# Patient Record
Sex: FEMALE | Race: BLACK OR AFRICAN AMERICAN | Employment: UNEMPLOYED | URBAN - METROPOLITAN AREA
[De-identification: names, ages, dates, MRNs, and addresses within clinical notes are randomized per-mention and may not be internally consistent; named-entity substitution may affect disease eponyms.]

---

## 2018-11-15 ENCOUNTER — APPOINTMENT (OUTPATIENT)
Dept: GENERAL RADIOLOGY | Age: 48
End: 2018-11-15
Payer: COMMERCIAL

## 2018-11-15 ENCOUNTER — APPOINTMENT (OUTPATIENT)
Dept: CT IMAGING | Age: 48
End: 2018-11-15
Payer: COMMERCIAL

## 2018-11-15 ENCOUNTER — HOSPITAL ENCOUNTER (EMERGENCY)
Age: 48
Discharge: HOME | End: 2018-11-15
Payer: COMMERCIAL

## 2018-11-15 VITALS
HEART RATE: 98 BPM | SYSTOLIC BLOOD PRESSURE: 183 MMHG | RESPIRATION RATE: 16 BRPM | DIASTOLIC BLOOD PRESSURE: 108 MMHG | WEIGHT: 250 LBS | OXYGEN SATURATION: 98 % | BODY MASS INDEX: 42.91 KG/M2 | TEMPERATURE: 98.5 F
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
## 2018-11-15 DIAGNOSIS — S13.4XXA WHIPLASH INJURY TO NECK, INITIAL ENCOUNTER: ICD-10-CM

## 2018-11-15 DIAGNOSIS — M25.462 EFFUSION OF LEFT KNEE: ICD-10-CM

## 2018-11-15 PROCEDURE — 73562 X-RAY EXAM OF KNEE 3: CPT

## 2018-11-15 PROCEDURE — 99284 EMERGENCY DEPT VISIT MOD MDM: CPT

## 2018-11-15 PROCEDURE — 72125 CT NECK SPINE W/O DYE: CPT

## 2018-11-15 PROCEDURE — 6370000000 HC RX 637 (ALT 250 FOR IP): Performed by: NURSE PRACTITIONER

## 2018-11-15 PROCEDURE — 70450 CT HEAD/BRAIN W/O DYE: CPT

## 2018-11-15 RX ORDER — NAPROXEN 250 MG/1
500 TABLET ORAL ONCE
Status: COMPLETED | OUTPATIENT
Start: 2018-11-15 | End: 2018-11-15

## 2018-11-15 RX ORDER — NAPROXEN 500 MG/1
500 TABLET ORAL 2 TIMES DAILY WITH MEALS
Qty: 30 TABLET | Refills: 0 | Status: SHIPPED | OUTPATIENT
Start: 2018-11-15 | End: 2019-07-17

## 2018-11-15 RX ORDER — HYDROCODONE BITARTRATE AND ACETAMINOPHEN 5; 325 MG/1; MG/1
1 TABLET ORAL ONCE
Status: DISCONTINUED | OUTPATIENT
Start: 2018-11-15 | End: 2018-11-15

## 2018-11-15 RX ORDER — CYCLOBENZAPRINE HCL 10 MG
10 TABLET ORAL 3 TIMES DAILY PRN
Qty: 20 TABLET | Refills: 0 | Status: SHIPPED | OUTPATIENT
Start: 2018-11-15 | End: 2018-11-22

## 2018-11-15 RX ADMIN — NAPROXEN 500 MG: 250 TABLET ORAL at 17:09

## 2018-11-15 ASSESSMENT — ENCOUNTER SYMPTOMS
VOMITING: 0
ABDOMINAL PAIN: 0
DIARRHEA: 0
CONSTIPATION: 0
RHINORRHEA: 0
SORE THROAT: 0
SHORTNESS OF BREATH: 0
BLOOD IN STOOL: 0
NAUSEA: 0

## 2018-11-15 ASSESSMENT — PAIN SCALES - GENERAL: PAINLEVEL_OUTOF10: 8
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
## 2019-07-17 ENCOUNTER — OFFICE VISIT (OUTPATIENT)
Dept: PRIMARY CARE CLINIC | Age: 49
End: 2019-07-17
Payer: COMMERCIAL

## 2019-07-17 VITALS
SYSTOLIC BLOOD PRESSURE: 172 MMHG | BODY MASS INDEX: 43.19 KG/M2 | DIASTOLIC BLOOD PRESSURE: 115 MMHG | TEMPERATURE: 98.4 F | WEIGHT: 253 LBS | RESPIRATION RATE: 12 BRPM | HEIGHT: 64 IN | OXYGEN SATURATION: 96 % | HEART RATE: 80 BPM

## 2019-07-17 DIAGNOSIS — H60.312 ACUTE DIFFUSE OTITIS EXTERNA OF LEFT EAR: ICD-10-CM

## 2019-07-17 DIAGNOSIS — G89.29 CHRONIC MIDLINE LOW BACK PAIN WITH RIGHT-SIDED SCIATICA: ICD-10-CM

## 2019-07-17 DIAGNOSIS — Z78.9 NO HISTORY OF VACCINATION FOR MEASLES, MUMPS, RUBELLA (MMR): ICD-10-CM

## 2019-07-17 DIAGNOSIS — Z12.39 BREAST CANCER SCREENING: ICD-10-CM

## 2019-07-17 DIAGNOSIS — M54.41 CHRONIC MIDLINE LOW BACK PAIN WITH RIGHT-SIDED SCIATICA: ICD-10-CM

## 2019-07-17 DIAGNOSIS — F33.1 MODERATE EPISODE OF RECURRENT MAJOR DEPRESSIVE DISORDER (HCC): ICD-10-CM

## 2019-07-17 DIAGNOSIS — Z00.00 PREVENTATIVE HEALTH CARE: Primary | ICD-10-CM

## 2019-07-17 DIAGNOSIS — Z00.00 PREVENTATIVE HEALTH CARE: ICD-10-CM

## 2019-07-17 DIAGNOSIS — I10 ESSENTIAL HYPERTENSION: ICD-10-CM

## 2019-07-17 LAB
ALBUMIN SERPL-MCNC: 4.6 G/DL (ref 3.4–5)
ALP BLD-CCNC: 57 U/L (ref 40–129)
ALT SERPL-CCNC: 12 U/L (ref 10–40)
ANION GAP SERPL CALCULATED.3IONS-SCNC: 11 MMOL/L (ref 3–16)
AST SERPL-CCNC: 13 U/L (ref 15–37)
BASOPHILS ABSOLUTE: 0 K/UL (ref 0–0.2)
BASOPHILS RELATIVE PERCENT: 0.3 %
BILIRUB SERPL-MCNC: <0.2 MG/DL (ref 0–1)
BILIRUBIN DIRECT: <0.2 MG/DL (ref 0–0.3)
BILIRUBIN, INDIRECT: ABNORMAL MG/DL (ref 0–1)
BUN BLDV-MCNC: 12 MG/DL (ref 7–20)
CALCIUM SERPL-MCNC: 9.6 MG/DL (ref 8.3–10.6)
CHLORIDE BLD-SCNC: 102 MMOL/L (ref 99–110)
CHOLESTEROL, TOTAL: 166 MG/DL (ref 0–199)
CO2: 28 MMOL/L (ref 21–32)
CREAT SERPL-MCNC: 0.7 MG/DL (ref 0.6–1.1)
EOSINOPHILS ABSOLUTE: 0 K/UL (ref 0–0.6)
EOSINOPHILS RELATIVE PERCENT: 1.3 %
GFR AFRICAN AMERICAN: >60
GFR NON-AFRICAN AMERICAN: >60
GLUCOSE BLD-MCNC: 101 MG/DL (ref 70–99)
HCT VFR BLD CALC: 32.4 % (ref 36–48)
HDLC SERPL-MCNC: 54 MG/DL (ref 40–60)
HEMOGLOBIN: 10.4 G/DL (ref 12–16)
LDL CHOLESTEROL CALCULATED: 97 MG/DL
LYMPHOCYTES ABSOLUTE: 1.6 K/UL (ref 1–5.1)
LYMPHOCYTES RELATIVE PERCENT: 40.5 %
MCH RBC QN AUTO: 28.1 PG (ref 26–34)
MCHC RBC AUTO-ENTMCNC: 32.1 G/DL (ref 31–36)
MCV RBC AUTO: 87.5 FL (ref 80–100)
MONOCYTES ABSOLUTE: 0.2 K/UL (ref 0–1.3)
MONOCYTES RELATIVE PERCENT: 6.3 %
NEUTROPHILS ABSOLUTE: 2 K/UL (ref 1.7–7.7)
NEUTROPHILS RELATIVE PERCENT: 51.6 %
PDW BLD-RTO: 18.1 % (ref 12.4–15.4)
PHOSPHORUS: 3.7 MG/DL (ref 2.5–4.9)
PLATELET # BLD: 267 K/UL (ref 135–450)
PMV BLD AUTO: 7.6 FL (ref 5–10.5)
POTASSIUM SERPL-SCNC: 4.2 MMOL/L (ref 3.5–5.1)
RBC # BLD: 3.7 M/UL (ref 4–5.2)
RUBELLA ANTIBODY IGG: 16.9 IU/ML
SODIUM BLD-SCNC: 141 MMOL/L (ref 136–145)
TOTAL PROTEIN: 8.5 G/DL (ref 6.4–8.2)
TRIGL SERPL-MCNC: 74 MG/DL (ref 0–150)
VLDLC SERPL CALC-MCNC: 15 MG/DL
WBC # BLD: 3.9 K/UL (ref 4–11)

## 2019-07-17 PROCEDURE — 96160 PT-FOCUSED HLTH RISK ASSMT: CPT | Performed by: INTERNAL MEDICINE

## 2019-07-17 PROCEDURE — 99386 PREV VISIT NEW AGE 40-64: CPT | Performed by: INTERNAL MEDICINE

## 2019-07-17 RX ORDER — CYCLOBENZAPRINE HCL 5 MG
5 TABLET ORAL 2 TIMES DAILY PRN
Qty: 60 TABLET | Refills: 2 | Status: SHIPPED | OUTPATIENT
Start: 2019-07-17 | End: 2019-10-16 | Stop reason: SDUPTHER

## 2019-07-17 RX ORDER — AMLODIPINE BESYLATE 5 MG/1
5 TABLET ORAL DAILY
Qty: 30 TABLET | Refills: 5 | Status: SHIPPED | OUTPATIENT
Start: 2019-07-17 | End: 2020-02-16

## 2019-07-17 RX ORDER — NEOMYCIN SULFATE, POLYMYXIN B SULFATE AND HYDROCORTISONE 10; 3.5; 1 MG/ML; MG/ML; [USP'U]/ML
3 SUSPENSION/ DROPS AURICULAR (OTIC) 4 TIMES DAILY
Qty: 10 ML | Refills: 0 | Status: SHIPPED | OUTPATIENT
Start: 2019-07-17 | End: 2019-07-27

## 2019-07-17 RX ORDER — HYDROCHLOROTHIAZIDE 25 MG/1
25 TABLET ORAL EVERY MORNING
Qty: 30 TABLET | Refills: 5 | Status: SHIPPED | OUTPATIENT
Start: 2019-07-17 | End: 2020-02-16

## 2019-07-17 RX ORDER — GABAPENTIN 300 MG/1
300 CAPSULE ORAL 3 TIMES DAILY PRN
Qty: 60 CAPSULE | Refills: 5 | Status: SHIPPED | OUTPATIENT
Start: 2019-07-17 | End: 2020-02-16

## 2019-07-17 RX ORDER — SERTRALINE HYDROCHLORIDE 25 MG/1
25 TABLET, FILM COATED ORAL DAILY
Qty: 30 TABLET | Refills: 5 | Status: SHIPPED | OUTPATIENT
Start: 2019-07-17 | End: 2020-02-16

## 2019-07-17 SDOH — HEALTH STABILITY: MENTAL HEALTH: HOW OFTEN DO YOU HAVE A DRINK CONTAINING ALCOHOL?: MONTHLY OR LESS

## 2019-07-17 ASSESSMENT — PATIENT HEALTH QUESTIONNAIRE - PHQ9
6. FEELING BAD ABOUT YOURSELF - OR THAT YOU ARE A FAILURE OR HAVE LET YOURSELF OR YOUR FAMILY DOWN: 3
SUM OF ALL RESPONSES TO PHQ9 QUESTIONS 1 & 2: 6
7. TROUBLE CONCENTRATING ON THINGS, SUCH AS READING THE NEWSPAPER OR WATCHING TELEVISION: 3
2. FEELING DOWN, DEPRESSED OR HOPELESS: 3
8. MOVING OR SPEAKING SO SLOWLY THAT OTHER PEOPLE COULD HAVE NOTICED. OR THE OPPOSITE, BEING SO FIGETY OR RESTLESS THAT YOU HAVE BEEN MOVING AROUND A LOT MORE THAN USUAL: 0
3. TROUBLE FALLING OR STAYING ASLEEP: 2
1. LITTLE INTEREST OR PLEASURE IN DOING THINGS: 3
10. IF YOU CHECKED OFF ANY PROBLEMS, HOW DIFFICULT HAVE THESE PROBLEMS MADE IT FOR YOU TO DO YOUR WORK, TAKE CARE OF THINGS AT HOME, OR GET ALONG WITH OTHER PEOPLE: 2
SUM OF ALL RESPONSES TO PHQ QUESTIONS 1-9: 16
5. POOR APPETITE OR OVEREATING: 1
4. FEELING TIRED OR HAVING LITTLE ENERGY: 1
SUM OF ALL RESPONSES TO PHQ QUESTIONS 1-9: 16
9. THOUGHTS THAT YOU WOULD BE BETTER OFF DEAD, OR OF HURTING YOURSELF: 0

## 2019-07-17 ASSESSMENT — ENCOUNTER SYMPTOMS
SHORTNESS OF BREATH: 0
NAUSEA: 0
ABDOMINAL PAIN: 0
DIARRHEA: 0
COUGH: 0
CONSTIPATION: 0
VOMITING: 0
BACK PAIN: 1

## 2019-07-17 NOTE — PROGRESS NOTES
Hemoglobin A1C; Future  - Lipid Panel; Future  - HIV Screen; Future  - Hepatic Function Panel; Future  - Renal Function Panel; Future    2. Essential hypertension:  Goal BP less than 140/90. Pt's BP is very high today. Will start HCTZ and amlodipine for treatment   - hydrochlorothiazide (HYDRODIURIL) 25 MG tablet; Take 1 tablet by mouth every morning  Dispense: 30 tablet; Refill: 5  - amLODIPine (NORVASC) 5 MG tablet; Take 1 tablet by mouth daily  Dispense: 30 tablet; Refill: 5    3. Chronic midline low back pain with right-sided sciatica    - gabapentin (NEURONTIN) 300 MG capsule; Take 1 capsule by mouth 3 times daily as needed (pain) for up to 180 days. Intended supply: 30 days  Dispense: 60 capsule; Refill: 5  - cyclobenzaprine (FLEXERIL) 5 MG tablet; Take 1 tablet by mouth 2 times daily as needed for Muscle spasms  Dispense: 60 tablet; Refill: 2  - [facility]    4. Moderate episode of recurrent major depressive disorder (HCC)    - sertraline (ZOLOFT) 25 MG tablet; Take 1 tablet by mouth daily  Dispense: 30 tablet; Refill: 5  - External Referral To Psychology    5. Breast cancer screening    - [facility] DIGITAL SCREEN W OR WO CAD BILATERAL; Future    6. Acute diffuse otitis externa of left ear    - neomycin-polymyxin-hydrocortisone (CORTISPORIN) 3.5-27235-9 otic suspension; Place 3 drops into the right ear 4 times daily for 10 days  Dispense: 10 mL; Refill: 0    7. No history of vaccination for measles, mumps, rubella (MMR)    - RUBEOLA ANTIBODY IGG; Future  - MUMPS ANTIBODY, IGG; Future  - RUBELLA ANTIBODY, IGG; Future        Return in about 1 month (around 8/17/2019) for hypertension, depression .

## 2019-07-17 NOTE — LETTER
[facility]  [id number]  Phone: [phone]  Fax: [phone]    Jaz Asher MD        July 17, 2019     Patient: Alton Miller   YOB: 1970   Date of Visit: 7/17/2019       To Whom it May Concern:    Noa Trejo was seen in my clinic on 7/17/2019. She is currently waiting on blood work to demonstrate vaccination against measles, mumps, and rubella. Her paperwork will be completed as soon as the test results return. If you have any questions or concerns, please don't hesitate to call.     Sincerely,         Jaz Asher MD

## 2019-07-17 NOTE — PATIENT INSTRUCTIONS
your body. ? Sudden vision changes. ? Sudden trouble speaking. ? Sudden confusion or trouble understanding simple statements. ? Sudden problems with walking or balance. ? A sudden, severe headache that is different from past headaches.     · You have severe back or belly pain.    Do not wait until your blood pressure comes down on its own. Get help right away.   Call your doctor now or seek immediate care if:    · Your blood pressure is much higher than normal (such as 180/120 or higher), but you don't have symptoms.     · You think high blood pressure is causing symptoms, such as:  ? Severe headache.  ? Blurry vision.    Watch closely for changes in your health, and be sure to contact your doctor if:    · Your blood pressure measures higher than your doctor recommends at least 2 times. That means the top number is higher or the bottom number is higher, or both.     · You think you may be having side effects from your blood pressure medicine. Where can you learn more? Go to https://[employer]. org and sign in to your [employer] account. Enter I388 in the [employer] box to learn more about \"High Blood Pressure: Care Instructions. \"     If you do not have an account, please click on the \"Sign Up Now\" link. Current as of: July 22, 2018  Content Version: 12.0  © 7257-5047 Healthwise, Incorporated. Care instructions adapted under license by [facility] ([facility]). If you have questions about a medical condition or this instruction, always ask your healthcare professional. [place] [address] any warranty or liability for your use of this information. Patient Education        Low Sodium Diet (2,000 Milligram): Care Instructions  Your Care Instructions    Too much sodium causes your body to hold on to extra water. This can raise your blood pressure and force your heart and kidneys to work harder.  In very serious cases, this could cause you to be put in the hospital. It might even be life-threatening. By limiting sodium, you will feel better and lower your risk of serious problems. The most common source of sodium is salt. People get most of the salt in their diet from canned, prepared, and packaged foods. Fast food and restaurant meals also are very high in sodium. Your doctor will probably limit your sodium to less than 2,000 milligrams (mg) a day. This limit counts all the sodium in prepared and packaged foods and any salt you add to your food. Follow-up care is a key part of your treatment and safety. Be sure to make and go to all appointments, and call your doctor if you are having problems. It's also a good idea to know your test results and keep a list of the medicines you take. How can you care for yourself at home? Read food labels  · Read labels on cans and food packages. The labels tell you how much sodium is in each serving. Make sure that you look at the serving size. If you eat more than the serving size, you have eaten more sodium. · Food labels also tell you the Percent Daily Value for sodium. Choose products with low Percent Daily Values for sodium. · Be aware that sodium can come in forms other than salt, including monosodium glutamate (MSG), sodium citrate, and sodium bicarbonate (baking soda). MSG is often added to Asian food. When you eat out, you can sometimes ask for food without MSG or added salt. Buy low-sodium foods  · Buy foods that are labeled \"unsalted\" (no salt added), \"sodium-free\" (less than 5 mg of sodium per serving), or \"low-sodium\" (less than 140 mg of sodium per serving). Foods labeled \"reduced-sodium\" and \"light sodium\" may still have too much sodium. Be sure to read the label to see how much sodium you are getting. · Buy fresh vegetables, or frozen vegetables without added sauces. Buy low-sodium versions of canned vegetables, soups, and other canned goods. Prepare low-sodium meals  · Cut back on the amount of salt you use in cooking.  This Milligram): Care Instructions. \"     If you do not have an account, please click on the \"Sign Up Now\" link. Current as of: November 7, 2018  Content Version: 12.0  © 7521-9756 Healthwise, Incorporated. Care instructions adapted under license by [facility] ([facility]). If you have questions about a medical condition or this instruction, always ask your healthcare professional. [address] any warranty or liability for your use of this information.

## 2019-07-18 LAB
ESTIMATED AVERAGE GLUCOSE: 134.1 MG/DL
HBA1C MFR BLD: 6.3 %
HIV AG/AB: NORMAL
HIV ANTIGEN: NORMAL
HIV-1 ANTIBODY: NORMAL
HIV-2 AB: NORMAL

## 2019-07-19 LAB
MUV IGG SER QL: 8.2 AU/ML
RUBEOLA (MEASLES) AB IGG: >300 AU/ML

## 2019-07-22 DIAGNOSIS — R73.03 PREDIABETES: Primary | ICD-10-CM

## 2019-07-22 DIAGNOSIS — D50.9 IRON DEFICIENCY ANEMIA, UNSPECIFIED IRON DEFICIENCY ANEMIA TYPE: ICD-10-CM

## 2019-07-22 RX ORDER — LANOLIN ALCOHOL/MO/W.PET/CERES
325 CREAM (GRAM) TOPICAL
Qty: 30 TABLET | Refills: 5 | Status: SHIPPED | OUTPATIENT
Start: 2019-07-22 | End: 2020-09-15

## 2019-08-29 ENCOUNTER — TELEPHONE (OUTPATIENT)
Dept: PRIMARY CARE CLINIC | Age: 49
End: 2019-08-29

## 2019-08-29 NOTE — TELEPHONE ENCOUNTER
Tried to call and leave msg no voice mail set up. Patient needs MMR per nurse visit to be made so we can complete  Employee Medical Statement paper that's been scanned into chart.

## 2019-10-16 DIAGNOSIS — M54.41 CHRONIC MIDLINE LOW BACK PAIN WITH RIGHT-SIDED SCIATICA: ICD-10-CM

## 2019-10-16 DIAGNOSIS — G89.29 CHRONIC MIDLINE LOW BACK PAIN WITH RIGHT-SIDED SCIATICA: ICD-10-CM

## 2019-10-16 RX ORDER — CYCLOBENZAPRINE HCL 5 MG
TABLET ORAL
Qty: 60 TABLET | Refills: 0 | Status: SHIPPED | OUTPATIENT
Start: 2019-10-16 | End: 2019-11-15 | Stop reason: SDUPTHER

## 2019-11-15 DIAGNOSIS — M54.41 CHRONIC MIDLINE LOW BACK PAIN WITH RIGHT-SIDED SCIATICA: ICD-10-CM

## 2019-11-15 DIAGNOSIS — G89.29 CHRONIC MIDLINE LOW BACK PAIN WITH RIGHT-SIDED SCIATICA: ICD-10-CM

## 2019-11-18 RX ORDER — CYCLOBENZAPRINE HCL 5 MG
TABLET ORAL
Qty: 60 TABLET | Refills: 0 | Status: SHIPPED | OUTPATIENT
Start: 2019-11-18 | End: 2019-12-15 | Stop reason: SDUPTHER

## 2020-02-16 RX ORDER — GABAPENTIN 300 MG/1
CAPSULE ORAL
Qty: 60 CAPSULE | Refills: 5 | Status: SHIPPED | OUTPATIENT
Start: 2020-02-16 | End: 2020-09-15 | Stop reason: SDUPTHER

## 2020-02-16 RX ORDER — HYDROCHLOROTHIAZIDE 25 MG/1
25 TABLET ORAL EVERY MORNING
Qty: 30 TABLET | Refills: 5 | Status: SHIPPED | OUTPATIENT
Start: 2020-02-16 | End: 2020-09-15 | Stop reason: SDUPTHER

## 2020-02-16 RX ORDER — SERTRALINE HYDROCHLORIDE 25 MG/1
25 TABLET, FILM COATED ORAL DAILY
Qty: 30 TABLET | Refills: 5 | Status: SHIPPED | OUTPATIENT
Start: 2020-02-16 | End: 2020-09-15

## 2020-02-16 RX ORDER — AMLODIPINE BESYLATE 5 MG/1
5 TABLET ORAL DAILY
Qty: 30 TABLET | Refills: 5 | Status: SHIPPED | OUTPATIENT
Start: 2020-02-16 | End: 2020-09-15 | Stop reason: SDUPTHER

## 2020-09-14 PROBLEM — E66.01 MORBIDLY OBESE (HCC): Status: ACTIVE | Noted: 2020-09-14

## 2020-09-14 NOTE — PROGRESS NOTES
Date: 9/15/2020                                               Subjective/Objective:     Chief Complaint   Patient presents with   Lulú Dickerson New Doctor    Foot Pain     left gets stuck and she has manually move it       Former patient of Dr João Akers. Here to establish care with new provider. Last OV with Dr João Akers 7/17/2020. Works as  at [employer]. Hasn't taken any medications in over a week. Just forgets. Has been left foot cramping. In the morning, sometimes during the day. Started a few weeks ago. Is uncomfortable. Just doesn't seem to want to bend back into position (her toes). Did take her muscle relaxer today and isn't having the problem. - Hypertension:  Current treatment - HCTZ and norvasc. Home blood pressure monitoring: no. Check at the grocery store sometimes - denies highs. She is adherent to a low sodium diet. Patient denies chest pain, shortness of breath and headache. Antihypertensive medication side effects: no medication side effects noted. Use of agents associated with hypertension: none. Needs labs, will Rx one month of meds pending labs. Sodium (mmol/L)       Date                     Value                 07/17/2019               141              ---------- BUN (mg/dL)       Date                     Value                 07/17/2019               12               ---------- Glucose (mg/dL)       Date                     Value                 07/17/2019               101 (H)          ----------  Potassium (mmol/L)       Date                     Value                 07/17/2019               4.2              ---------- CREATININE (mg/dL)       Date                     Value                 07/17/2019               0.7              ----------     - Mood Disorder:  Patient presents for follow-up of depression and anxiety disorder. Current complaints include: tearfulness and excessive worry. She denies any other symptoms. Symptoms/signs of elvis: none. External stressors: housing issues. Previous treatment - Zoloft - stopped because 25 mg wasn't helping. Previously took 100 mg and felt well on this. Medication side effects: none. PHQ-9 in office today c/w moderate depression. Son is in skilled nursing - sentenced to 8 years - hasn't been able to visit due to COVID, is able to talk to him on the phone every day. Tearful in office when discussing this. - Prediabetes: A1C 6.3 (7/2019). Diet-drinks soda - has cut back - drinks regular, drinks juice occasionally. Diet is poor. Exercise-no regular. Denies polys. - Anemia: Isn't taking her Iron pills. Gets constipated when she takes them. Hasn't had occult stool testing to my knowledge. - Chronic low back pain: taking Gabapentin. Started many years ago, was playing with her children. Radiates down right leg. Gabapentin helps. Has not had injections. Has not seen specialist.  Has not done PT.     - Obesity: diet is poor, no regular exercise. Age/Gender Health Maintenance     Lipid - nl 7/2019  Colon Cancer Screening - needs. Denies FH  Never smoker      Tetanus - UTD, received 9/2017  Influenza Vaccine - needs - give in clinic today   Shingrix - needs   Needs MMR (not immune to mumps) - give dose 1 today      HIV Screening - non reactive 2019  Hep C Screening- needs, ordered     Breast Cancer Screening - last mammo 2016. Needs. Denies   Cervical Cancer Screening - s/p hysterectomy (unsure of cervix status).  Sees gyn at [address] [place] [address].                   Patient Active Problem List    Diagnosis Date Noted    Morbidly obese ([address] 09/14/2020    Prediabetes 07/22/2019    Iron deficiency anemia 07/22/2019    Essential hypertension 07/17/2019    Chronic midline low back pain with right-sided sciatica 07/17/2019    Moderate episode of recurrent major depressive disorder ([address] 07/17/2019    Acute diffuse otitis externa of left ear 07/17/2019    Other specified disorders of rotator cuff syndrome of shoulder and allied disorders 05/26/2009    Osteoarthritis of shoulder 03/17/2009    Obesity 06/16/2008       Past Medical History:   Diagnosis Date    Anxiety     Chronic back pain     Depression     Hypertension     Prediabetes        Past Surgical History:   Procedure Laterality Date    HYSTERECTOMY      SHOULDER SURGERY         Orders Only on 07/17/2019   Component Date Value Ref Range Status    Rubella Antibody IgG 07/17/2019 16.9  IU/mL Final    Comment: Default Normal Ranges    >=10 Presumed Immune  <10  Presumed Not immune    The following results were obtained with [employer] Rubella IgG  assay. Results from assays of other manufacturers cannot be used  interchangeably.  Mumps IgG 07/17/2019 8.2  AU/mL Final    Comment: INTERPRETIVE INFORMATION: Mumps Ab, IgG by EDUIN    8.9 AU/mL or less . ... Negative - No significant level of                           detectable IgG mumps virus antibody    9.0-10.9 AU/mL . ...... Equivocal - Repeat testing in 10-14                           days may be helpful    11.0 AU/mL or greater: Positive - IgG antibody to mumps                           virus detected, which may indicate                           a current or past exposure/                           immunization to mumps virus. The best evidence for current infection is a significant change on two  appropriately timed specimens, where both tests are done in the same  laboratory at the same time. Performed by [place] [address], [address] [facility] [phone]  www. Chepe Zepeda MD - Lab. Director      Rubeola (Measles) Ab IgG 07/17/2019 >300.0  AU/mL Final    Comment: INTERPRETIVE INFORMATION: Measles (Rubeola) Antibody, IgG    24.9 AU/mL or less. ....... Negative - No significant level                               of detectable measles (rubeola)                               IgG antibody. 25.0-29.9 AU/mL . .........  Equivocal - Repeat testing in 10-14 days may be helpful. 30.0 AU/mL or greater . ... Positive - IgG antibody to                               measles (rubeola) detected                               which may indicate a current                               or past exposure/immunization                               to measles (rubeola). The best evidence for current infection is a significant change on two  appropriately timed specimens, where both tests are done in the same  laboratory at the same time. Performed by [facility] [place]  [place] [address], [address] [facility] [phone]  www. Jevon Kam MD - Lab. Director      Sodium 07/17/2019 141  136 - 145 mmol/L Final    Potassium 07/17/2019 4.2  3.5 - 5.1 mmol/L Final    Chloride 07/17/2019 102  99 - 110 mmol/L Final    CO2 07/17/2019 28  21 - 32 mmol/L Final    Anion Gap 07/17/2019 11  3 - 16 Final    Glucose 07/17/2019 101* 70 - 99 mg/dL Final    BUN 07/17/2019 12  7 - 20 mg/dL Final    CREATININE 07/17/2019 0.7  0.6 - 1.1 mg/dL Final    GFR Non- 07/17/2019 >60  >60 Final    Comment: >60 mL/min/1.73m2 EGFR, calc. for ages 25 and older using the  MDRD formula (not corrected for weight), is valid for stable  renal function.  GFR  07/17/2019 >60  >60 Final    Comment: Chronic Kidney Disease: less than 60 ml/min/1.73 sq.m. Kidney Failure: less than 15 ml/min/1.73 sq.m. Results valid for patients 18 years and older.       Calcium 07/17/2019 9.6  8.3 - 10.6 mg/dL Final    Phosphorus 07/17/2019 3.7  2.5 - 4.9 mg/dL Final    Alb 07/17/2019 4.6  3.4 - 5.0 g/dL Final    Total Protein 07/17/2019 8.5* 6.4 - 8.2 g/dL Final    Alkaline Phosphatase 07/17/2019 57  40 - 129 U/L Final    ALT 07/17/2019 12  10 - 40 U/L Final    AST 07/17/2019 13* 15 - 37 U/L Final    Total Bilirubin 07/17/2019 <0.2  0.0 - 1.0 mg/dL Final    Bilirubin, Direct 07/17/2019 <0.2  0.0 - 0.3 mg/dL Final    Bilirubin, Indirect 07/17/2019 see below  0.0 - 1.0 mg/dL Final    Comment: Indirect Bilirubin cannot be calculated since Total Bilirubin  and/or Direct Bilirubin is below measurable range.       HIV Ag/Ab 07/17/2019 Non-Reactive  Non-reactive Final    HIV-1 Antibody 07/17/2019 Non-Reactive  Non-reactive Final    HIV ANTIGEN 07/17/2019 Non-Reactive  Non-reactive Final    HIV-2 Ab 07/17/2019 Non-Reactive  Non-reactive Final    Cholesterol, Total 07/17/2019 166  0 - 199 mg/dL Final    Triglycerides 07/17/2019 74  0 - 150 mg/dL Final    HDL 07/17/2019 54  40 - 60 mg/dL Final    LDL Calculated 07/17/2019 97  <100 mg/dL Final    VLDL Cholesterol Calculated 07/17/2019 15  Not Established mg/dL Final    Hemoglobin A1C 07/17/2019 6.3  See comment % Final    Comment: Comment:  Diagnosis of Diabetes: > or = 6.5%  Increased risk of diabetes (Prediabetes): 5.7-6.4%  Glycemic Control: Nonpregnant Adults: <7.0%                    Pregnant: <6.0%        eAG 07/17/2019 134.1  mg/dL Final    WBC 07/17/2019 3.9* 4.0 - 11.0 K/uL Final    RBC 07/17/2019 3.70* 4.00 - 5.20 M/uL Final    Hemoglobin 07/17/2019 10.4* 12.0 - 16.0 g/dL Final    Hematocrit 07/17/2019 32.4* 36.0 - 48.0 % Final    MCV 07/17/2019 87.5  80.0 - 100.0 fL Final    MCH 07/17/2019 28.1  26.0 - 34.0 pg Final    MCHC 07/17/2019 32.1  31.0 - 36.0 g/dL Final    RDW 07/17/2019 18.1* 12.4 - 15.4 % Final    Platelets 60/44/2466 267  135 - 450 K/uL Final    MPV 07/17/2019 7.6  5.0 - 10.5 fL Final    Neutrophils % 07/17/2019 51.6  % Final    Lymphocytes % 07/17/2019 40.5  % Final    Monocytes % 07/17/2019 6.3  % Final    Eosinophils % 07/17/2019 1.3  % Final    Basophils % 07/17/2019 0.3  % Final    Neutrophils Absolute 07/17/2019 2.0  1.7 - 7.7 K/uL Final    Lymphocytes Absolute 07/17/2019 1.6  1.0 - 5.1 K/uL Final    Monocytes Absolute 07/17/2019 0.2  0.0 - 1.3 K/uL Final    Eosinophils Absolute 07/17/2019 0.0  0.0 - 0.6 K/uL Final    Basophils Absolute 07/17/2019 0.0  0.0 - 0.2 K/uL Final       Family History   Problem Relation Age of Onset    Hypertension Mother     Diabetes Father     Hypertension Father     Cancer Father         leukemia    Heart Attack Father     Cancer Maternal Grandmother         liver       Current Outpatient Medications   Medication Sig Dispense Refill    zoster recombinant adjuvanted vaccine (SHINGRIX) 50 MCG/0.5ML SUSR injection Inject 0.5 mLs into the muscle See Admin Instructions 1 dose now and repeat in 2-6 months 0.5 mL 0    amLODIPine (NORVASC) 5 MG tablet Take 1 tablet by mouth daily 30 tablet 0    cyclobenzaprine (FLEXERIL) 5 MG tablet TAKE 1 TABLET BY MOUTH TWICE DAILY AS NEEDED FOR MUSCLE SPASMS 60 tablet 2    gabapentin (NEURONTIN) 300 MG capsule TAKE ONE CAPSULE BY MOUTH THREE TIMES DAILY AS NEEDED FOR PAIN 60 capsule 5    hydroCHLOROthiazide (HYDRODIURIL) 25 MG tablet Take 1 tablet by mouth every morning 30 tablet 0    sertraline (ZOLOFT) 50 MG tablet Take 0.5 tablets by mouth daily 30 tablet 5     No current facility-administered medications for this visit. No Known Allergies    Review of Systems   Constitutional: Negative for activity change, chills and fever. HENT: Negative for congestion and sore throat. Respiratory: Negative for cough and shortness of breath. Cardiovascular: Negative for chest pain and leg swelling. Gastrointestinal: Negative for abdominal distention, abdominal pain, constipation, diarrhea, nausea and vomiting. Endocrine: Negative for polydipsia, polyphagia and polyuria. Genitourinary: Negative for difficulty urinating. Musculoskeletal: Positive for back pain. Negative for arthralgias and myalgias. Chronic low back pain. Left foot cramping and getting stuck   Neurological: Negative for dizziness, weakness and headaches. Psychiatric/Behavioral: Negative for dysphoric mood and sleep disturbance. The patient is not nervous/anxious.     All other systems reviewed and are negative. Vitals:  /78   Pulse 77   Temp 97.9 °F (36.6 °C) (Temporal)   Ht 5' 4\" (1.626 m)   Wt 262 lb (118.8 kg)   BMI 44.97 kg/m²     Physical Exam  Vitals signs reviewed. Constitutional:       General: She is not in acute distress. Appearance: Normal appearance. She is obese. She is not ill-appearing. HENT:      Head: Normocephalic and atraumatic. Right Ear: Tympanic membrane, ear canal and external ear normal.      Left Ear: Tympanic membrane, ear canal and external ear normal.      Nose: Nose normal.      Mouth/Throat:      Mouth: Mucous membranes are moist.   Eyes:      Conjunctiva/sclera: Conjunctivae normal.      Pupils: Pupils are equal, round, and reactive to light. Neck:      Musculoskeletal: Normal range of motion and neck supple. Thyroid: No thyromegaly. Vascular: No carotid bruit. Cardiovascular:      Rate and Rhythm: Normal rate and regular rhythm. Pulses: Normal pulses. Heart sounds: Normal heart sounds. No murmur. Pulmonary:      Effort: Pulmonary effort is normal.      Breath sounds: Normal breath sounds. Abdominal:      General: Bowel sounds are normal. There is no distension. Palpations: Abdomen is soft. Tenderness: There is no abdominal tenderness. Musculoskeletal: Normal range of motion. General: No swelling or signs of injury. Right lower leg: No edema. Left lower leg: No edema. Lymphadenopathy:      Cervical: No cervical adenopathy. Skin:     General: Skin is warm and dry. Findings: No rash. Neurological:      General: No focal deficit present. Mental Status: She is alert and oriented to person, place, and time. Mental status is at baseline. Psychiatric:         Mood and Affect: Mood normal.         Behavior: Behavior normal.         Thought Content: Thought content normal.         Judgment: Judgment normal.         Assessment/Plan     1.  Moderate episode of recurrent major depressive disorder ([facility]): start zoloft at 1/2 tab (25 mg). She will schedule VV in a few weeks, with plans to increase dose if indicated. Previously felt well on 100 mg. Would also benefit from therapist.   - sertraline (ZOLOFT) 50 MG tablet; Take 0.5 tablets by mouth daily  Dispense: 30 tablet; Refill: 5    2. Anxiety:  - sertraline (ZOLOFT) 50 MG tablet; Take 0.5 tablets by mouth daily  Dispense: 30 tablet; Refill: 5    3. Morbidly obese ([facility]): counseled on diet, exercise. 4. Essential hypertension: continue meds, needs labs. Rx one month pending blood work. Advised to take medications regularly. - amLODIPine (NORVASC) 5 MG tablet; Take 1 tablet by mouth daily  Dispense: 30 tablet; Refill: 0  - hydroCHLOROthiazide (HYDRODIURIL) 25 MG tablet; Take 1 tablet by mouth every morning  Dispense: 30 tablet; Refill: 0  - RENAL FUNCTION PANEL; Future    5. Prediabetes: counseled on diet, exercise.   - HEMOGLOBIN A1C; Future    6. Iron deficiency anemia, unspecified iron deficiency anemia type: no longer taking PO iron.   - BLOOD OCCULT STOOL #1; Future  - CBC WITH AUTO DIFFERENTIAL; Future  - IRON AND TIBC; Future  - FERRITIN; Future    7. Cramping of feet: if doesn't resolve/worsens will schedule with podiatry.   - RENAL FUNCTION PANEL; Future  - Lauren Rahman, DPM, [facility], [facility]    8. Chronic midline low back pain with right-sided sciatica: would like to see specialist   - cyclobenzaprine (FLEXERIL) 5 MG tablet; TAKE 1 TABLET BY MOUTH TWICE DAILY AS NEEDED FOR MUSCLE SPASMS  Dispense: 60 tablet; Refill: 2  - gabapentin (NEURONTIN) 300 MG capsule; TAKE ONE CAPSULE BY MOUTH THREE TIMES DAILY AS NEEDED FOR PAIN  Dispense: 60 capsule; Refill: 5  - AFL - Don Madrigal MD, Spine Surgery DEPARTMENT OF [facility], [facility]    9. Need for shingles vaccine: go to pharmacy for vaccine  - zoster recombinant adjuvanted vaccine [facility]) 50 MCG/0.5ML SUSR injection;  Inject 0.5 mLs into the muscle See Admin Instructions 1 dose now and repeat in 2-6 months  Dispense: 0.5 mL; Refill: 0    10. Need for influenza vaccination  - INFLUENZA, QUADV, 3 YRS AND OLDER, IM PF, PREFILL SYR OR SDV, 0.5ML (AFLURIA QUADV, PF) - given in clinic today      11. Screening for malignant neoplasm of breast  - GABI DIGITAL SCREEN W OR WO CAD BILATERAL; Future    12. Need for MMR vaccine: not immune to mumps. Given in clinic today. - MMR vaccine subcutaneous    13. Healthcare maintenance  - RENAL FUNCTION PANEL; Future  - HEPATITIS C ANTIBODY; Future  - MICHELA Riojas MD, Gastroenterology, [facility]  - TSH with Reflex; Future      Orders Placed This Encounter   Procedures    GABI DIGITAL SCREEN W OR WO CAD BILATERAL     Standing Status:   Future     Standing Expiration Date:   11/14/2021     Order Specific Question:   Reason for exam:     Answer:   breast cancer screening    INFLUENZA, QUADV, 3 YRS AND OLDER, IM PF, PREFILL SYR OR SDV, 0.5ML (AFLURIA QUADV, PF)    MMR vaccine subcutaneous    RENAL FUNCTION PANEL     Standing Status:   Future     Standing Expiration Date:   9/14/2021    HEMOGLOBIN A1C     Standing Status:   Future     Standing Expiration Date:   9/14/2021    BLOOD OCCULT STOOL #1     Standing Status:   Future     Standing Expiration Date:   9/14/2021    CBC WITH AUTO DIFFERENTIAL     Standing Status:   Future     Standing Expiration Date:   9/14/2021    IRON AND TIBC     Standing Status:   Future     Standing Expiration Date:   9/14/2021     Order Specific Question:   Is Patient Fasting? Answer:   yes     Order Specific Question:   No of Hours?      Answer:   8    FERRITIN     Standing Status:   Future     Standing Expiration Date:   9/14/2021    HEPATITIS C ANTIBODY     Standing Status:   Future     Standing Expiration Date:   9/14/2021    TSH with Reflex     Standing Status:   Future     Standing Expiration Date:   9/15/2021    MICHELA Riojas MD, Gastroenterology, [facility]

## 2020-09-15 ENCOUNTER — OFFICE VISIT (OUTPATIENT)
Dept: PRIMARY CARE CLINIC | Age: 50
End: 2020-09-15
Payer: COMMERCIAL

## 2020-09-15 VITALS
WEIGHT: 262 LBS | DIASTOLIC BLOOD PRESSURE: 78 MMHG | HEIGHT: 64 IN | BODY MASS INDEX: 44.73 KG/M2 | HEART RATE: 77 BPM | SYSTOLIC BLOOD PRESSURE: 139 MMHG | TEMPERATURE: 97.9 F

## 2020-09-15 PROCEDURE — 99214 OFFICE O/P EST MOD 30 MIN: CPT | Performed by: NURSE PRACTITIONER

## 2020-09-15 PROCEDURE — 90707 MMR VACCINE SC: CPT | Performed by: NURSE PRACTITIONER

## 2020-09-15 PROCEDURE — G8431 POS CLIN DEPRES SCRN F/U DOC: HCPCS | Performed by: NURSE PRACTITIONER

## 2020-09-15 PROCEDURE — 90472 IMMUNIZATION ADMIN EACH ADD: CPT | Performed by: NURSE PRACTITIONER

## 2020-09-15 PROCEDURE — 90471 IMMUNIZATION ADMIN: CPT | Performed by: NURSE PRACTITIONER

## 2020-09-15 PROCEDURE — 90686 IIV4 VACC NO PRSV 0.5 ML IM: CPT | Performed by: NURSE PRACTITIONER

## 2020-09-15 RX ORDER — GABAPENTIN 300 MG/1
CAPSULE ORAL
Qty: 60 CAPSULE | Refills: 5 | Status: SHIPPED | OUTPATIENT
Start: 2020-09-15 | End: 2021-03-19 | Stop reason: SDUPTHER

## 2020-09-15 RX ORDER — ZOSTER VACCINE RECOMBINANT, ADJUVANTED 50 MCG/0.5
0.5 KIT INTRAMUSCULAR SEE ADMIN INSTRUCTIONS
Qty: 0.5 ML | Refills: 0 | Status: SHIPPED | OUTPATIENT
Start: 2020-09-15 | End: 2021-03-14

## 2020-09-15 RX ORDER — HYDROCHLOROTHIAZIDE 25 MG/1
25 TABLET ORAL EVERY MORNING
Qty: 30 TABLET | Refills: 0 | Status: SHIPPED | OUTPATIENT
Start: 2020-09-15 | End: 2021-03-19 | Stop reason: SDUPTHER

## 2020-09-15 RX ORDER — AMLODIPINE BESYLATE 5 MG/1
5 TABLET ORAL DAILY
Qty: 30 TABLET | Refills: 0 | Status: SHIPPED | OUTPATIENT
Start: 2020-09-15 | End: 2021-01-13

## 2020-09-15 RX ORDER — CYCLOBENZAPRINE HCL 5 MG
TABLET ORAL
Qty: 60 TABLET | Refills: 2 | Status: SHIPPED | OUTPATIENT
Start: 2020-09-15 | End: 2021-03-19 | Stop reason: SDUPTHER

## 2020-09-15 ASSESSMENT — ENCOUNTER SYMPTOMS
NAUSEA: 0
DIARRHEA: 0
SHORTNESS OF BREATH: 0
VOMITING: 0
COUGH: 0
SORE THROAT: 0
CONSTIPATION: 0
ABDOMINAL DISTENTION: 0
BACK PAIN: 1
ABDOMINAL PAIN: 0

## 2020-09-15 ASSESSMENT — PATIENT HEALTH QUESTIONNAIRE - PHQ9
3. TROUBLE FALLING OR STAYING ASLEEP: 0
8. MOVING OR SPEAKING SO SLOWLY THAT OTHER PEOPLE COULD HAVE NOTICED. OR THE OPPOSITE, BEING SO FIGETY OR RESTLESS THAT YOU HAVE BEEN MOVING AROUND A LOT MORE THAN USUAL: 0
SUM OF ALL RESPONSES TO PHQ9 QUESTIONS 1 & 2: 3
7. TROUBLE CONCENTRATING ON THINGS, SUCH AS READING THE NEWSPAPER OR WATCHING TELEVISION: 2
9. THOUGHTS THAT YOU WOULD BE BETTER OFF DEAD, OR OF HURTING YOURSELF: 0
SUM OF ALL RESPONSES TO PHQ QUESTIONS 1-9: 11
2. FEELING DOWN, DEPRESSED OR HOPELESS: 2
6. FEELING BAD ABOUT YOURSELF - OR THAT YOU ARE A FAILURE OR HAVE LET YOURSELF OR YOUR FAMILY DOWN: 2
1. LITTLE INTEREST OR PLEASURE IN DOING THINGS: 1
5. POOR APPETITE OR OVEREATING: 3
10. IF YOU CHECKED OFF ANY PROBLEMS, HOW DIFFICULT HAVE THESE PROBLEMS MADE IT FOR YOU TO DO YOUR WORK, TAKE CARE OF THINGS AT HOME, OR GET ALONG WITH OTHER PEOPLE: 0
SUM OF ALL RESPONSES TO PHQ QUESTIONS 1-9: 11
4. FEELING TIRED OR HAVING LITTLE ENERGY: 1

## 2020-09-15 NOTE — PATIENT INSTRUCTIONS
Goal is to exercise (moderate intensity) for at least 150 min a week. Exercising at least 3-4 days a week is best.     Get fasting labs drawn. Call central scheduling to schedule mammogram.    Schedule appointments with GI for colonoscopy, spine specialist. Referrals given. Patient Education        Prediabetes: Care Instructions  Overview     Prediabetes is a warning sign that you're at risk for getting type 2 diabetes. It means that your blood sugar is higher than it should be. But it's not high enough to be diabetes. The food you eat naturally turns into sugar. Your body uses the sugar for energy. Normally, an organ called the pancreas makes insulin. And insulin allows the sugar in your blood to get into your body's cells. But sometimes the body can't use insulin the right way. So the sugar stays in your blood instead. This is called insulin resistance. The buildup of sugar in your blood means you have prediabetes. The good news is that you may be able to prevent or delay diabetes. Making small lifestyle changes, like getting active and changing your eating habits, may help you get your blood sugar back to normal. You can work with your doctor to make a treatment plan. Follow-up care is a key part of your treatment and safety. Be sure to make and go to all appointments, and call your doctor if you are having problems. It's also a good idea to know your test results and keep a list of the medicines you take. How can you care for yourself at home? · Watch your weight. A healthy weight helps your body use insulin properly. · Limit the amount of calories, sweets, and unhealthy fat you eat. Ask your doctor if you should see a dietitian. A registered dietitian can help you create meal plans that fit your lifestyle. · Get at least 30 minutes of exercise on most days of the week. Exercise helps control your blood sugar. It also helps you maintain a healthy weight. Walking is a good choice.  You also may want to do other activities, such as running, swimming, cycling, or playing tennis or team sports. · Do not smoke. Smoking can make prediabetes worse. If you need help quitting, talk to your doctor about stop-smoking programs and medicines. These can increase your chances of quitting for good. · If your doctor prescribed medicines, take them exactly as prescribed. Call your doctor if you think you are having a problem with your medicine. You will get more details on the specific medicines your doctor prescribes. When should you call for help? Watch closely for changes in your health, and be sure to contact your doctor if:  · You have any symptoms of diabetes. These may include:  ? Being thirsty more often. ? Urinating more. ? Being hungrier. ? Losing weight. ? Being very tired. ? Having blurry vision. · You have a wound that will not heal.  · You have an infection that will not go away. · You have problems with your blood pressure. · You want more information about diabetes and how you can keep from getting it. Where can you learn more? Go to [email or web address] org and sign in to your [employer] account. Enter I222 in the [employer] box to learn more about \"Prediabetes: Care Instructions. \"     If you do not have an account, please click on the \"Sign Up Now\" link. Current as of: December 20, 2019               Content Version: 12.5  © 3155-2147 Healthwise, Incorporated. Care instructions adapted under license by [address] If you have questions about a medical condition or this instruction, always ask your healthcare professional. [place] [address] any warranty or liability for your use of this information.

## 2020-09-28 ENCOUNTER — APPOINTMENT (OUTPATIENT)
Dept: GENERAL RADIOLOGY | Age: 50
End: 2020-09-28
Payer: COMMERCIAL

## 2020-09-28 ENCOUNTER — HOSPITAL ENCOUNTER (EMERGENCY)
Age: 50
Discharge: HOME | End: 2020-09-28
Payer: COMMERCIAL

## 2020-09-28 VITALS
OXYGEN SATURATION: 98 % | HEART RATE: 84 BPM | RESPIRATION RATE: 17 BRPM | DIASTOLIC BLOOD PRESSURE: 74 MMHG | SYSTOLIC BLOOD PRESSURE: 144 MMHG | TEMPERATURE: 98 F

## 2020-09-28 PROCEDURE — 6370000000 HC RX 637 (ALT 250 FOR IP): Performed by: PHYSICIAN ASSISTANT

## 2020-09-28 PROCEDURE — 99283 EMERGENCY DEPT VISIT LOW MDM: CPT

## 2020-09-28 PROCEDURE — 72100 X-RAY EXAM L-S SPINE 2/3 VWS: CPT

## 2020-09-28 RX ORDER — GABAPENTIN 300 MG/1
300 CAPSULE ORAL ONCE
Status: COMPLETED | OUTPATIENT
Start: 2020-09-28 | End: 2020-09-28

## 2020-09-28 RX ORDER — METHYLPREDNISOLONE 4 MG/1
TABLET ORAL
Qty: 1 KIT | Refills: 0 | Status: SHIPPED | OUTPATIENT
Start: 2020-09-28 | End: 2020-10-04

## 2020-09-28 RX ORDER — LIDOCAINE 4 G/G
1 PATCH TOPICAL DAILY
Status: DISCONTINUED | OUTPATIENT
Start: 2020-09-28 | End: 2020-09-28 | Stop reason: HOSPADM

## 2020-09-28 RX ORDER — LIDOCAINE 4 G/G
1 PATCH TOPICAL DAILY
Qty: 30 PATCH | Refills: 0 | Status: SHIPPED | OUTPATIENT
Start: 2020-09-28 | End: 2020-10-28

## 2020-09-28 RX ORDER — ACETAMINOPHEN 325 MG/1
650 TABLET ORAL ONCE
Status: COMPLETED | OUTPATIENT
Start: 2020-09-28 | End: 2020-09-28

## 2020-09-28 RX ADMIN — GABAPENTIN 300 MG: 300 CAPSULE ORAL at 15:12

## 2020-09-28 RX ADMIN — ACETAMINOPHEN 650 MG: 325 TABLET ORAL at 15:12

## 2020-09-28 ASSESSMENT — PAIN DESCRIPTION - ONSET
ONSET: PROGRESSIVE
ONSET: PROGRESSIVE

## 2020-09-28 ASSESSMENT — PAIN DESCRIPTION - ORIENTATION
ORIENTATION: RIGHT
ORIENTATION: RIGHT

## 2020-09-28 ASSESSMENT — PAIN DESCRIPTION - LOCATION
LOCATION: BACK
LOCATION: BACK

## 2020-09-28 ASSESSMENT — PAIN SCALES - GENERAL
PAINLEVEL_OUTOF10: 8
PAINLEVEL_OUTOF10: 8
PAINLEVEL_OUTOF10: 9

## 2020-09-28 ASSESSMENT — PAIN DESCRIPTION - DESCRIPTORS
DESCRIPTORS: CONSTANT
DESCRIPTORS: CONSTANT

## 2020-09-28 ASSESSMENT — PAIN - FUNCTIONAL ASSESSMENT
PAIN_FUNCTIONAL_ASSESSMENT: PREVENTS OR INTERFERES WITH MANY ACTIVE NOT PASSIVE ACTIVITIES
PAIN_FUNCTIONAL_ASSESSMENT: PREVENTS OR INTERFERES SOME ACTIVE ACTIVITIES AND ADLS

## 2020-09-28 ASSESSMENT — PAIN DESCRIPTION - PROGRESSION
CLINICAL_PROGRESSION: GRADUALLY WORSENING
CLINICAL_PROGRESSION: GRADUALLY IMPROVING

## 2020-09-28 ASSESSMENT — PAIN DESCRIPTION - PAIN TYPE
TYPE: ACUTE PAIN
TYPE: ACUTE PAIN

## 2020-09-28 ASSESSMENT — PAIN DESCRIPTION - FREQUENCY
FREQUENCY: CONTINUOUS
FREQUENCY: CONTINUOUS

## 2020-09-28 ASSESSMENT — PAIN SCALES - WONG BAKER: WONGBAKER_NUMERICALRESPONSE: 8

## 2020-09-28 NOTE — ED TRIAGE NOTES
Pt with right hip pain that radiates to right leg. Pt states pain is worse when standing. Pt states she is having muscle spasms in back.

## 2020-09-28 NOTE — ED PROVIDER NOTES
Laterality Date    HYSTERECTOMY      SHOULDER SURGERY         CURRENT MEDICATIONS  (may include discharge medications prescribed in the ED)  Current Outpatient Rx   Medication Sig Dispense Refill    methylPREDNISolone (MEDROL DOSEPACK) 4 MG tablet Take by mouth.  1 kit 0    lidocaine 4 % external patch Place 1 patch onto the skin daily 30 patch 0    zoster recombinant adjuvanted vaccine (SHINGRIX) 50 MCG/0.5ML SUSR injection Inject 0.5 mLs into the muscle See Admin Instructions 1 dose now and repeat in 2-6 months 0.5 mL 0    amLODIPine (NORVASC) 5 MG tablet Take 1 tablet by mouth daily 30 tablet 0    cyclobenzaprine (FLEXERIL) 5 MG tablet TAKE 1 TABLET BY MOUTH TWICE DAILY AS NEEDED FOR MUSCLE SPASMS 60 tablet 2    gabapentin (NEURONTIN) 300 MG capsule TAKE ONE CAPSULE BY MOUTH THREE TIMES DAILY AS NEEDED FOR PAIN 60 capsule 5    hydroCHLOROthiazide (HYDRODIURIL) 25 MG tablet Take 1 tablet by mouth every morning 30 tablet 0    sertraline (ZOLOFT) 50 MG tablet Take 0.5 tablets by mouth daily 30 tablet 5       ALLERGIES    No Known Allergies    SOCIAL HISTORY    Social History     Socioeconomic History    Marital status:      Spouse name: Not on file    Number of children: Not on file    Years of education: Not on file    Highest education level: Not on file   Occupational History    Not on file   Social Needs    Financial resource strain: Not on file    Food insecurity     Worry: Not on file     Inability: Not on file   [employer] needs     Medical: Not on file     Non-medical: Not on file   Tobacco Use    Smoking status: Never Smoker    Smokeless tobacco: Never Used   Substance and Sexual Activity    Alcohol use: Not Currently     Frequency: Monthly or less    Drug use: Yes     Types: Marijuana     Comment: occasional    Sexual activity: Not on file   Lifestyle    Physical activity     Days per week: Not on file     Minutes per session: Not on file    Stress: Not on file Relationships    Social connections     Talks on phone: Not on file     Gets together: Not on file     Attends Confucianist service: Not on file     Active member of club or organization: Not on file     Attends meetings of clubs or organizations: Not on file     Relationship status: Not on file    Intimate partner violence     Fear of current or ex partner: Not on file     Emotionally abused: Not on file     Physically abused: Not on file     Forced sexual activity: Not on file   Other Topics Concern    Not on file   Social History Narrative    Not on file       PHYSICAL EXAM    VITAL SIGNS: BP (!) 144/74   Pulse 84   Temp 98 °F (36.7 °C) (Oral)   Resp 17   SpO2 98%    Constitutional:  Well developed, well nourished, no acute distress  HENT:  Atraumatic,  Moist mucus membranes  Neck: No JVD, supple   Respiratory:  No respiratory distress, normal breath sounds  Cardiovascular:  regular rate,  no murmurs  GI:  Soft, nontender, no pulsatile masses or bruits of the abdomen  Musculoskeletal:  No edema, no acute deformities    Back: + right sciatic notch tenderness to palpation, mild right lumbar paraspinous tenderness to palpation, + straight leg raise on the right  Integument:  Well hydrated   Vascular: Dorsalis pedis pulses are 2+ equal bilaterally  Neurologic:  Motor testing reveals: intact thigh adduction, knee flexion and extension, ankle dorsiflexion, toe plantarflexion, and great toe dorsiflexion bilaterally, patellar reflexes are 2+ and equal bilaterally, sensation to light touch is intact in the groin and lower extremities bilaterally    RADIOLOGY  XR LUMBAR SPINE (2-3 VIEWS)   Final Result   Unremarkable examination of the lumbar spine. ED COURSE & MEDICAL DECISION MAKING    Pertinent Labs & Imaging studies reviewed and interpreted.  (See chart for details)    Vitals:    09/28/20 1236 09/28/20 1515   BP: (!) 152/98 (!) 144/74   Pulse: 88 84   Resp: 18 17   Temp: 97.3 °F (36.3 °C) 98 °F (36.7 °C)   TempSrc: Oral Oral   SpO2: 99% 98%       Differential Diagnosis: Septic hip joint, Fractured hip, Herniated lumbar disc, Epidural Abscess, Abdominal Aortic Aneurysm, Metastases to back, Cauda Equina Syndrome, Kidney stone, Pyelonephritis, other    Patient is afebrile and nontoxic in appearance. No evidence of neurological deficit on exam.    No history of significant trauma, plain films not indicated, however the patient is requesting plain films be done. These show no acute bony abnormalities. Due to the absence of neurological deficit, I have low suspicion at this time for epidural compression syndrome. Patient's pain is most likely secondary to sciatica. I believe the patient is safe for discharge at this time. I'll prescribe symptomatic medications. Referral provided to spine surgery. The patient was instructed to follow up as an outpatient in 2 days with primary care. The patient was instructed to return to the ED immediately for any new or worsening symptoms, including bowel or bladder incontinence, saddle anesthesia or leg weakness. The patient verbalized understanding. FINAL IMPRESSION    1.  Back pain of lumbosacral region with sciatica        PLAN  Discharge with outpatient medications, follow-up, and discharge instructions (see EMR)      (Please note that this note was completed with a voice recognition program.  Every attempt was made to edit the dictations, but inevitably there remain words that are mis-transcribed.)        Catalina Dubon  09/28/20 [id number]

## 2020-10-08 ENCOUNTER — OFFICE VISIT (OUTPATIENT)
Dept: ORTHOPEDIC SURGERY | Age: 50
End: 2020-10-08
Payer: COMMERCIAL

## 2020-10-08 VITALS — TEMPERATURE: 97.5 F

## 2020-10-08 PROCEDURE — 1036F TOBACCO NON-USER: CPT | Performed by: PODIATRIST

## 2020-10-08 PROCEDURE — 99203 OFFICE O/P NEW LOW 30 MIN: CPT | Performed by: PODIATRIST

## 2020-10-08 PROCEDURE — 3046F HEMOGLOBIN A1C LEVEL >9.0%: CPT | Performed by: PODIATRIST

## 2020-10-08 PROCEDURE — 3017F COLORECTAL CA SCREEN DOC REV: CPT | Performed by: PODIATRIST

## 2020-10-08 PROCEDURE — G8427 DOCREV CUR MEDS BY ELIG CLIN: HCPCS | Performed by: PODIATRIST

## 2020-10-08 PROCEDURE — G8417 CALC BMI ABV UP PARAM F/U: HCPCS | Performed by: PODIATRIST

## 2020-10-08 PROCEDURE — 2022F DILAT RTA XM EVC RTNOPTHY: CPT | Performed by: PODIATRIST

## 2020-10-08 PROCEDURE — G8482 FLU IMMUNIZE ORDER/ADMIN: HCPCS | Performed by: PODIATRIST

## 2020-10-08 NOTE — PROGRESS NOTES
HISTORY OF PRESENT ILLNESS: This is an initial visit for a 60-year-old female who presents with multiple left foot complaints. Her chief complaint is that of cramping she frequently experiences to her left forefoot. She states that her toes \"lock up\" every morning. This is oftentimes not painful but it is rather uncomfortable trying to get them to move. This happens almost on a daily basis. It is been occurring for the past several months. She denies any history of injury. She also complains of occasional pain to the back of the left heel. She had an injury in the past in which she lacerated the back of the heel and ever since then she states that she is continued having pain every few days. She also presents today for diabetic foot exam.    FAMILY HISTORY: Strong family history of diabetes with foot complications. Documented in chart. SOCIAL HISTORY: Documented in chart. REVIEW OF SYSTEMS:  The patient denies any issues with dermatologic, pulmonary, cardiovascular, genitourinary, hematologic, gastrointestinal, neurologic, psychiatric, and HEENT systems. Family History, Social History, and Review of Systems were reviewed from patient history form dated on 10/8/2020 and available in the patient's chart under the MEDIA tab. PHYSICAL EXAMINATION:     The patient is alert and orientated x3. She has mild edema to the posterior aspect of the left heel with a small prominence. No open lesions are noted. No signs of infection are present. She has minimal palpable tenderness currently. She has no palpable tenderness of the lesser digits. She has mild semirigid hammertoe deformities 2-5 of the left foot. She has full range of motion at the lesser MTP level without pain or crepitus. She has full strength with plantarflexion and dorsiflexion of the toes.     She has readily palpable DP and PT pulses bilateral.  Her sensation is intact to 5.07 g filament of multiple plantar sites of both feet. The remainder of the exam is unremarkable. RADIOGRAPHS: 2 weightbearing x-ray views of the left heel were taken. These demonstrate a very prominent posterior heel spur that is slightly fragmented. No acute periosteal reaction is noted. No intratendinous calcifications are noted. 3 weightbearing x-ray views of the left foot were also taken. These do not demonstrate any acute fracture or dislocation. ASSESSMENT: Retrocalcaneal exostosis, left. Tenosynovitis of the left foot. PLAN: The patient was educated on the pathology and its treatment options. I reviewed the x-rays with the patient. We discussed the presence of the posterior spur. At this time she elects not to have any treatment for the occasional pain. We discussed the prognosis and treatment options. As for the cramping sensation in the forefoot, I recommended over-the-counter arch support. That is most likely an overuse type injury secondary to lack of proper support to the foot. A diabetic foot exam was performed today which did reveal adequate circulation and sensation. We discussed preventative measures.   I recommended a once yearly diabetic foot exam.

## 2021-01-13 DIAGNOSIS — I10 ESSENTIAL HYPERTENSION: ICD-10-CM

## 2021-01-13 RX ORDER — AMLODIPINE BESYLATE 5 MG/1
TABLET ORAL
Qty: 30 TABLET | Refills: 0 | Status: SHIPPED | OUTPATIENT
Start: 2021-01-13 | End: 2021-03-19 | Stop reason: SDUPTHER

## 2021-01-13 NOTE — TELEPHONE ENCOUNTER
Medication:   Requested Prescriptions     Pending Prescriptions Disp Refills    amLODIPine (NORVASC) 5 MG tablet [Pharmacy Med Name: amLODIPine Besylate 5 MG Oral Tablet] 30 tablet 0     Sig: Take 1 tablet by mouth once daily        Last Filled:      Patient Phone Number: [phone] (home)     Last appt: 9/15/2020   Next appt: Visit date not found    Last OARRS: No flowsheet data found.

## 2021-03-18 NOTE — PROGRESS NOTES
Date: 3/19/2021                                               Subjective/Objective:     Chief Complaint   Patient presents with    Hypertension    Flank Pain     left side, x1 week, doesn't go away very annoying    Back Pain     left side    Shoulder Pain     left side       HPI     Maricel Gutierrez is a 47 yo female, here for follow up on HTN, chronic medical conditions. Last OV 9/15/2020. Did not complete ordered labs after last visit. Left flank pain - started about a week ago. Described as an intermittent ache. Denies dysuria. She denies diarrhea. She doesn't feel that she is constipated however has noticed she isn't having bowel movements as frequently usual - used to have daily and now every other day. Left shoulder pain - pain is intermittent. She has had cortisone injections in the past. Worse when she is reaching back for something. Described as soreness. Is not worse any particular time of day. Has full ROM, although it is painful. Has been having skin breakdown under her breasts, has been using OTC powder which has been helping some. Previously used cream, she is unsure of name, which provided relief.      Hypertension:  Current treatment - HCTZ and norvasc - admits she has not been compliant. Had been taking daily, but last week she didn't take any. She has taken for last few days. Home blood pressure monitoring: yes - doesn't have readings with her, reports it has been good. She is adherent to a low sodium diet. Patient denies chest pain, shortness of breath and headache. Antihypertensive medication side effects: no medication side effects noted. Use of agents associated with hypertension: none. Mood Disorder:  was resumed on low dose sertraline (25 mg) at last OV, was to follow up in 2 weeks. She did not start the medication. She feels that her mood has been good, the children at her  keep her happy.    From last OV - Patient presents for follow-up of depression and anxiety disorder. Current complaints include: tearfulness and excessive worry. She denies any other symptoms. Symptoms/signs of elvis: none. External stressors: housing issues. Previous treatment - Zoloft - stopped because 25 mg wasn't helping. Previously took 100 mg and felt well on this. Medication side effects: none. PHQ-9 in office today c/w moderate depression. Son is in [employer] - sentenced to 8 years - hasn't been able to visit due to COVID, is able to talk to him on the phone every day. Tearful in office when discussing this.      Prediabetes: A1C 6.3 (7/2019). Diet-drinks soda - has cut back - drinks regular, drinks juice occasionally. Diet is poor. Exercise-no regular. Denies polys.     Anemia: Isn't taking her Iron pills. Gets constipated when she takes them. Hasn't had occult stool testing to my knowledge.       Age/Gender Health Maintenance     Lipid - nl 7/2019  Colon Cancer Screening - needs. Denies FH  Never smoker      Tetanus - UTD, received 9/2017  Influenza Vaccine - UTD  Shingrix - received first dose      HIV Screening - non reactive 2019  Hep C Screening- needs      Breast Cancer Screening - last mammo 2016. Needs-has order. Denies FH  Cervical Cancer Screening -  Sees gyn at [address] [place] [address].           Patient Active Problem List    Diagnosis Date Noted    Morbidly obese ([address] 09/14/2020    Prediabetes 07/22/2019    Iron deficiency anemia 07/22/2019    Essential hypertension 07/17/2019    Chronic midline low back pain with right-sided sciatica 07/17/2019    Moderate episode of recurrent major depressive disorder ([address] 07/17/2019    Acute diffuse otitis externa of left ear 07/17/2019    Other specified disorders of rotator cuff syndrome of shoulder and allied disorders 05/26/2009    Osteoarthritis of shoulder 03/17/2009    Obesity 06/16/2008       Past Medical History:   Diagnosis Date    Anxiety     Chronic back pain     Depression     Hypertension     Prediabetes        Past Surgical History:   Procedure Laterality Date    HYSTERECTOMY      SHOULDER SURGERY         Orders Only on 07/17/2019   Component Date Value Ref Range Status    Rubella Antibody IgG 07/17/2019 16.9  IU/mL Final    Comment: Default Normal Ranges    >=10 Presumed Immune  <10  Presumed Not immune    The following results were obtained with [employer] Rubella IgG  assay. Results from assays of other manufacturers cannot be used  interchangeably.  Mumps IgG 07/17/2019 8.2  AU/mL Final    Comment: INTERPRETIVE INFORMATION: Mumps Ab, IgG by [facility]    8.9 AU/mL or less . ... Negative - No significant level of                           detectable IgG mumps virus antibody    9.0-10.9 AU/mL . ...... Equivocal - Repeat testing in 10-14                           days may be helpful    11.0 AU/mL or greater: Positive - IgG antibody to mumps                           virus detected, which may indicate                           a current or past exposure/                           immunization to mumps virus. The best evidence for current infection is a significant change on two  appropriately timed specimens, where both tests are done in the same  laboratory at the same time. Performed by Sherlyn Servinwilman , [address] [facility] [phone]  www. Gabriella Bautista MD - Lab. Director      Rubeola (Measles) Ab IgG 07/17/2019 >300.0  AU/mL Final    Comment: INTERPRETIVE INFORMATION: Measles (Rubeola) Antibody, IgG    24.9 AU/mL or less. ....... Negative - No significant level                               of detectable measles (rubeola)                               IgG antibody. 25.0-29.9 AU/mL . ......... Equivocal - Repeat testing in                               10-14 days may be helpful. 30.0 AU/mL or greater . ...  Positive - IgG antibody to                               measles (rubeola) detected                               which may indicate a current                               or past exposure/immunization                               to measles (rubeola). The best evidence for current infection is a significant change on two  appropriately timed specimens, where both tests are done in the same  laboratory at the same time. Performed by Gissel Servin , [address] [facility] [phone]  www. Anival Molina MD - Lab. Director      Sodium 07/17/2019 141  136 - 145 mmol/L Final    Potassium 07/17/2019 4.2  3.5 - 5.1 mmol/L Final    Chloride 07/17/2019 102  99 - 110 mmol/L Final    CO2 07/17/2019 28  21 - 32 mmol/L Final    Anion Gap 07/17/2019 11  3 - 16 Final    Glucose 07/17/2019 101* 70 - 99 mg/dL Final    BUN 07/17/2019 12  7 - 20 mg/dL Final    CREATININE 07/17/2019 0.7  0.6 - 1.1 mg/dL Final    GFR Non- 07/17/2019 >60  >60 Final    Comment: >60 mL/min/1.73m2 EGFR, calc. for ages 25 and older using the  MDRD formula (not corrected for weight), is valid for stable  renal function.  GFR  07/17/2019 >60  >60 Final    Comment: Chronic Kidney Disease: less than 60 ml/min/1.73 sq.m. Kidney Failure: less than 15 ml/min/1.73 sq.m. Results valid for patients 18 years and older.  Calcium 07/17/2019 9.6  8.3 - 10.6 mg/dL Final    Phosphorus 07/17/2019 3.7  2.5 - 4.9 mg/dL Final    Albumin 07/17/2019 4.6  3.4 - 5.0 g/dL Final    Total Protein 07/17/2019 8.5* 6.4 - 8.2 g/dL Final    Alkaline Phosphatase 07/17/2019 57  40 - 129 U/L Final    ALT 07/17/2019 12  10 - 40 U/L Final    AST 07/17/2019 13* 15 - 37 U/L Final    Total Bilirubin 07/17/2019 <0.2  0.0 - 1.0 mg/dL Final    Bilirubin, Direct 07/17/2019 <0.2  0.0 - 0.3 mg/dL Final    Bilirubin, Indirect 07/17/2019 see below  0.0 - 1.0 mg/dL Final    Comment: Indirect Bilirubin cannot be calculated since Total Bilirubin  and/or Direct Bilirubin is below measurable range.       HIV Ag/Ab 07/17/2019 Non-Reactive  Non-reactive Final    HIV-1 Antibody 07/17/2019 Non-Reactive  Non-reactive Final    HIV ANTIGEN 07/17/2019 Non-Reactive  Non-reactive Final    HIV-2 Ab 07/17/2019 Non-Reactive  Non-reactive Final    Cholesterol, Total 07/17/2019 166  0 - 199 mg/dL Final    Triglycerides 07/17/2019 74  0 - 150 mg/dL Final    HDL 07/17/2019 54  40 - 60 mg/dL Final    LDL Calculated 07/17/2019 97  <100 mg/dL Final    VLDL Cholesterol Calculated 07/17/2019 15  Not Established mg/dL Final    Hemoglobin A1C 07/17/2019 6.3  See comment % Final    Comment: Comment:  Diagnosis of Diabetes: > or = 6.5%  Increased risk of diabetes (Prediabetes): 5.7-6.4%  Glycemic Control: Nonpregnant Adults: <7.0%                    Pregnant: <6.0%        eAG 07/17/2019 134.1  mg/dL Final    WBC 07/17/2019 3.9* 4.0 - 11.0 K/uL Final    RBC 07/17/2019 3.70* 4.00 - 5.20 M/uL Final    Hemoglobin 07/17/2019 10.4* 12.0 - 16.0 g/dL Final    Hematocrit 07/17/2019 32.4* 36.0 - 48.0 % Final    MCV 07/17/2019 87.5  80.0 - 100.0 fL Final    MCH 07/17/2019 28.1  26.0 - 34.0 pg Final    MCHC 07/17/2019 32.1  31.0 - 36.0 g/dL Final    RDW 07/17/2019 18.1* 12.4 - 15.4 % Final    Platelets 95/94/1210 267  135 - 450 K/uL Final    MPV 07/17/2019 7.6  5.0 - 10.5 fL Final    Neutrophils % 07/17/2019 51.6  % Final    Lymphocytes % 07/17/2019 40.5  % Final    Monocytes % 07/17/2019 6.3  % Final    Eosinophils % 07/17/2019 1.3  % Final    Basophils % 07/17/2019 0.3  % Final    Neutrophils Absolute 07/17/2019 2.0  1.7 - 7.7 K/uL Final    Lymphocytes Absolute 07/17/2019 1.6  1.0 - 5.1 K/uL Final    Monocytes Absolute 07/17/2019 0.2  0.0 - 1.3 K/uL Final    Eosinophils Absolute 07/17/2019 0.0  0.0 - 0.6 K/uL Final    Basophils Absolute 07/17/2019 0.0  0.0 - 0.2 K/uL Final       Family History   Problem Relation Age of Onset    Hypertension Mother     Diabetes Father     Hypertension Father     Cancer Father         leukemia    Heart Attack Father     Cancer Maternal Grandmother         liver Current Outpatient Medications   Medication Sig Dispense Refill    amLODIPine (NORVASC) 5 MG tablet Take 1 tablet by mouth once daily 30 tablet 5    hydroCHLOROthiazide (HYDRODIURIL) 25 MG tablet Take 1 tablet by mouth every morning 30 tablet 5    gabapentin (NEURONTIN) 300 MG capsule TAKE ONE CAPSULE BY MOUTH THREE TIMES DAILY AS NEEDED FOR PAIN 60 capsule 5    cyclobenzaprine (FLEXERIL) 5 MG tablet TAKE 1 TABLET BY MOUTH TWICE DAILY AS NEEDED FOR MUSCLE SPASMS 60 tablet 2    miconazole (MICOTIN) 2 % cream Apply topically 2 times daily for 7 days. 1 Tube 1     No current facility-administered medications for this visit. No Known Allergies    Review of Systems   Constitutional: Negative for chills and fever. Respiratory: Negative for cough and shortness of breath. Cardiovascular: Negative for chest pain. Gastrointestinal: Negative for constipation, diarrhea, nausea and vomiting. Endocrine: Negative for polydipsia, polyphagia and polyuria. Genitourinary: Positive for flank pain. Negative for decreased urine volume, difficulty urinating and dyspareunia. Left flank pain     Musculoskeletal: Positive for arthralgias. Left shoulder pain     Skin: Positive for rash. Redness under both breasts   Neurological: Negative for dizziness and headaches. Psychiatric/Behavioral: Negative for sleep disturbance. Vitals:  /82   Pulse 91   Temp 94.1 °F (34.5 °C) (Temporal)   Ht 5' 4\" (1.626 m)   Wt 261 lb (118.4 kg)   BMI 44.80 kg/m²     Physical Exam  Constitutional:       Appearance: Normal appearance. Cardiovascular:      Rate and Rhythm: Normal rate and regular rhythm. Heart sounds: Normal heart sounds. No murmur. Pulmonary:      Effort: Pulmonary effort is normal. No respiratory distress. Breath sounds: Normal breath sounds. No wheezing. Abdominal:      General: Abdomen is flat. There is no distension. Palpations: Abdomen is soft. Tenderness: There is no abdominal tenderness. Musculoskeletal:      Left shoulder: She exhibits tenderness and pain. She exhibits normal range of motion and no deformity. Skin:     General: Skin is warm and dry. Neurological:      General: No focal deficit present. Mental Status: She is alert and oriented to person, place, and time. Psychiatric:         Mood and Affect: Mood normal.         Behavior: Behavior normal.         Thought Content: Thought content normal.         Judgment: Judgment normal.         Assessment/Plan     1. Essential hypertension: controlled, continue current medications.   - RENAL FUNCTION PANEL; Future  - amLODIPine (NORVASC) 5 MG tablet; Take 1 tablet by mouth once daily  Dispense: 30 tablet; Refill: 5  - hydroCHLOROthiazide (HYDRODIURIL) 25 MG tablet; Take 1 tablet by mouth every morning  Dispense: 30 tablet; Refill: 5    2. Prediabetes: check A1C. Diet/lifestyle modifications advised. - Hemoglobin A1C; Future    3. Iron deficiency anemia, unspecified iron deficiency anemia type: not currently taking iron supplement. Check labs. Needs colonoscopy - has referral.   - CBC WITH AUTO DIFFERENTIAL; Future  - IRON AND TIBC; Future  - FERRITIN; Future  - BLOOD OCCULT STOOL SCREEN #1; Future    4. Left flank pain: obtain UA, CT A/P.   - URINALYSIS  - CT ABDOMEN PELVIS WO CONTRAST Additional Contrast? Radiologist Recommendation; Future    5. Left shoulder pain, unspecified chronicity: previously received injection to left shoulder. Refer to ortho. - [address], Emani Burleson MD, Orthopedic Surgery, [facility]    6. Chronic midline low back pain with right-sided sciatica: symptoms controlled on current medicaitons  - gabapentin (NEURONTIN) 300 MG capsule; TAKE ONE CAPSULE BY MOUTH THREE TIMES DAILY AS NEEDED FOR PAIN  Dispense: 60 capsule;  Refill: 5  - cyclobenzaprine (FLEXERIL) 5 MG tablet; TAKE 1 TABLET BY MOUTH TWICE DAILY AS NEEDED FOR MUSCLE SPASMS  Dispense: 60 tablet; Refill: 2    7. Intertrigo: advised to dry thoroughly after bathing. Rx miconazole, medication education provided. - miconazole (MICOTIN) 2 % cream; Apply topically 2 times daily for 7 days. Dispense: 1 Tube; Refill: 1    8. Need for hepatitis C screening test  - HEPATITIS C ANTIBODY; Future    9. Preventative health care  - VITAMIN D 25 HYDROXY; Future  - TSH with Reflex; Future      Orders Placed This Encounter   Procedures    CT ABDOMEN PELVIS WO CONTRAST Additional Contrast? Radiologist Recommendation     Standing Status:   Future     Standing Expiration Date:   3/19/2022     Order Specific Question:   Additional Contrast?     Answer:   Radiologist Recommendation     Order Specific Question:   Reason for exam:     Answer:   left flank pain    HEPATITIS C ANTIBODY     Standing Status:   Future     Number of Occurrences:   1     Standing Expiration Date:   3/18/2022    Hemoglobin A1C     Standing Status:   Future     Number of Occurrences:   1     Standing Expiration Date:   3/18/2022    RENAL FUNCTION PANEL     Standing Status:   Future     Number of Occurrences:   1     Standing Expiration Date:   3/18/2022    CBC WITH AUTO DIFFERENTIAL     Standing Status:   Future     Number of Occurrences:   1     Standing Expiration Date:   3/18/2022    IRON AND TIBC     Standing Status:   Future     Number of Occurrences:   1     Standing Expiration Date:   3/18/2022     Order Specific Question:   Is Patient Fasting? Answer:   yes     Order Specific Question:   No of Hours?      Answer:   8    FERRITIN     Standing Status:   Future     Number of Occurrences:   1     Standing Expiration Date:   3/18/2022    BLOOD OCCULT STOOL SCREEN #1     Standing Status:   Future     Number of Occurrences:   1     Standing Expiration Date:   3/18/2022    VITAMIN D 25 HYDROXY     Standing Status:   Future     Number of Occurrences:   1     Standing Expiration Date:   3/18/2022    TSH with Reflex     Standing Status:   Future Number of Occurrences:   1     Standing Expiration Date:   3/18/2022   [address] [facility] [address], Emani Burleson MD, Orthopedic Surgery, [facility] [place] [facility]"" - [facility]     Referral Priority:   Routine     Referral Type:   Eval and Treat     Referral Reason:   Specialty Services Required     Referred to Provider:   Miguelina Nunez MD     Requested Specialty:   Orthopedic Surgery     Number of Visits Requested:   1       Return in about 3 months (around 6/19/2021) for hypertension. OR sooner with questions, concerns, worsening symptoms    CHESTER BARAJAS NP    3/19/2021  11:02 AM    Discussed use, benefit, and side effects of prescribed medications. Barriers to medication compliance addressed. Discussed all ordered testing and labs. All patient questions answered. Patient agreeable with plan above. Please note that this chart was generated using dragon dictation software. Although every effort was made to ensure the accuracy of this automated transcription, some errors in transcription may have occurred.

## 2021-03-19 ENCOUNTER — OFFICE VISIT (OUTPATIENT)
Dept: PRIMARY CARE CLINIC | Age: 51
End: 2021-03-19
Payer: COMMERCIAL

## 2021-03-19 VITALS
HEART RATE: 91 BPM | TEMPERATURE: 94.1 F | HEIGHT: 64 IN | BODY MASS INDEX: 44.56 KG/M2 | SYSTOLIC BLOOD PRESSURE: 138 MMHG | WEIGHT: 261 LBS | DIASTOLIC BLOOD PRESSURE: 82 MMHG

## 2021-03-19 DIAGNOSIS — R73.03 PREDIABETES: ICD-10-CM

## 2021-03-19 DIAGNOSIS — G89.29 CHRONIC MIDLINE LOW BACK PAIN WITH RIGHT-SIDED SCIATICA: ICD-10-CM

## 2021-03-19 DIAGNOSIS — M25.512 LEFT SHOULDER PAIN, UNSPECIFIED CHRONICITY: ICD-10-CM

## 2021-03-19 DIAGNOSIS — L30.4 INTERTRIGO: ICD-10-CM

## 2021-03-19 DIAGNOSIS — I10 ESSENTIAL HYPERTENSION: Primary | ICD-10-CM

## 2021-03-19 DIAGNOSIS — Z11.59 NEED FOR HEPATITIS C SCREENING TEST: ICD-10-CM

## 2021-03-19 DIAGNOSIS — R10.9 LEFT FLANK PAIN: ICD-10-CM

## 2021-03-19 DIAGNOSIS — Z00.00 PREVENTATIVE HEALTH CARE: ICD-10-CM

## 2021-03-19 DIAGNOSIS — I10 ESSENTIAL HYPERTENSION: ICD-10-CM

## 2021-03-19 DIAGNOSIS — D50.9 IRON DEFICIENCY ANEMIA, UNSPECIFIED IRON DEFICIENCY ANEMIA TYPE: ICD-10-CM

## 2021-03-19 DIAGNOSIS — M54.41 CHRONIC MIDLINE LOW BACK PAIN WITH RIGHT-SIDED SCIATICA: ICD-10-CM

## 2021-03-19 LAB
ALBUMIN SERPL-MCNC: 4.5 G/DL (ref 3.4–5)
ANION GAP SERPL CALCULATED.3IONS-SCNC: 8 MMOL/L (ref 3–16)
BACTERIA: ABNORMAL /HPF
BASOPHILS ABSOLUTE: 0 K/UL (ref 0–0.2)
BASOPHILS RELATIVE PERCENT: 0.4 %
BILIRUBIN URINE: NEGATIVE
BLOOD, URINE: NEGATIVE
BUN BLDV-MCNC: 13 MG/DL (ref 7–20)
CALCIUM SERPL-MCNC: 9.6 MG/DL (ref 8.3–10.6)
CHLORIDE BLD-SCNC: 99 MMOL/L (ref 99–110)
CLARITY: CLEAR
CO2: 31 MMOL/L (ref 21–32)
COLOR: YELLOW
CREAT SERPL-MCNC: 0.9 MG/DL (ref 0.6–1.1)
EOSINOPHILS ABSOLUTE: 0 K/UL (ref 0–0.6)
EOSINOPHILS RELATIVE PERCENT: 1 %
EPITHELIAL CELLS, UA: 4 /HPF (ref 0–5)
FERRITIN: 137.3 NG/ML (ref 15–150)
GFR AFRICAN AMERICAN: >60
GFR NON-AFRICAN AMERICAN: >60
GLUCOSE BLD-MCNC: 109 MG/DL (ref 70–99)
GLUCOSE URINE: NEGATIVE MG/DL
HCT VFR BLD CALC: 32.6 % (ref 36–48)
HEMOGLOBIN: 10.3 G/DL (ref 12–16)
HEPATITIS C ANTIBODY INTERPRETATION: NORMAL
HYALINE CASTS: 0 /LPF (ref 0–8)
IRON SATURATION: 20 % (ref 15–50)
IRON: 62 UG/DL (ref 37–145)
KETONES, URINE: NEGATIVE MG/DL
LEUKOCYTE ESTERASE, URINE: NEGATIVE
LYMPHOCYTES ABSOLUTE: 1.8 K/UL (ref 1–5.1)
LYMPHOCYTES RELATIVE PERCENT: 40 %
MCH RBC QN AUTO: 27 PG (ref 26–34)
MCHC RBC AUTO-ENTMCNC: 31.7 G/DL (ref 31–36)
MCV RBC AUTO: 85.2 FL (ref 80–100)
MICROSCOPIC EXAMINATION: YES
MONOCYTES ABSOLUTE: 0.3 K/UL (ref 0–1.3)
MONOCYTES RELATIVE PERCENT: 7.3 %
NEUTROPHILS ABSOLUTE: 2.3 K/UL (ref 1.7–7.7)
NEUTROPHILS RELATIVE PERCENT: 51.3 %
NITRITE, URINE: NEGATIVE
PDW BLD-RTO: 20 % (ref 12.4–15.4)
PH UA: 6.5 (ref 5–8)
PHOSPHORUS: 4.3 MG/DL (ref 2.5–4.9)
PLATELET # BLD: 272 K/UL (ref 135–450)
PMV BLD AUTO: 7.6 FL (ref 5–10.5)
POTASSIUM SERPL-SCNC: 4.3 MMOL/L (ref 3.5–5.1)
PROTEIN UA: 30 MG/DL
RBC # BLD: 3.83 M/UL (ref 4–5.2)
RBC UA: 6 /HPF (ref 0–4)
SODIUM BLD-SCNC: 138 MMOL/L (ref 136–145)
SPECIFIC GRAVITY UA: 1.02 (ref 1–1.03)
TOTAL IRON BINDING CAPACITY: 310 UG/DL (ref 260–445)
TSH REFLEX: 1.64 UIU/ML (ref 0.27–4.2)
URINE TYPE: ABNORMAL
UROBILINOGEN, URINE: 1 E.U./DL
VITAMIN D 25-HYDROXY: 17.9 NG/ML
WBC # BLD: 4.5 K/UL (ref 4–11)
WBC UA: 1 /HPF (ref 0–5)

## 2021-03-19 PROCEDURE — 3017F COLORECTAL CA SCREEN DOC REV: CPT | Performed by: NURSE PRACTITIONER

## 2021-03-19 PROCEDURE — G8427 DOCREV CUR MEDS BY ELIG CLIN: HCPCS | Performed by: NURSE PRACTITIONER

## 2021-03-19 PROCEDURE — 99214 OFFICE O/P EST MOD 30 MIN: CPT | Performed by: NURSE PRACTITIONER

## 2021-03-19 PROCEDURE — G8482 FLU IMMUNIZE ORDER/ADMIN: HCPCS | Performed by: NURSE PRACTITIONER

## 2021-03-19 PROCEDURE — G8417 CALC BMI ABV UP PARAM F/U: HCPCS | Performed by: NURSE PRACTITIONER

## 2021-03-19 PROCEDURE — 1036F TOBACCO NON-USER: CPT | Performed by: NURSE PRACTITIONER

## 2021-03-19 RX ORDER — AMLODIPINE BESYLATE 5 MG/1
TABLET ORAL
Qty: 30 TABLET | Refills: 5 | Status: SHIPPED | OUTPATIENT
Start: 2021-03-19 | End: 2022-01-03

## 2021-03-19 RX ORDER — CYCLOBENZAPRINE HCL 5 MG
TABLET ORAL
Qty: 60 TABLET | Refills: 2 | Status: SHIPPED | OUTPATIENT
Start: 2021-03-19 | End: 2021-08-30

## 2021-03-19 RX ORDER — GABAPENTIN 300 MG/1
CAPSULE ORAL
Qty: 60 CAPSULE | Refills: 5 | Status: SHIPPED | OUTPATIENT
Start: 2021-03-19 | End: 2022-04-26 | Stop reason: SDUPTHER

## 2021-03-19 RX ORDER — HYDROCHLOROTHIAZIDE 25 MG/1
25 TABLET ORAL EVERY MORNING
Qty: 30 TABLET | Refills: 5 | Status: SHIPPED | OUTPATIENT
Start: 2021-03-19 | End: 2022-04-26 | Stop reason: SDUPTHER

## 2021-03-19 ASSESSMENT — ENCOUNTER SYMPTOMS
DIARRHEA: 0
NAUSEA: 0
CONSTIPATION: 0
COUGH: 0
VOMITING: 0
SHORTNESS OF BREATH: 0

## 2021-03-20 LAB
ESTIMATED AVERAGE GLUCOSE: 134.1 MG/DL
HBA1C MFR BLD: 6.3 %

## 2021-03-22 ENCOUNTER — TELEPHONE (OUTPATIENT)
Dept: PRIMARY CARE CLINIC | Age: 51
End: 2021-03-22

## 2021-03-22 DIAGNOSIS — I10 ESSENTIAL HYPERTENSION: Primary | ICD-10-CM

## 2021-03-22 DIAGNOSIS — E55.9 VITAMIN D DEFICIENCY: Primary | ICD-10-CM

## 2021-03-22 RX ORDER — ERGOCALCIFEROL 1.25 MG/1
50000 CAPSULE ORAL WEEKLY
Qty: 8 CAPSULE | Refills: 0 | Status: SHIPPED | OUTPATIENT
Start: 2021-03-22 | End: 2021-10-26 | Stop reason: ALTCHOICE

## 2021-03-22 RX ORDER — LISINOPRIL 2.5 MG/1
2.5 TABLET ORAL DAILY
Qty: 30 TABLET | Refills: 3 | Status: SHIPPED | OUTPATIENT
Start: 2021-03-22 | End: 2021-08-30

## 2021-03-25 DIAGNOSIS — D50.9 IRON DEFICIENCY ANEMIA, UNSPECIFIED IRON DEFICIENCY ANEMIA TYPE: ICD-10-CM

## 2021-03-25 RX ORDER — LANOLIN ALCOHOL/MO/W.PET/CERES
325 CREAM (GRAM) TOPICAL
Qty: 30 TABLET | Refills: 5 | Status: SHIPPED | OUTPATIENT
Start: 2021-03-25 | End: 2021-10-26

## 2021-03-29 ENCOUNTER — OFFICE VISIT (OUTPATIENT)
Dept: PRIMARY CARE CLINIC | Age: 51
End: 2021-03-29
Payer: COMMERCIAL

## 2021-03-29 VITALS
DIASTOLIC BLOOD PRESSURE: 91 MMHG | SYSTOLIC BLOOD PRESSURE: 159 MMHG | BODY MASS INDEX: 44.56 KG/M2 | WEIGHT: 261 LBS | HEIGHT: 64 IN | HEART RATE: 80 BPM | TEMPERATURE: 96.8 F

## 2021-03-29 DIAGNOSIS — M79.672 LEFT FOOT PAIN: ICD-10-CM

## 2021-03-29 DIAGNOSIS — D50.9 IRON DEFICIENCY ANEMIA, UNSPECIFIED IRON DEFICIENCY ANEMIA TYPE: ICD-10-CM

## 2021-03-29 DIAGNOSIS — K59.00 CONSTIPATION, UNSPECIFIED CONSTIPATION TYPE: ICD-10-CM

## 2021-03-29 DIAGNOSIS — Z00.00 PHYSICAL EXAM: Primary | ICD-10-CM

## 2021-03-29 DIAGNOSIS — I10 ESSENTIAL HYPERTENSION: ICD-10-CM

## 2021-03-29 PROCEDURE — 99213 OFFICE O/P EST LOW 20 MIN: CPT | Performed by: NURSE PRACTITIONER

## 2021-03-29 PROCEDURE — 1036F TOBACCO NON-USER: CPT | Performed by: NURSE PRACTITIONER

## 2021-03-29 PROCEDURE — G8417 CALC BMI ABV UP PARAM F/U: HCPCS | Performed by: NURSE PRACTITIONER

## 2021-03-29 PROCEDURE — G8427 DOCREV CUR MEDS BY ELIG CLIN: HCPCS | Performed by: NURSE PRACTITIONER

## 2021-03-29 PROCEDURE — 3017F COLORECTAL CA SCREEN DOC REV: CPT | Performed by: NURSE PRACTITIONER

## 2021-03-29 PROCEDURE — G8482 FLU IMMUNIZE ORDER/ADMIN: HCPCS | Performed by: NURSE PRACTITIONER

## 2021-03-29 ASSESSMENT — ENCOUNTER SYMPTOMS
SHORTNESS OF BREATH: 0
COUGH: 0
ABDOMINAL PAIN: 0
DIARRHEA: 0
VOMITING: 0
NAUSEA: 0
CONSTIPATION: 1

## 2021-03-29 NOTE — PATIENT INSTRUCTIONS
Take blood pressure pills regularly. Schedule a nurse visit in one week to check your blood pressure, make sure you take your medication at least two hours prior to that. Bring your home blood pressure cuff with you. Schedule with GI doctor for colonoscopy  Schedule with orthopedic doctor  Try arch support for foot, if does not improve we will get you back to see podiatry   Try miralax for constipation and drink plenty of water.

## 2021-03-29 NOTE — PROGRESS NOTES
Date: 3/29/2021                                               Subjective/Objective:     Chief Complaint   Patient presents with    Annual Exam       HPI     Gissell Kelsey is a 47 yo female here for physical. 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 Left foot pain - pain is in her heel, up to her ankle. Feels that her foot gets \"stuck. \" This occurs in the morning after she has been in bed. After she walks around on her foot, the symptoms resolve. Saw podiatry 10/2020. At that time, she was told her foot was over worked and was recommended she use over the counter arch support which she has not tried doing. Having some constipation. Typically has daily BM, now having 3-4 BMs weekly. Denies associated N/V. Denies associated abdominal pain. Does have some flank pain - has CT ordered for this.      Hypertension:  Current treatment - HCTZ and norvasc - admits she has not been compliant. She did take her blood pressure medications today, prior to this had not taken in 2 days. Home blood pressure monitoring: yes - does not have readings with her. Elizabeth Bowens is adherent to a low sodium diet. Patient denies chest pain, shortness of breath and headache.  Antihypertensive medication side effects: no medication side effects noted.  Use of agents associated with hypertension: none.      Prediabetes: A1C 6.3 (7/2019). Diet-drinks soda - has cut back - drinks regular, drinks juice occasionally. Diet is poor. Exercise-no regular. Denies polys.     Iron deficiency anemia: Isn't taking her Iron pills. Gets constipated when she takes them. Has not had colonoscopy - has GI referral - has not scheduled. Is asking about using Miralax to aide with constipation and iron pills.      Age/Gender Health Maintenance     Lipid - nl 7/2019  Colon Cancer Screening - needs.  Denies FH  Never smoker      Tetanus - UTD, received 9/2017  Influenza Vaccine - UTD  Shingrix - received first dose      HIV Screening - non reactive 2019  Hep C Screening- needs      Breast Cancer Screening - last mammo 2016. Needs-has order. Denies FH  Cervical Cancer Screening -  Sees gyn at [address] [place] [address]. Patient Active Problem List    Diagnosis Date Noted    Morbidly obese ([facility] [address] 09/14/2020    Prediabetes 07/22/2019    Iron deficiency anemia 07/22/2019    Essential hypertension 07/17/2019    Chronic midline low back pain with right-sided sciatica 07/17/2019    Moderate episode of recurrent major depressive disorder ([facility] [address] 07/17/2019    Acute diffuse otitis externa of left ear 07/17/2019    Other specified disorders of rotator cuff syndrome of shoulder and allied disorders 05/26/2009    Osteoarthritis of shoulder 03/17/2009    Obesity 06/16/2008       Past Medical History:   Diagnosis Date    Anxiety     Chronic back pain     Depression     Hypertension     Prediabetes        Past Surgical History:   Procedure Laterality Date    HYSTERECTOMY      SHOULDER SURGERY         Orders Only on 03/19/2021   Component Date Value Ref Range Status    Vit D, 25-Hydroxy 03/19/2021 17.9* >=30 ng/mL Final    Comment: <=20 ng/mL. ........... Ginger Rad Deficient  21-29 ng/mL. ......... Ginger Rad Insufficient  >=30 ng/mL. ........ Ginger Rad Sufficient      TSH 03/19/2021 1.64  0.27 - 4.20 uIU/mL Final    Sodium 03/19/2021 138  136 - 145 mmol/L Final    Potassium 03/19/2021 4.3  3.5 - 5.1 mmol/L Final    Chloride 03/19/2021 99  99 - 110 mmol/L Final    CO2 03/19/2021 31  21 - 32 mmol/L Final    Anion Gap 03/19/2021 8  3 - 16 Final    Glucose 03/19/2021 109* 70 - 99 mg/dL Final    BUN 03/19/2021 13  7 - 20 mg/dL Final    CREATININE 03/19/2021 0.9  0.6 - 1.1 mg/dL Final    GFR Non- 03/19/2021 >60  >60 Final    Comment: >60 mL/min/1.73m2 EGFR, calc. for ages 25 and older using the  MDRD formula (not corrected for weight), is valid for stable  renal function.       GFR  03/19/2021 >60  >60 Final    Comment: Chronic Kidney Disease: less than 60 ml/min/1.73 sq.m.          Kidney Failure: less than 15 ml/min/1.73 sq.m. Results valid for patients 18 years and older.       Calcium 03/19/2021 9.6  8.3 - 10.6 mg/dL Final    Phosphorus 03/19/2021 4.3  2.5 - 4.9 mg/dL Final    Albumin 03/19/2021 4.5  3.4 - 5.0 g/dL Final    Iron 03/19/2021 62  37 - 145 ug/dL Final    TIBC 03/19/2021 310  260 - 445 ug/dL Final    Iron Saturation 03/19/2021 20  15 - 50 % Final    Ferritin 03/19/2021 137.3  15.0 - 150.0 ng/mL Final    WBC 03/19/2021 4.5  4.0 - 11.0 K/uL Final    RBC 03/19/2021 3.83* 4.00 - 5.20 M/uL Final    Hemoglobin 03/19/2021 10.3* 12.0 - 16.0 g/dL Final    Hematocrit 03/19/2021 32.6* 36.0 - 48.0 % Final    MCV 03/19/2021 85.2  80.0 - 100.0 fL Final    MCH 03/19/2021 27.0  26.0 - 34.0 pg Final    MCHC 03/19/2021 31.7  31.0 - 36.0 g/dL Final    RDW 03/19/2021 20.0* 12.4 - 15.4 % Final    Platelets 93/30/3134 272  135 - 450 K/uL Final    MPV 03/19/2021 7.6  5.0 - 10.5 fL Final    Neutrophils % 03/19/2021 51.3  % Final    Lymphocytes % 03/19/2021 40.0  % Final    Monocytes % 03/19/2021 7.3  % Final    Eosinophils % 03/19/2021 1.0  % Final    Basophils % 03/19/2021 0.4  % Final    Neutrophils Absolute 03/19/2021 2.3  1.7 - 7.7 K/uL Final    Lymphocytes Absolute 03/19/2021 1.8  1.0 - 5.1 K/uL Final    Monocytes Absolute 03/19/2021 0.3  0.0 - 1.3 K/uL Final    Eosinophils Absolute 03/19/2021 0.0  0.0 - 0.6 K/uL Final    Basophils Absolute 03/19/2021 0.0  0.0 - 0.2 K/uL Final    Hep C Ab Interp 03/19/2021 Non-reactive  Non-reactive Final    Hemoglobin A1C 03/19/2021 6.3  See comment % Final    Comment: Comment:  Diagnosis of Diabetes: > or = 6.5%  Increased risk of diabetes (Prediabetes): 5.7-6.4%  Glycemic Control: Nonpregnant Adults: <7.0%                    Pregnant: <6.0%        eAG 03/19/2021 134.1  mg/dL Final       Family History   Problem Relation Age of Onset    Hypertension Mother     Diabetes Father     Hypertension Father     Cancer Father         leukemia    Heart Attack Father     Cancer Maternal Grandmother         liver       Current Outpatient Medications   Medication Sig Dispense Refill    ferrous sulfate (FE TABS 325) 325 (65 Fe) MG EC tablet Take 1 tablet by mouth daily (with breakfast) 30 tablet 5    vitamin D (ERGOCALCIFEROL) 1.25 MG (35621 UT) CAPS capsule Take 1 capsule by mouth once a week for 8 doses 8 capsule 0    lisinopril (PRINIVIL;ZESTRIL) 2.5 MG tablet Take 1 tablet by mouth daily 30 tablet 3    amLODIPine (NORVASC) 5 MG tablet Take 1 tablet by mouth once daily 30 tablet 5    hydroCHLOROthiazide (HYDRODIURIL) 25 MG tablet Take 1 tablet by mouth every morning 30 tablet 5    gabapentin (NEURONTIN) 300 MG capsule TAKE ONE CAPSULE BY MOUTH THREE TIMES DAILY AS NEEDED FOR PAIN 60 capsule 5    cyclobenzaprine (FLEXERIL) 5 MG tablet TAKE 1 TABLET BY MOUTH TWICE DAILY AS NEEDED FOR MUSCLE SPASMS 60 tablet 2    miconazole (MICOTIN) 2 % cream Apply topically 2 times daily for 7 days. 1 Tube 1     No current facility-administered medications for this visit. No Known Allergies    Review of Systems   Constitutional: Negative for chills and fever. Respiratory: Negative for cough and shortness of breath. Cardiovascular: Negative for chest pain. Gastrointestinal: Positive for constipation. Negative for abdominal pain, diarrhea, nausea and vomiting. Genitourinary: Positive for flank pain. Negative for difficulty urinating. Flank pain improving   Musculoskeletal: Positive for arthralgias. Left foot pain   Neurological: Negative for dizziness and headaches. Vitals:  BP (!) 159/91   Pulse 80   Temp 96.8 °F (36 °C) (Temporal)   Ht 5' 4\" (1.626 m)   Wt 261 lb (118.4 kg)   BMI 44.80 kg/m²     Physical Exam  Constitutional:       General: She is not in acute distress. Appearance: Normal appearance. She is obese. HENT:      Head: Normocephalic and atraumatic.       Right Ear: External ear normal.      Left Ear: External ear normal.   Cardiovascular:      Rate and Rhythm: Normal rate and regular rhythm. Pulses: Normal pulses. Heart sounds: Normal heart sounds. No murmur. Pulmonary:      Effort: Pulmonary effort is normal. No respiratory distress. Breath sounds: Normal breath sounds. Abdominal:      General: Bowel sounds are normal. There is no distension. Palpations: Abdomen is soft. Tenderness: There is no abdominal tenderness. Skin:     General: Skin is warm and dry. Neurological:      General: No focal deficit present. Mental Status: She is alert and oriented to person, place, and time. Psychiatric:         Mood and Affect: Mood normal.         Behavior: Behavior normal.         Thought Content: Thought content normal.         Judgment: Judgment normal.         Assessment/Plan     1. Physical exam: Medically cleared for employment. 2. Essential hypertension: Uncontrolled. For medication compliance. Medication compliance advised, she is to follow-up in 1 week for nurse visit. Advised to take her medications least 2 hours prior to that visit and bring her home blood pressure cuff with her. 3. Iron deficiency anemia, unspecified iron deficiency anemia type: Advised to schedule with GI for colonoscopy. Advised to resume taking her iron supplements. She may take MiraLAX to aid with the constipation, she will let me know if this does not help. 4. Left foot pain: Previously seen by podiatry. Reviewed with her the recommendations to wear an over-the-counter arch support. She will try this and let me know how she is doing. 5. Constipation, unspecified constipation type: She will start using MiraLAX, education provided. Advised to drink plenty of water. No orders of the defined types were placed in this encounter. Return in about 1 week (around 4/5/2021) for nurse visit for blood pressure check. . OR sooner with questions, concerns, worsening symptoms    CHESTER BARAJAS NP    3/29/2021  8:53 AM    Discussed use, benefit, and side effects of prescribed medications. Barriers to medication compliance addressed. Discussed all ordered testing and labs. All patient questions answered. Patient agreeable with plan above. Please note that this chart was generated using dragon dictation software. Although every effort was made to ensure the accuracy of this automated transcription, some errors in transcription may have occurred.

## 2021-04-05 ENCOUNTER — NURSE ONLY (OUTPATIENT)
Dept: PRIMARY CARE CLINIC | Age: 51
End: 2021-04-05

## 2021-04-05 VITALS — OXYGEN SATURATION: 98 % | HEART RATE: 96 BPM | SYSTOLIC BLOOD PRESSURE: 132 MMHG | DIASTOLIC BLOOD PRESSURE: 81 MMHG

## 2021-04-05 DIAGNOSIS — I10 HYPERTENSION, UNSPECIFIED TYPE: ICD-10-CM

## 2021-08-29 DIAGNOSIS — G89.29 CHRONIC MIDLINE LOW BACK PAIN WITH RIGHT-SIDED SCIATICA: ICD-10-CM

## 2021-08-29 DIAGNOSIS — I10 ESSENTIAL HYPERTENSION: ICD-10-CM

## 2021-08-29 DIAGNOSIS — M54.41 CHRONIC MIDLINE LOW BACK PAIN WITH RIGHT-SIDED SCIATICA: ICD-10-CM

## 2021-08-30 RX ORDER — CYCLOBENZAPRINE HCL 5 MG
TABLET ORAL
Qty: 60 TABLET | Refills: 0 | Status: SHIPPED | OUTPATIENT
Start: 2021-08-30 | End: 2021-10-26 | Stop reason: SDUPTHER

## 2021-08-30 RX ORDER — LISINOPRIL 2.5 MG/1
TABLET ORAL
Qty: 30 TABLET | Refills: 0 | Status: SHIPPED | OUTPATIENT
Start: 2021-08-30 | End: 2021-10-26

## 2021-08-30 NOTE — TELEPHONE ENCOUNTER
Medication:   Requested Prescriptions     Pending Prescriptions Disp Refills    cyclobenzaprine (FLEXERIL) 5 MG tablet [Pharmacy Med Name: Cyclobenzaprine HCl 5 MG Oral Tablet] 60 tablet 0     Sig: TAKE 1 TABLET BY MOUTH TWICE DAILY AS NEEDED FOR MUSCLE SPASM    lisinopril (PRINIVIL;ZESTRIL) 2.5 MG tablet [Pharmacy Med Name: Lisinopril 2.5 MG Oral Tablet] 30 tablet 0     Sig: Take 1 tablet by mouth once daily        Last Filled:      Patient Phone Number: [phone] (home)     Last appt: 3/29/2021   Next appt: Visit date not found    Last OARRS: No flowsheet data found.

## 2021-10-26 ENCOUNTER — OFFICE VISIT (OUTPATIENT)
Dept: PRIMARY CARE CLINIC | Age: 51
End: 2021-10-26
Payer: COMMERCIAL

## 2021-10-26 VITALS
BODY MASS INDEX: 44.22 KG/M2 | HEIGHT: 64 IN | DIASTOLIC BLOOD PRESSURE: 85 MMHG | WEIGHT: 259 LBS | HEART RATE: 83 BPM | TEMPERATURE: 97.2 F | SYSTOLIC BLOOD PRESSURE: 132 MMHG

## 2021-10-26 DIAGNOSIS — Z12.11 COLON CANCER SCREENING: ICD-10-CM

## 2021-10-26 DIAGNOSIS — Z00.00 PREVENTATIVE HEALTH CARE: ICD-10-CM

## 2021-10-26 DIAGNOSIS — I10 ESSENTIAL HYPERTENSION: Primary | ICD-10-CM

## 2021-10-26 DIAGNOSIS — R73.03 PREDIABETES: ICD-10-CM

## 2021-10-26 DIAGNOSIS — M54.41 CHRONIC MIDLINE LOW BACK PAIN WITH RIGHT-SIDED SCIATICA: ICD-10-CM

## 2021-10-26 DIAGNOSIS — E55.9 VITAMIN D DEFICIENCY: ICD-10-CM

## 2021-10-26 DIAGNOSIS — D50.9 IRON DEFICIENCY ANEMIA, UNSPECIFIED IRON DEFICIENCY ANEMIA TYPE: ICD-10-CM

## 2021-10-26 DIAGNOSIS — G89.29 CHRONIC MIDLINE LOW BACK PAIN WITH RIGHT-SIDED SCIATICA: ICD-10-CM

## 2021-10-26 PROCEDURE — G8427 DOCREV CUR MEDS BY ELIG CLIN: HCPCS | Performed by: NURSE PRACTITIONER

## 2021-10-26 PROCEDURE — G8484 FLU IMMUNIZE NO ADMIN: HCPCS | Performed by: NURSE PRACTITIONER

## 2021-10-26 PROCEDURE — G8417 CALC BMI ABV UP PARAM F/U: HCPCS | Performed by: NURSE PRACTITIONER

## 2021-10-26 PROCEDURE — 1036F TOBACCO NON-USER: CPT | Performed by: NURSE PRACTITIONER

## 2021-10-26 PROCEDURE — 99214 OFFICE O/P EST MOD 30 MIN: CPT | Performed by: NURSE PRACTITIONER

## 2021-10-26 PROCEDURE — 3017F COLORECTAL CA SCREEN DOC REV: CPT | Performed by: NURSE PRACTITIONER

## 2021-10-26 RX ORDER — CYCLOBENZAPRINE HCL 5 MG
TABLET ORAL
Qty: 60 TABLET | Refills: 0 | Status: SHIPPED | OUTPATIENT
Start: 2021-10-26 | End: 2022-01-03

## 2021-10-26 SDOH — ECONOMIC STABILITY: FOOD INSECURITY: WITHIN THE PAST 12 MONTHS, THE FOOD YOU BOUGHT JUST DIDN'T LAST AND YOU DIDN'T HAVE MONEY TO GET MORE.: NEVER TRUE

## 2021-10-26 SDOH — ECONOMIC STABILITY: FOOD INSECURITY: WITHIN THE PAST 12 MONTHS, YOU WORRIED THAT YOUR FOOD WOULD RUN OUT BEFORE YOU GOT MONEY TO BUY MORE.: NEVER TRUE

## 2021-10-26 ASSESSMENT — ENCOUNTER SYMPTOMS
NAUSEA: 0
BLOOD IN STOOL: 0
VOMITING: 0
SHORTNESS OF BREATH: 0
ABDOMINAL PAIN: 0
CONSTIPATION: 0
DIARRHEA: 0
COUGH: 0

## 2021-10-26 ASSESSMENT — SOCIAL DETERMINANTS OF HEALTH (SDOH): HOW HARD IS IT FOR YOU TO PAY FOR THE VERY BASICS LIKE FOOD, HOUSING, MEDICAL CARE, AND HEATING?: NOT HARD AT ALL

## 2021-10-26 NOTE — PROGRESS NOTES
Date: 10/26/2021                                               Subjective/Objective:     Chief Complaint   Patient presents with    Hypertension       HPI     Beth Blackmon is a 47 yo female, visit today for follow up on hypertension. Denies concerns. Hypertension:  Endorses compliance with amlodipine and hydrochlorothiazide. Home blood pressure monitoring: no. Has not taken medications yet today, normally takes later in the morning.  She is adherent to a low sodium diet. Patient denies chest pain, shortness of breath and headache.  Antihypertensive medication side effects: no medication side effects noted.  Use of agents associated with hypertension: none.      She carries a history of prediabetes, most recent A1C 6.3 (3/2021). Denies polys.     Iron deficiency anemia: Not compliant with PO iron due to constipation. Was referred to GI for colonoscopy-has not scheduled.     Age/Gender Health Maintenance     Colon Cancer Screening - needs  Never smoker      COVID vac - received dose 1     Breast Cancer Screening - last mammo 2016.  Needs-has order  Cervical Cancer Screening -  Sees gyn at [address] [place] [address]         Patient Active Problem List    Diagnosis Date Noted    Morbidly obese ([facility] [address] 09/14/2020    Prediabetes 07/22/2019    Iron deficiency anemia 07/22/2019    Essential hypertension 07/17/2019    Chronic midline low back pain with right-sided sciatica 07/17/2019    Moderate episode of recurrent major depressive disorder ([address] 07/17/2019    Acute diffuse otitis externa of left ear 07/17/2019    Other specified disorders of rotator cuff syndrome of shoulder and allied disorders 05/26/2009    Osteoarthritis of shoulder 03/17/2009    Obesity 06/16/2008       Past Medical History:   Diagnosis Date    Anxiety     Chronic back pain     Depression     Hypertension     Prediabetes        Past Surgical History:   Procedure Laterality Date    HYSTERECTOMY      SHOULDER SURGERY         Orders Only on 03/19/2021   Component Date Value Ref Range Status    Vit D, 25-Hydroxy 03/19/2021 17.9* >=30 ng/mL Final    Comment: <=20 ng/mL. ........... Posey Chime Deficient  21-29 ng/mL. ......... Posey Chime Insufficient  >=30 ng/mL. ........ Posey Chime Sufficient      TSH 03/19/2021 1.64  0.27 - 4.20 uIU/mL Final    Sodium 03/19/2021 138  136 - 145 mmol/L Final    Potassium 03/19/2021 4.3  3.5 - 5.1 mmol/L Final    Chloride 03/19/2021 99  99 - 110 mmol/L Final    CO2 03/19/2021 31  21 - 32 mmol/L Final    Anion Gap 03/19/2021 8  3 - 16 Final    Glucose 03/19/2021 109* 70 - 99 mg/dL Final    BUN 03/19/2021 13  7 - 20 mg/dL Final    CREATININE 03/19/2021 0.9  0.6 - 1.1 mg/dL Final    GFR Non- 03/19/2021 >60  >60 Final    Comment: >60 mL/min/1.73m2 EGFR, calc. for ages 25 and older using the  MDRD formula (not corrected for weight), is valid for stable  renal function.  GFR  03/19/2021 >60  >60 Final    Comment: Chronic Kidney Disease: less than 60 ml/min/1.73 sq.m. Kidney Failure: less than 15 ml/min/1.73 sq.m. Results valid for patients 18 years and older.       Calcium 03/19/2021 9.6  8.3 - 10.6 mg/dL Final    Phosphorus 03/19/2021 4.3  2.5 - 4.9 mg/dL Final    Albumin 03/19/2021 4.5  3.4 - 5.0 g/dL Final    Iron 03/19/2021 62  37 - 145 ug/dL Final    TIBC 03/19/2021 310  260 - 445 ug/dL Final    Iron Saturation 03/19/2021 20  15 - 50 % Final    Ferritin 03/19/2021 137.3  15.0 - 150.0 ng/mL Final    WBC 03/19/2021 4.5  4.0 - 11.0 K/uL Final    RBC 03/19/2021 3.83* 4.00 - 5.20 M/uL Final    Hemoglobin 03/19/2021 10.3* 12.0 - 16.0 g/dL Final    Hematocrit 03/19/2021 32.6* 36.0 - 48.0 % Final    MCV 03/19/2021 85.2  80.0 - 100.0 fL Final    MCH 03/19/2021 27.0  26.0 - 34.0 pg Final    MCHC 03/19/2021 31.7  31.0 - 36.0 g/dL Final    RDW 03/19/2021 20.0* 12.4 - 15.4 % Final    Platelets 97/92/0129 272  135 - 450 K/uL Final    MPV 03/19/2021 7.6  5.0 - 10.5 fL Final    Neutrophils % 03/19/2021 51.3  % Final    Lymphocytes % 03/19/2021 40.0  % Final    Monocytes % 03/19/2021 7.3  % Final    Eosinophils % 03/19/2021 1.0  % Final    Basophils % 03/19/2021 0.4  % Final    Neutrophils Absolute 03/19/2021 2.3  1.7 - 7.7 K/uL Final    Lymphocytes Absolute 03/19/2021 1.8  1.0 - 5.1 K/uL Final    Monocytes Absolute 03/19/2021 0.3  0.0 - 1.3 K/uL Final    Eosinophils Absolute 03/19/2021 0.0  0.0 - 0.6 K/uL Final    Basophils Absolute 03/19/2021 0.0  0.0 - 0.2 K/uL Final    Hep C Ab Interp 03/19/2021 Non-reactive  Non-reactive Final    Hemoglobin A1C 03/19/2021 6.3  See comment % Final    Comment: Comment:  Diagnosis of Diabetes: > or = 6.5%  Increased risk of diabetes (Prediabetes): 5.7-6.4%  Glycemic Control: Nonpregnant Adults: <7.0%                    Pregnant: <6.0%        eAG 03/19/2021 134.1  mg/dL Final       Family History   Problem Relation Age of Onset    Hypertension Mother     Diabetes Father     Hypertension Father     Cancer Father         leukemia    Heart Attack Father     Cancer Maternal Grandmother         liver       Current Outpatient Medications   Medication Sig Dispense Refill    cyclobenzaprine (FLEXERIL) 5 MG tablet TAKE 1 TABLET BY MOUTH TWICE DAILY AS NEEDED FOR MUSCLE SPASM 60 tablet 0    lisinopril (PRINIVIL;ZESTRIL) 2.5 MG tablet Take 1 tablet by mouth once daily 30 tablet 0    amLODIPine (NORVASC) 5 MG tablet Take 1 tablet by mouth once daily 30 tablet 5    hydroCHLOROthiazide (HYDRODIURIL) 25 MG tablet Take 1 tablet by mouth every morning 30 tablet 5    gabapentin (NEURONTIN) 300 MG capsule TAKE ONE CAPSULE BY MOUTH THREE TIMES DAILY AS NEEDED FOR PAIN 60 capsule 5     No current facility-administered medications for this visit. No Known Allergies    Review of Systems   Constitutional: Negative for chills and fever. HENT: Negative. Respiratory: Negative for cough and shortness of breath.     Cardiovascular: Negative for chest pain and leg swelling. Gastrointestinal: Negative for abdominal pain, blood in stool, constipation, diarrhea, nausea and vomiting. Endocrine: Negative for polydipsia, polyphagia and polyuria. Genitourinary: Negative. Skin: Negative. Neurological: Negative. Vitals:  /85   Pulse 83   Temp 97.2 °F (36.2 °C) (Temporal)   Ht 5' 4\" (1.626 m)   Wt 259 lb (117.5 kg)   BMI 44.46 kg/m²     Physical Exam  Vitals reviewed. Constitutional:       General: She is not in acute distress. Appearance: Normal appearance. She is obese. HENT:      Head: Normocephalic and atraumatic. Right Ear: Tympanic membrane, ear canal and external ear normal.      Left Ear: Tympanic membrane, ear canal and external ear normal.   Cardiovascular:      Rate and Rhythm: Normal rate and regular rhythm. Heart sounds: Normal heart sounds. No murmur heard. Pulmonary:      Effort: Pulmonary effort is normal. No respiratory distress. Breath sounds: Normal breath sounds. No wheezing. Abdominal:      General: Bowel sounds are normal. There is no distension. Palpations: Abdomen is soft. Tenderness: There is no abdominal tenderness. Lymphadenopathy:      Cervical: No cervical adenopathy. Skin:     General: Skin is warm and dry. Neurological:      General: No focal deficit present. Mental Status: She is alert and oriented to person, place, and time. Psychiatric:         Mood and Affect: Mood normal.         Behavior: Behavior normal.         Thought Content: Thought content normal.         Judgment: Judgment normal.         Assessment/Plan     1. Essential hypertension: controlled  - Continue current medications  - RENAL FUNCTION PANEL; Future    2. Prediabetes: diet/lifestyle managed  - Check A1C  - Hemoglobin A1C; Future    3.  Iron deficiency anemia, unspecified iron deficiency anemia type: non compliant with PO iron due to constipation  - Previously referred to GI for colonoscopy, did not arrange  - Refer to GI  - CBC; Future  - MICHELA Cerda MD, Gastroenterology, [facility]    4. Vitamin D deficiency  - VITAMIN D 25 HYDROXY; Future    5. Chronic midline low back pain with right-sided sciatica  - cyclobenzaprine (FLEXERIL) 5 MG tablet; TAKE 1 TABLET BY MOUTH TWICE DAILY AS NEEDED FOR MUSCLE SPASM  Dispense: 60 tablet; Refill: 0    6. Colon cancer screening  - MICHELA Cerda MD, Gastroenterology, [facility]    7. Preventative health care  - HEPATIC FUNCTION PANEL; Future      Orders Placed This Encounter   Procedures    Hemoglobin A1C     Standing Status:   Future     Standing Expiration Date:   10/26/2022    CBC     Standing Status:   Future     Standing Expiration Date:   10/26/2022    RENAL FUNCTION PANEL     Standing Status:   Future     Standing Expiration Date:   10/26/2022    HEPATIC FUNCTION PANEL     Standing Status:   Future     Standing Expiration Date:   10/26/2022    VITAMIN D 25 HYDROXY     Standing Status:   Future     Standing Expiration Date:   10/26/2022    MICHELA Cerda MD, Gastroenterology, [facility]     Referral Priority:   Routine     Referral Type:   Eval and Treat     Referral Reason:   Specialty Services Required     Referred to Provider:   Margaret Menendez MD     Requested Specialty:   Gastroenterology     Number of Visits Requested:   1       Return in about 6 months (around 4/26/2022) for hypertension. OR sooner with questions, concerns, worsening symptoms    SHY LOREDO, GORGE    10/26/2021  9:43 AM    Discussed use, benefit, and side effects of prescribed medications. Barriers to medication compliance addressed. Discussed all ordered testing and labs. All patient questions answered. Patient agreeable with plan above. Please note that this chart was generated using dragon dictation software.   Although every effort was made to ensure the accuracy of this automated transcription, some errors in transcription may have occurred.

## 2021-11-03 DIAGNOSIS — Z00.00 PREVENTATIVE HEALTH CARE: ICD-10-CM

## 2021-11-03 DIAGNOSIS — E55.9 VITAMIN D DEFICIENCY: ICD-10-CM

## 2021-11-03 DIAGNOSIS — I10 ESSENTIAL HYPERTENSION: ICD-10-CM

## 2021-11-03 DIAGNOSIS — R73.03 PREDIABETES: ICD-10-CM

## 2021-11-03 DIAGNOSIS — D50.9 IRON DEFICIENCY ANEMIA, UNSPECIFIED IRON DEFICIENCY ANEMIA TYPE: ICD-10-CM

## 2021-11-03 LAB
ALBUMIN SERPL-MCNC: 4.4 G/DL (ref 3.4–5)
ALP BLD-CCNC: 61 U/L (ref 40–129)
ALT SERPL-CCNC: 17 U/L (ref 10–40)
ANION GAP SERPL CALCULATED.3IONS-SCNC: 13 MMOL/L (ref 3–16)
AST SERPL-CCNC: 18 U/L (ref 15–37)
BILIRUB SERPL-MCNC: 0.3 MG/DL (ref 0–1)
BILIRUBIN DIRECT: <0.2 MG/DL (ref 0–0.3)
BILIRUBIN, INDIRECT: ABNORMAL MG/DL (ref 0–1)
BUN BLDV-MCNC: 13 MG/DL (ref 7–20)
CALCIUM SERPL-MCNC: 9.2 MG/DL (ref 8.3–10.6)
CHLORIDE BLD-SCNC: 97 MMOL/L (ref 99–110)
CO2: 25 MMOL/L (ref 21–32)
CREAT SERPL-MCNC: 1 MG/DL (ref 0.6–1.1)
GFR AFRICAN AMERICAN: >60
GFR NON-AFRICAN AMERICAN: 58
GLUCOSE BLD-MCNC: 100 MG/DL (ref 70–99)
HCT VFR BLD CALC: 29.3 % (ref 36–48)
HEMOGLOBIN: 9 G/DL (ref 12–16)
MCH RBC QN AUTO: 26.8 PG (ref 26–34)
MCHC RBC AUTO-ENTMCNC: 30.8 G/DL (ref 31–36)
MCV RBC AUTO: 87.1 FL (ref 80–100)
PDW BLD-RTO: 22.2 % (ref 12.4–15.4)
PHOSPHORUS: 3.3 MG/DL (ref 2.5–4.9)
PLATELET # BLD: 243 K/UL (ref 135–450)
PMV BLD AUTO: 7.8 FL (ref 5–10.5)
POTASSIUM SERPL-SCNC: 3.9 MMOL/L (ref 3.5–5.1)
RBC # BLD: 3.36 M/UL (ref 4–5.2)
SODIUM BLD-SCNC: 135 MMOL/L (ref 136–145)
TOTAL PROTEIN: 10 G/DL (ref 6.4–8.2)
VITAMIN D 25-HYDROXY: 19.2 NG/ML
WBC # BLD: 3.7 K/UL (ref 4–11)

## 2021-11-04 DIAGNOSIS — R77.9 ELEVATED BLOOD PROTEIN: Primary | ICD-10-CM

## 2021-11-04 DIAGNOSIS — E11.9 TYPE 2 DIABETES MELLITUS WITHOUT COMPLICATION, WITHOUT LONG-TERM CURRENT USE OF INSULIN (HCC): Primary | ICD-10-CM

## 2021-11-04 DIAGNOSIS — E55.9 VITAMIN D DEFICIENCY: ICD-10-CM

## 2021-11-04 DIAGNOSIS — D50.9 IRON DEFICIENCY ANEMIA, UNSPECIFIED IRON DEFICIENCY ANEMIA TYPE: ICD-10-CM

## 2021-11-04 LAB
ESTIMATED AVERAGE GLUCOSE: 139.9 MG/DL
HBA1C MFR BLD: 6.5 %

## 2021-11-04 RX ORDER — ERGOCALCIFEROL 1.25 MG/1
50000 CAPSULE ORAL WEEKLY
Qty: 12 CAPSULE | Refills: 0 | Status: SHIPPED | OUTPATIENT
Start: 2021-11-04 | End: 2022-04-26

## 2021-11-19 ENCOUNTER — HOSPITAL ENCOUNTER (OUTPATIENT)
Dept: CT IMAGING | Age: 51
Discharge: HOME | End: 2021-11-19
Attending: INTERNAL MEDICINE | Admitting: INTERNAL MEDICINE
Payer: COMMERCIAL

## 2021-11-19 VITALS
SYSTOLIC BLOOD PRESSURE: 134 MMHG | BODY MASS INDEX: 43.04 KG/M2 | OXYGEN SATURATION: 96 % | WEIGHT: 252.1 LBS | RESPIRATION RATE: 18 BRPM | HEART RATE: 87 BPM | HEIGHT: 64 IN | TEMPERATURE: 97 F | DIASTOLIC BLOOD PRESSURE: 77 MMHG

## 2021-11-19 DIAGNOSIS — D50.0 IRON DEFICIENCY ANEMIA SECONDARY TO BLOOD LOSS (CHRONIC): ICD-10-CM

## 2021-11-19 LAB
BASOPHILS ABSOLUTE: 0 K/UL (ref 0–0.2)
BASOPHILS RELATIVE PERCENT: 0.9 %
EOSINOPHILS ABSOLUTE: 0 K/UL (ref 0–0.6)
EOSINOPHILS RELATIVE PERCENT: 0.7 %
HCT VFR BLD CALC: 28.6 % (ref 36–48)
HCT VFR BLD CALC: 28.8 % (ref 36–48)
HEMOGLOBIN: 9 G/DL (ref 12–16)
IMMATURE RETIC FRACT: 0.49 (ref 0.21–0.37)
INR BLD: 1.13 (ref 0.88–1.12)
LYMPHOCYTES ABSOLUTE: 1.6 K/UL (ref 1–5.1)
LYMPHOCYTES RELATIVE PERCENT: 37.9 %
MCH RBC QN AUTO: 26.4 PG (ref 26–34)
MCHC RBC AUTO-ENTMCNC: 31.3 G/DL (ref 31–36)
MCV RBC AUTO: 84.4 FL (ref 80–100)
MONOCYTES ABSOLUTE: 0.3 K/UL (ref 0–1.3)
MONOCYTES RELATIVE PERCENT: 7.1 %
NEUTROPHILS ABSOLUTE: 2.3 K/UL (ref 1.7–7.7)
NEUTROPHILS RELATIVE PERCENT: 53.4 %
PDW BLD-RTO: 22 % (ref 12.4–15.4)
PLATELET # BLD: 250 K/UL (ref 135–450)
PMV BLD AUTO: 7.8 FL (ref 5–10.5)
PROTHROMBIN TIME: 12.8 SEC (ref 9.9–12.7)
RBC # BLD: 3.41 M/UL (ref 4–5.2)
RETICULOCYTE ABSOLUTE COUNT: 0.04 M/UL (ref 0.02–0.1)
RETICULOCYTE COUNT PCT: 1.3 % (ref 0.5–2.18)
WBC # BLD: 4.3 K/UL (ref 4–11)

## 2021-11-19 PROCEDURE — 7100000011 HC PHASE II RECOVERY - ADDTL 15 MIN

## 2021-11-19 PROCEDURE — 88185 FLOWCYTOMETRY/TC ADD-ON: CPT

## 2021-11-19 PROCEDURE — 88305 TISSUE EXAM BY PATHOLOGIST: CPT

## 2021-11-19 PROCEDURE — 85045 AUTOMATED RETICULOCYTE COUNT: CPT

## 2021-11-19 PROCEDURE — C1830 POWER BONE MARROW BX NEEDLE: HCPCS

## 2021-11-19 PROCEDURE — 77012 CT SCAN FOR NEEDLE BIOPSY: CPT

## 2021-11-19 PROCEDURE — 85610 PROTHROMBIN TIME: CPT

## 2021-11-19 PROCEDURE — 6360000002 HC RX W HCPCS: Performed by: RADIOLOGY

## 2021-11-19 PROCEDURE — 88311 DECALCIFY TISSUE: CPT

## 2021-11-19 PROCEDURE — 36415 COLL VENOUS BLD VENIPUNCTURE: CPT

## 2021-11-19 PROCEDURE — 88313 SPECIAL STAINS GROUP 2: CPT

## 2021-11-19 PROCEDURE — 88184 FLOWCYTOMETRY/ TC 1 MARKER: CPT

## 2021-11-19 PROCEDURE — 85025 COMPLETE CBC W/AUTO DIFF WBC: CPT

## 2021-11-19 PROCEDURE — 7100000010 HC PHASE II RECOVERY - FIRST 15 MIN

## 2021-11-19 RX ORDER — MIDAZOLAM HYDROCHLORIDE 1 MG/ML
INJECTION INTRAMUSCULAR; INTRAVENOUS DAILY PRN
Status: COMPLETED | OUTPATIENT
Start: 2021-11-19 | End: 2021-11-19

## 2021-11-19 RX ORDER — M-VIT,TX,IRON,MINS/CALC/FOLIC 27MG-0.4MG
1 TABLET ORAL DAILY
COMMUNITY
End: 2022-06-30 | Stop reason: HOSPADM

## 2021-11-19 RX ORDER — ONDANSETRON 2 MG/ML
4 INJECTION INTRAMUSCULAR; INTRAVENOUS EVERY 8 HOURS PRN
Status: DISCONTINUED | OUTPATIENT
Start: 2021-11-19 | End: 2021-11-20 | Stop reason: HOSPADM

## 2021-11-19 RX ORDER — OXYCODONE HYDROCHLORIDE AND ACETAMINOPHEN 5; 325 MG/1; MG/1
2 TABLET ORAL EVERY 4 HOURS PRN
Status: DISCONTINUED | OUTPATIENT
Start: 2021-11-19 | End: 2021-11-20 | Stop reason: HOSPADM

## 2021-11-19 RX ORDER — ACETAMINOPHEN 325 MG/1
650 TABLET ORAL EVERY 4 HOURS PRN
Status: DISCONTINUED | OUTPATIENT
Start: 2021-11-19 | End: 2021-11-20 | Stop reason: HOSPADM

## 2021-11-19 RX ORDER — FENTANYL CITRATE 50 UG/ML
INJECTION, SOLUTION INTRAMUSCULAR; INTRAVENOUS DAILY PRN
Status: COMPLETED | OUTPATIENT
Start: 2021-11-19 | End: 2021-11-19

## 2021-11-19 RX ORDER — OXYCODONE HYDROCHLORIDE AND ACETAMINOPHEN 5; 325 MG/1; MG/1
1 TABLET ORAL EVERY 4 HOURS PRN
Status: DISCONTINUED | OUTPATIENT
Start: 2021-11-19 | End: 2021-11-20 | Stop reason: HOSPADM

## 2021-11-19 RX ADMIN — FENTANYL CITRATE 50 MCG: 50 INJECTION INTRAMUSCULAR; INTRAVENOUS at 10:33

## 2021-11-19 RX ADMIN — MIDAZOLAM 1 MG: 1 INJECTION INTRAMUSCULAR; INTRAVENOUS at 10:26

## 2021-11-19 RX ADMIN — FENTANYL CITRATE 50 MCG: 50 INJECTION INTRAMUSCULAR; INTRAVENOUS at 10:26

## 2021-11-19 RX ADMIN — MIDAZOLAM 1 MG: 1 INJECTION INTRAMUSCULAR; INTRAVENOUS at 10:29

## 2021-11-19 ASSESSMENT — PAIN SCALES - GENERAL
PAINLEVEL_OUTOF10: 0

## 2021-11-19 ASSESSMENT — PAIN - FUNCTIONAL ASSESSMENT: PAIN_FUNCTIONAL_ASSESSMENT: 0-10

## 2021-11-19 NOTE — PROGRESS NOTES
Discharge instructions given to patient and daughter. Verbalize understanding. No complaints. Resting quietly.

## 2021-11-19 NOTE — PRE SEDATION
Sedation Pre-Procedure Note    Patient Name: Leila Crowder   YOB: 1970  Room/Bed: Room/bed info not found  Medical Record Number: [id number]  Date: 11/19/2021   Time: 10:12 AM       Indication:  Bone marrow bx    Consent: I have discussed with the patient and/or the patient representative the indication, alternatives, and the possible risks and/or complications of the planned procedure and the anesthesia methods. The patient and/or patient representative appear to understand and agree to proceed. Vital Signs:   Vitals:    11/19/21 0902   BP: (!) 147/86   Pulse: 90   Resp: 16   Temp: 97.2 °F (36.2 °C)   SpO2: 99%       Past Medical History:   has a past medical history of Anxiety, Chronic back pain, Depression, Hypertension, and Prediabetes. Past Surgical History:   has a past surgical history that includes Hysterectomy and shoulder surgery. Medications:   Scheduled Meds:   Continuous Infusions:   PRN Meds:   Home Meds:   Prior to Admission medications    Medication Sig Start Date End Date Taking? Authorizing Provider   Multiple Vitamins-Minerals (THERAPEUTIC MULTIVITAMIN-MINERALS) tablet Take 1 tablet by mouth daily   Yes Historical Provider, MD   metFORMIN (GLUCOPHAGE) 500 MG tablet Take 1 tablet by mouth daily (with breakfast) 11/4/21  Yes SHY Sarabia   cyclobenzaprine (FLEXERIL) 5 MG tablet TAKE 1 TABLET BY MOUTH TWICE DAILY AS NEEDED FOR MUSCLE SPASM 10/26/21  Yes SHY Sarabia   amLODIPine (NORVASC) 5 MG tablet Take 1 tablet by mouth once daily 3/19/21  Yes SHY Sarabia   hydroCHLOROthiazide (HYDRODIURIL) 25 MG tablet Take 1 tablet by mouth every morning 3/19/21  Yes SHY Arrington   gabapentin (NEURONTIN) 300 MG capsule TAKE ONE CAPSULE BY MOUTH THREE TIMES DAILY AS NEEDED FOR PAIN  Patient taking differently: Take 300 mg by mouth daily.  TAKE ONE CAPSULE BY MOUTH THREE TIMES DAILY AS NEEDED FOR PAIN 3/19/21 11/19/21 Yes Jarad Pritchard APRN   vitamin D (ERGOCALCIFEROL) 1.25 MG (63414 UT) CAPS capsule Take 1 capsule by mouth once a week for 12 doses 11/4/21 1/21/22  SHY Sarabia     Coumadin Use Last 7 Days:  no  Antiplatelet drug therapy use last 7 days: no  Other anticoagulant use last 7 days: no  Additional Medication Information:        Pre-Sedation Documentation and Exam:   I have reviewed the patient's history and review of systems.     Mallampati Airway Assessment:  normal neck range of motion    Prior History of Anesthesia Complications:   none    ASA Classification:  Class 2 - A normal healthy patient with mild systemic disease    Sedation/ Anesthesia Plan:   intravenous sedation    Medications Planned:   midazolam (Versed) intravenously and fentanyl intravenously    Patient is an appropriate candidate for plan of sedation: yes    Electronically signed by Morgan Mathis MD on 11/19/2021 at 10:12 AM

## 2021-11-29 LAB
Lab: NORMAL
REPORT: NORMAL
THIS TEST SENT TO: NORMAL

## 2021-12-07 ENCOUNTER — HOSPITAL ENCOUNTER (OUTPATIENT)
Dept: ONCOLOGY | Age: 51
Setting detail: INFUSION SERIES
Discharge: HOME | End: 2021-12-07
Payer: COMMERCIAL

## 2021-12-07 VITALS
OXYGEN SATURATION: 100 % | HEART RATE: 89 BPM | RESPIRATION RATE: 18 BRPM | SYSTOLIC BLOOD PRESSURE: 116 MMHG | DIASTOLIC BLOOD PRESSURE: 75 MMHG

## 2021-12-07 LAB
BASOPHILS ABSOLUTE: 0 K/UL (ref 0–0.2)
BASOPHILS RELATIVE PERCENT: 0.4 %
EOSINOPHILS ABSOLUTE: 0 K/UL (ref 0–0.6)
EOSINOPHILS RELATIVE PERCENT: 0.7 %
HCT VFR BLD CALC: 29.4 % (ref 36–48)
HEMOGLOBIN: 9.4 G/DL (ref 12–16)
LYMPHOCYTES ABSOLUTE: 2.1 K/UL (ref 1–5.1)
LYMPHOCYTES RELATIVE PERCENT: 45.6 %
MCH RBC QN AUTO: 27.2 PG (ref 26–34)
MCHC RBC AUTO-ENTMCNC: 31.8 G/DL (ref 31–36)
MCV RBC AUTO: 85.5 FL (ref 80–100)
MONOCYTES ABSOLUTE: 0.3 K/UL (ref 0–1.3)
MONOCYTES RELATIVE PERCENT: 6.4 %
NEUTROPHILS ABSOLUTE: 2.2 K/UL (ref 1.7–7.7)
NEUTROPHILS RELATIVE PERCENT: 46.9 %
PDW BLD-RTO: 22 % (ref 12.4–15.4)
PLATELET # BLD: 256 K/UL (ref 135–450)
PMV BLD AUTO: 7.7 FL (ref 5–10.5)
RBC # BLD: 3.44 M/UL (ref 4–5.2)
WBC # BLD: 4.7 K/UL (ref 4–11)

## 2021-12-07 PROCEDURE — 94761 N-INVAS EAR/PLS OXIMETRY MLT: CPT

## 2021-12-07 PROCEDURE — 88264 CHROMOSOME ANALYSIS 20-25: CPT

## 2021-12-07 PROCEDURE — 6360000002 HC RX W HCPCS: Performed by: NURSE PRACTITIONER

## 2021-12-07 PROCEDURE — 88271 CYTOGENETICS DNA PROBE: CPT

## 2021-12-07 PROCEDURE — 88275 CYTOGENETICS 100-300: CPT

## 2021-12-07 PROCEDURE — 88237 TISSUE CULTURE BONE MARROW: CPT

## 2021-12-07 PROCEDURE — 88185 FLOWCYTOMETRY/TC ADD-ON: CPT

## 2021-12-07 PROCEDURE — 38221 DX BONE MARROW BIOPSIES: CPT

## 2021-12-07 PROCEDURE — 88313 SPECIAL STAINS GROUP 2: CPT

## 2021-12-07 PROCEDURE — 88341 IMHCHEM/IMCYTCHM EA ADD ANTB: CPT

## 2021-12-07 PROCEDURE — 88342 IMHCHEM/IMCYTCHM 1ST ANTB: CPT

## 2021-12-07 PROCEDURE — 88184 FLOWCYTOMETRY/ TC 1 MARKER: CPT

## 2021-12-07 PROCEDURE — 99153 MOD SED SAME PHYS/QHP EA: CPT

## 2021-12-07 PROCEDURE — 96374 THER/PROPH/DIAG INJ IV PUSH: CPT

## 2021-12-07 PROCEDURE — 88311 DECALCIFY TISSUE: CPT

## 2021-12-07 PROCEDURE — 85025 COMPLETE CBC W/AUTO DIFF WBC: CPT

## 2021-12-07 PROCEDURE — 2700000000 HC OXYGEN THERAPY PER DAY

## 2021-12-07 PROCEDURE — 96375 TX/PRO/DX INJ NEW DRUG ADDON: CPT

## 2021-12-07 PROCEDURE — 88305 TISSUE EXAM BY PATHOLOGIST: CPT

## 2021-12-07 PROCEDURE — 99152 MOD SED SAME PHYS/QHP 5/>YRS: CPT

## 2021-12-07 RX ORDER — FENTANYL CITRATE 50 UG/ML
100 INJECTION, SOLUTION INTRAMUSCULAR; INTRAVENOUS
Status: COMPLETED | OUTPATIENT
Start: 2021-12-07 | End: 2021-12-07

## 2021-12-07 RX ORDER — FLUMAZENIL 0.1 MG/ML
0.5 INJECTION, SOLUTION INTRAVENOUS PRN
Status: DISCONTINUED | OUTPATIENT
Start: 2021-12-07 | End: 2021-12-08 | Stop reason: HOSPADM

## 2021-12-07 RX ORDER — NALOXONE HYDROCHLORIDE 0.4 MG/ML
0.4 INJECTION, SOLUTION INTRAMUSCULAR; INTRAVENOUS; SUBCUTANEOUS PRN
Status: DISCONTINUED | OUTPATIENT
Start: 2021-12-07 | End: 2021-12-08 | Stop reason: HOSPADM

## 2021-12-07 RX ORDER — MIDAZOLAM HYDROCHLORIDE 1 MG/ML
6 INJECTION INTRAMUSCULAR; INTRAVENOUS
Status: COMPLETED | OUTPATIENT
Start: 2021-12-07 | End: 2021-12-07

## 2021-12-07 RX ADMIN — FENTANYL CITRATE 75 MCG: 50 INJECTION INTRAMUSCULAR; INTRAVENOUS at 09:02

## 2021-12-07 RX ADMIN — MIDAZOLAM 4 MG: 1 INJECTION INTRAMUSCULAR; INTRAVENOUS at 09:01

## 2021-12-07 NOTE — PROCEDURES
Bone Marrow Core & Aspirate with Moderate Sedation    Diagnosis: NC/NC Anemia  Indication: Possible myeloma  Planned Sedation:  Fentanyl: 75 mcg  Versed: 4 mg  Ativan:  Pre-Procedure ASA Score: Class II - mild to moderate systemic disturbance, well-controlled  Airway Assessment Score: II (soft palate, uvula, fauces visible)    Consent:   Consent was obtained from the patient by:   1. Explaining the risks associated with biopsy [bleeding and bruising] and moderate sedation [respiratory depression and sedation], as well as the benefits [an understanding of the current diagnosis to make treatment decisions], and alternatives to biopsy with sedation. 2. The patient voiced an understanding of the risks/benefits and the answers to their questions, then proceeded to sign and date the consent form. Assessment:  Patient was assessed by myself prior to initiation of sedation and procedure and is deemed medically fit to undergo the procedure. Recent H&P, current medications, and allergies are on the chart and reviewed. Time out performed. Procedure:  Patient placed in the left lateral decubitus position. The area was prepped with chloraprep. Sterile drapes were applied. A puncture was made with the provided scalpel, then using a l needle a bone marrow aspirate was taken from the right posterior iliac crest.  Then using an 8 G 6\" needle a core biopsy was obtained. A sterile bandage was applied. The patient had no significant bleeding. The sample was sent for histology, flow cytometry and cytogenetics and FISH. Patient tolerated well, no complications. Patient may be discharged from outpt oncology following procedure once Shyanne Score is at least 8 or meets pre-procedure Shyanne Score. Nursing to notify physician if pt doesn't meet criteria for discharge.     Fede Sadler MD  [facility]  [phone]

## 2021-12-07 NOTE — PROGRESS NOTES
ETCO2  Monitoring done for conscious sedation. Patient is on 4 liters/min via nasal cannula for procedure.     Baseline information:  HR: 90 BP: 126/84  RR: 23 LOC: alert  ETCO2: 41 SpO2: 100    5 minutes after sedation administered:  HR: 85 BP: 142/99  RR: 23 LOC: lethargic  ETCO2: 41 SpO2: 99    10 min after sedation administered:  HR: 79 BP: 123/93  RR: 15 LOC: lethargic  ETCO2: 38 SpO2: 95    15 minutes after sedation administered:  HR: 79 BP: 125/100  RR: 18 LOC:lethargic  ETCO2: 42 SpO2: 98        Post-Procedure:  HR: 79 BP: 143/78  RR: 18 LOC: alert  ETCO2: 30 SpO2: 97  well    Patient/caregiver was educated on the proper method of use:  Yes      Level of patient/caregiver understanding able to:   [] Verbalize understanding   [] Demonstrate understanding       [] Teach back        [] Needs reinforcement       []  No available caregiver               []  Other:     Response to education:  Very Good

## 2022-03-18 ENCOUNTER — HOSPITAL ENCOUNTER (OUTPATIENT)
Dept: CT IMAGING | Age: 52
Discharge: HOME | End: 2022-03-18
Payer: COMMERCIAL

## 2022-03-18 VITALS
HEART RATE: 91 BPM | OXYGEN SATURATION: 97 % | DIASTOLIC BLOOD PRESSURE: 82 MMHG | RESPIRATION RATE: 16 BRPM | TEMPERATURE: 96.9 F | SYSTOLIC BLOOD PRESSURE: 130 MMHG

## 2022-03-18 DIAGNOSIS — C90.00 MULTIPLE MYELOMA, REMISSION STATUS UNSPECIFIED (HCC): ICD-10-CM

## 2022-03-18 LAB
APTT: 31.3 SEC (ref 26.2–38.6)
BASOPHILS ABSOLUTE: 0 K/UL (ref 0–0.2)
BASOPHILS RELATIVE PERCENT: 0.4 %
EOSINOPHILS ABSOLUTE: 0 K/UL (ref 0–0.6)
EOSINOPHILS RELATIVE PERCENT: 0.8 %
HCT VFR BLD CALC: 31.8 % (ref 36–48)
HEMOGLOBIN: 10 G/DL (ref 12–16)
INR BLD: 1.06 (ref 0.88–1.12)
LYMPHOCYTES ABSOLUTE: 0.5 K/UL (ref 1–5.1)
LYMPHOCYTES RELATIVE PERCENT: 8.7 %
MCH RBC QN AUTO: 27 PG (ref 26–34)
MCHC RBC AUTO-ENTMCNC: 31.6 G/DL (ref 31–36)
MCV RBC AUTO: 85.4 FL (ref 80–100)
MONOCYTES ABSOLUTE: 0.2 K/UL (ref 0–1.3)
MONOCYTES RELATIVE PERCENT: 3.2 %
NEUTROPHILS ABSOLUTE: 4.7 K/UL (ref 1.7–7.7)
NEUTROPHILS RELATIVE PERCENT: 86.9 %
PDW BLD-RTO: 19.8 % (ref 12.4–15.4)
PLATELET # BLD: 267 K/UL (ref 135–450)
PMV BLD AUTO: 8.4 FL (ref 5–10.5)
PROTHROMBIN TIME: 12 SEC (ref 9.9–12.7)
RBC # BLD: 3.73 M/UL (ref 4–5.2)
WBC # BLD: 5.4 K/UL (ref 4–11)

## 2022-03-18 PROCEDURE — 7100000011 HC PHASE II RECOVERY - ADDTL 15 MIN

## 2022-03-18 PROCEDURE — 85730 THROMBOPLASTIN TIME PARTIAL: CPT

## 2022-03-18 PROCEDURE — 2500000003 HC RX 250 WO HCPCS: Performed by: RADIOLOGY

## 2022-03-18 PROCEDURE — 7100000010 HC PHASE II RECOVERY - FIRST 15 MIN

## 2022-03-18 PROCEDURE — 88365 INSITU HYBRIDIZATION (FISH): CPT

## 2022-03-18 PROCEDURE — 77012 CT SCAN FOR NEEDLE BIOPSY: CPT

## 2022-03-18 PROCEDURE — 6360000002 HC RX W HCPCS: Performed by: RADIOLOGY

## 2022-03-18 PROCEDURE — 85610 PROTHROMBIN TIME: CPT

## 2022-03-18 PROCEDURE — 88342 IMHCHEM/IMCYTCHM 1ST ANTB: CPT

## 2022-03-18 PROCEDURE — 88305 TISSUE EXAM BY PATHOLOGIST: CPT

## 2022-03-18 PROCEDURE — 2709999900 CT BIOPSY BONE MARROW

## 2022-03-18 PROCEDURE — 36415 COLL VENOUS BLD VENIPUNCTURE: CPT

## 2022-03-18 PROCEDURE — 88311 DECALCIFY TISSUE: CPT

## 2022-03-18 PROCEDURE — 88341 IMHCHEM/IMCYTCHM EA ADD ANTB: CPT

## 2022-03-18 PROCEDURE — 85025 COMPLETE CBC W/AUTO DIFF WBC: CPT

## 2022-03-18 PROCEDURE — 88313 SPECIAL STAINS GROUP 2: CPT

## 2022-03-18 RX ORDER — FENTANYL CITRATE 50 UG/ML
50 INJECTION, SOLUTION INTRAMUSCULAR; INTRAVENOUS ONCE
Status: DISCONTINUED | OUTPATIENT
Start: 2022-03-18 | End: 2022-03-19 | Stop reason: HOSPADM

## 2022-03-18 RX ORDER — MIDAZOLAM HYDROCHLORIDE 1 MG/ML
1 INJECTION INTRAMUSCULAR; INTRAVENOUS ONCE
Status: DISCONTINUED | OUTPATIENT
Start: 2022-03-18 | End: 2022-03-19 | Stop reason: HOSPADM

## 2022-03-18 RX ORDER — LIDOCAINE HYDROCHLORIDE 10 MG/ML
15 INJECTION, SOLUTION INFILTRATION; PERINEURAL ONCE
Status: COMPLETED | OUTPATIENT
Start: 2022-03-18 | End: 2022-03-18

## 2022-03-18 RX ORDER — BUPIVACAINE HYDROCHLORIDE 5 MG/ML
15 INJECTION, SOLUTION EPIDURAL; INTRACAUDAL ONCE
Status: COMPLETED | OUTPATIENT
Start: 2022-03-18 | End: 2022-03-18

## 2022-03-18 RX ORDER — MIDAZOLAM HYDROCHLORIDE 1 MG/ML
1 INJECTION INTRAMUSCULAR; INTRAVENOUS ONCE
Status: COMPLETED | OUTPATIENT
Start: 2022-03-18 | End: 2022-03-18

## 2022-03-18 RX ORDER — FENTANYL CITRATE 50 UG/ML
50 INJECTION, SOLUTION INTRAMUSCULAR; INTRAVENOUS ONCE
Status: COMPLETED | OUTPATIENT
Start: 2022-03-18 | End: 2022-03-18

## 2022-03-18 RX ORDER — HEPARIN SODIUM (PORCINE) LOCK FLUSH IV SOLN 100 UNIT/ML 100 UNIT/ML
500 SOLUTION INTRAVENOUS ONCE
Status: COMPLETED | OUTPATIENT
Start: 2022-03-18 | End: 2022-03-18

## 2022-03-18 RX ADMIN — LIDOCAINE HYDROCHLORIDE 15 ML: 10 INJECTION, SOLUTION INFILTRATION; PERINEURAL at 11:34

## 2022-03-18 RX ADMIN — FENTANYL CITRATE 50 MCG: 50 INJECTION, SOLUTION INTRAMUSCULAR; INTRAVENOUS at 11:27

## 2022-03-18 RX ADMIN — Medication 500 UNITS: at 11:35

## 2022-03-18 RX ADMIN — MIDAZOLAM HYDROCHLORIDE 1 MG: 2 INJECTION, SOLUTION INTRAMUSCULAR; INTRAVENOUS at 11:27

## 2022-03-18 RX ADMIN — BUPIVACAINE HYDROCHLORIDE 75 MG: 5 INJECTION, SOLUTION EPIDURAL; INTRACAUDAL; PERINEURAL at 11:34

## 2022-03-18 ASSESSMENT — PAIN SCALES - GENERAL
PAINLEVEL_OUTOF10: 0
PAINLEVEL_OUTOF10: 0

## 2022-03-18 NOTE — PROGRESS NOTES
Pt arrived in SDS  Alert and vs stable  Hep lock dcd, fluids and food taken well  drsg on biopsy site, lower R iliac crest clean dry and intact  Pain scale -0  Verbal and written discharge instructions given to pt and S.O. Pt remains stable , dcd per wheelchair to car with family member present  Ambulatory Surgery/Procedure Discharge Note    Vitals:    03/18/22 1210   BP:    Pulse: 91   Resp: 16   Temp:    SpO2: 97%       No intake/output data recorded. Restroom use offered before discharge. Yes    Pain assessment:  level of pain (1-10, 10 severe),   Pain Level: 0        Patient discharged to home/self care.  Patient discharged via wheel chair by transporter to waiting family/S.O.       3/18/2022 12:15 PM

## 2022-03-18 NOTE — H&P
IR  H & P      Patient:  Neno Mills   :   1970      Relevant patient history reviewed and discussed. CC: Multiple myeloma in need of bone marrow biopsy. The procedure including risks and benefits was discussed at length with the patient (or designated family member) and all questions were answered. Informed consent to proceed with the procedure was given. Heart : regular rate and rhythm  Lungs : clear, breathing easily  Airway Assessment: Mallampati 4  Condition : stable    Shyanne Scale: Activity:  2 - Able to move 4 extremities voluntarily on command  Respiration:  2 - Able to breathe deeply and cough freely  Circulation:  2 - BP+/- 20mmHg of normal  Consciousness:  2 - Fully awake  Oxygen Saturation (color):  2 - Able to maintain oxygen saturation >92% on room air      [facility] nurses notes reviewed and agreed. Medications reviewed. Current Outpatient Medications on File Prior to Encounter   Medication Sig Dispense Refill    cyclobenzaprine (FLEXERIL) 5 MG tablet TAKE 1 TABLET BY MOUTH TWICE DAILY AS NEEDED FOR MUSCLE SPASM 60 tablet 2    amLODIPine (NORVASC) 5 MG tablet Take 1 tablet by mouth once daily 30 tablet 2    Multiple Vitamins-Minerals (THERAPEUTIC MULTIVITAMIN-MINERALS) tablet Take 1 tablet by mouth daily      vitamin D (ERGOCALCIFEROL) 1.25 MG (82166 UT) CAPS capsule Take 1 capsule by mouth once a week for 12 doses 12 capsule 0    metFORMIN (GLUCOPHAGE) 500 MG tablet Take 1 tablet by mouth daily (with breakfast) 90 tablet 1    hydroCHLOROthiazide (HYDRODIURIL) 25 MG tablet Take 1 tablet by mouth every morning 30 tablet 5    gabapentin (NEURONTIN) 300 MG capsule TAKE ONE CAPSULE BY MOUTH THREE TIMES DAILY AS NEEDED FOR PAIN (Patient taking differently: Take 300 mg by mouth daily. TAKE ONE CAPSULE BY MOUTH THREE TIMES DAILY AS NEEDED FOR PAIN) 60 capsule 5     No current facility-administered medications on file prior to encounter.      Allergies: No Known Allergies  Sedation : Moderate sedation planned  ASA 1 - Normal health patient

## 2022-03-18 NOTE — SEDATION DOCUMENTATION
IMAGING SERVICES NURSING PROGRESS NOTE    Procedure:  BMBX  March 18, 2022  Ángel Saldivar      Allergies:  No Known Allergies    Vitals:    03/18/22 1112   BP: (!) 147/87   Pulse: 91   Resp: 18   Temp: 98.2 °F (36.8 °C)   SpO2: 96%       Recent lab work reviewed with MD: yes    Procedure explained to patient by MD: yes   Informed consent obtained:yes  Family with patient:yes    Mental Status:  Normal  Readiness to learn:  Yes  Barriers to learning: No    Pain Assessment Pre-Procedure:  Pain Present:  no  Pain Score:  0  Pain Quality/Description:  na    Time out Procedure Verification with:  [x] RN  [x] Physician  [x] Patient  [x] Other: CT Technologist  Procedure site marked, if applicable:  Yes    Note: Patient arrived A & O x 4, denies pain, breathing easily on room air, Spoke to Dr. Sharon Singletary prior to procedure. Procedural sedation:  Fentanyl:  100mcg  Versed:    2mg  Post Procedureal Note:  Patient tolerated procedure well. Breathing easily on room air. Report given to Srinivas Levine RN. Patient transported in stable conditon to room 4.     Pain Assessment Post-Procedure:  Pain Present:  no  Pain Score:  0  Pain Quality/Description:  na    Plan of Care Goals:  Safety measures met:  Yes  Patient understands explanation of procedure:  Yes    Time in:  1127  Time out:  Jerman Mckeon RN.  R.N. 3/18/2022

## 2022-03-18 NOTE — BRIEF OP NOTE
Brief Postoperative Note    Suzanna Brantley  YOB: 1970  [id number]    Pre-operative Diagnosis: Multiple myeloma in need of bone marrow biopsy.     Post-operative Diagnosis: Same    Procedure: CT guided bone marrow biopsy    Anesthesia: moderate    Surgeons/Assistants: Ishmael Hall    Estimated Blood Loss: Minimal    Complications: none    Specimens: were obtained      Ishmael Hall MD MD  3/18/2022

## 2022-03-28 PROBLEM — D50.0 ANEMIA DUE TO CHRONIC BLOOD LOSS: Status: ACTIVE | Noted: 2022-03-28

## 2022-03-28 PROBLEM — K90.9 INTESTINAL MALABSORPTION: Status: ACTIVE | Noted: 2022-03-28

## 2022-03-28 PROBLEM — C90.00 MULTIPLE MYELOMA (HCC): Status: ACTIVE | Noted: 2022-03-28

## 2022-04-05 DIAGNOSIS — R06.02 SHORTNESS OF BREATH: ICD-10-CM

## 2022-04-05 DIAGNOSIS — C90.01 MULTIPLE MYELOMA IN REMISSION (HCC): Primary | ICD-10-CM

## 2022-04-07 LAB
Lab: NORMAL
REPORT: NORMAL
THIS TEST SENT TO: NORMAL

## 2022-04-11 LAB
Lab: NORMAL
REPORT: NORMAL
THIS TEST SENT TO: NORMAL

## 2022-04-16 DIAGNOSIS — G89.29 CHRONIC MIDLINE LOW BACK PAIN WITH RIGHT-SIDED SCIATICA: ICD-10-CM

## 2022-04-16 DIAGNOSIS — M54.41 CHRONIC MIDLINE LOW BACK PAIN WITH RIGHT-SIDED SCIATICA: ICD-10-CM

## 2022-04-16 DIAGNOSIS — I10 ESSENTIAL HYPERTENSION: ICD-10-CM

## 2022-04-18 RX ORDER — CYCLOBENZAPRINE HCL 5 MG
TABLET ORAL
Qty: 60 TABLET | Refills: 0 | OUTPATIENT
Start: 2022-04-18

## 2022-04-18 RX ORDER — AMLODIPINE BESYLATE 5 MG/1
TABLET ORAL
Qty: 30 TABLET | Refills: 0 | OUTPATIENT
Start: 2022-04-18

## 2022-04-20 ENCOUNTER — HOSPITAL ENCOUNTER (OUTPATIENT)
Dept: NON INVASIVE DIAGNOSTICS | Age: 52
Discharge: HOME | End: 2022-04-20
Payer: COMMERCIAL

## 2022-04-20 ENCOUNTER — HOSPITAL ENCOUNTER (OUTPATIENT)
Dept: PULMONOLOGY | Age: 52
Discharge: HOME | End: 2022-04-20
Payer: COMMERCIAL

## 2022-04-20 DIAGNOSIS — C90.01 MULTIPLE MYELOMA IN REMISSION (HCC): ICD-10-CM

## 2022-04-20 DIAGNOSIS — R06.02 SHORTNESS OF BREATH: ICD-10-CM

## 2022-04-20 LAB
LV EF: 70 %
LVEF MODALITY: NORMAL

## 2022-04-20 PROCEDURE — 94010 BREATHING CAPACITY TEST: CPT

## 2022-04-20 PROCEDURE — 94729 DIFFUSING CAPACITY: CPT

## 2022-04-20 PROCEDURE — 94664 DEMO&/EVAL PT USE INHALER: CPT

## 2022-04-20 PROCEDURE — 6360000004 HC RX CONTRAST MEDICATION: Performed by: INTERNAL MEDICINE

## 2022-04-20 PROCEDURE — 94726 PLETHYSMOGRAPHY LUNG VOLUMES: CPT

## 2022-04-20 PROCEDURE — 94761 N-INVAS EAR/PLS OXIMETRY MLT: CPT

## 2022-04-20 PROCEDURE — C8929 TTE W OR WO FOL WCON,DOPPLER: HCPCS

## 2022-04-20 RX ADMIN — PERFLUTREN 1.65 MG: 6.52 INJECTION, SUSPENSION INTRAVENOUS at 15:13

## 2022-04-22 NOTE — PROCEDURES
[facility]               [address]                               PULMONARY FUNCTION    PATIENT NAME: Cynthia Fitzpatrick                  :        1970  MED REC NO:   [id number]                          ROOM:  ACCOUNT NO:   [de-identified]                           ADMIT DATE: 2022  PROVIDER:     Michael Malave MD    DATE OF PROCEDURE:  2022    FINDINGS:  Spirometry for this patient shows an FEV1 of 1.85 liters,  which is 81% of predicted and a forced vital capacity of 2.24, which is  78% of predicted giving a ratio of 83%. Lung volume shows an increase  total lung capacity at 138% of predicted and residual volume of 249% of  predicted. Diffusion capacity was well within the normal range. CONCLUSION:  Forced vital capacity was low at a ratio normal and her  total lung capacity was greater than expected. The elevated total lung  capacity and residual volume would suggest hyperinflation and air  trapping. A bronchodilator challenge would be very useful interpreting  this study, but I would read this as a mild obstructive defect versus  normal study with elevated total lung capacity and residual volume of  questionable clinical significance.   Diffusion is normal.        Jessica Mora MD    D: 2022 9:59:30       T: 2022 10:48:13     [email or web address]  Job#: [id number]     Doc#: [id number]    CC:

## 2022-04-23 NOTE — PROGRESS NOTES
Kimber Stark (:  1970) is a 46 y.o. female,New patient, here for evaluation of the following chief complaint(s):  Established New Doctor, Medication Check, Foot Pain (right heel pain had a cut where the door cut her heel last summer. still having pain after healing ), and Numbness (tingling in right arm )         ASSESSMENT/PLAN:  1. Obesity, diabetes, and hypertension syndrome ([facility] weight increasing, BP elevated but has not taken medication today, a1c 6.4%  -     POCT glycosylated hemoglobin (Hb A1C)6.4%  -  Continue   lisinopril (PRINIVIL;ZESTRIL) 2.5 MG tablet; Take 1 tablet by mouth daily, Disp-90 tablet, R-1Normal  - Continue    metFORMIN (GLUCOPHAGE) 500 MG tablet; Take 1 tablet by mouth daily (with breakfast), Disp-90 tablet, R-1Normal  -     amLODIPine (NORVASC) 5 MG tablet; Take 1 tablet daily, Disp-90 tablet, R-1Normal  - Continue    hydroCHLOROthiazide (HYDRODIURIL) 25 MG tablet; Take 1 tablet by mouth every morning, Disp-90 tablet, R-1Normal  3. Encounter for screening mammogram for malignant neoplasm of breast  -     GABI DIGITAL SCREEN W OR WO CAD BILATERAL; Future  4. Screening for thyroid disorder  -     TSH with Reflex; Future  -     T4, Free; Future  5. Screening for lipid disorders  -     Lipid Panel; Future  6. Vitamin D deficiency  -     Vitamin D 25 Hydroxy; Future  7. Chronic midline low back pain with right-sided sciatica, no significant degenerative changes noted 2020 in x-ray  -   continue  gabapentin (NEURONTIN) 300 MG capsule; TAKE ONE CAPSULE BY MOUTH THREE TIMES DAILY AS NEEDED FOR PAIN, Disp-90 capsule, R-5Normal  - Continue    cyclobenzaprine (FLEXERIL) 5 MG tablet; Take 1 tablet BID as needed, Disp-60 tablet, R-2Normal  8. Moderate episode of recurrent major depressive disorder ([facility]- PHQ-9; denies SI/HI  -no medications at this time  -consider Psychiatry or Psychology referral if symptoms worsen  9. Multiple myeloma in remission ([facility]  -followed by Hem/Onc  10. Type 2 diabetes mellitus with hyperlipidemia ([facility]- a1c 6.4%  -Continue Metformin  -Decrease sugary foods, drinks, starches  -Exercise regulary    Return in about 3 months (around 7/26/2022) for HTN, DIABETES. Ms. Ananth Sorensen is establishing care, with a history of multiple myeloma diagnosed in December. States she is in remission, completed treatment but has an appointment for future treatments. Dental extractions on Saturday. Subjective   SUBJECTIVE/OBJECTIVE:  Hypertension  This is a chronic problem. The current episode started more than 1 year ago. The problem is uncontrolled. Pertinent negatives include no chest pain, headaches, palpitations or shortness of breath. Risk factors for coronary artery disease include dyslipidemia, family history, obesity and diabetes mellitus. Past treatments include calcium channel blockers, diuretics and lifestyle changes. Compliance problems include diet and exercise. Hyperlipidemia  This is a chronic problem. Exacerbating diseases include diabetes and obesity. Factors aggravating her hyperlipidemia include thiazides. Pertinent negatives include no chest pain, myalgias or shortness of breath. Current antihyperlipidemic treatment includes diet change and exercise. Compliance problems include adherence to diet and adherence to exercise. Risk factors for coronary artery disease include dyslipidemia, hypertension and obesity. Diabetes  She presents for her follow-up diabetic visit. She has type 2 diabetes mellitus. Her disease course has been stable (A1C 6.5%--> 6.4%). Pertinent negatives for hypoglycemia include no dizziness, headaches or nervousness/anxiousness. Pertinent negatives for diabetes include no chest pain and no polyuria. There are no hypoglycemic complications. Symptoms are stable. There are no diabetic complications. Risk factors for coronary artery disease include dyslipidemia, diabetes mellitus, obesity and hypertension.  Current diabetic treatment includes oral agent (monotherapy). Multiple Myeloma   Diagnosed in December, started on chemotherapy, completed in March. Had teeth pulled, colonoscopy on Thursday and EGD. Has appointment next week for next steps in treatment. Had 3 teeth pulled on upper left on Saturday, has some pain, slight facial swelling    Review of Systems   Constitutional: Negative for activity change and fever. HENT: Positive for dental problem (dental extractions). Negative for congestion. Eyes: Negative for visual disturbance. Respiratory: Negative for chest tightness and shortness of breath. Cardiovascular: Negative for chest pain, palpitations and leg swelling. Hypertension, hyperlipdemia   Gastrointestinal: Negative for abdominal pain, constipation and diarrhea. Endocrine: Negative for polyuria. Diabetes   Genitourinary: Negative for dysuria. Hysterectomy   Musculoskeletal: Negative for arthralgias and myalgias. Skin: Negative for rash. Neurological: Negative for dizziness, light-headedness and headaches. Hematological:        Multiple Myeloma   Psychiatric/Behavioral: Negative for agitation, decreased concentration and sleep disturbance. The patient is not nervous/anxious. Depression   family history includes Cancer in her father and maternal grandmother; Diabetes in her father; Heart Attack in her father; Hypertension in her father and mother. Objective     height is 5' 4\" (1.626 m) and weight is 262 lb (118.8 kg). Her temperature is 97.1 °F (36.2 °C). Her blood pressure is 145/86 (abnormal) and her pulse is 104. Her oxygen saturation is 97%. BP Readings from Last 3 Encounters:   04/26/22 (!) 145/86   03/18/22 130/82   12/07/21 116/75     Wt Readings from Last 3 Encounters:   04/26/22 262 lb (118.8 kg)   11/19/21 252 lb 1.5 oz (114.4 kg)   10/26/21 259 lb (117.5 kg)     Physical Exam  Vitals reviewed. Constitutional:       Appearance: She is well-developed.    HENT: Head: Normocephalic. Right Ear: Hearing and external ear normal.      Left Ear: Hearing and external ear normal.      Nose: Nose normal.   Eyes:      General: Lids are normal.   Cardiovascular:      Rate and Rhythm: Normal rate and regular rhythm. Heart sounds: Normal heart sounds, S1 normal and S2 normal.   Pulmonary:      Effort: Pulmonary effort is normal.      Breath sounds: Normal breath sounds. Musculoskeletal:         General: Normal range of motion. Skin:     General: Skin is warm and dry. Findings: No rash. Neurological:      Mental Status: She is alert and oriented to person, place, and time. GCS: GCS eye subscore is 4. GCS verbal subscore is 5. GCS motor subscore is 6. Psychiatric:         Speech: Speech normal.         Behavior: Behavior normal. Behavior is cooperative. On this date 4/26/2022 I have spent 25 minutes reviewing previous notes, test results and face to face with the patient discussing the diagnosis and importance of compliance with the treatment plan as well as documenting on the day of the visit. An electronic signature was used to authenticate this note.     --SHY Joe - CNP

## 2022-04-26 ENCOUNTER — OFFICE VISIT (OUTPATIENT)
Dept: PRIMARY CARE CLINIC | Age: 52
End: 2022-04-26
Payer: COMMERCIAL

## 2022-04-26 VITALS
BODY MASS INDEX: 44.73 KG/M2 | TEMPERATURE: 97.1 F | WEIGHT: 262 LBS | HEIGHT: 64 IN | DIASTOLIC BLOOD PRESSURE: 86 MMHG | SYSTOLIC BLOOD PRESSURE: 145 MMHG | OXYGEN SATURATION: 97 % | HEART RATE: 104 BPM

## 2022-04-26 DIAGNOSIS — G89.29 CHRONIC MIDLINE LOW BACK PAIN WITH RIGHT-SIDED SCIATICA: ICD-10-CM

## 2022-04-26 DIAGNOSIS — E55.9 VITAMIN D DEFICIENCY: ICD-10-CM

## 2022-04-26 DIAGNOSIS — Z13.220 SCREENING FOR LIPID DISORDERS: ICD-10-CM

## 2022-04-26 DIAGNOSIS — Z13.29 SCREENING FOR THYROID DISORDER: ICD-10-CM

## 2022-04-26 DIAGNOSIS — M54.41 CHRONIC MIDLINE LOW BACK PAIN WITH RIGHT-SIDED SCIATICA: ICD-10-CM

## 2022-04-26 DIAGNOSIS — I15.2 OBESITY, DIABETES, AND HYPERTENSION SYNDROME (HCC): Primary | ICD-10-CM

## 2022-04-26 DIAGNOSIS — F33.1 MODERATE EPISODE OF RECURRENT MAJOR DEPRESSIVE DISORDER (HCC): ICD-10-CM

## 2022-04-26 DIAGNOSIS — C90.01 MULTIPLE MYELOMA IN REMISSION (HCC): ICD-10-CM

## 2022-04-26 DIAGNOSIS — E66.9 OBESITY, DIABETES, AND HYPERTENSION SYNDROME (HCC): Primary | ICD-10-CM

## 2022-04-26 DIAGNOSIS — E78.5 TYPE 2 DIABETES MELLITUS WITH HYPERLIPIDEMIA (HCC): ICD-10-CM

## 2022-04-26 DIAGNOSIS — E11.69 OBESITY, DIABETES, AND HYPERTENSION SYNDROME (HCC): Primary | ICD-10-CM

## 2022-04-26 DIAGNOSIS — E11.69 TYPE 2 DIABETES MELLITUS WITH HYPERLIPIDEMIA (HCC): ICD-10-CM

## 2022-04-26 DIAGNOSIS — E11.59 OBESITY, DIABETES, AND HYPERTENSION SYNDROME (HCC): Primary | ICD-10-CM

## 2022-04-26 DIAGNOSIS — Z12.31 ENCOUNTER FOR SCREENING MAMMOGRAM FOR MALIGNANT NEOPLASM OF BREAST: ICD-10-CM

## 2022-04-26 PROBLEM — R73.03 PREDIABETES: Status: RESOLVED | Noted: 2019-07-22 | Resolved: 2022-04-26

## 2022-04-26 LAB
CHOLESTEROL, TOTAL: 208 MG/DL (ref 0–199)
HBA1C MFR BLD: 6.4 %
HDLC SERPL-MCNC: 56 MG/DL (ref 40–60)
LDL CHOLESTEROL CALCULATED: 133 MG/DL
T4 FREE: 1.1 NG/DL (ref 0.9–1.8)
TRIGL SERPL-MCNC: 94 MG/DL (ref 0–150)
TSH REFLEX: 0.47 UIU/ML (ref 0.27–4.2)
VITAMIN D 25-HYDROXY: 27.6 NG/ML
VLDLC SERPL CALC-MCNC: 19 MG/DL

## 2022-04-26 PROCEDURE — 3044F HG A1C LEVEL LT 7.0%: CPT | Performed by: NURSE PRACTITIONER

## 2022-04-26 PROCEDURE — G8417 CALC BMI ABV UP PARAM F/U: HCPCS | Performed by: NURSE PRACTITIONER

## 2022-04-26 PROCEDURE — 2022F DILAT RTA XM EVC RTNOPTHY: CPT | Performed by: NURSE PRACTITIONER

## 2022-04-26 PROCEDURE — 3017F COLORECTAL CA SCREEN DOC REV: CPT | Performed by: NURSE PRACTITIONER

## 2022-04-26 PROCEDURE — 1036F TOBACCO NON-USER: CPT | Performed by: NURSE PRACTITIONER

## 2022-04-26 PROCEDURE — 99214 OFFICE O/P EST MOD 30 MIN: CPT | Performed by: NURSE PRACTITIONER

## 2022-04-26 PROCEDURE — G8427 DOCREV CUR MEDS BY ELIG CLIN: HCPCS | Performed by: NURSE PRACTITIONER

## 2022-04-26 PROCEDURE — 83036 HEMOGLOBIN GLYCOSYLATED A1C: CPT | Performed by: NURSE PRACTITIONER

## 2022-04-26 RX ORDER — VALACYCLOVIR HYDROCHLORIDE 500 MG/1
500 TABLET, FILM COATED ORAL DAILY
COMMUNITY
End: 2022-06-30 | Stop reason: SDUPTHER

## 2022-04-26 RX ORDER — LISINOPRIL 2.5 MG/1
2.5 TABLET ORAL DAILY
COMMUNITY
End: 2022-04-26 | Stop reason: SDUPTHER

## 2022-04-26 RX ORDER — LISINOPRIL 2.5 MG/1
2.5 TABLET ORAL DAILY
Qty: 90 TABLET | Refills: 1 | Status: SHIPPED | OUTPATIENT
Start: 2022-04-26

## 2022-04-26 RX ORDER — GABAPENTIN 300 MG/1
CAPSULE ORAL
Qty: 90 CAPSULE | Refills: 5 | Status: SHIPPED | OUTPATIENT
Start: 2022-04-26 | End: 2022-06-30 | Stop reason: SDUPTHER

## 2022-04-26 RX ORDER — HYDROCHLOROTHIAZIDE 25 MG/1
25 TABLET ORAL EVERY MORNING
Qty: 90 TABLET | Refills: 1 | Status: SHIPPED | OUTPATIENT
Start: 2022-04-26

## 2022-04-26 RX ORDER — CYCLOBENZAPRINE HCL 5 MG
TABLET ORAL
Qty: 60 TABLET | Refills: 2 | Status: SHIPPED | OUTPATIENT
Start: 2022-04-26 | End: 2022-10-14

## 2022-04-26 RX ORDER — AMLODIPINE BESYLATE 5 MG/1
TABLET ORAL
Qty: 90 TABLET | Refills: 1 | Status: SHIPPED | OUTPATIENT
Start: 2022-04-26

## 2022-04-26 ASSESSMENT — ENCOUNTER SYMPTOMS
DIARRHEA: 0
ABDOMINAL PAIN: 0
CHEST TIGHTNESS: 0
CONSTIPATION: 0
SHORTNESS OF BREATH: 0

## 2022-04-26 ASSESSMENT — PATIENT HEALTH QUESTIONNAIRE - PHQ9
3. TROUBLE FALLING OR STAYING ASLEEP: 1
SUM OF ALL RESPONSES TO PHQ QUESTIONS 1-9: 5
8. MOVING OR SPEAKING SO SLOWLY THAT OTHER PEOPLE COULD HAVE NOTICED. OR THE OPPOSITE, BEING SO FIGETY OR RESTLESS THAT YOU HAVE BEEN MOVING AROUND A LOT MORE THAN USUAL: 0
9. THOUGHTS THAT YOU WOULD BE BETTER OFF DEAD, OR OF HURTING YOURSELF: 0
SUM OF ALL RESPONSES TO PHQ QUESTIONS 1-9: 5
10. IF YOU CHECKED OFF ANY PROBLEMS, HOW DIFFICULT HAVE THESE PROBLEMS MADE IT FOR YOU TO DO YOUR WORK, TAKE CARE OF THINGS AT HOME, OR GET ALONG WITH OTHER PEOPLE: 0
SUM OF ALL RESPONSES TO PHQ QUESTIONS 1-9: 5
4. FEELING TIRED OR HAVING LITTLE ENERGY: 1
6. FEELING BAD ABOUT YOURSELF - OR THAT YOU ARE A FAILURE OR HAVE LET YOURSELF OR YOUR FAMILY DOWN: 0
1. LITTLE INTEREST OR PLEASURE IN DOING THINGS: 1
7. TROUBLE CONCENTRATING ON THINGS, SUCH AS READING THE NEWSPAPER OR WATCHING TELEVISION: 0
2. FEELING DOWN, DEPRESSED OR HOPELESS: 1
SUM OF ALL RESPONSES TO PHQ9 QUESTIONS 1 & 2: 2
5. POOR APPETITE OR OVEREATING: 1
SUM OF ALL RESPONSES TO PHQ QUESTIONS 1-9: 5

## 2022-04-27 LAB
ESTIMATED AVERAGE GLUCOSE: 131.2 MG/DL
HBA1C MFR BLD: 6.2 %

## 2022-05-16 ENCOUNTER — HOSPITAL ENCOUNTER (OUTPATIENT)
Dept: ONCOLOGY | Age: 52
Setting detail: INFUSION SERIES
Discharge: HOME | End: 2022-05-16
Payer: COMMERCIAL

## 2022-05-16 VITALS
RESPIRATION RATE: 18 BRPM | OXYGEN SATURATION: 96 % | HEIGHT: 66 IN | TEMPERATURE: 97.8 F | HEART RATE: 99 BPM | WEIGHT: 260.58 LBS | DIASTOLIC BLOOD PRESSURE: 102 MMHG | BODY MASS INDEX: 41.88 KG/M2 | SYSTOLIC BLOOD PRESSURE: 151 MMHG

## 2022-05-16 LAB
A/G RATIO: 1 (ref 1.1–2.2)
ALBUMIN SERPL-MCNC: 4.4 G/DL (ref 3.4–5)
ALP BLD-CCNC: 61 U/L (ref 40–129)
ALT SERPL-CCNC: 23 U/L (ref 10–40)
ANION GAP SERPL CALCULATED.3IONS-SCNC: 9 MMOL/L (ref 3–16)
APTT: 35.1 SEC (ref 26.2–38.6)
AST SERPL-CCNC: 17 U/L (ref 15–37)
BASOPHILS ABSOLUTE: 0 K/UL (ref 0–0.2)
BASOPHILS RELATIVE PERCENT: 0.8 %
BILIRUB SERPL-MCNC: <0.2 MG/DL (ref 0–1)
BILIRUBIN DIRECT: <0.2 MG/DL (ref 0–0.3)
BILIRUBIN, INDIRECT: NORMAL MG/DL (ref 0–1)
BUN BLDV-MCNC: 9 MG/DL (ref 7–20)
CALCIUM SERPL-MCNC: 9.8 MG/DL (ref 8.3–10.6)
CHLORIDE BLD-SCNC: 97 MMOL/L (ref 99–110)
CO2: 31 MMOL/L (ref 21–32)
CREAT SERPL-MCNC: 0.7 MG/DL (ref 0.6–1.1)
EOSINOPHILS ABSOLUTE: 0 K/UL (ref 0–0.6)
EOSINOPHILS RELATIVE PERCENT: 1.1 %
GFR AFRICAN AMERICAN: >60
GFR NON-AFRICAN AMERICAN: >60
GLUCOSE BLD-MCNC: 120 MG/DL (ref 70–99)
GONADOTROPIN, CHORIONIC (HCG) QUANT: <5 MIU/ML
HAV IGM SER IA-ACNC: NORMAL
HBV SURFACE AB TITR SER: <3.5 MIU/ML
HCT VFR BLD CALC: 38.2 % (ref 36–48)
HEMOGLOBIN: 12.2 G/DL (ref 12–16)
INR BLD: 1.04 (ref 0.88–1.12)
LACTATE DEHYDROGENASE: 182 U/L (ref 100–190)
LYMPHOCYTES ABSOLUTE: 1.4 K/UL (ref 1–5.1)
LYMPHOCYTES RELATIVE PERCENT: 32.9 %
MCH RBC QN AUTO: 29 PG (ref 26–34)
MCHC RBC AUTO-ENTMCNC: 32 G/DL (ref 31–36)
MCV RBC AUTO: 90.6 FL (ref 80–100)
MONOCYTES ABSOLUTE: 0.3 K/UL (ref 0–1.3)
MONOCYTES RELATIVE PERCENT: 6.5 %
NEUTROPHILS ABSOLUTE: 2.5 K/UL (ref 1.7–7.7)
NEUTROPHILS RELATIVE PERCENT: 58.7 %
PDW BLD-RTO: 17.7 % (ref 12.4–15.4)
PHOSPHORUS: 3.1 MG/DL (ref 2.5–4.9)
PLATELET # BLD: 213 K/UL (ref 135–450)
PMV BLD AUTO: 7.7 FL (ref 5–10.5)
POTASSIUM SERPL-SCNC: 3.6 MMOL/L (ref 3.5–5.1)
PROTHROMBIN TIME: 11.8 SEC (ref 9.9–12.7)
RBC # BLD: 4.21 M/UL (ref 4–5.2)
SODIUM BLD-SCNC: 137 MMOL/L (ref 136–145)
TOTAL PROTEIN: 8.8 G/DL (ref 6.4–8.2)
URIC ACID, SERUM: 6.5 MG/DL (ref 2.6–6)
WBC # BLD: 4.3 K/UL (ref 4–11)

## 2022-05-16 PROCEDURE — 85025 COMPLETE CBC W/AUTO DIFF WBC: CPT

## 2022-05-16 PROCEDURE — 84100 ASSAY OF PHOSPHORUS: CPT

## 2022-05-16 PROCEDURE — 86787 VARICELLA-ZOSTER ANTIBODY: CPT

## 2022-05-16 PROCEDURE — 86707 HEPATITIS BE ANTIBODY: CPT

## 2022-05-16 PROCEDURE — 80053 COMPREHEN METABOLIC PANEL: CPT

## 2022-05-16 PROCEDURE — 86706 HEP B SURFACE ANTIBODY: CPT

## 2022-05-16 PROCEDURE — 86709 HEPATITIS A IGM ANTIBODY: CPT

## 2022-05-16 PROCEDURE — 84550 ASSAY OF BLOOD/URIC ACID: CPT

## 2022-05-16 PROCEDURE — 85610 PROTHROMBIN TIME: CPT

## 2022-05-16 PROCEDURE — 86694 HERPES SIMPLEX NES ANTBDY: CPT

## 2022-05-16 PROCEDURE — 82248 BILIRUBIN DIRECT: CPT

## 2022-05-16 PROCEDURE — 84702 CHORIONIC GONADOTROPIN TEST: CPT

## 2022-05-16 PROCEDURE — 85660 RBC SICKLE CELL TEST: CPT

## 2022-05-16 PROCEDURE — G0480 DRUG TEST DEF 1-7 CLASSES: HCPCS

## 2022-05-16 PROCEDURE — 85730 THROMBOPLASTIN TIME PARTIAL: CPT

## 2022-05-16 PROCEDURE — 80307 DRUG TEST PRSMV CHEM ANLYZR: CPT

## 2022-05-16 PROCEDURE — 86645 CMV ANTIBODY IGM: CPT

## 2022-05-16 PROCEDURE — 83615 LACTATE (LD) (LDH) ENZYME: CPT

## 2022-05-16 NOTE — PROGRESS NOTES
Pre-Autologous Stem Cell Transplant Psychological Evaluation- 5/16/2022    PSYCHOSOCIAL ASSESSMENT/RECOMMENDATIONS    Based on this evaluation, the recommendation is to consider as a candidate if identified risk factors can be resolved. Spencer Sainz was informed of the following risk factors that have been identified and recommendations that are being made to the treatment team:      1) Patient's caregiver reported having no knowledge regarding transplant. Patient and caregiver reported that caregiver was not present during pre-transplant education. However, documentation indicates that caregiver was present, but minimally engaged. Recommend additional education of caregiver and then reassessment by psychologist.    2) Ms. Kami Ying smokes marijuana nightly to assist with sleep. Recommended eliminating smoking marijuana. 3) Some concerns exist regarding Ms. Kayleen Butt adherence to treatment recommendations. Chart review reveals issues with remembering to take medications. Recommend utilizing a pill box, alarms, and caregiver to remember medications. Additional suggestions:  1) Complete advanced directive     Identified caregivers: Mag Mac (partner), Ethan Wolfe (daughter), Liana Simms (mother), Jairo Tate (daughter)   Protective factors: multiple people living in the home who can assist   DSM-5 Diagnoses: Hx of documented recurrent major depressive disorder although patient currently denies  ____________________________________________________________________________________________    Spencer Sainz is a 59-year-old [facility] American female with multiple myeloma referred for a psychological evaluation in preparation for autologous stem cell transplant. She presented with her identified primary caregiver, Mag Abbasitings (her partner). PAST MEDICAL HISTORY  Specner Sainz reports that she was diagnosed with multiple myeloma in 12/2021.  She explains her experience with treatment thus far to be \"it's been okay and chemo has not been bad\". Past medical history is significant for obesity, major depressive disorder recurrent, ADHD (childhood), hypertension, and vitamin D deficiency. KNOWLEDGE RELATED TO PROCEDURE    Diagnosis: Adequate, was provided additional education and expressed understanding  Procedure: Good  Risks and Benefits: Good  Outcome: Adequate, was provided additional education and expressed understanding  Lifestyle changes:    Hand-washing and hygiene:Excellent   People and Places: Excellent   Bleeding precautions: Good   Pets: Excellent   Sun exposure: Poor, was provided additional education and expressed understanding   Driving restrictions: Excellent   Staying within 45 minutes of the hospital: Poor, was provided additional education and expressed understanding   Physical activity: Poor, was provided additional education and expressed understanding   Sexual activity: Poor, was provided additional education and expressed understanding   Tobacco use: Excellent   Alcohol use: Excellent   Illicit drug use (including marijuana): Excellent   Food safety: Poor, was provided additional education and expressed understanding   Medication management: Excellent   Frequency of appointments: Excellent   When to call BMT doctor: Good   Going to the [facility]. in case of emergency: Excellent   Possibility for admission and re-admission to the hospital: Excellent    ADHERENCE     No-showed appointments: Denies; Chart review indicates inconsistent follow-up with PCPs in the past. Patient reports that she would not follow-up regularly in the past before she had chronic health conditions. She reports following up since being diagnosed with high blood pressure. Patient reports not liking past physicians, but eventually finding a PCP that she trusts. Reports forgetting medications: PCP note from 9/2020 indicates \"Hasn't taken medications in over a week. Just forgets\";  Patient reports currently forgetting medications once a week. Using pill box: Yes; Use of pill box was recommended: Yes  Is willing to allow caregiver to manage medications immediately following procedure: Yes  Currently requires assistance with taking medications: Denies  Endorses history of going against the medical advise of a provider: Denies  History of issues with adherence to medical recommendations for comorbidities: Denies  Motivation for procedure: 9/10    PSYCHOSOCIAL HISTORY    Marital Status:  for over 10 years, but in a relationship for 20 years   If , describes marriage as: current relationship is \"I wouldn't trade him for the world\"  Race/Ethnicity: \"Black\"  Lives: [place], [place] (approximately 15 minutes from [facility] [employer] with partner, mother, daughter [de-identified]35)  Reports stable housing: Yes  Reliable transportation: Yes  Children: 4 (ages 21-31)  Employment: not currently, applied for disability last week  Financial concerns related to this procedure: Denies  Childhood history: She was raised by mother and father in [place], [place] and has 3 half siblings. Sunny Hal describes her childhood as \"wonderful\"  Educational history: Denies difficulties in school (e.g., learning disability, accommodations). Highest level of education completed is bachelor's degree in business. Mu-ism/spiritual beliefs: Chelsea Pinzon and denies Hinduism beliefs that would affect her medical care   service: Denies  Legal history (e.g. DUI/DWI, prison/FCI, arrests, probation/parole, bankruptcy): Denies  Current stressors: son being in [employer] for the past 4 years (talks on the phone daily)  Greatest stressor in life thus far: son being in [employer]   Coping mechanisms: accepting what she can't control, crafts  Physical activity: walks daily for 6,000 steps per day  Specific dietary needs: Denies;  Denies being a picky eater  Supplements or over-the-counter drugs: multivitamin     SUPPORT SYSTEM    Identified primary caregiver: Margret Alvarez  Relationship to patient: Partner  Age: 54  [facility] Yes outside of work  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  Adequate knowledge regarding procedure: No  Diagnosis: Poor, was provided additional education and expressed understanding  Procedure: Poor, attempted to provide additional education, but did not appear to retain the information  Risks and Benefits: Poor, attempted to provide additional education, but did not appear to retain the information  Outcome: Poor, attempted to provide additional education, but did not appear to retain the information  Lifestyle changes: Caregiver would not attempt  Willingness to be a caregiver: Yes  Significant mental health conditions that would affect care giving: Denies  Significant substance abuse that would affect care giving: Denies  Significant medical conditions that would affect care giving: Denies    CAREGIVER: RAPID ESTIMATE OF ADULT LITERACY IN MEDICINE  REALM-R: 3/8; REALM-SF Score: 4/7  (0= <3rd grade reading level; 1-3= 4-6th grade reading level; 4-6= 7-8th grade reading level; >7= >9th grade reading level)    CAREGIVER: GIOVANY COGNITIVE ASSESSMENT (Version 1): 26/30 (Within Normal Limits)  Difficulty noted in the area(s) of: attention  Normal scores are ?26     Additional support people:   1) Santino Mosquera   Relationship to patient: Daughter  Age: 35  Lives: with patient  Employment: works third shift doing customer service  COVID vaccine: No   2) Diamante Lambert   Relationship to patient: mother  Age: 68  [facility]: [facility] with patient  Employment: Denies  COVID vaccine: Yes   3) Razia Jiménez (daughter)    Has completed an advanced directive: No    PSYCHIATRIC HISTORY    Current mental health treatment: Denies  Past mental health treatment: sertraline (25mg) but states that this did not help  Family psychiatric history: Denies    Depression: States that she had depression about her son when he went to snf; States that these feelings would occur for 30 minutes at a time; symptoms consisted of anhedonia/diminished interest in activities and depressed mood; Denies history of major depressive episodes  Suicidal ideation: Denies  Homicidal ideation: Denies  Past suicide attempts: Denies  Anxiety (i.e. Generalized Anxiety Disorder, health or treatment related anxiety, panic attacks, obsessions, or compulsions): Denies  Medical Anxiety (e.g. needles, pills, MRI, hospitals): Does not like taking pills due to childhood experience, but does it anyway  Eating disordered behavior (I.e. binging, purging, other compensatory behaviors, concern about body image): Denies  Rosaura: Denies   Psychosis: Denies  Post-traumatic stress disorder: Denies  Developmental or learning disability: Denies  Sleep: 5 hours per night; Denies difficulties with sleep; takes naps during the day    THE DEPRESSION, ANXIETY, STRESS SCALE- 21 ITEMS (JORGE-21) : Typical cutoff scores:  Depression = 2 (Normal) (0-9=Normal, 10-13= Mild, 14-20= Moderate, 21-27= Severe, 28+= Extremely Severe)  Anxiety = 5 (Normal) (0-9=Normal, 10-13= Mild, 14-20= Moderate, 21-27= Severe, 28+= Extremely Severe)  Stress= 2 (Normal) (0-14=Normal, 15-18= Mild, 19-25= Moderate, 26-33= Severe, 34+= Extremely Severe)    When compared to a sample of the general population she scored in the following percentiles:  Depression= 41.5 (Normal range)  Anxiety= 85.5 (Mild range)  Stress= 25.8 (Normal range)    BRIEF COPING ORIENTATION TO PROBLEMS EXPERIENCED INVENTORY (BRIEF-COPE):    Results indicate primarily an Approach coping style. This coping style is characterized by the subscales of active coping, positive reframing, planning, acceptance, seeking emotional support, and seeking informational support.  Approach Coping is associated with more helpful responses to adversity, including adaptive practical adjustment, better physical health outcomes and more stable emotional responding. Approach Coping:   Percentile= 99.6 (Raw score= 37)  Avoidant Coping   Percentile= 2.6 (Raw score=20)    WORLD HEALTH ORGANIZATION QUALITY OF LIFE ASSESSMENT (WHOQOL-BREF):     Scores indicate her quality of life in the following areas in percentiles:  Physical Health: 66.1 percentile (Transformed score= 81)  Psychological Health: 95.3 percentile (Transformed score= 94)  Social Relationships: 69.9 percentile (Transformed score= 81)  Environment: 67.8 percentile (Transformed score= 81)    SUBSTANCE USE HISTORY    Tobacco: Denies  Alcohol: Reports drinking less than monthly. Denies history of problematic alcohol use. Illicit drugs: Marijuana nightly, smokes marijuana; denies other illicit drug use     Sunny Hong does not foresee difficulties from abstaining from these substances immediately following her procedure. NEUROCOGNITIVE FUNCTIONING    Sunny Hong denies a history of closed head injury, seizures, or stroke. PATIENT: RAPID ESTIMATE OF ADULT LITERACY IN MEDICINE  REALM-R: 2/8; REALM-SF Score: 4/7  (0= <3rd grade reading level; 1-3= 4-6th grade reading level; 4-6= 7-8th grade reading level; >7= >9th grade reading level)    PATIENT GIOVANY COGNITIVE ASSESSMENT (Version 1): 28/30 (Within Normal Limits)  Difficulty noted in the area(s) of: None  Normal scores are ?26     BEHAVIORAL OBSERVATIONS    She arrived on time for her scheduled appointment. She presented with her partner as her caregiver and was dressed appropriately. During the evaluation, she presented with normal speech and logical/linear thought processes. Patient reported frustration regarding anticipated length of the evaluation, stating that this was a long amount of time. She was cooperative and friendly. Her mood was \"good\" and affect was euthymic.  Patient's primary caregiver presented as reserved and made minimal transplantation. Appropriateness of Physical Living Space and Environment: 0) Excellent: Patient has excellent, long-term, permanent and adequate housing. Social Support System Total Score: 6    Psychological Stability and Psychopathology   Presence of Psychopathology: 2) Mild Psychopathology - Present or History of mild psychopathology (e.g., Adjustment disorder). Usually a self-limited problem without significant negative impact on level of functioning. No hospitalization needed. No History of SI/SA. Assessment of Depression: 0) No Clinical Depression  Assessment of Anxiety: 0) No Clinical Anxiety  History of Organic Psychopathology or Neurocognitive Impairment: 0) None: No history of disease or treatment induced psychiatric problem. Assessment of Current Cognitive Functionin) Cognitive Functioning Within Normal Limits; or MoCA / MMSE . 26. Influence of Personality Traits vs. Disorder: 0) None: No history of significant personality disorder or psychopathology/traits. Effect of Truthfulness vs. Deceptive Behavior in Presentation: 4) Patient has not been fully forthcoming with negative information, but provides it on confrontation. Overall Risk for Psychopathology: 2) Mild: History of poor coping with current or previous medical challenges or psychosocial stressors. Only minimal, if any, psychiatric complications in response to illness, medical treatment or psychosocial stressors. Psychological Stability and Psychopathology Total Score: 8    Lifestyle and Effect of Substance Use  Alcohol Use/Abuse/Dependence: 2) ALCOHOL USE - NO ABUSE: History of minimal alcohol use which has caused no social or medical problems (i.e., no abuse). If requested by the team the patient promptly discontinued all alcohol use. Alcohol Risk for Recidivism: 1) Low Risk: (AUDIT 1 - 7). Substance Use/Abuse/Dependence: 2) History of minimal substance abuse (illicit or prescribed substances).  Quit use as soon as patient learned of disease or when first told by MD.  Substance Use Risk for Recidivism: 1) Low Risk: (DAST 1 - 2). Nicotine Use/Abuse/Dependence: 0) None: Never used tobacco in any form. No history of Nicotine Use/Abuse. Lifestyle and Effect of Substance Use Total Score: 6    -------------------------------------------------------------------------------------    Shellia Sandifer, Psy.D., Clinical Psychologist      Yamil Lozano was seen for a pre-BMT/CAR-T psychological evaluation. There were no urgent psychological concerns (e.g. suicidal or homicidal ideation). Full report to follow. Prior to the evaluation, patient and/or support system was informed of the purposes of the evaluation, which include identification of any psychosocial barriers to successful BMT/CAR-T. Educated patient regarding psychologist's role in making treatment recommendations to patient's treatment team regarding patient's candidacy for BMT/CAR-T from a psychological standpoint. Discussed limits of confidentiality (e.g., documenting in patient's medical record, coordinating with team, abuse of vulnerable person, court subpoena, and/or being a risk to self or others). Also explained to patient that provider sees patient's while hospitalized in the [place] [address]. Patient expressed understanding and was agreeable to complete the evaluation. All measures completed today were sent to medical records to be uploaded into patient's chart.

## 2022-05-16 NOTE — LETTER
[facility]  [address] [facility] [address]  Phone: [phone]    Jody Jones PSYD        May 16, 2022    [address] [facility] [address]      Dear Claudia Carrizales:    Based on today's psychological evaluation, the following recommendations need to be addressed prior to your procedure:     Will talk to transplant coordinator regarding additional education of caregiver and scheduling another appointment for caregiver and psychologist.  [address] [facility] [address] Immediately eliminate smoking of marijuana   Set alarms for remembering medications and have caregiver remind you        If you have any questions or concerns, please don't hesitate to call.     Sincerely,        Jody Jones PSYD

## 2022-05-17 LAB
CYTOMEGALOVIRUS IGM ANTIBODY: <8 AU/ML
HEPATITIS BE ANTIBODY: NEGATIVE

## 2022-05-18 LAB
AMPHETAMINES SCREEN BLOOD: NEGATIVE NG/ML
BARBITURATES SCREEN BLOOD: NEGATIVE NG/ML
BENZODIAZEPINES SCREEN BLOOD: NEGATIVE NG/ML
BUPRENORPHINE: NEGATIVE NG/ML
CANNABINOID SCREEN BLOOD: POSITIVE NG/ML
COCAINE SCREEN BLOOD: NEGATIVE NG/ML
HERPES TYPE 1/2 IGM COMBINED: 0.32 IV
HERPES TYPE I/II IGG COMBINED: 7.9 IV
Lab: NORMAL
METHADONE SCREEN BLOOD: NEGATIVE NG/ML
METHAMPHETAMINES SERUM/ PLASMA: NEGATIVE NG/ML
OPIATES SCREEN BLOOD: NEGATIVE NG/ML
OXYCODONE: NEGATIVE NG/ML
PHENCYCLIDINE SCREEN BLOOD: NEGATIVE NG/ML
VARICELLA ZOSTER AB IGM: 0 ISR
VZV IGG SER QL IA: 197.6 IV

## 2022-05-19 LAB — MISCELLANEOUS LAB TEST ORDER: NORMAL

## 2022-05-20 LAB
3-OH-COTININE URINE: 54 NG/ML
ANABASINE URINE: <5 NG/ML
COTININE, URINE: 23 NG/ML
NICOTINE URINE: <15 NG/ML

## 2022-05-23 LAB — CANNABINOID GC/MS: 131 NG/ML

## 2022-06-07 ENCOUNTER — CASE MANAGEMENT (OUTPATIENT)
Dept: ONCOLOGY | Age: 52
End: 2022-06-07

## 2022-06-07 NOTE — PROGRESS NOTES
Met with pt & significant other Virgie Means. She was seen at [facility] for pre auto SCT candidacy visit. Her pre-transplant psycho-social assessment identified potential problems with caregiver situation. Ms. Stacey Johnson would like to do outpatient transplant. Today I reviewed with Gómez Ulloa the expectations of caregiver during the transplant process. Emphasized that he must accompany Ms. Stacey Johnson on her daily visits; re- educated him on pt's risk for infection due to compromised immune system - topics included strict handwashing at home, restrictions enforced for [place] - no large gatherings, avoid grocery, shopping malls, [employer] functions. Mr. Lurdes Roa was informed that he is to contact [facility] should pt develop fever of 100.4 or greater, N/V that doesn't resolve with prescription nausea med, diarrhea. Pt states that in addition to Mr. Lrudes Roa being with her, her adult daughter - Susan Cox - lives with them & can assist as caregiver. Pt's mother also lives with them and can be present to help with meal prep, grocery shopping & household chores. At end of this meeting Mr. Lurdes Roa acknowledged of caregiver responsibilities & states he can follow thru with these. Ms Stacey Johnson & Mr. Lurdes Roa were told this is an acceptable plan. Will review with team at next protocol meeting.

## 2022-06-09 ENCOUNTER — TELEPHONE (OUTPATIENT)
Dept: ONCOLOGY | Age: 52
End: 2022-06-09

## 2022-06-09 DIAGNOSIS — Z52.011 AUTOLOGOUS DONOR OF STEM CELLS: ICD-10-CM

## 2022-06-09 RX ORDER — LOPERAMIDE HYDROCHLORIDE 2 MG/1
2 CAPSULE ORAL PRN
Status: CANCELLED | OUTPATIENT
Start: 2022-06-21

## 2022-06-09 RX ORDER — PROCHLORPERAZINE MALEATE 10 MG
10 TABLET ORAL EVERY 6 HOURS PRN
Status: CANCELLED | OUTPATIENT
Start: 2022-06-21

## 2022-06-09 NOTE — TELEPHONE ENCOUNTER
Behavioral Health    Called patient to schedule follow-up caregiver evaluation for autologous stem cell transplant. Patient did not have a voicemail set up. Will try again later.

## 2022-06-10 ENCOUNTER — HOSPITAL ENCOUNTER (OUTPATIENT)
Dept: CT IMAGING | Age: 52
Discharge: HOME | End: 2022-06-10
Payer: COMMERCIAL

## 2022-06-10 VITALS
TEMPERATURE: 96.7 F | DIASTOLIC BLOOD PRESSURE: 94 MMHG | RESPIRATION RATE: 20 BRPM | SYSTOLIC BLOOD PRESSURE: 154 MMHG | OXYGEN SATURATION: 96 % | HEART RATE: 81 BPM

## 2022-06-10 DIAGNOSIS — C90.00 MULTIPLE MYELOMA, REMISSION STATUS UNSPECIFIED (HCC): ICD-10-CM

## 2022-06-10 LAB
APTT: 25.1 SEC (ref 23–34.3)
BASOPHILS ABSOLUTE: 0 K/UL (ref 0–0.2)
BASOPHILS RELATIVE PERCENT: 1 %
EOSINOPHILS ABSOLUTE: 0.1 K/UL (ref 0–0.6)
EOSINOPHILS RELATIVE PERCENT: 1.7 %
HCT VFR BLD CALC: 37.6 % (ref 36–48)
HEMOGLOBIN: 12.3 G/DL (ref 12–16)
INR BLD: 1.07 (ref 0.87–1.14)
LYMPHOCYTES ABSOLUTE: 1.2 K/UL (ref 1–5.1)
LYMPHOCYTES RELATIVE PERCENT: 27.8 %
MCH RBC QN AUTO: 29.8 PG (ref 26–34)
MCHC RBC AUTO-ENTMCNC: 32.6 G/DL (ref 31–36)
MCV RBC AUTO: 91.3 FL (ref 80–100)
MONOCYTES ABSOLUTE: 0.4 K/UL (ref 0–1.3)
MONOCYTES RELATIVE PERCENT: 8.5 %
NEUTROPHILS ABSOLUTE: 2.7 K/UL (ref 1.7–7.7)
NEUTROPHILS RELATIVE PERCENT: 61 %
PDW BLD-RTO: 17.5 % (ref 12.4–15.4)
PLATELET # BLD: 220 K/UL (ref 135–450)
PMV BLD AUTO: 7.5 FL (ref 5–10.5)
PROTHROMBIN TIME: 13.8 SEC (ref 11.7–14.5)
RBC # BLD: 4.12 M/UL (ref 4–5.2)
WBC # BLD: 4.5 K/UL (ref 4–11)

## 2022-06-10 PROCEDURE — 7100000011 HC PHASE II RECOVERY - ADDTL 15 MIN

## 2022-06-10 PROCEDURE — 85730 THROMBOPLASTIN TIME PARTIAL: CPT

## 2022-06-10 PROCEDURE — 99152 MOD SED SAME PHYS/QHP 5/>YRS: CPT

## 2022-06-10 PROCEDURE — 88311 DECALCIFY TISSUE: CPT

## 2022-06-10 PROCEDURE — 88305 TISSUE EXAM BY PATHOLOGIST: CPT

## 2022-06-10 PROCEDURE — 85025 COMPLETE CBC W/AUTO DIFF WBC: CPT

## 2022-06-10 PROCEDURE — 77012 CT SCAN FOR NEEDLE BIOPSY: CPT

## 2022-06-10 PROCEDURE — 36415 COLL VENOUS BLD VENIPUNCTURE: CPT

## 2022-06-10 PROCEDURE — 88313 SPECIAL STAINS GROUP 2: CPT

## 2022-06-10 PROCEDURE — 2500000003 HC RX 250 WO HCPCS: Performed by: RADIOLOGY

## 2022-06-10 PROCEDURE — 7100000010 HC PHASE II RECOVERY - FIRST 15 MIN

## 2022-06-10 PROCEDURE — 85610 PROTHROMBIN TIME: CPT

## 2022-06-10 PROCEDURE — 6360000002 HC RX W HCPCS: Performed by: RADIOLOGY

## 2022-06-10 RX ORDER — MIDAZOLAM HYDROCHLORIDE 1 MG/ML
1 INJECTION INTRAMUSCULAR; INTRAVENOUS ONCE
Status: COMPLETED | OUTPATIENT
Start: 2022-06-10 | End: 2022-06-10

## 2022-06-10 RX ORDER — LIDOCAINE HYDROCHLORIDE 20 MG/ML
15 INJECTION, SOLUTION INFILTRATION; PERINEURAL ONCE
Status: COMPLETED | OUTPATIENT
Start: 2022-06-10 | End: 2022-06-10

## 2022-06-10 RX ORDER — FENTANYL CITRATE 50 UG/ML
50 INJECTION, SOLUTION INTRAMUSCULAR; INTRAVENOUS ONCE
Status: COMPLETED | OUTPATIENT
Start: 2022-06-10 | End: 2022-06-10

## 2022-06-10 RX ORDER — BUPIVACAINE HYDROCHLORIDE 5 MG/ML
30 INJECTION, SOLUTION EPIDURAL; INTRACAUDAL ONCE
Status: COMPLETED | OUTPATIENT
Start: 2022-06-10 | End: 2022-06-10

## 2022-06-10 RX ORDER — HEPARIN SODIUM (PORCINE) LOCK FLUSH IV SOLN 100 UNIT/ML 100 UNIT/ML
500 SOLUTION INTRAVENOUS ONCE
Status: COMPLETED | OUTPATIENT
Start: 2022-06-10 | End: 2022-06-10

## 2022-06-10 RX ADMIN — BUPIVACAINE HYDROCHLORIDE 150 MG: 5 INJECTION, SOLUTION EPIDURAL; INTRACAUDAL; PERINEURAL at 11:46

## 2022-06-10 RX ADMIN — MIDAZOLAM HYDROCHLORIDE 1 MG: 2 INJECTION, SOLUTION INTRAMUSCULAR; INTRAVENOUS at 11:51

## 2022-06-10 RX ADMIN — FENTANYL CITRATE 50 MCG: 50 INJECTION, SOLUTION INTRAMUSCULAR; INTRAVENOUS at 11:51

## 2022-06-10 RX ADMIN — Medication 500 UNITS: at 11:55

## 2022-06-10 RX ADMIN — MIDAZOLAM HYDROCHLORIDE 1 MG: 2 INJECTION, SOLUTION INTRAMUSCULAR; INTRAVENOUS at 11:43

## 2022-06-10 RX ADMIN — LIDOCAINE HYDROCHLORIDE 15 ML: 20 INJECTION, SOLUTION INFILTRATION; PERINEURAL at 11:45

## 2022-06-10 RX ADMIN — FENTANYL CITRATE 50 MCG: 50 INJECTION, SOLUTION INTRAMUSCULAR; INTRAVENOUS at 11:42

## 2022-06-10 ASSESSMENT — PAIN - FUNCTIONAL ASSESSMENT: PAIN_FUNCTIONAL_ASSESSMENT: NONE - DENIES PAIN

## 2022-06-10 NOTE — BRIEF OP NOTE
Brief Postoperative Note      Patient: Albina Diaz  YOB: 1970  MRN: [id number]    Date of Procedure: 6/10/2022    Pre-Op Diagnosis: multiple myeloma    Post-Op Diagnosis: Same       CT guided bone marrow biopsy    Dr. Ashleigh Bentley    Assistant:  * No surgical staff found *    Anesthesia: Intravenous sedation, local    Estimated Blood Loss (mL): Minimal    Complications: None    Specimens:   Aspirate x 2, core x 1     Implants:  * No implants in log *      Drains: * No LDAs found *    Electronically signed by Sharmaine To MD on 6/10/2022 at 12:11 PM

## 2022-06-10 NOTE — PROGRESS NOTES
1342   Ambulatory Surgery/Procedure Discharge Note    Vitals:    06/10/22 1300   BP: (!) 154/94   Pulse: 81   Resp: 20   Temp:    SpO2: 96%       No intake/output data recorded. Restroom use offered before discharge. Yes    Pain assessment:  present - adequately treated           Patient discharged to home/self care.  Patient discharged via wheel chair by transporter to waiting family/S.O.       6/10/2022 2:15 PM

## 2022-06-10 NOTE — PROGRESS NOTES
Discharged to home ambulatory accompanied by RN. Daughter with patient. Denies pain. Reviewed instructions, including incision care, when to call MD, resuming home medications, follow up. Verbalized understanding of all.

## 2022-06-10 NOTE — SEDATION DOCUMENTATION
IMAGING SERVICES NURSING PROGRESS NOTE    Procedure:  BMBX  Sayda 10, 2022  Vaughn Schaefer      Allergies:  No Known Allergies    Vitals:    06/10/22 1143   BP: 138/81   Pulse: 83   Resp: 18   Temp:    SpO2: 92%       Recent lab work reviewed with MD: yes    Procedure explained to patient by MD: yes   Informed consent obtained:yes  Family with patient:yes    Mental Status:  Normal  Readiness to learn:  Yes  Barriers to learning: No    Pain Assessment Pre-Procedure:  Pain Present:  no  Pain Score:  0  Pain Quality/Description:  na    Time out Procedure Verification with:  [x] RN  [x] Physician  [x] Patient  [x] Other: CT Technologist  Procedure site marked, if applicable:  Yes    Note: Patient arrived A & O x 4, denies pain, breathing easily on room air, Spoke to Dr. Sonia Cordero prior to procedure. Procedural sedation:  Fentanyl:  100mcg  Versed:    2mg  Post Procedureal Note:  Patient tolerated procedure well. Breathing easily on room air. Report given to [facility] RN. Patient transported in stable conditon to room 18.     Pain Assessment Post-Procedure:  Pain Present:  no  Pain Score:  0  Pain Quality/Description:  na    Plan of Care Goals:  Safety measures met:  Yes  Patient understands explanation of procedure:  Yes    Time in:  1140  Time out:  1103 Josie Craig RN.  R.N. 6/10/2022

## 2022-06-10 NOTE — PRE SEDATION
Sedation Pre-Procedure Note    Patient Name: Sunny Hong   YOB: 1970  Room/Bed: Room/bed info not found  Medical Record Number: [id number]  Date: 6/10/2022   Time: 12:10 PM       Indication:  Multiple myeloma    Consent: I have discussed with the patient and/or the patient representative the indication, alternatives, and the possible risks and/or complications of the planned procedure and the anesthesia methods. The patient and/or patient representative appear to understand and agree to proceed. Vital Signs:   Vitals:    06/10/22 1158   BP: 129/78   Pulse: 78   Resp: 18   Temp:    SpO2: 91%       Past Medical History:   has a past medical history of Anxiety, Chronic back pain, Depression, Hypertension, Multiple myeloma not having achieved remission ([facility] [address] Prediabetes, Type 2 diabetes mellitus with hyperglycemia, without long-term current use of insulin ([facility] [address] and Type 2 diabetes mellitus with hyperlipidemia ([facility] [address] Past Surgical History:   has a past surgical history that includes Hysterectomy; shoulder surgery; CT BIOPSY BONE MARROW (11/19/2021); and CT BIOPSY BONE MARROW (3/18/2022). Medications:   Scheduled Meds:   Continuous Infusions:   PRN Meds:   Home Meds:   Prior to Admission medications    Medication Sig Start Date End Date Taking?  Authorizing Provider   valACYclovir (VALTREX) 500 MG tablet Take 500 mg by mouth daily    Historical Provider, MD   Oyster Shell 500 MG TABS Take 500 mg by mouth daily    Historical Provider, MD   lisinopril (PRINIVIL;ZESTRIL) 2.5 MG tablet Take 1 tablet by mouth daily 4/26/22   SHY Polk CNP   gabapentin (NEURONTIN) 300 MG capsule TAKE ONE CAPSULE BY MOUTH THREE TIMES DAILY AS NEEDED FOR PAIN 4/26/22 12/27/22  SHY Polk CNP   metFORMIN (GLUCOPHAGE) 500 MG tablet Take 1 tablet by mouth daily (with breakfast) 4/26/22   SHY Polk CNP   amLODIPine (NORVASC) 5 MG tablet Take 1 tablet daily 4/26/22   Pennie Buerger SHY Weiner CNP   cyclobenzaprine (FLEXERIL) 5 MG tablet Take 1 tablet BID as needed 4/26/22   SHY Gonzalez CNP   hydroCHLOROthiazide (HYDRODIURIL) 25 MG tablet Take 1 tablet by mouth every morning 4/26/22   SHY Gonzalez CNP   Multiple Vitamins-Minerals (THERAPEUTIC MULTIVITAMIN-MINERALS) tablet Take 1 tablet by mouth daily    Historical Provider, MD     Coumadin Use Last 7 Days:  no  Antiplatelet drug therapy use last 7 days: no  Other anticoagulant use last 7 days: no  Additional Medication Information:  See nsg notes      Pre-Sedation Documentation and Exam:   I have reviewed the patient's history and review of systems.     Mallampati Airway Assessment:  Mallampati Class III - (soft palate & base of uvula are visible)    Prior History of Anesthesia Complications:   none    ASA Classification:  Class 2 - A normal healthy patient with mild systemic disease    Sedation/ Anesthesia Plan:   intravenous sedation    Medications Planned:   midazolam (Versed) intravenously and fentanyl intravenously    Patient is an appropriate candidate for plan of sedation: yes    Electronically signed by Polo Richardson MD on 6/10/2022 at 12:10 PM

## 2022-06-13 ENCOUNTER — HOSPITAL ENCOUNTER (OUTPATIENT)
Age: 52
Setting detail: INFUSION SERIES
Discharge: HOME | End: 2022-06-13

## 2022-06-14 ENCOUNTER — CLINICAL DOCUMENTATION (OUTPATIENT)
Dept: ONCOLOGY | Age: 52
End: 2022-06-14

## 2022-06-14 NOTE — PROGRESS NOTES
Behavioral health note: Caregiver did not show for today's scheduled appointment.  Notified oncologist

## 2022-06-15 ENCOUNTER — TELEPHONE (OUTPATIENT)
Dept: ONCOLOGY | Age: 52
End: 2022-06-15

## 2022-06-15 NOTE — TELEPHONE ENCOUNTER
Behavioral health follow-up     Called patient due to caregiver missing appointment yesterday for required caregiver evaluation prior to autologous stem cell transplant. Patient reports that he likely forgot. She is going to have him call psychologist to reschedule.

## 2022-06-16 ENCOUNTER — TELEPHONE (OUTPATIENT)
Dept: ONCOLOGY | Age: 52
End: 2022-06-16

## 2022-06-16 ENCOUNTER — TELEPHONE (OUTPATIENT)
Dept: SURGERY | Age: 52
End: 2022-06-16

## 2022-06-16 NOTE — TELEPHONE ENCOUNTER
Patient called expressing frustration regarding requirement of caregiver evaluation prior to autologous stem cell transplant. She states that she feels that this is unnecessary. However, she requested that this appointment be scheduled for 6/21/2022 at 9:30am to meet this requirement. Patient requested transplant handbook to have her caregiver review in advance. She was agreeable to handbook being emailed to her. Appointment scheduled.

## 2022-06-17 ENCOUNTER — ANESTHESIA EVENT (OUTPATIENT)
Dept: OPERATING ROOM | Age: 52
End: 2022-06-17
Payer: COMMERCIAL

## 2022-06-17 NOTE — PROGRESS NOTES
[facility] PRE-SURGICAL TESTING INSTRUCTIONS                                  PRIOR TO PROCEDURE DATE:        1. PLEASE FOLLOW ANY  GUIDELINES/ INSTRUCTIONS PRIOR TO YOUR PROCEDURE AS ADVISED BY YOUR SURGEON. 2. Arrange for someone to drive you home and be with you for the first 24 hours after discharge for your safety after your procedure for which you received sedation. Ensure it is someone we can share information with regarding your discharge. 3. You must contact your surgeon for instructions IF:   You are taking any blood thinners, aspirin, anti-inflammatory or vitamin E.   There is a change in your physical condition such as a cold, fever, rash, cuts, sores or any other infection, especially near your surgical site. 4. Do not drink alcohol the day before or day of your procedure. 5. A Pre-op History and Physical for surgery MUST be completed by your Physician or Urgent Care within 30 days of your procedure date. Please bring a copy with you on the day of your procedure and along with any other testing performed. THE DAY OF YOUR PROCEDURE:  1. Follow instructions for ARRIVAL TIME as DIRECTED BY YOUR SURGEON. 2. Enter the MAIN entrance from [address] and follow the signs to the free [place] or [employer] parking (offered free of charge 6am-5pm). 3. Enter the Main Entrance of the hospital (do not enter from the lower level of the parking garage). Upon entrance, check in with the  at the main desk on your left. If no one is available at the desk, proceed into the [facility] Waiting Room and go through the door directly into the [facility]. There is a Check-in desk ACROSS from Room 5 (marked with a sign hanging from the ceiling). The phone number for the surgery center is [phone]. 4. Please call [phone] option #2 option #2 if you have not been preregistered yet.   On the day of your procedure bring your insurance card and photo ID. You will be registered at your bedside once brought back to your room. 5. DO NOT EAT ANYTHING eight hours prior to your arrival for surgery. May have 8 ounces of water 4 hours prior to your arrival for surgery. NOTE: ALL Gastric, Bariatric and Bowel surgery patients MUST follow their surgeon's instructions. 6. MEDICATIONS    Take the following medications with a SMALL sip of water: AMLODIPINE   Bariatric patient's call surgeon if on diabetic medications as some need to be stopped 1 week preop   Use your usual dose of inhalers the morning of surgery. BRING your rescue inhaler with you to hospital.    Anesthesia does NOT want you to take insulin the morning of surgery. They will control your blood sugar while you are at the hospital. Please contact your ordering physician for instructions regarding your insulin the night before your procedure. If you have an insulin pump, please keep it set on basal rate. 7. Do not swallow water when brushing teeth. No gum, candy, mints or ice chips. Refrain from smoking or at least decrease the amount. 8. Dress in loose, comfortable clothing appropriate for redressing after your procedure. Do not wear jewelry (including body piercings), make-up (especially NO eye make-up), fingernail polish (NO toenail polish if foot/leg surgery), lotion, powders or metal hairclips. 9. Dentures, glasses, or contacts will need to be removed before your procedure. Bring cases for your glasses, contacts, dentures, or hearing aids to protect them while you are in surgery. 10. If you use a CPAP, please bring it with you on the day of your procedure. 11. We recommend that valuable personal  belongings such as cash, cell phones, e-tablets or jewelry, be left at home during your stay. The hospital will not be responsible for valuables that are not secured in the hospital safe.  However, if your insurance requires a co-pay, you may want to bring a method of payment, i.e. Check or credit card, if you wish to pay your co-pay the day of surgery. 12. If you are to stay overnight, you may bring a bag with personal items. Please have any large items you may need brought in by your family after your arrival to your hospital room. 15. If you have a Living Will or Durable Power of , please bring a copy on the day of your procedure. 15. With your permission, one family member may accompany you while you are being prepared for surgery. Once you are ready, additional family members may join you. HOW WE KEEP YOU SAFE and WORK TO PREVENT SURGICAL SITE INFECTIONS:  1. Health care workers should always check your ID bracelet to verify your name and birth date. You will be asked many times to state your name, date of birth, and allergies. 2. Health care workers should always clean their hands with soap or alcohol gel before providing care to you. It is okay to ask anyone if they cleaned their hands before they touch you. 3. You will be actively involved in verifying the type of procedure you are having and ensuring the correct surgical site. This will be confirmed multiple times prior to your procedure. Do NOT fco your surgery site UNLESS instructed to by your surgeon. 4. Do not shave or wax for 72 hours prior to procedure near your operative site. Shaving with a razor can irritate your skin and make it easier to develop an infection. On the day of your procedure, any hair that needs to be removed near the surgical site will be clipped by a healthcare worker using a special clippers designed to avoid skin irritation. 5. When you are in the operating room, your surgical site will be cleansed with a special soap, and in most cases, you will be given an antibiotic before the surgery begins. What to expect AFTER YOUR PROCEDURE:  1. Immediately following your procedure, your will be taken to the PACU for the first phase of your recovery.   Your nurse will help you recover from any potential side effects of anesthesia, such as extreme drowsiness, changes in your vital signs or breathing patterns. Nausea, headache, muscle aches, or sore throat may also occur after anesthesia. Your nurse will help you manage these potential side effects. 2. For comfort and safety, arrange to have someone at home with you for the first 24 hours after discharge. 3. You and your family will be given written instructions about your diet, activity, dressing care, medications, and return visits. 4. Once at home, should issues with nausea, pain, or bleeding occur, or should you notice any signs of infection, you should call your surgeon. 5. Always clean your hands before and after caring for your wound. Do not let your family touch your surgery site without cleaning their hands. 6. Narcotic pain medications can cause significant constipation. You may want to add a stool softener to your postoperative medication schedule or speak to your surgeon on how best to manage this SIDE EFFECT. SPECIAL INSTRUCTIONS     Thank you for allowing us to care for you. We strive to exceed your expectations in the delivery of care and service provided to you and your family. If you need to contact the [place] [address] staff for any reason, please call us at [phone]    Instructions reviewed with patient during preadmission testing phone interview.   Sanjana Keller RN.6/17/2022 .7:47 AM      ADDITIONAL EDUCATIONAL INFORMATION REVIEWED PER PHONE WITH YOU AND/OR YOUR FAMILY:  No Hibiclens® Bathing Instructions   Yes Antibacterial Soap

## 2022-06-17 NOTE — PROGRESS NOTES
Place patient label inside box (if no patient label, complete below)  Name:  :  MR#:                 Rosalva Cordoba / PROCEDURE  1. I (we), Nidia Rowell  (Patient Name) authorize DR. Ingrid Perales  (Provider / Paxton Beth) and/or such assistants as may be selected by him/her, to perform the following operation/procedure(s): INSERT TRIFUSION CATHETER       Note: If unable to obtain consent prior to an emergent procedure, document the emergent reason in the medical record. This procedure has been explained to my (our) satisfaction and included in the explanation was:  A) The intended benefit, nature, and extent of the procedure to be performed;  B) The significant risks involved and the probability of success;  C) Alternative procedures and methods of treatment;  D) The dangers and probable consequences of such alternatives (including no procedure or treatment); E) The expected consequences of the procedure on my future health;  F) Whether other qualified individuals would be performing important surgical tasks and/or whether  would be present to advise or support the procedure. I (we) understand that there are other risks of infection and other serious complications in the pre-operative/procedural and postoperative/procedural stages of my (our) care. I (we) have asked all of the questions which I (we) thought were important in deciding whether or not to undergo treatment or diagnosis. These questions have been answered to my (our) satisfaction. I (we) understand that no assurance can be given that the procedure will be a success, and no guarantee or warranty of success has been given to me (us). 2. It has been explained to me (us) that during the course of the operation/procedure, unforeseen conditions may be revealed that necessitate extension of the original procedure(s) or different procedure(s) than those set forth in Paragraph 1.  I (we) authorize and request that the above-named physician, his/her assistants or his/her designees, perform procedures as necessary and desirable if deemed to be in my (our) best interest.     Revised 8/2/2021                                                                          Page 1 of 2       3. I acknowledge that health care personnel may be observing this procedure for the purpose of medical education or other specified purposes as may be necessary as requested and/or approved by my (our) physician. 4. I (we) consent to the disposal by the hospital Pathologist of the removed tissue, parts or organs in accordance with hospital policy. 5. I do ____ do not ____ consent to the use of a local infiltration pain blocking agent that will be used by my provider/surgical provider to help alleviate pain during my procedure. 6. I do ____ do not ____ consent to an emergent blood transfusion in the case of a life-threatening situation that requires blood components to be administered. This consent is valid for 24 hours from the beginning of the procedure. 7. This patient does ____ or does not ____ currently have a DNR status/order. If DNR order is in place, obtain Addendum to the Surgical Consent for ALL Patients with a DNR Order to address joseph-operative status for limited intervention or DNR suspension.      8. I have read and fully understand the above Consent for Operation/Procedure and that all blanks were completed before I signed the consent.   _____________________________       _____________________      ____/____am/pm  Signature of Patient or legal representative      Printed Name / Relationship            Date / Time   ____________________________       _____________________      ____/____am/pm  Witness to Signature                                    Printed Name                    Date / Time     If patient is unable to sign or is a minor, complete the following)  Patient is a minor, ____ years of age, or unable to sign because:   ______________________________________________________________________________________________    Masterell Halim If a phone consent is obtained, consent will be documented by using two health care professionals, each affirming that the consenting party has no questions and gives consent for the procedure discussed with the physician/provider.   _____________________          ____________________       _____/_____am/pm   2nd witness to phone consent        Printed name           Date / Time    Informed Consent:  I have provided the explanation described above in section 1 to the patient and/or legal representative.  I have provided the patient and/or legal representative with an opportunity to ask any questions about the proposed operation/procedure.   ___________________________          ____________________         ____/____am/pm  Provider / Proceduralist                            Printed name            Date / Time  Revised 8/2/2021                                                                      Page 2 of 2

## 2022-06-20 ENCOUNTER — APPOINTMENT (OUTPATIENT)
Dept: GENERAL RADIOLOGY | Age: 52
End: 2022-06-20
Attending: SURGERY
Payer: COMMERCIAL

## 2022-06-20 ENCOUNTER — HOSPITAL ENCOUNTER (OUTPATIENT)
Age: 52
Setting detail: OUTPATIENT SURGERY
Discharge: HOME | End: 2022-06-20
Attending: SURGERY | Admitting: SURGERY
Payer: COMMERCIAL

## 2022-06-20 ENCOUNTER — ANESTHESIA (OUTPATIENT)
Dept: OPERATING ROOM | Age: 52
End: 2022-06-20
Payer: COMMERCIAL

## 2022-06-20 VITALS
SYSTOLIC BLOOD PRESSURE: 156 MMHG | HEART RATE: 102 BPM | WEIGHT: 262.2 LBS | OXYGEN SATURATION: 100 % | TEMPERATURE: 97.1 F | BODY MASS INDEX: 43.68 KG/M2 | HEIGHT: 65 IN | DIASTOLIC BLOOD PRESSURE: 93 MMHG | RESPIRATION RATE: 14 BRPM

## 2022-06-20 LAB
ANISOCYTOSIS: ABNORMAL
BANDED NEUTROPHILS RELATIVE PERCENT: 7 % (ref 0–7)
BASOPHILS ABSOLUTE: 0 K/UL (ref 0–0.2)
BASOPHILS RELATIVE PERCENT: 0 %
DOHLE BODIES: PRESENT
EOSINOPHILS ABSOLUTE: 0.5 K/UL (ref 0–0.6)
EOSINOPHILS RELATIVE PERCENT: 2 %
GLUCOSE BLD-MCNC: 136 MG/DL (ref 70–99)
GLUCOSE BLD-MCNC: 148 MG/DL (ref 70–99)
HCT VFR BLD CALC: 37.4 % (ref 36–48)
HEMOGLOBIN: 11.7 G/DL (ref 12–16)
LYMPHOCYTES ABSOLUTE: 3 K/UL (ref 1–5.1)
LYMPHOCYTES RELATIVE PERCENT: 13 %
MCH RBC QN AUTO: 29.7 PG (ref 26–34)
MCHC RBC AUTO-ENTMCNC: 31.2 G/DL (ref 31–36)
MCV RBC AUTO: 95.2 FL (ref 80–100)
MONOCYTES ABSOLUTE: 1.1 K/UL (ref 0–1.3)
MONOCYTES RELATIVE PERCENT: 5 %
NEUTROPHILS ABSOLUTE: 18.3 K/UL (ref 1.7–7.7)
NEUTROPHILS RELATIVE PERCENT: 73 %
PDW BLD-RTO: 16.8 % (ref 12.4–15.4)
PERFORMED ON: ABNORMAL
PERFORMED ON: ABNORMAL
PLATELET # BLD: 226 K/UL (ref 135–450)
PLATELET SLIDE REVIEW: ADEQUATE
PMV BLD AUTO: 7.9 FL (ref 5–10.5)
RBC # BLD: 3.93 M/UL (ref 4–5.2)
SLIDE REVIEW: ABNORMAL
TOXIC GRANULATION: PRESENT
WBC # BLD: 22.9 K/UL (ref 4–11)

## 2022-06-20 PROCEDURE — 2709999900 HC NON-CHARGEABLE SUPPLY: Performed by: SURGERY

## 2022-06-20 PROCEDURE — 77001 FLUOROGUIDE FOR VEIN DEVICE: CPT

## 2022-06-20 PROCEDURE — 86367 STEM CELLS TOTAL COUNT: CPT

## 2022-06-20 PROCEDURE — 2580000003 HC RX 258: Performed by: SURGERY

## 2022-06-20 PROCEDURE — C1769 GUIDE WIRE: HCPCS | Performed by: SURGERY

## 2022-06-20 PROCEDURE — 71045 X-RAY EXAM CHEST 1 VIEW: CPT

## 2022-06-20 PROCEDURE — 36558 INSERT TUNNELED CV CATH: CPT | Performed by: SURGERY

## 2022-06-20 PROCEDURE — 2500000003 HC RX 250 WO HCPCS: Performed by: NURSE ANESTHETIST, CERTIFIED REGISTERED

## 2022-06-20 PROCEDURE — 7100000001 HC PACU RECOVERY - ADDTL 15 MIN: Performed by: SURGERY

## 2022-06-20 PROCEDURE — 3600000012 HC SURGERY LEVEL 2 ADDTL 15MIN: Performed by: SURGERY

## 2022-06-20 PROCEDURE — 6360000002 HC RX W HCPCS: Performed by: SURGERY

## 2022-06-20 PROCEDURE — 7100000000 HC PACU RECOVERY - FIRST 15 MIN: Performed by: SURGERY

## 2022-06-20 PROCEDURE — 3600000002 HC SURGERY LEVEL 2 BASE: Performed by: SURGERY

## 2022-06-20 PROCEDURE — 2580000003 HC RX 258: Performed by: NURSE ANESTHETIST, CERTIFIED REGISTERED

## 2022-06-20 PROCEDURE — 7100000010 HC PHASE II RECOVERY - FIRST 15 MIN: Performed by: SURGERY

## 2022-06-20 PROCEDURE — C1751 CATH, INF, PER/CENT/MIDLINE: HCPCS | Performed by: SURGERY

## 2022-06-20 PROCEDURE — 6360000002 HC RX W HCPCS: Performed by: NURSE ANESTHETIST, CERTIFIED REGISTERED

## 2022-06-20 PROCEDURE — 3700000000 HC ANESTHESIA ATTENDED CARE: Performed by: SURGERY

## 2022-06-20 PROCEDURE — A4217 STERILE WATER/SALINE, 500 ML: HCPCS | Performed by: SURGERY

## 2022-06-20 PROCEDURE — 3700000001 HC ADD 15 MINUTES (ANESTHESIA): Performed by: SURGERY

## 2022-06-20 PROCEDURE — 85025 COMPLETE CBC W/AUTO DIFF WBC: CPT

## 2022-06-20 PROCEDURE — 77001 FLUOROGUIDE FOR VEIN DEVICE: CPT | Performed by: SURGERY

## 2022-06-20 PROCEDURE — 2580000003 HC RX 258: Performed by: ANESTHESIOLOGY

## 2022-06-20 RX ORDER — LABETALOL HYDROCHLORIDE 5 MG/ML
10 INJECTION, SOLUTION INTRAVENOUS
Status: DISCONTINUED | OUTPATIENT
Start: 2022-06-20 | End: 2022-06-20 | Stop reason: HOSPADM

## 2022-06-20 RX ORDER — DEXAMETHASONE SODIUM PHOSPHATE 4 MG/ML
INJECTION, SOLUTION INTRA-ARTICULAR; INTRALESIONAL; INTRAMUSCULAR; INTRAVENOUS; SOFT TISSUE PRN
Status: DISCONTINUED | OUTPATIENT
Start: 2022-06-20 | End: 2022-06-20 | Stop reason: SDUPTHER

## 2022-06-20 RX ORDER — SODIUM CHLORIDE 0.9 % (FLUSH) 0.9 %
5-40 SYRINGE (ML) INJECTION EVERY 12 HOURS SCHEDULED
Status: DISCONTINUED | OUTPATIENT
Start: 2022-06-20 | End: 2022-06-20 | Stop reason: HOSPADM

## 2022-06-20 RX ORDER — ONDANSETRON 2 MG/ML
INJECTION INTRAMUSCULAR; INTRAVENOUS PRN
Status: DISCONTINUED | OUTPATIENT
Start: 2022-06-20 | End: 2022-06-20 | Stop reason: SDUPTHER

## 2022-06-20 RX ORDER — MAGNESIUM HYDROXIDE 1200 MG/15ML
LIQUID ORAL CONTINUOUS PRN
Status: DISCONTINUED | OUTPATIENT
Start: 2022-06-20 | End: 2022-06-20 | Stop reason: HOSPADM

## 2022-06-20 RX ORDER — SODIUM CHLORIDE 0.9 % (FLUSH) 0.9 %
5-40 SYRINGE (ML) INJECTION PRN
Status: DISCONTINUED | OUTPATIENT
Start: 2022-06-20 | End: 2022-06-20 | Stop reason: HOSPADM

## 2022-06-20 RX ORDER — OXYCODONE HYDROCHLORIDE 5 MG/1
5 TABLET ORAL PRN
Status: DISCONTINUED | OUTPATIENT
Start: 2022-06-20 | End: 2022-06-20 | Stop reason: HOSPADM

## 2022-06-20 RX ORDER — PROPOFOL 10 MG/ML
INJECTION, EMULSION INTRAVENOUS PRN
Status: DISCONTINUED | OUTPATIENT
Start: 2022-06-20 | End: 2022-06-20 | Stop reason: SDUPTHER

## 2022-06-20 RX ORDER — IPRATROPIUM BROMIDE AND ALBUTEROL SULFATE 2.5; .5 MG/3ML; MG/3ML
1 SOLUTION RESPIRATORY (INHALATION)
Status: DISCONTINUED | OUTPATIENT
Start: 2022-06-20 | End: 2022-06-20 | Stop reason: HOSPADM

## 2022-06-20 RX ORDER — HEPARIN SODIUM (PORCINE) LOCK FLUSH IV SOLN 100 UNIT/ML 100 UNIT/ML
SOLUTION INTRAVENOUS PRN
Status: DISCONTINUED | OUTPATIENT
Start: 2022-06-20 | End: 2022-06-20 | Stop reason: HOSPADM

## 2022-06-20 RX ORDER — SODIUM CHLORIDE, SODIUM LACTATE, POTASSIUM CHLORIDE, CALCIUM CHLORIDE 600; 310; 30; 20 MG/100ML; MG/100ML; MG/100ML; MG/100ML
INJECTION, SOLUTION INTRAVENOUS CONTINUOUS
Status: DISCONTINUED | OUTPATIENT
Start: 2022-06-20 | End: 2022-06-20 | Stop reason: HOSPADM

## 2022-06-20 RX ORDER — FENTANYL CITRATE 50 UG/ML
50 INJECTION, SOLUTION INTRAMUSCULAR; INTRAVENOUS EVERY 5 MIN PRN
Status: DISCONTINUED | OUTPATIENT
Start: 2022-06-20 | End: 2022-06-20 | Stop reason: HOSPADM

## 2022-06-20 RX ORDER — ONDANSETRON 2 MG/ML
4 INJECTION INTRAMUSCULAR; INTRAVENOUS
Status: DISCONTINUED | OUTPATIENT
Start: 2022-06-20 | End: 2022-06-20 | Stop reason: HOSPADM

## 2022-06-20 RX ORDER — METOCLOPRAMIDE HYDROCHLORIDE 5 MG/ML
10 INJECTION INTRAMUSCULAR; INTRAVENOUS
Status: DISCONTINUED | OUTPATIENT
Start: 2022-06-20 | End: 2022-06-20 | Stop reason: HOSPADM

## 2022-06-20 RX ORDER — LORAZEPAM 2 MG/ML
0.5 INJECTION INTRAMUSCULAR
Status: DISCONTINUED | OUTPATIENT
Start: 2022-06-20 | End: 2022-06-20 | Stop reason: HOSPADM

## 2022-06-20 RX ORDER — KETAMINE HCL IN NACL, ISO-OSM 20 MG/2 ML
SYRINGE (ML) INJECTION PRN
Status: DISCONTINUED | OUTPATIENT
Start: 2022-06-20 | End: 2022-06-20 | Stop reason: SDUPTHER

## 2022-06-20 RX ORDER — SODIUM CHLORIDE, SODIUM LACTATE, POTASSIUM CHLORIDE, CALCIUM CHLORIDE 600; 310; 30; 20 MG/100ML; MG/100ML; MG/100ML; MG/100ML
INJECTION, SOLUTION INTRAVENOUS CONTINUOUS PRN
Status: DISCONTINUED | OUTPATIENT
Start: 2022-06-20 | End: 2022-06-20 | Stop reason: SDUPTHER

## 2022-06-20 RX ORDER — SODIUM CHLORIDE 9 MG/ML
INJECTION, SOLUTION INTRAVENOUS PRN
Status: DISCONTINUED | OUTPATIENT
Start: 2022-06-20 | End: 2022-06-20 | Stop reason: HOSPADM

## 2022-06-20 RX ORDER — MIDAZOLAM HYDROCHLORIDE 1 MG/ML
INJECTION INTRAMUSCULAR; INTRAVENOUS PRN
Status: DISCONTINUED | OUTPATIENT
Start: 2022-06-20 | End: 2022-06-20 | Stop reason: SDUPTHER

## 2022-06-20 RX ORDER — SODIUM CHLORIDE 9 MG/ML
25 INJECTION, SOLUTION INTRAVENOUS PRN
Status: DISCONTINUED | OUTPATIENT
Start: 2022-06-20 | End: 2022-06-20 | Stop reason: HOSPADM

## 2022-06-20 RX ORDER — GLYCOPYRROLATE 1 MG/5 ML
SYRINGE (ML) INTRAVENOUS PRN
Status: DISCONTINUED | OUTPATIENT
Start: 2022-06-20 | End: 2022-06-20 | Stop reason: SDUPTHER

## 2022-06-20 RX ORDER — OXYCODONE HYDROCHLORIDE 5 MG/1
10 TABLET ORAL PRN
Status: DISCONTINUED | OUTPATIENT
Start: 2022-06-20 | End: 2022-06-20 | Stop reason: HOSPADM

## 2022-06-20 RX ADMIN — PROPOFOL 50 MG: 10 INJECTION, EMULSION INTRAVENOUS at 09:51

## 2022-06-20 RX ADMIN — PROPOFOL 20 MG: 10 INJECTION, EMULSION INTRAVENOUS at 10:06

## 2022-06-20 RX ADMIN — PROPOFOL 20 MG: 10 INJECTION, EMULSION INTRAVENOUS at 09:56

## 2022-06-20 RX ADMIN — PROPOFOL 50 MG: 10 INJECTION, EMULSION INTRAVENOUS at 09:45

## 2022-06-20 RX ADMIN — PROPOFOL 30 MG: 10 INJECTION, EMULSION INTRAVENOUS at 09:37

## 2022-06-20 RX ADMIN — DEXAMETHASONE SODIUM PHOSPHATE 4 MG: 4 INJECTION, SOLUTION INTRAMUSCULAR; INTRAVENOUS at 10:02

## 2022-06-20 RX ADMIN — PROPOFOL 20 MG: 10 INJECTION, EMULSION INTRAVENOUS at 09:54

## 2022-06-20 RX ADMIN — MIDAZOLAM HYDROCHLORIDE 2 MG: 2 INJECTION, SOLUTION INTRAMUSCULAR; INTRAVENOUS at 09:24

## 2022-06-20 RX ADMIN — ONDANSETRON 4 MG: 2 INJECTION INTRAMUSCULAR; INTRAVENOUS at 10:02

## 2022-06-20 RX ADMIN — Medication 0.2 MG: at 09:28

## 2022-06-20 RX ADMIN — Medication 10 MG: at 09:36

## 2022-06-20 RX ADMIN — SODIUM CHLORIDE, POTASSIUM CHLORIDE, SODIUM LACTATE AND CALCIUM CHLORIDE: 600; 310; 30; 20 INJECTION, SOLUTION INTRAVENOUS at 09:07

## 2022-06-20 RX ADMIN — CEFAZOLIN 2000 MG: 2 INJECTION, POWDER, FOR SOLUTION INTRAMUSCULAR; INTRAVENOUS at 09:30

## 2022-06-20 RX ADMIN — PROPOFOL 30 MG: 10 INJECTION, EMULSION INTRAVENOUS at 09:33

## 2022-06-20 RX ADMIN — SODIUM CHLORIDE, SODIUM LACTATE, POTASSIUM CHLORIDE, AND CALCIUM CHLORIDE: .6; .31; .03; .02 INJECTION, SOLUTION INTRAVENOUS at 08:42

## 2022-06-20 RX ADMIN — PROPOFOL 40 MG: 10 INJECTION, EMULSION INTRAVENOUS at 09:42

## 2022-06-20 RX ADMIN — PROPOFOL 20 MG: 10 INJECTION, EMULSION INTRAVENOUS at 09:58

## 2022-06-20 RX ADMIN — PROPOFOL 50 MG: 10 INJECTION, EMULSION INTRAVENOUS at 09:48

## 2022-06-20 RX ADMIN — PROPOFOL 20 MG: 10 INJECTION, EMULSION INTRAVENOUS at 10:02

## 2022-06-20 RX ADMIN — Medication 10 MG: at 09:44

## 2022-06-20 ASSESSMENT — PAIN SCALES - GENERAL: PAINLEVEL_OUTOF10: 0

## 2022-06-20 ASSESSMENT — LIFESTYLE VARIABLES: SMOKING_STATUS: 0

## 2022-06-20 NOTE — PROGRESS NOTES
Patient admitted to PACU # 06 from OR at 1020 post [address]   per Dr. Mantilla Rater. Attached to PACU monitoring system and report received from anesthesia provider. Patient was reported to be hemodynamically stable during procedure. Patient drowsy on admission and denied pain. Pt on simple mask at 6 L NC. R trifusion catheter in place and drsg is c/d/i with tegaderm. Pt ST on monitor. . Will continue to monitor.

## 2022-06-20 NOTE — BRIEF OP NOTE
Brief Postoperative Note      Patient: Adrian Peñao  YOB: 1970  MRN: [id number]    Date of Procedure: 6/20/2022    Pre-Op Diagnosis: Multiple myeloma, remission status unspecified ([facility] [C90.00]    Post-Op Diagnosis: Same       Procedure(s):  INSERT TRIFUSION CATHETER    Surgeon(s):  Nia Peterson MD    Assistant:  Resident: Brian Lechuga MD    Anesthesia: Monitor Anesthesia Care    Estimated Blood Loss (mL): Minimal    Complications: None    Specimens:   * No specimens in log *    Implants:  * No implants in log *      Drains: * No LDAs found *    Findings: L subclavian Trifusion catheter placement with fluoroscopic guidance    Electronically signed by Brian Lechuga MD on 6/20/2022 at 10:12 AM

## 2022-06-20 NOTE — PROGRESS NOTES
Pt to [facility] for insert trifusion catheter. Pt alert; oriented X 4; speech clear; breathing easily on RA; walks with steady gait without Jalen Jhonathan RN from oncology came to bedside and brought tubes of blood to be drawn with placement of IV. Pt reports being a very difficult stick, and warm blankets were placed to both arms. #20 IV placed in right hand by Bennett Butler RN after 3 sticks, and all ordered blood drawn and sent to lab and additional tubes handed to Andrade Hartley. Daughter of pt has her purse and other valuables. Pt in surgery now. Ancef 2 g IVPB went to OR with pt.

## 2022-06-20 NOTE — FLOWSHEET NOTE
Dr. Regina Billy made aware of BP and that pt is upset and states will take BP medication at home. Per Dr. Regina Billy ok to DC.

## 2022-06-20 NOTE — OP NOTE
OPERATIVE NOTE     Patient name: Vaughn Schaefer  : 1970  MRN: [id number]     Pre-operative Diagnosis: Multiple myeloma     Post-operative Diagnosis: same     Procedure: Left subclavian triple lumen Trifusion catheter (23 cm) insertion with fluoroscopic guidance     Surgeon: Lu Richard MD    Assistant: Elver Mckeon, PGY-3 resident    Anesthesia: MAC and Local     Estimated Blood Loss: minimal     Complications: None    Indication for procedure: Patient required Trifusion catheter placement as recommended by patient's oncologist.    Risks, benefits, and alternatives discussed with patient and any available family members in the preoperative area. Risks including but not limited to: bleeding, infection, hematoma, malposition, need for re-operation or removal, and pneumothorax. All questions answered and patient consented to proceed. Procedure Details: The patient was brought to the operating room and placed in the supine position with arms padded and tucked at sides. A towel roll was placed between the scapulae. Upper chest hair was trimmed with electronic mary as needed. The patient's bilateral upper chest and neck was then prepped and draped in sterile fashion using chlorhexidine solution. A time-out was performed confirming the patient's identity and operative site. Antibiotics were confirmed to be infusing. SCDs were on and functioning. Sedation was started by anesthesia. Patient was placed in Trendelenburg position. The left infraclavicular fossa was anesthetized with local anesthetic. The left subclavian vein was then entered on the 2nd attempt with return of venous blood. Guidewire was then advanced through the access needle and positioned in the superior vena cava. This was confirmed using fluoroscopy. Using fluoroscopy, the catheter was measured and an appropriate exit site was estimated and chosen.  Following this, the skin exit site was anesthetized and incised inferior and lateral to the access site. Skin was also incised adjacent to wire site in order to accommodate the wire, catheter, and dilator sheath. The Trifusion triple lumen catheter was then tunneled subcutaneously to the entrance site of the guidewire with the subcutaneous cuff positioned at the skin exit site. Smaller dilator advanced over wire without resistance under fluoroscopic guidance and then removed over the wire. The larger dilator sheath was placed over the guidewire and guided into the superior vena cava under fluoroscopic guidance without resistance. The inner dilator and wire were subsequently removed and the opening of the sheath was occluded with a finger. The catheter was then threaded through the sheath into the superior vena cava, then the sheath was split and discarded. Final positioning of the catheter course was confirmed and documented using fluoroscopy. The catheter was then aspirated and flushed through all 3 lumens with heparinized saline. Good return of venous blood was noted, and flushed easily. Wounds were inspected. Good hemostasis was noted. Following this, the access site wound was then closed using 4-0 monocryl and skin glue. The catheter was secured to the skin using 3-0 prolene sutures x 2. Sterile dressing including BioPatch and tegaderm was applied. All counts were correct at the end of the procedure. The patient tolerated the procedure well and was taken to the recovery room in stable condition. Portable chest radiograph ordered for PACU.

## 2022-06-20 NOTE — ANESTHESIA POSTPROCEDURE EVALUATION
Department of Anesthesiology  Postprocedure Note    Patient: Cassi Irby  MRN: [id number]  YOB: 1970  Date of evaluation: 6/20/2022  Time:  11:19 AM     Procedure Summary     Date: 06/20/22 Room / Location: [address] [place] [address]    Anesthesia Start: 1976 Anesthesia Stop: 1022    Procedure: INSERT TRIFUSION CATHETER (N/A Chest) Diagnosis:       Multiple myeloma, remission status unspecified ([address]      (Multiple myeloma, remission status unspecified ([address] [C90.00])    Surgeons: Odalis Eaton MD Responsible Provider: Latisha Tucker MD    Anesthesia Type: MAC ASA Status: 3          Anesthesia Type: No value filed. Shyanne Phase I: Shyanne Score: 10    Shyanne Phase II:      Last vitals: Reviewed and per EMR flowsheets.        Anesthesia Post Evaluation    Patient location during evaluation: PACU  Level of consciousness: awake and alert  Airway patency: patent  Nausea & Vomiting: no vomiting  Complications: no  Cardiovascular status: blood pressure returned to baseline  Respiratory status: acceptable  Hydration status: euvolemic  Multimodal analgesia pain management approach

## 2022-06-20 NOTE — H&P
Adrian Hernandez    [id number]    [facility] Same Day Surgery Update H & P  Department of General Surgery   Surgical Service   Pre-operative History and Physical  Last H & P within the last 30 days. DIAGNOSIS:   Multiple myeloma, remission status unspecified ([facility] [C90.00]    Procedure(s):  INSERT TRIFUSION CATHETER    History obtained from: Patient interview and EHR      HISTORY OF PRESENT ILLNESS:   Patient is a morbidly obese (Body mass index is 43.97 kg/m².), 46 y.o. female with multiple myeloma presents today for trifusion catheter placement in anticipation of planned treatment. Illness Screening: Patient denies fever, chills, worsening cough, or close contact with sick individuals. Past Medical History:        Diagnosis Date    Anxiety     Chronic back pain     Depression     Hypertension     Multiple myeloma not having achieved remission ([address]     Prediabetes     Type 2 diabetes mellitus with hyperglycemia, without long-term current use of insulin ([facility])     Type 2 diabetes mellitus with hyperlipidemia ([facility] 4/26/2022     Past Surgical History:        Procedure Laterality Date    CT BONE MARROW BIOPSY  11/19/2021    CT BONE MARROW BIOPSY 11/19/2021 [facility] CT    CT BONE MARROW BIOPSY  3/18/2022    CT BONE MARROW [facility]OPSY [facility]    CT BONE MARROW BIOPSY  6/10/2022    CT BONE MARROW BIOPSY 6/10/2022 [facility] CT SCAN    HYSTERECTOMY (CERVIX STATUS UNKNOWN)      SHOULDER SURGERY         Medications Prior to Admission:      Prior to Admission medications    Medication Sig Start Date End Date Taking?  Authorizing Provider   valACYclovir (VALTREX) 500 MG tablet Take 500 mg by mouth daily    Historical Provider, MD   Oyster Shell 500 MG TABS Take 500 mg by mouth daily    Historical Provider, MD   lisinopril (PRINIVIL;ZESTRIL) 2.5 MG tablet Take 1 tablet by mouth daily 4/26/22   SHY Damian - CNP   gabapentin (NEURONTIN) 300 MG capsule TAKE ONE CAPSULE BY MOUTH THREE TIMES DAILY AS NEEDED FOR PAIN 4/26/22 12/27/22  SHY Singh CNP   metFORMIN (GLUCOPHAGE) 500 MG tablet Take 1 tablet by mouth daily (with breakfast) 4/26/22   SHY Singh CNP   amLODIPine (NORVASC) 5 MG tablet Take 1 tablet daily 4/26/22   SHY Singh CNP   cyclobenzaprine (FLEXERIL) 5 MG tablet Take 1 tablet BID as needed 4/26/22   SHY Singh CNP   hydroCHLOROthiazide (HYDRODIURIL) 25 MG tablet Take 1 tablet by mouth every morning 4/26/22   SHY Singh CNP   Multiple Vitamins-Minerals (THERAPEUTIC MULTIVITAMIN-MINERALS) tablet Take 1 tablet by mouth daily    Historical Provider, MD         Allergies:  Patient has no known allergies. PHYSICAL EXAM:      BP (!) 150/104   Pulse 98   Temp 97.2 °F (36.2 °C) (Temporal)   Resp 18   Ht 5' 4.75\" (1.645 m)   Wt 262 lb 3.2 oz (118.9 kg)   SpO2 96%   BMI 43.97 kg/m²      Airway:  Airway patent with no audible stridor    Heart:  Regular rate and rhythm, No murmur noted    Lungs:  No increased work of breathing, good air exchange, clear to auscultation bilaterally, no crackles or wheezing    Abdomen:  Soft, non-distended, non-tender, no rebound tenderness or guarding, and no masses palpated    ASSESSMENT AND PLAN     Patient is a 46 y.o. female with above specified procedure planned. 1.  The patients history and physical was obtained and signed off by the pre-admission testing department. Patient seen and focused exam done today- no new changes since last physical exam on 6/7/22    2. Access to ancillary services are available per request of the provider.     SHY Baez CNP     6/20/2022

## 2022-06-20 NOTE — ANESTHESIA PRE PROCEDURE
Department of Anesthesiology  Preprocedure Note       Name:  Evangelina Live   Age:  46 y.o.  :  1970                                          MRN:  [id number]         Date:  2022      Surgeon: Russ Martines):  Teresa Aguirre MD    Procedure: Procedure(s):  INSERT TRIFUSION CATHETER    Medications prior to admission:   Prior to Admission medications    Medication Sig Start Date End Date Taking?  Authorizing Provider   valACYclovir (VALTREX) 500 MG tablet Take 500 mg by mouth daily    Historical Provider, MD   Oyster Shell 500 MG TABS Take 500 mg by mouth daily    Historical Provider, MD   lisinopril (PRINIVIL;ZESTRIL) 2.5 MG tablet Take 1 tablet by mouth daily 22   SHY Kruse CNP   gabapentin (NEURONTIN) 300 MG capsule TAKE ONE CAPSULE BY MOUTH THREE TIMES DAILY AS NEEDED FOR PAIN 22  SHY Kruse CNP   metFORMIN (GLUCOPHAGE) 500 MG tablet Take 1 tablet by mouth daily (with breakfast) 22   SHY Kruse CNP   amLODIPine (NORVASC) 5 MG tablet Take 1 tablet daily 22   SHY Kruse CNP   cyclobenzaprine (FLEXERIL) 5 MG tablet Take 1 tablet BID as needed 22   SHY Kruse CNP   hydroCHLOROthiazide (HYDRODIURIL) 25 MG tablet Take 1 tablet by mouth every morning 22   SHY Kruse CNP   Multiple Vitamins-Minerals (THERAPEUTIC MULTIVITAMIN-MINERALS) tablet Take 1 tablet by mouth daily    Historical Provider, MD       Current medications:    Current Facility-Administered Medications   Medication Dose Route Frequency Provider Last Rate Last Admin    ceFAZolin (ANCEF) 2,000 mg in sodium chloride 0.9 % 50 mL IVPB (mini-bag)  2,000 mg IntraVENous Once Teresa Aguirre MD        lactated ringers infusion   IntraVENous Continuous Rose Suarez DO        sodium chloride flush 0.9 % injection 5-40 mL  5-40 mL IntraVENous 2 times per day Rose Suarez DO        sodium chloride flush 0.9 % injection Answered      Vital Signs (Current):   Vitals:    06/17/22 0739 06/20/22 0820   BP:  (!) 150/104   Pulse:  98   Resp:  18   Temp:  97.2 °F (36.2 °C)   TempSrc:  Temporal   SpO2:  96%   Weight: 260 lb (117.9 kg) 262 lb 3.2 oz (118.9 kg)   Height: 5' 5.75\" (1.67 m) 5' 4.75\" (1.645 m)                                              BP Readings from Last 3 Encounters:   06/20/22 (!) 150/104   06/10/22 (!) 154/94   05/16/22 (!) 151/102       NPO Status:                                                                                 BMI:   Wt Readings from Last 3 Encounters:   06/20/22 262 lb 3.2 oz (118.9 kg)   05/16/22 260 lb 9.3 oz (118.2 kg)   04/26/22 262 lb (118.8 kg)     Body mass index is 43.97 kg/m². CBC:   Lab Results   Component Value Date    WBC 4.5 06/10/2022    RBC 4.12 06/10/2022    HGB 12.3 06/10/2022    HCT 37.6 06/10/2022    MCV 91.3 06/10/2022    RDW 17.5 06/10/2022     06/10/2022       CMP:   Lab Results   Component Value Date     05/16/2022    K 3.6 05/16/2022    CL 97 05/16/2022    CO2 31 05/16/2022    BUN 9 05/16/2022    CREATININE 0.7 05/16/2022    GFRAA >60 05/16/2022    AGRATIO 1.0 05/16/2022    LABGLOM >60 05/16/2022    GLUCOSE 120 05/16/2022    PROT 8.8 05/16/2022    CALCIUM 9.8 05/16/2022    BILITOT <0.2 05/16/2022    ALKPHOS 61 05/16/2022    AST 17 05/16/2022    ALT 23 05/16/2022       POC Tests: No results for input(s): POCGLU, POCNA, POCK, POCCL, POCBUN, POCHEMO, POCHCT in the last 72 hours.     Coags:   Lab Results   Component Value Date    PROTIME 13.8 06/10/2022    INR 1.07 06/10/2022    APTT 25.1 06/10/2022       HCG (If Applicable): No results found for: PREGTESTUR, PREGSERUM, HCG, HCGQUANT     ABGs: No results found for: PHART, PO2ART, QGY4JIR, YDA4AQH, BEART, J4VYBVBE     Type & Screen (If Applicable):  No results found for: LABABO, LABRH    Drug/Infectious Status (If Applicable):  No results found for: HIV, HEPCAB    COVID-19 Screening (If Applicable): No results found for: COVID19        Anesthesia Evaluation    Airway: Mallampati: III  TM distance: >3 FB   Neck ROM: full  Mouth opening: > = 3 FB   Dental: normal exam         Pulmonary: breath sounds clear to auscultation      (-) COPD, asthma, sleep apnea and not a current smoker                           Cardiovascular:    (+) hypertension:,     (-) past MI, CAD, CABG/stent, dysrhythmias,  angina,  CHF, orthopnea and PND      Rhythm: regular  Rate: normal           Beta Blocker:  Not on Beta Blocker         Neuro/Psych:   (+) neuromuscular disease (sciatica with chronic pain):, depression/anxiety    (-) TIA and CVA           GI/Hepatic/Renal:   (+) morbid obesity     (-) GERD, PUD and hepatitis       Endo/Other:    (+) DiabetesType II DM, , blood dyscrasia (Fe def anemia): anemia, arthritis: OA., malignancy/cancer (multiple myeloma). (-) hypothyroidism, hyperthyroidism               Abdominal:             Vascular:     - DVT and PE. Other Findings:           Anesthesia Plan      MAC     ASA 3     (Pt counseled on the Risks of conversion to GA and or GETA in the event of medical necessity or emergency, Pt verbalizes understanding and agrees to continue)  Induction: intravenous. MIPS: Prophylactic antiemetics administered. Anesthetic plan and risks discussed with patient. Plan discussed with CRNA.                     Thierno Granados MD   6/20/2022

## 2022-06-21 ENCOUNTER — CLINICAL DOCUMENTATION (OUTPATIENT)
Dept: ONCOLOGY | Age: 52
End: 2022-06-21

## 2022-06-21 ENCOUNTER — HOSPITAL ENCOUNTER (OUTPATIENT)
Dept: ONCOLOGY | Age: 52
Setting detail: INFUSION SERIES
Discharge: HOME | End: 2022-06-21
Payer: COMMERCIAL

## 2022-06-21 VITALS
RESPIRATION RATE: 20 BRPM | OXYGEN SATURATION: 97 % | DIASTOLIC BLOOD PRESSURE: 88 MMHG | HEART RATE: 115 BPM | TEMPERATURE: 98.6 F | SYSTOLIC BLOOD PRESSURE: 150 MMHG

## 2022-06-21 DIAGNOSIS — Z52.011 AUTOLOGOUS DONOR OF STEM CELLS: Primary | ICD-10-CM

## 2022-06-21 DIAGNOSIS — C90.00 MULTIPLE MYELOMA, REMISSION STATUS UNSPECIFIED (HCC): ICD-10-CM

## 2022-06-21 LAB
A/G RATIO: 1.4 (ref 1.1–2.2)
ALBUMIN SERPL-MCNC: 4.5 G/DL (ref 3.4–5)
ALP BLD-CCNC: 199 U/L (ref 40–129)
ALT SERPL-CCNC: 21 U/L (ref 10–40)
ANION GAP SERPL CALCULATED.3IONS-SCNC: 11 MMOL/L (ref 3–16)
ANISOCYTOSIS: ABNORMAL
ANISOCYTOSIS: ABNORMAL
AST SERPL-CCNC: 17 U/L (ref 15–37)
BANDED NEUTROPHILS RELATIVE PERCENT: 16 % (ref 0–7)
BASOPHILS ABSOLUTE: 0 K/UL (ref 0–0.2)
BASOPHILS ABSOLUTE: 0.2 K/UL (ref 0–0.2)
BASOPHILS RELATIVE PERCENT: 0 %
BASOPHILS RELATIVE PERCENT: 1 %
BILIRUB SERPL-MCNC: <0.2 MG/DL (ref 0–1)
BLASTS RELATIVE PERCENT: 1 %
BUN BLDV-MCNC: 12 MG/DL (ref 7–20)
CALCIUM SERPL-MCNC: 9.5 MG/DL (ref 8.3–10.6)
CHLORIDE BLD-SCNC: 100 MMOL/L (ref 99–110)
CO2: 29 MMOL/L (ref 21–32)
CREAT SERPL-MCNC: 0.8 MG/DL (ref 0.6–1.1)
DOHLE BODIES: PRESENT
EOSINOPHILS ABSOLUTE: 0 K/UL (ref 0–0.6)
EOSINOPHILS ABSOLUTE: 0.2 K/UL (ref 0–0.6)
EOSINOPHILS RELATIVE PERCENT: 0 %
EOSINOPHILS RELATIVE PERCENT: 1 %
GFR AFRICAN AMERICAN: >60
GFR NON-AFRICAN AMERICAN: >60
GLUCOSE BLD-MCNC: 207 MG/DL (ref 70–99)
HCT VFR BLD CALC: 32.8 % (ref 36–48)
HCT VFR BLD CALC: 33.9 % (ref 36–48)
HEMATOLOGY PATH CONSULT: NO
HEMOGLOBIN: 10.7 G/DL (ref 12–16)
HEMOGLOBIN: 10.9 G/DL (ref 12–16)
HYPOCHROMIA: ABNORMAL
LYMPHOCYTES ABSOLUTE: 1.4 K/UL (ref 1–5.1)
LYMPHOCYTES ABSOLUTE: 1.9 K/UL (ref 1–5.1)
LYMPHOCYTES RELATIVE PERCENT: 6 %
LYMPHOCYTES RELATIVE PERCENT: 7 %
MAGNESIUM: 1.6 MG/DL (ref 1.8–2.4)
MCH RBC QN AUTO: 29.7 PG (ref 26–34)
MCH RBC QN AUTO: 29.9 PG (ref 26–34)
MCHC RBC AUTO-ENTMCNC: 32 G/DL (ref 31–36)
MCHC RBC AUTO-ENTMCNC: 32.6 G/DL (ref 31–36)
MCV RBC AUTO: 91.2 FL (ref 80–100)
MCV RBC AUTO: 93.3 FL (ref 80–100)
METAMYELOCYTES RELATIVE PERCENT: 1 %
METAMYELOCYTES RELATIVE PERCENT: 1 %
MONOCYTES ABSOLUTE: 0 K/UL (ref 0–1.3)
MONOCYTES ABSOLUTE: 1.6 K/UL (ref 0–1.3)
MONOCYTES RELATIVE PERCENT: 0 %
MONOCYTES RELATIVE PERCENT: 6 %
MYELOCYTE PERCENT: 1 %
NEUTROPHILS ABSOLUTE: 20.5 K/UL (ref 1.7–7.7)
NEUTROPHILS ABSOLUTE: 23.8 K/UL (ref 1.7–7.7)
NEUTROPHILS RELATIVE PERCENT: 74 %
NEUTROPHILS RELATIVE PERCENT: 85 %
NUCLEATED RED BLOOD CELLS: 1 /100 WBC
PDW BLD-RTO: 16.8 % (ref 12.4–15.4)
PDW BLD-RTO: 16.9 % (ref 12.4–15.4)
PLATELET # BLD: 145 K/UL (ref 135–450)
PLATELET # BLD: 222 K/UL (ref 135–450)
PMV BLD AUTO: 7.6 FL (ref 5–10.5)
PMV BLD AUTO: 7.7 FL (ref 5–10.5)
POTASSIUM SERPL-SCNC: 3.3 MMOL/L (ref 3.5–5.1)
RBC # BLD: 3.6 M/UL (ref 4–5.2)
RBC # BLD: 3.64 M/UL (ref 4–5.2)
SODIUM BLD-SCNC: 140 MMOL/L (ref 136–145)
TOTAL PROTEIN: 7.8 G/DL (ref 6.4–8.2)
TOXIC GRANULATION: PRESENT
WBC # BLD: 22.5 K/UL (ref 4–11)
WBC # BLD: 27.4 K/UL (ref 4–11)

## 2022-06-21 PROCEDURE — 96366 THER/PROPH/DIAG IV INF ADDON: CPT

## 2022-06-21 PROCEDURE — 6370000000 HC RX 637 (ALT 250 FOR IP): Performed by: INTERNAL MEDICINE

## 2022-06-21 PROCEDURE — 36592 COLLECT BLOOD FROM PICC: CPT

## 2022-06-21 PROCEDURE — 6360000002 HC RX W HCPCS

## 2022-06-21 PROCEDURE — 83735 ASSAY OF MAGNESIUM: CPT

## 2022-06-21 PROCEDURE — 96372 THER/PROPH/DIAG INJ SC/IM: CPT

## 2022-06-21 PROCEDURE — 38206 HARVEST AUTO STEM CELLS: CPT

## 2022-06-21 PROCEDURE — 85025 COMPLETE CBC W/AUTO DIFF WBC: CPT

## 2022-06-21 PROCEDURE — 6360000002 HC RX W HCPCS: Performed by: STUDENT IN AN ORGANIZED HEALTH CARE EDUCATION/TRAINING PROGRAM

## 2022-06-21 PROCEDURE — 38207 CRYOPRESERVE STEM CELLS: CPT

## 2022-06-21 PROCEDURE — 6360000002 HC RX W HCPCS: Performed by: INTERNAL MEDICINE

## 2022-06-21 PROCEDURE — 80053 COMPREHEN METABOLIC PANEL: CPT

## 2022-06-21 PROCEDURE — 96365 THER/PROPH/DIAG IV INF INIT: CPT

## 2022-06-21 RX ORDER — PROCHLORPERAZINE MALEATE 10 MG
10 TABLET ORAL EVERY 6 HOURS PRN
Status: CANCELLED | OUTPATIENT
Start: 2022-06-22

## 2022-06-21 RX ORDER — LOPERAMIDE HYDROCHLORIDE 2 MG/1
2 CAPSULE ORAL PRN
Status: CANCELLED | OUTPATIENT
Start: 2022-06-22

## 2022-06-21 RX ORDER — ONDANSETRON HYDROCHLORIDE 8 MG/1
24 TABLET, FILM COATED ORAL ONCE
Status: CANCELLED | OUTPATIENT
Start: 2022-06-30

## 2022-06-21 RX ORDER — HEPARIN SODIUM (PORCINE) LOCK FLUSH IV SOLN 100 UNIT/ML 100 UNIT/ML
500 SOLUTION INTRAVENOUS PRN
Status: DISCONTINUED | OUTPATIENT
Start: 2022-06-21 | End: 2022-06-22 | Stop reason: HOSPADM

## 2022-06-21 RX ORDER — LOPERAMIDE HYDROCHLORIDE 2 MG/1
2 CAPSULE ORAL PRN
Status: DISCONTINUED | OUTPATIENT
Start: 2022-06-21 | End: 2022-06-22 | Stop reason: HOSPADM

## 2022-06-21 RX ORDER — POTASSIUM CHLORIDE 20 MEQ/1
40 TABLET, EXTENDED RELEASE ORAL ONCE
Status: COMPLETED | OUTPATIENT
Start: 2022-06-21 | End: 2022-06-21

## 2022-06-21 RX ORDER — PROCHLORPERAZINE MALEATE 10 MG
10 TABLET ORAL EVERY 6 HOURS PRN
Status: DISCONTINUED | OUTPATIENT
Start: 2022-06-21 | End: 2022-06-22 | Stop reason: HOSPADM

## 2022-06-21 RX ORDER — 0.9 % SODIUM CHLORIDE 0.9 %
1000 INTRAVENOUS SOLUTION INTRAVENOUS ONCE
Status: CANCELLED | OUTPATIENT
Start: 2022-06-30

## 2022-06-21 RX ORDER — DEXAMETHASONE 4 MG/1
12 TABLET ORAL ONCE
Status: CANCELLED | OUTPATIENT
Start: 2022-06-30

## 2022-06-21 RX ADMIN — SODIUM CHLORIDE, PRESERVATIVE FREE 1500 UNITS: 5 INJECTION INTRAVENOUS at 14:00

## 2022-06-21 RX ADMIN — FILGRASTIM-AAFI 1200 MCG: 300 INJECTION, SOLUTION SUBCUTANEOUS at 07:57

## 2022-06-21 RX ADMIN — POTASSIUM CHLORIDE 40 MEQ: 20 TABLET, EXTENDED RELEASE ORAL at 10:33

## 2022-06-21 RX ADMIN — CALCIUM GLUCONATE 4000 MG: 20 INJECTION, SOLUTION INTRAVENOUS at 08:51

## 2022-06-21 NOTE — PROGRESS NOTES
PATIENT:  Riana Langston    :  [birth year]    DIAGNOSIS:  Mult Myeloma    PROCEDURE: Autologous stem cell collection in anticipation of autologous stem cell transplant. SUBJECTIVE:  Feels well, did not receive Mozobil as her pre collection CD34 count was 40  . REVIEW OF SYSTEMS:  The remainder of her 10-point is negative. PHYSICAL EXAMINATION:   GENERAL APPEARANCE: He is alert and oriented in no distress. VITAL SIGNS: Within normal limits. HEENT: Pupils are equal and reactive. RESPIRATORY: Clear to auscultation bilaterally. CARDIOVASCULAR: Regular rate and rhythm. No rubs or murmurs. ABDOMEN: Nontender. EXTREMITIES: No edema. SKIN: No rash or dermatitis. LABORATORIES: Reviewed. DESCRIPTION OF PROCEDURE: Patient underwent a 25 liter collection at a flow rate of 100 mL per minute. The procedure was tolerated well. ASSESSMENT AND PLAN: Day #  1 of stem cell collection. Goal is 5.0 CD-34 cells/kg. Collection will be done today and possibly tomorrow depending on CD-34 count from today.    =====================    Enrique Bañuelos.  Mecca Kang, [address] [place] [address]

## 2022-06-21 NOTE — PROGRESS NOTES
Pt arrived to [facility] today for scheduled stem cell pheresis procedure. Apheresis, line care, education, & lab draws all completed by Scott HILL, Helen . Review Scott documentation for details.      Kayleen Romero RN

## 2022-06-21 NOTE — PROGRESS NOTES
Pre-Autologous Stem Cell Transplant Psychological Evaluation Update- 6/21/2022    PSYCHOSOCIAL ASSESSMENT/RECOMMENDATIONS    Based on this evaluation, the recommendation is to continue with monitoring of identified risk factors. Helene Roper was informed of the following risk factors that have been identified and recommendations that are being made to the treatment team:      1) Patient's caregiver concerns have been addressed. She is now using her mother and daughter as her caregivers rather than her partner. Her mother and daughter appear to be adequate caregivers. 2) Patient smokes marijuana nightly, but was educated on the importance of abstinence from marijuana post-transplant. 3) Some concerns regarding patient's adherence to treatment recommendations due to history of issues remembering to take medications or difficulties contacting patient via telephone. Obtained contact information for all caregivers to assist with collaboration of care. Also, patient has not voluntarily disclosed some negative information during evaluation. Recommend continuing to develop relationship with patient to assist with transparency.      · Identified caregivers: Nasra Baumann (partner), Katelynmegan Viktor (daughter), Boni Dakins (mother), Herrera Nephew (daughter)  · Protective factors: multiple people living in the home who can assist  · DSM-5 Diagnoses: Hx of documented recurrent major depressive disorder although patient currently denies  ____________________________________________________________________________________________    SUPPORT SYSTEM    Identified primary caregiver: Boni Dakins  Relationship to patient: Mother  Age: 68  Lives: [place] to stay with patient: Yes with assistance from daughter  Employment: Denied   Adequate knowledge regarding procedure: Yes  Diagnosis: Good  Procedure: Adequate, was provided additional education and expressed understanding  Risks and Benefits: Excellent  Outcome: Excellent  Lifestyle changes:     Hand-washing and hygiene:Excellent    People and Places: Excellent    Bleeding precautions: Excellent    Pets: Excellent    Sun exposure: Excellent    Driving restrictions: Adequate, was provided additional education and expressed understanding    Staying within 45 minutes of the hospital: Excellent    Physical activity: Excellent    Tobacco use: Excellent    Alcohol use: Excellent    Illicit drug use (including marijuana): Excellent    Food safety: Excellent    Medication management: Excellent    Frequency of appointments: Excellent    When to call BMT doctor: Excellent    Going to the [facility] in case of emergency: Excellent    Possibility for admission and re-admission to the hospital: Excellent  Willingness to be a caregiver: Yes  Significant mental health conditions that would affect care giving: Denies  Significant substance abuse that would affect care giving: Denies  Significant medical conditions that would affect care giving: Denies  Smoke tobacco in the home: Denies  Known cognitive impairment: Denies  COVID vaccine: Yes    CAREGIVER: RAPID ESTIMATE OF ADULT LITERACY IN MEDICINE  REALM-R: 7/8; REALM-SF Score: N/A  (0= <3rd grade reading level; 1-3= 4-6th grade reading level; 4-6= 7-8th grade reading level; >7= >9th grade reading level)    CAREGIVER: GIOVANY COGNITIVE ASSESSMENT (Version 1): 26/30 (Within Normal Limits)  Difficulty noted in the area(s) of: delayed recall  Normal scores are ?26     Additional support people:              1) Santino Mosquera              Relationship to patient: Daughter  Age: 35  Lives: with patient  Employment: works third shift doing customer service  COVID vaccine: No              2) Richard Rachel Pallas              Relationship to patient: partner  Age: 54  Lives: [address] with patient  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  COVID vaccine:  Yes              3) Razia Jiménez (daughter)    STANDARD ASSESSMENT FOR INTEGRATING DATA  BALJIT INTEGRATED PSYCHOSOCIAL ASSESSMENT FOR TRANSPLANT (SIPAT)= 23 ((21-39) Minimally Acceptable Candidate: Consider continuing if identified risk factors can be satisfactorily addressed in advance)   SIPAT Score Interpretation: 0-6 Excellent Candidate, 7-20 Good Candidate, 21-39 Minimally Acceptance Candidate, 40-69 Poor Candidate, >70 High Risk Candidate  The total score was obtained through summing patient's score on the following categories:    Patient's Readiness Level  Knowledge and Understanding of Medical Illness Process: 1) Good Understanding: Patient & support system are mostly aware of the cause(s) of the illness process and contribution to current health status. Knowledge and Understanding of the Process of Transplantation: 1) Good Understanding: Patient & support have studied & understood provided literature. Willingness/Desire for Treatment: 1) Good: Patient expresses interest and is actively involved in his/her care  Treatment Compliance/Adherence: 2) Good: Patient is mostly compliant; requires redirection or reeducation; but no significant negative outcomes are documented. Lifestyle Factors: 0) Able to modify & sustain needed changes- self initiated. Patient's Readiness Level Total Score: 5    Social Support System  Availability of Social Support System: 0) Excellent: Multiple family, significant others &/or friends have been identified and ARE actively engaged as part of the support system. Excellent back-up system in place. Functionality of Social Support System: 2) Good: A limited support system has already committed to and has had effective engagement in the patient's care. Or, they are not involved yet, but appear ready to help. Appropriateness of Physical Living Space and Environment: 0) Excellent: Patient has excellent, long-term, permanent and adequate housing.    Social Support System Total Score: 2    Psychological Stability and Psychopathology   Presence of Psychopathology: 2) Mild Psychopathology - Present or History of mild psychopathology (e.g., Adjustment disorder). Usually a self-limited problem without significant negative impact on level of functioning. No hospitalization needed. No History of SI/SA. Assessment of Depression: 0) No Clinical Depression  Assessment of Anxiety: 0) No Clinical Anxiety  History of Organic Psychopathology or Neurocognitive Impairment: 0) None: No history of disease or treatment induced psychiatric problem. Assessment of Current Cognitive Functionin) Cognitive Functioning Within Normal Limits; or MoCA / MMSE . 26. Influence of Personality Traits vs. Disorder: 0) None: No history of significant personality disorder or psychopathology/traits. Effect of Truthfulness vs. Deceptive Behavior in Presentation: 4) Patient has not been fully forthcoming with negative information, but provides it on confrontation. Overall Risk for Psychopathology: 2) Mild: History of poor coping with current or previous medical challenges or psychosocial stressors. Only minimal, if any, psychiatric complications in response to illness, medical treatment or psychosocial stressors. Psychological Stability and Psychopathology Total Score: 8    Lifestyle and Effect of Substance Use  Alcohol Use/Abuse/Dependence: 2) ALCOHOL USE - NO ABUSE: History of minimal alcohol use which has caused no social or medical problems (i.e., no abuse). If requested by the team the patient promptly discontinued all alcohol use. Alcohol Risk for Recidivism: 1) Low Risk: (AUDIT 1 - 7). Substance Use/Abuse/Dependence: 2) History of minimal substance abuse (illicit or prescribed substances). Quit use as soon as patient learned of disease or when first told by MD.  Substance Use Risk for Recidivism: 2) Moderate Risk: (DAST 3 - 5). Nicotine Use/Abuse/Dependence: 0) None: Never used tobacco in any form.  No history of Nicotine Use/Abuse. Lifestyle and Effect of Substance Use Total Score: 7    -------------------------------------------------------------------------------------    Cas Copeland Psy.D., Clinical Psychologist      Blane Wylie was seen for a pre-BMT/CAR-T psychological evaluation. There were no urgent psychological concerns (e.g. suicidal or homicidal ideation). Full report to follow. Prior to the evaluation, patient and/or support system was informed of the purposes of the evaluation, which include identification of any psychosocial barriers to successful BMT/CAR-T. Educated patient regarding psychologist's role in making treatment recommendations to patient's treatment team regarding patient's candidacy for BMT/CAR-T from a psychological standpoint. Discussed limits of confidentiality (e.g., documenting in patient's medical record, coordinating with team, abuse of vulnerable person, court subpoena, and/or being a risk to self or others). Also explained to patient that provider sees patient's while hospitalized in the [place] [address]. Patient expressed understanding and was agreeable to complete the evaluation. All measures completed today were sent to medical records to be uploaded into patient's chart.

## 2022-06-21 NOTE — CARE COORDINATION
Met with patient and caregivers ( daughter and mother)  today while patient was collecting cells for a future transplant. I provided information regarding inpatient stay including reviewing safety policies that are followed on the  Unit. Reviewed how to prepare for an inpatient stay. I also reviewed when caregivers are needed for inpatient stay which is upon discharge. I also provided information to caregivers regarding their role with an outpatient infusion. This information was provided in writing as well as verbally discussed. Will follow up with patient when she comes back for transplant.

## 2022-06-24 RX ORDER — PETROLATUM, MENTHOL, UNSPECIFIED FORM, CAMPHOR (SYNTHETIC), AND PHENOL 59.14; 1; 1; .6 G/100G; G/100G; G/100G; G/100G
PASTE TOPICAL PRN
Status: CANCELLED | OUTPATIENT
Start: 2022-06-30

## 2022-06-24 RX ORDER — PROCHLORPERAZINE MALEATE 10 MG
10 TABLET ORAL EVERY 6 HOURS PRN
Status: CANCELLED | OUTPATIENT
Start: 2022-06-30

## 2022-06-24 RX ORDER — POTASSIUM CHLORIDE 20 MEQ/1
40 TABLET, EXTENDED RELEASE ORAL PRN
Status: CANCELLED | OUTPATIENT
Start: 2022-06-30

## 2022-06-24 RX ORDER — OXYCODONE HYDROCHLORIDE 5 MG/1
5 TABLET ORAL EVERY 4 HOURS PRN
Status: CANCELLED | OUTPATIENT
Start: 2022-06-30

## 2022-06-24 RX ORDER — PROCHLORPERAZINE EDISYLATE 5 MG/ML
10 INJECTION INTRAMUSCULAR; INTRAVENOUS EVERY 6 HOURS PRN
Status: CANCELLED | OUTPATIENT
Start: 2022-06-30

## 2022-06-24 RX ORDER — MAGNESIUM SULFATE IN WATER 40 MG/ML
4000 INJECTION, SOLUTION INTRAVENOUS PRN
Status: CANCELLED | OUTPATIENT
Start: 2022-06-30

## 2022-06-24 RX ORDER — HEPARIN SODIUM (PORCINE) LOCK FLUSH IV SOLN 100 UNIT/ML 100 UNIT/ML
500 SOLUTION INTRAVENOUS PRN
Status: CANCELLED | OUTPATIENT
Start: 2022-06-30

## 2022-06-24 RX ORDER — SODIUM CHLORIDE 9 MG/ML
INJECTION, SOLUTION INTRAVENOUS CONTINUOUS PRN
Status: CANCELLED | OUTPATIENT
Start: 2022-06-30

## 2022-06-24 RX ORDER — ONDANSETRON 2 MG/ML
8 INJECTION INTRAMUSCULAR; INTRAVENOUS EVERY 8 HOURS PRN
Status: CANCELLED | OUTPATIENT
Start: 2022-06-30

## 2022-06-24 RX ORDER — OXYCODONE HYDROCHLORIDE 5 MG/1
10 TABLET ORAL EVERY 4 HOURS PRN
Status: CANCELLED | OUTPATIENT
Start: 2022-06-30

## 2022-06-24 RX ORDER — ONDANSETRON 4 MG/1
8 TABLET, FILM COATED ORAL EVERY 8 HOURS PRN
Status: CANCELLED | OUTPATIENT
Start: 2022-06-30

## 2022-06-24 RX ORDER — 0.9 % SODIUM CHLORIDE 0.9 %
1000 INTRAVENOUS SOLUTION INTRAVENOUS ONCE
Status: CANCELLED | OUTPATIENT
Start: 2022-06-30 | End: 2022-06-30

## 2022-06-24 RX ORDER — POTASSIUM CHLORIDE 29.8 MG/ML
80 INJECTION INTRAVENOUS PRN
Status: CANCELLED | OUTPATIENT
Start: 2022-06-30

## 2022-06-30 ENCOUNTER — HOSPITAL ENCOUNTER (OUTPATIENT)
Dept: ONCOLOGY | Age: 52
Setting detail: INFUSION SERIES
Discharge: HOME | End: 2022-06-30
Payer: COMMERCIAL

## 2022-06-30 VITALS
HEART RATE: 87 BPM | OXYGEN SATURATION: 94 % | SYSTOLIC BLOOD PRESSURE: 120 MMHG | DIASTOLIC BLOOD PRESSURE: 64 MMHG | TEMPERATURE: 98.1 F | HEIGHT: 65 IN | RESPIRATION RATE: 18 BRPM | BODY MASS INDEX: 43.68 KG/M2 | WEIGHT: 262.2 LBS

## 2022-06-30 DIAGNOSIS — Z52.011 AUTOLOGOUS DONOR OF STEM CELLS: ICD-10-CM

## 2022-06-30 DIAGNOSIS — C90.00 MULTIPLE MYELOMA, REMISSION STATUS UNSPECIFIED (HCC): ICD-10-CM

## 2022-06-30 DIAGNOSIS — M54.41 CHRONIC MIDLINE LOW BACK PAIN WITH RIGHT-SIDED SCIATICA: ICD-10-CM

## 2022-06-30 DIAGNOSIS — C90.00 MULTIPLE MYELOMA NOT HAVING ACHIEVED REMISSION (HCC): Primary | ICD-10-CM

## 2022-06-30 DIAGNOSIS — G89.29 CHRONIC MIDLINE LOW BACK PAIN WITH RIGHT-SIDED SCIATICA: ICD-10-CM

## 2022-06-30 LAB
ABO/RH: NORMAL
ALBUMIN SERPL-MCNC: 4.4 G/DL (ref 3.4–5)
ALP BLD-CCNC: 81 U/L (ref 40–129)
ALT SERPL-CCNC: 34 U/L (ref 10–40)
ANION GAP SERPL CALCULATED.3IONS-SCNC: 15 MMOL/L (ref 3–16)
ANTIBODY SCREEN: NORMAL
AST SERPL-CCNC: 21 U/L (ref 15–37)
BASOPHILS ABSOLUTE: 0 K/UL (ref 0–0.2)
BASOPHILS RELATIVE PERCENT: 0.6 %
BILIRUB SERPL-MCNC: <0.2 MG/DL (ref 0–1)
BILIRUBIN DIRECT: <0.2 MG/DL (ref 0–0.3)
BILIRUBIN, INDIRECT: NORMAL MG/DL (ref 0–1)
BUN BLDV-MCNC: 15 MG/DL (ref 7–20)
CALCIUM SERPL-MCNC: 9.1 MG/DL (ref 8.3–10.6)
CHLORIDE BLD-SCNC: 101 MMOL/L (ref 99–110)
CO2: 25 MMOL/L (ref 21–32)
CREAT SERPL-MCNC: 0.7 MG/DL (ref 0.6–1.1)
EOSINOPHILS ABSOLUTE: 0.1 K/UL (ref 0–0.6)
EOSINOPHILS RELATIVE PERCENT: 1.3 %
GFR AFRICAN AMERICAN: >60
GFR NON-AFRICAN AMERICAN: >60
GLUCOSE BLD-MCNC: 222 MG/DL (ref 70–99)
HCT VFR BLD CALC: 32.4 % (ref 36–48)
HEMOGLOBIN: 10.6 G/DL (ref 12–16)
INR BLD: 1.04 (ref 0.87–1.14)
LYMPHOCYTES ABSOLUTE: 1.3 K/UL (ref 1–5.1)
LYMPHOCYTES RELATIVE PERCENT: 28.9 %
MAGNESIUM: 1.7 MG/DL (ref 1.8–2.4)
MCH RBC QN AUTO: 30.4 PG (ref 26–34)
MCHC RBC AUTO-ENTMCNC: 32.9 G/DL (ref 31–36)
MCV RBC AUTO: 92.5 FL (ref 80–100)
MONOCYTES ABSOLUTE: 0.3 K/UL (ref 0–1.3)
MONOCYTES RELATIVE PERCENT: 7 %
NEUTROPHILS ABSOLUTE: 2.8 K/UL (ref 1.7–7.7)
NEUTROPHILS RELATIVE PERCENT: 62.2 %
PDW BLD-RTO: 15.6 % (ref 12.4–15.4)
PHOSPHORUS: 2.8 MG/DL (ref 2.5–4.9)
PLATELET # BLD: 132 K/UL (ref 135–450)
PMV BLD AUTO: 8.1 FL (ref 5–10.5)
POTASSIUM SERPL-SCNC: 3.4 MMOL/L (ref 3.5–5.1)
PROTHROMBIN TIME: 13.6 SEC (ref 11.7–14.5)
RBC # BLD: 3.5 M/UL (ref 4–5.2)
SODIUM BLD-SCNC: 141 MMOL/L (ref 136–145)
TOTAL PROTEIN: 7.4 G/DL (ref 6.4–8.2)
URIC ACID, SERUM: 7.2 MG/DL (ref 2.6–6)
WBC # BLD: 4.5 K/UL (ref 4–11)

## 2022-06-30 PROCEDURE — 96365 THER/PROPH/DIAG IV INF INIT: CPT

## 2022-06-30 PROCEDURE — 80048 BASIC METABOLIC PNL TOTAL CA: CPT

## 2022-06-30 PROCEDURE — 6360000002 HC RX W HCPCS: Performed by: NURSE PRACTITIONER

## 2022-06-30 PROCEDURE — 83735 ASSAY OF MAGNESIUM: CPT

## 2022-06-30 PROCEDURE — 2580000003 HC RX 258: Performed by: STUDENT IN AN ORGANIZED HEALTH CARE EDUCATION/TRAINING PROGRAM

## 2022-06-30 PROCEDURE — 86850 RBC ANTIBODY SCREEN: CPT

## 2022-06-30 PROCEDURE — 84550 ASSAY OF BLOOD/URIC ACID: CPT

## 2022-06-30 PROCEDURE — 6370000000 HC RX 637 (ALT 250 FOR IP): Performed by: NURSE PRACTITIONER

## 2022-06-30 PROCEDURE — 36592 COLLECT BLOOD FROM PICC: CPT

## 2022-06-30 PROCEDURE — 84100 ASSAY OF PHOSPHORUS: CPT

## 2022-06-30 PROCEDURE — 86900 BLOOD TYPING SEROLOGIC ABO: CPT

## 2022-06-30 PROCEDURE — 6360000002 HC RX W HCPCS: Performed by: STUDENT IN AN ORGANIZED HEALTH CARE EDUCATION/TRAINING PROGRAM

## 2022-06-30 PROCEDURE — 85025 COMPLETE CBC W/AUTO DIFF WBC: CPT

## 2022-06-30 PROCEDURE — 86901 BLOOD TYPING SEROLOGIC RH(D): CPT

## 2022-06-30 PROCEDURE — 96413 CHEMO IV INFUSION 1 HR: CPT

## 2022-06-30 PROCEDURE — 85610 PROTHROMBIN TIME: CPT

## 2022-06-30 PROCEDURE — 96360 HYDRATION IV INFUSION INIT: CPT

## 2022-06-30 PROCEDURE — 96361 HYDRATE IV INFUSION ADD-ON: CPT

## 2022-06-30 PROCEDURE — 80076 HEPATIC FUNCTION PANEL: CPT

## 2022-06-30 PROCEDURE — 6370000000 HC RX 637 (ALT 250 FOR IP): Performed by: STUDENT IN AN ORGANIZED HEALTH CARE EDUCATION/TRAINING PROGRAM

## 2022-06-30 RX ORDER — GABAPENTIN 300 MG/1
CAPSULE ORAL
Qty: 90 CAPSULE | Refills: 5 | Status: ON HOLD | OUTPATIENT
Start: 2022-06-30 | End: 2022-07-13 | Stop reason: HOSPADM

## 2022-06-30 RX ORDER — ONDANSETRON 4 MG/1
24 TABLET, FILM COATED ORAL ONCE
Status: COMPLETED | OUTPATIENT
Start: 2022-06-30 | End: 2022-06-30

## 2022-06-30 RX ORDER — DEXAMETHASONE 4 MG/1
12 TABLET ORAL ONCE
Status: COMPLETED | OUTPATIENT
Start: 2022-06-30 | End: 2022-06-30

## 2022-06-30 RX ORDER — 0.9 % SODIUM CHLORIDE 0.9 %
1000 INTRAVENOUS SOLUTION INTRAVENOUS ONCE
Status: CANCELLED | OUTPATIENT
Start: 2022-07-01

## 2022-06-30 RX ORDER — 0.9 % SODIUM CHLORIDE 0.9 %
1000 INTRAVENOUS SOLUTION INTRAVENOUS ONCE
Status: CANCELLED | OUTPATIENT
Start: 2022-06-30

## 2022-06-30 RX ORDER — 0.9 % SODIUM CHLORIDE 0.9 %
1000 INTRAVENOUS SOLUTION INTRAVENOUS ONCE
Status: COMPLETED | OUTPATIENT
Start: 2022-06-30 | End: 2022-06-30

## 2022-06-30 RX ORDER — LEVOFLOXACIN 500 MG/1
500 TABLET, FILM COATED ORAL DAILY
Qty: 30 TABLET | Refills: 0 | Status: SHIPPED | OUTPATIENT
Start: 2022-06-30 | End: 2022-07-06 | Stop reason: SDUPTHER

## 2022-06-30 RX ORDER — VALACYCLOVIR HYDROCHLORIDE 500 MG/1
500 TABLET, FILM COATED ORAL 2 TIMES DAILY
Qty: 60 TABLET | Refills: 2 | Status: SHIPPED | OUTPATIENT
Start: 2022-06-30

## 2022-06-30 RX ORDER — ONDANSETRON 2 MG/ML
8 INJECTION INTRAMUSCULAR; INTRAVENOUS EVERY 8 HOURS PRN
Status: CANCELLED | OUTPATIENT
Start: 2022-07-01

## 2022-06-30 RX ORDER — POTASSIUM CHLORIDE 20 MEQ/1
40 TABLET, EXTENDED RELEASE ORAL PRN
Status: DISCONTINUED | OUTPATIENT
Start: 2022-06-30 | End: 2022-07-01 | Stop reason: HOSPADM

## 2022-06-30 RX ORDER — OXYCODONE HYDROCHLORIDE 5 MG/1
5 TABLET ORAL EVERY 4 HOURS PRN
Status: CANCELLED | OUTPATIENT
Start: 2022-07-01

## 2022-06-30 RX ORDER — DEXAMETHASONE 4 MG/1
12 TABLET ORAL ONCE
Status: CANCELLED | OUTPATIENT
Start: 2022-06-30

## 2022-06-30 RX ORDER — PROCHLORPERAZINE EDISYLATE 5 MG/ML
10 INJECTION INTRAMUSCULAR; INTRAVENOUS EVERY 6 HOURS PRN
Status: CANCELLED | OUTPATIENT
Start: 2022-07-01

## 2022-06-30 RX ORDER — PROCHLORPERAZINE MALEATE 10 MG
10 TABLET ORAL EVERY 6 HOURS PRN
Status: CANCELLED | OUTPATIENT
Start: 2022-07-01

## 2022-06-30 RX ORDER — OXYCODONE HYDROCHLORIDE 5 MG/1
10 TABLET ORAL EVERY 4 HOURS PRN
Status: CANCELLED | OUTPATIENT
Start: 2022-07-01

## 2022-06-30 RX ORDER — DIPHENHYDRAMINE HCL 25 MG
25 TABLET ORAL ONCE
Status: CANCELLED | OUTPATIENT
Start: 2022-07-01

## 2022-06-30 RX ORDER — POTASSIUM CHLORIDE 29.8 MG/ML
80 INJECTION INTRAVENOUS PRN
Status: CANCELLED | OUTPATIENT
Start: 2022-07-01

## 2022-06-30 RX ORDER — MAGNESIUM SULFATE IN WATER 40 MG/ML
4000 INJECTION, SOLUTION INTRAVENOUS PRN
Status: CANCELLED | OUTPATIENT
Start: 2022-07-01

## 2022-06-30 RX ORDER — POTASSIUM CHLORIDE 20 MEQ/1
40 TABLET, EXTENDED RELEASE ORAL PRN
Status: CANCELLED | OUTPATIENT
Start: 2022-07-01

## 2022-06-30 RX ORDER — PETROLATUM, MENTHOL, UNSPECIFIED FORM, CAMPHOR (SYNTHETIC), AND PHENOL 59.14; 1; 1; .6 G/100G; G/100G; G/100G; G/100G
PASTE TOPICAL PRN
Status: CANCELLED | OUTPATIENT
Start: 2022-07-01

## 2022-06-30 RX ORDER — SODIUM CHLORIDE 9 MG/ML
INJECTION, SOLUTION INTRAVENOUS CONTINUOUS PRN
Status: CANCELLED | OUTPATIENT
Start: 2022-06-30

## 2022-06-30 RX ORDER — PROCHLORPERAZINE MALEATE 10 MG
10 TABLET ORAL EVERY 6 HOURS PRN
Qty: 120 TABLET | Refills: 3 | Status: SHIPPED | OUTPATIENT
Start: 2022-06-30

## 2022-06-30 RX ORDER — DIPHENHYDRAMINE HYDROCHLORIDE 50 MG/ML
25 INJECTION INTRAMUSCULAR; INTRAVENOUS PRN
Status: CANCELLED | OUTPATIENT
Start: 2022-06-30 | End: 2022-07-01

## 2022-06-30 RX ORDER — ONDANSETRON 4 MG/1
8 TABLET, FILM COATED ORAL EVERY 8 HOURS PRN
Status: CANCELLED | OUTPATIENT
Start: 2022-07-01

## 2022-06-30 RX ORDER — POTASSIUM CHLORIDE 20 MEQ/1
20 TABLET, EXTENDED RELEASE ORAL DAILY
Qty: 30 TABLET | Refills: 3 | Status: SHIPPED | OUTPATIENT
Start: 2022-06-30

## 2022-06-30 RX ORDER — HEPARIN SODIUM (PORCINE) LOCK FLUSH IV SOLN 100 UNIT/ML 100 UNIT/ML
500 SOLUTION INTRAVENOUS PRN
Status: CANCELLED | OUTPATIENT
Start: 2022-07-01

## 2022-06-30 RX ORDER — MORPHINE SULFATE 2 MG/ML
2 INJECTION, SOLUTION INTRAMUSCULAR; INTRAVENOUS PRN
Status: CANCELLED | OUTPATIENT
Start: 2022-06-30 | End: 2022-07-01

## 2022-06-30 RX ORDER — ONDANSETRON 8 MG/1
8 TABLET, ORALLY DISINTEGRATING ORAL EVERY 8 HOURS PRN
Qty: 30 TABLET | Refills: 2 | Status: ON HOLD | OUTPATIENT
Start: 2022-06-30 | End: 2022-07-13

## 2022-06-30 RX ORDER — ONDANSETRON HYDROCHLORIDE 8 MG/1
24 TABLET, FILM COATED ORAL ONCE
Status: CANCELLED | OUTPATIENT
Start: 2022-06-30

## 2022-06-30 RX ORDER — HEPARIN SODIUM (PORCINE) LOCK FLUSH IV SOLN 100 UNIT/ML 100 UNIT/ML
500 SOLUTION INTRAVENOUS PRN
Status: DISCONTINUED | OUTPATIENT
Start: 2022-06-30 | End: 2022-07-01 | Stop reason: HOSPADM

## 2022-06-30 RX ORDER — ACETAMINOPHEN 325 MG/1
650 TABLET ORAL ONCE
Status: CANCELLED | OUTPATIENT
Start: 2022-07-01

## 2022-06-30 RX ORDER — 0.9 % SODIUM CHLORIDE 0.9 %
1000 INTRAVENOUS SOLUTION INTRAVENOUS ONCE
Status: CANCELLED | OUTPATIENT
Start: 2022-07-01 | End: 2022-07-01

## 2022-06-30 RX ORDER — SODIUM CHLORIDE 9 MG/ML
INJECTION, SOLUTION INTRAVENOUS CONTINUOUS PRN
Status: CANCELLED | OUTPATIENT
Start: 2022-07-01

## 2022-06-30 RX ORDER — FLUCONAZOLE 200 MG/1
400 TABLET ORAL DAILY
Qty: 60 TABLET | Refills: 3 | Status: ON HOLD | OUTPATIENT
Start: 2022-07-01 | End: 2022-07-13 | Stop reason: HOSPADM

## 2022-06-30 RX ADMIN — FOSAPREPITANT 150 MG: 150 INJECTION, POWDER, LYOPHILIZED, FOR SOLUTION INTRAVENOUS at 10:28

## 2022-06-30 RX ADMIN — SODIUM CHLORIDE 1000 ML: 9 INJECTION, SOLUTION INTRAVENOUS at 09:05

## 2022-06-30 RX ADMIN — DEXAMETHASONE 12 MG: 4 TABLET ORAL at 10:29

## 2022-06-30 RX ADMIN — MELPHALAN HYDROCHLORIDE 400 MG: KIT INTRAVENOUS at 11:25

## 2022-06-30 RX ADMIN — ONDANSETRON HYDROCHLORIDE 24 MG: 4 TABLET, FILM COATED ORAL at 10:28

## 2022-06-30 RX ADMIN — POTASSIUM CHLORIDE 40 MEQ: 20 TABLET, EXTENDED RELEASE ORAL at 11:45

## 2022-06-30 RX ADMIN — SODIUM CHLORIDE, PRESERVATIVE FREE 500 UNITS: 5 INJECTION INTRAVENOUS at 14:12

## 2022-06-30 RX ADMIN — SODIUM CHLORIDE 1000 ML: 9 INJECTION, SOLUTION INTRAVENOUS at 11:48

## 2022-06-30 NOTE — H&P
[address] | VIP History and Physical        Attending Physician: No att. providers found    Primary Care: Thony Harrington, APRN - CNP       Referring MD: Abran Cuba, [address]  [facility],  [address]    Name: Hu Zhao :  1970  MRN:  [id number]    Admission: 22    Date: 2022    Reason for Admission: High Dose Melphalan preparative regimen followed by Autologous Stem Cell Transplant for IgG Kappa Multiple Myeloma    History of Present Illness: Chiara Bustillo is a 46year old female with hx of HTN & anemia who was diagnosed with multiple myeloma and was referred to Dr. Rene Mccarthy for evaluation of autologous transplantation. Chiara Bustillo was referred to Dr Gianni Landry for anemia in . She has history of iron def anemia and underwent hysterectomy when she was 28. Her iron studies were unremarkable this time. Myeloma labs were ordered. Her kappa light chain ratio was 132. SPEP suggested M spike of 3.7  g/dL of IgG kappa. She underwent a bone marrow biopsy which showed 20% plasma cells. She has t(14:20), gain of 1q21, monosomy 13. She had a PET scan (22) which showed diffuse marrow signaling, no lytic lesions. She had increased uptake in esophageous and EGD has been recommended. She has never had a colonoscopy or EGD. She was started on RVD on 22 and has currently completed 5 cycles (C5D1: 22). Her   repeat bone marrow bx (6/10/22) was negative for plasma cells, consistent with partial response. She then started Granix 22 and collected stem cells on 22. She is now being seen to start high dose Melphalan (200mg/m^2) x 1 dose in OP Infusion. This will be followed by an Autologous Stem Cell Transplant of 3.66 x 10^6 [id number]/kg PBSCs in 210 mL on 22.  She feels well today and denies fever, chills, sweats, change in appetite, visual changes, headache, sinus congestion, sore throat, mouth pain, cough, increased shortness of breath, chest pain, abdominal pain, nausea, vomiting, diarrhea, constipation, dysuria, hematuria, lower extremity pain or swelling. She has no complaints today. Pre Transplant Workup:              Pre Transplant labs:   5/16/2022 11:46   CMV IgM <8.0   VARICELLA ZOSTER AB IGM 0.00   VARICELLA ZOSTER AB .6   Herpes Type 1/2 IgM Combined 0.32        5/16/2022 11:46   Herpes Type 1/2 IgM Combined 0.32   VARICELLA ZOSTER AB .6   Hep A IgM Non-reactive   Hep B E Ab Negative   Hep B S Ab <3.50       Past Surgical History:   Procedure Laterality Date    CATHETER INSERTION N/A 6/20/2022    INSERT TRIFUSION CATHETER performed by Brenda Carreno MD at [facility]  11/19/2021    CT BONE MARROW BIOPSY 11/19/2021 [facility] CT    CT BONE MARROW BIOPSY  3/18/2022    CT BONE MARROW BIOPSY 3/18/2022 [facility] CT SCAN    CT BONE MARROW BIOPSY  6/10/2022    CT BONE MARROW BIOPSY 6/10/2022 [facility] CT SCAN    DENTAL SURGERY      HYSTERECTOMY (CERVIX STATUS UNKNOWN)      SHOULDER SURGERY         Past Medical History:   Diagnosis Date    Anxiety     Chronic back pain     Depression     Hypertension     Multiple myeloma not having achieved remission ([facility]     Prediabetes     Type 2 diabetes mellitus with hyperglycemia, without long-term current use of insulin ([facility]     Type 2 diabetes mellitus with hyperlipidemia ([facility] 4/26/2022       Prior to Admission medications    Medication Sig Start Date End Date Taking? Authorizing Provider   gabapentin (NEURONTIN) 300 MG capsule TAKE ONE CAPSULE BY MOUTH DAILY AS NEEDED FOR PAIN 6/30/22 3/2/23 Yes Ravinder Salamanca APRN - NP   valACYclovir (VALTREX) 500 MG tablet Take 1 tablet by mouth 2 times daily 6/30/22  Yes Ravinder Salamanca APRN - NP   fluconazole (DIFLUCAN) 200 MG tablet Take 2 tablets by mouth daily 7/1/22 7/31/22 Yes Ravinder Salamanca APRN - NP   levoFLOXacin (LEVAQUIN) 500 MG tablet Take 1 tablet by mouth daily Do not take until instructed to by Provider.  6/30/22 7/30/22 Yes SHY Bearden NP   prochlorperazine (COMPAZINE) 10 MG tablet Take 1 tablet by mouth every 6 hours as needed (nausea / vomiting) 6/30/22  Yes SHY Bearden NP   ondansetron (ZOFRAN ODT) 8 MG TBDP disintegrating tablet Place 1 tablet under the tongue every 8 hours as needed for Nausea or Vomiting 6/30/22 7/10/22 Yes SHY Bearden NP   potassium chloride (KLOR-CON M) 20 MEQ extended release tablet Take 1 tablet by mouth daily 6/30/22  Yes SHY Bearden NP   lisinopril (PRINIVIL;ZESTRIL) 2.5 MG tablet Take 1 tablet by mouth daily 4/26/22   SHY Saez CNP   metFORMIN (GLUCOPHAGE) 500 MG tablet Take 1 tablet by mouth daily (with breakfast) 4/26/22   SHY Saez CNP   amLODIPine (NORVASC) 5 MG tablet Take 1 tablet daily 4/26/22   SHY Saez CNP   cyclobenzaprine (FLEXERIL) 5 MG tablet Take 1 tablet BID as needed 4/26/22   SHY Saez CNP   hydroCHLOROthiazide (HYDRODIURIL) 25 MG tablet Take 1 tablet by mouth every morning 4/26/22   SHY Saez CNP       No Known Allergies    Family History   Problem Relation Age of Onset    Hypertension Mother     Diabetes Father     Hypertension Father     Cancer Father         leukemia    Heart Attack Father     Cancer Maternal Grandmother         liver        Social History     Socioeconomic History    Marital status:      Spouse name: Not on file    Number of children: 2    Years of education: Not on file    Highest education level: Not on file   Occupational History    Occupation: day care worker   Tobacco Use    Smoking status: Never Smoker    Smokeless tobacco: Never Used   Vaping Use    Vaping Use: Never used   Substance and Sexual Activity    Alcohol use: Not Currently    Drug use: Yes     Types: Marijuana Lesia Lux)     Comment: medical marijuana - smokes 2 per day    Sexual activity: Not on file   Other Topics Concern    Not on file Social History Narrative    Lives with daughter, mother, and significant other     Social Determinants of Health     Financial Resource Strain: Low Risk     Difficulty of Paying Living Expenses: Not hard at all   Food Insecurity: No Food Insecurity    Worried About [address] in the Last Year: Never true    [address] in the Last Year: Never true   Transportation Needs:     Lack of Transportation (Medical): Not on file    Lack of Transportation (Non-Medical):  Not on file   Physical Activity:     Days of Exercise per Week: Not on file    Minutes of Exercise per Session: Not on file   Stress:     Feeling of Stress : Not on file   Social Connections:     Frequency of Communication with Friends and Family: Not on file    Frequency of Social Gatherings with Friends and Family: Not on file    Attends Gnosticism Services: Not on file    Active Member of Clubs or Organizations: Not on file    Attends Club or Organization Meetings: Not on file    Marital Status: Not on file   Intimate Partner Violence:     Fear of Current or Ex-Partner: Not on file    Emotionally Abused: Not on file    Physically Abused: Not on file    Sexually Abused: Not on file   Housing Stability:     Unable to Pay for Housing in the Last Year: Not on file    Number of [place] in the Last Year: Not on file    Unstable Housing in the Last Year: Not on file        ROS:  As noted above, otherwise remainder of 10-point ROS negative      Physical Exam:     Vital Signs:  /64   Pulse 87   Temp 98.1 °F (36.7 °C) (Axillary)   Resp 18   Ht 5' 4.75\" (1.645 m)   Wt 262 lb 3.2 oz (118.9 kg)   SpO2 94%   BMI 43.97 kg/m²     Weight:    Wt Readings from Last 3 Encounters:   06/30/22 262 lb 3.2 oz (118.9 kg)   06/20/22 262 lb 3.2 oz (118.9 kg)   05/16/22 260 lb 9.3 oz (118.2 kg)       KPS: 80% Normal activity with effort; some signs or symptoms of disease    ECOG PS:  (1) Restricted in physically strenuous activity, ambulatory and able to do work of light nature    General: Awake, alert and oriented. HEENT: Normocephalic, atraumatic, poor dentition, many teeth requiring extraction  NECK: supple without palpable adenopathy  BACK: Straight negative CVAT  SKIN: warm dry and intact without lesions rashes or masses  CHEST: CTA bilaterally without use of accessory muscles  CV: Normal S1 S2, RRR, no MRG  ABD: NT ND normoactive BS, no palpable masses or hepatosplenomegaly  EXTREMITIES: without edema, denies calf tenderness  NEURO: CN II - XII grossly intact  CATHETER: Left Subclavian Trifusion (GS: Sofie, 6/20/22)     Laboratory Data:  CBC:   Recent Labs     06/30/22  0928   WBC 4.5   HGB 10.6*   HCT 32.4*   MCV 92.5   *     BMP/Mag:  Recent Labs     06/30/22 0919      K 3.4*      CO2 25   PHOS 2.8   BUN 15   CREATININE 0.7   MG 1.70*     LIVP:   Recent Labs     06/30/22 0919   AST 21   ALT 34   BILIDIR <0.2   BILITOT <0.2   ALKPHOS 81     Coags:   Recent Labs     06/30/22  0919   PROTIME 13.6   INR 1.04     Uric Acid   Recent Labs     06/30/22  0919   LABURIC 7.2*       PROBLEM LIST:            1. Multiple myeloma   2. Dental disorder  3. HTN  4. Intestinal malabsorption (disorder)  5.  Iron deficiency anemia due to chronic blood loss        TREATMENT:            1. RVD x 5 cycles:  - C1D1: 12/2021  - C5D1: 5/16/22    ASSESSMENT AND PLAN:            1. 1) Multiple myeloma, IgG Kappa, ISS 3: partial response   - t(14;20), gain of 1q21, monosomy 13.    - At diagnoses - M spike is 3.7, FLC ratio  132  - BMBx (12/7/21): 20% plasma cells & PET scan (1/12/22) showed diffuse bone marrow activity, no lytic lesions  - Started RVD on 12/27/21 and tolerated well   - M spike down to 0.5 and FLC ratio decreased to 15  - BMBx (3/18/22) after cycle 4: 40% cellularity and few percentage of clonal plasma cells   - Her transplant work up was delayed due to needing dental extraction and EGD   - EGD unremarkable and findings on PET scan are likely related to esophagitis   - Light chain trending up, will plan for 1-2 cycles of RVD prior to transplant   - TTE, PFTS, CXR normal  - Finished cycle 5 of RVD   - 6/10/22 Repeat bone marrow bx negative for plasma cells: Consistent with partial response     Dr. Yair Diehl has discussed the risks associated with transplant which include the risk of infection, bleeding and inability to obtain a long term remission. He understands these risks and is willing to proceed. The consent is signed and on the chart. Day - 1    2. ID:  Afebrile, no evidence of infection.    - Begin Valtrex 6/30/22  - Start Diflucan prophylaxis on 7/1/22  - Start Levaquin when [address] < 1.5    3. Heme: Hx of Anemia: resolved related to myeloma, chemotherapy & iron deficiency has resolved  - Received IV iron (3/21/22 & 3/28/22)  - EGD/colonoscopy (4/28/22): mild esophagitis and normal colonoscopy  - Transfuse for Hgb < 7 and Platelets < 94F  - No transfusion today    4. Metabolic: HypoK+, Hyperglycemic, renal fxn stable. - Start IVF hydration: will receive 2 L NS today and tomorrow with ASCT, then 1 L daily in OP Infusion  - Replace potassium and magnesium per policy. 5. Nutrition:  Appetite and oral intake is good. - Start low microbial diet   - Follow closely with dietary    6. Poor dentition  - completed partial extractions, others pending.    - Disposition: Will return to OP Infusion tomorrow for ASCT then daily for toxicity assessment, labs and MD visit. The patient was seen and examined by Dr. Yair Diehl. This admission history and physical has been discussed and agreed upon by Dr. Yair Diehl.     SHY Gibbons - GORGE

## 2022-06-30 NOTE — PROGRESS NOTES
Original chemotherapy orders reviewed and acknowledged. Appropriateness of chemotherapy treatment regimen Melphalan for diagnosis of Multiple Myeloma was verified. Patient educated on chemotherapy regimen. Acknowledgement of informed consent for chemotherapy obtained. Pt height, weight, and BSA verified. Appropriate dosing calculations of chemotherapy based on height, weight, and BSA verified. Administration: Chemotherapy drug Melphalan independently verified with Birgit Hardy RN prior to administration. Original order, appropriateness of regimen, drug supplied, height, weight, BSA, dose calculations, expiration dates/times, drug appearance, and two patient identifiers were verified by both RNs. Drug checked for vesicant/irritant status and for risk of hypersensitivity. Most recent laboratory values and allergies, were reviewed. Appropriate IV access available: Positive, brisk blood return via CVC was confirmed prior to administration. Chest x-ray for correct line placement reviewed  Renard Birmingham RN and Birgit Hardy RN verified correct rate of chemotherapy and maintenance IV fluids. Patient was educated on chemotherapy regimen prior to administration including indication for treatment related to disease & side effects of chemotherapy drug. Patient verbalizes understanding of all instructions. Completion of Chemotherapy: Monitoring during infusion done per policy, see Flowsheets. Blood return verified before, during, and after infusion per policy; no signs of extravasation. Pt tolerated chemotherapy well and without incident. Chemotherapy infusion end time on the [facility]     Renard Birmingham RN

## 2022-07-01 ENCOUNTER — HOSPITAL ENCOUNTER (OUTPATIENT)
Dept: ONCOLOGY | Age: 52
Setting detail: INFUSION SERIES
Discharge: HOME | End: 2022-07-01
Payer: COMMERCIAL

## 2022-07-01 VITALS
SYSTOLIC BLOOD PRESSURE: 137 MMHG | RESPIRATION RATE: 22 BRPM | BODY MASS INDEX: 42.91 KG/M2 | WEIGHT: 266.98 LBS | DIASTOLIC BLOOD PRESSURE: 72 MMHG | HEIGHT: 66 IN | OXYGEN SATURATION: 100 % | TEMPERATURE: 97.7 F | HEART RATE: 108 BPM

## 2022-07-01 DIAGNOSIS — Z52.011 AUTOLOGOUS DONOR OF STEM CELLS: Primary | ICD-10-CM

## 2022-07-01 DIAGNOSIS — C90.00 MULTIPLE MYELOMA, REMISSION STATUS UNSPECIFIED (HCC): ICD-10-CM

## 2022-07-01 LAB
ALBUMIN SERPL-MCNC: 4.3 G/DL (ref 3.4–5)
ALP BLD-CCNC: 74 U/L (ref 40–129)
ALT SERPL-CCNC: 30 U/L (ref 10–40)
ANION GAP SERPL CALCULATED.3IONS-SCNC: 8 MMOL/L (ref 3–16)
AST SERPL-CCNC: 17 U/L (ref 15–37)
BASOPHILS ABSOLUTE: 0 K/UL (ref 0–0.2)
BASOPHILS RELATIVE PERCENT: 0.4 %
BILIRUB SERPL-MCNC: 0.3 MG/DL (ref 0–1)
BILIRUBIN DIRECT: <0.2 MG/DL (ref 0–0.3)
BILIRUBIN, INDIRECT: NORMAL MG/DL (ref 0–1)
BUN BLDV-MCNC: 14 MG/DL (ref 7–20)
CALCIUM SERPL-MCNC: 8.9 MG/DL (ref 8.3–10.6)
CHLORIDE BLD-SCNC: 103 MMOL/L (ref 99–110)
CO2: 26 MMOL/L (ref 21–32)
CREAT SERPL-MCNC: 0.8 MG/DL (ref 0.6–1.1)
EOSINOPHILS ABSOLUTE: 0 K/UL (ref 0–0.6)
EOSINOPHILS RELATIVE PERCENT: 0 %
GFR AFRICAN AMERICAN: >60
GFR NON-AFRICAN AMERICAN: >60
GLUCOSE BLD-MCNC: 217 MG/DL (ref 70–99)
HCT VFR BLD CALC: 30.1 % (ref 36–48)
HEMOGLOBIN: 10 G/DL (ref 12–16)
LACTATE DEHYDROGENASE: 246 U/L (ref 100–190)
LYMPHOCYTES ABSOLUTE: 0.4 K/UL (ref 1–5.1)
LYMPHOCYTES RELATIVE PERCENT: 5.4 %
MAGNESIUM: 1.9 MG/DL (ref 1.8–2.4)
MCH RBC QN AUTO: 30.6 PG (ref 26–34)
MCHC RBC AUTO-ENTMCNC: 33.3 G/DL (ref 31–36)
MCV RBC AUTO: 91.9 FL (ref 80–100)
MONOCYTES ABSOLUTE: 0.3 K/UL (ref 0–1.3)
MONOCYTES RELATIVE PERCENT: 3.5 %
NEUTROPHILS ABSOLUTE: 7.3 K/UL (ref 1.7–7.7)
NEUTROPHILS RELATIVE PERCENT: 90.7 %
PDW BLD-RTO: 16.2 % (ref 12.4–15.4)
PHOSPHORUS: 2.1 MG/DL (ref 2.5–4.9)
PLATELET # BLD: 183 K/UL (ref 135–450)
PMV BLD AUTO: 8.1 FL (ref 5–10.5)
POTASSIUM SERPL-SCNC: 4.1 MMOL/L (ref 3.5–5.1)
RBC # BLD: 3.27 M/UL (ref 4–5.2)
SODIUM BLD-SCNC: 137 MMOL/L (ref 136–145)
TOTAL PROTEIN: 7.5 G/DL (ref 6.4–8.2)
URIC ACID, SERUM: 6.3 MG/DL (ref 2.6–6)
WBC # BLD: 8.1 K/UL (ref 4–11)

## 2022-07-01 PROCEDURE — 96360 HYDRATION IV INFUSION INIT: CPT

## 2022-07-01 PROCEDURE — 80048 BASIC METABOLIC PNL TOTAL CA: CPT

## 2022-07-01 PROCEDURE — 84100 ASSAY OF PHOSPHORUS: CPT

## 2022-07-01 PROCEDURE — 38241 TRANSPLT AUTOL HCT/DONOR: CPT

## 2022-07-01 PROCEDURE — 38208 THAW PRESERVED STEM CELLS: CPT

## 2022-07-01 PROCEDURE — 80076 HEPATIC FUNCTION PANEL: CPT

## 2022-07-01 PROCEDURE — 96374 THER/PROPH/DIAG INJ IV PUSH: CPT

## 2022-07-01 PROCEDURE — 36592 COLLECT BLOOD FROM PICC: CPT

## 2022-07-01 PROCEDURE — 83615 LACTATE (LD) (LDH) ENZYME: CPT

## 2022-07-01 PROCEDURE — 6370000000 HC RX 637 (ALT 250 FOR IP): Performed by: NURSE PRACTITIONER

## 2022-07-01 PROCEDURE — 85025 COMPLETE CBC W/AUTO DIFF WBC: CPT

## 2022-07-01 PROCEDURE — 2580000003 HC RX 258: Performed by: NURSE PRACTITIONER

## 2022-07-01 PROCEDURE — 6360000002 HC RX W HCPCS: Performed by: NURSE PRACTITIONER

## 2022-07-01 PROCEDURE — 96361 HYDRATE IV INFUSION ADD-ON: CPT

## 2022-07-01 PROCEDURE — 83735 ASSAY OF MAGNESIUM: CPT

## 2022-07-01 PROCEDURE — 84550 ASSAY OF BLOOD/URIC ACID: CPT

## 2022-07-01 RX ORDER — DIMETHICONE, CAMPHOR (SYNTHETIC), MENTHOL, AND PHENOL 1.1; .5; .625; .5 G/100G; G/100G; G/100G; G/100G
OINTMENT TOPICAL PRN
Status: DISCONTINUED | OUTPATIENT
Start: 2022-07-01 | End: 2022-07-02 | Stop reason: HOSPADM

## 2022-07-01 RX ORDER — MAGNESIUM SULFATE IN WATER 40 MG/ML
4000 INJECTION, SOLUTION INTRAVENOUS PRN
Status: CANCELLED | OUTPATIENT
Start: 2022-07-02

## 2022-07-01 RX ORDER — PROCHLORPERAZINE EDISYLATE 5 MG/ML
10 INJECTION INTRAMUSCULAR; INTRAVENOUS EVERY 6 HOURS PRN
Status: CANCELLED | OUTPATIENT
Start: 2022-07-02

## 2022-07-01 RX ORDER — ONDANSETRON 4 MG/1
8 TABLET, FILM COATED ORAL EVERY 8 HOURS PRN
Status: DISCONTINUED | OUTPATIENT
Start: 2022-07-01 | End: 2022-07-02 | Stop reason: HOSPADM

## 2022-07-01 RX ORDER — SODIUM CHLORIDE 9 MG/ML
INJECTION, SOLUTION INTRAVENOUS CONTINUOUS PRN
Status: DISCONTINUED | OUTPATIENT
Start: 2022-07-01 | End: 2022-07-02 | Stop reason: HOSPADM

## 2022-07-01 RX ORDER — DIPHENHYDRAMINE HYDROCHLORIDE 50 MG/ML
25 INJECTION INTRAMUSCULAR; INTRAVENOUS PRN
Status: DISCONTINUED | OUTPATIENT
Start: 2022-07-01 | End: 2022-07-02 | Stop reason: HOSPADM

## 2022-07-01 RX ORDER — POTASSIUM CHLORIDE 20 MEQ/1
40 TABLET, EXTENDED RELEASE ORAL PRN
Status: DISCONTINUED | OUTPATIENT
Start: 2022-07-01 | End: 2022-07-02 | Stop reason: HOSPADM

## 2022-07-01 RX ORDER — PROCHLORPERAZINE MALEATE 10 MG
10 TABLET ORAL EVERY 6 HOURS PRN
Status: DISCONTINUED | OUTPATIENT
Start: 2022-07-01 | End: 2022-07-02 | Stop reason: HOSPADM

## 2022-07-01 RX ORDER — PETROLATUM, MENTHOL, UNSPECIFIED FORM, CAMPHOR (SYNTHETIC), AND PHENOL 59.14; 1; 1; .6 G/100G; G/100G; G/100G; G/100G
PASTE TOPICAL PRN
Status: CANCELLED | OUTPATIENT
Start: 2022-07-02

## 2022-07-01 RX ORDER — POTASSIUM CHLORIDE 29.8 MG/ML
80 INJECTION INTRAVENOUS PRN
Status: CANCELLED | OUTPATIENT
Start: 2022-07-02

## 2022-07-01 RX ORDER — PROCHLORPERAZINE MALEATE 10 MG
10 TABLET ORAL EVERY 6 HOURS PRN
Status: CANCELLED | OUTPATIENT
Start: 2022-07-02

## 2022-07-01 RX ORDER — DIPHENHYDRAMINE HCL 25 MG
25 TABLET ORAL ONCE
Status: COMPLETED | OUTPATIENT
Start: 2022-07-01 | End: 2022-07-01

## 2022-07-01 RX ORDER — SODIUM CHLORIDE 9 MG/ML
INJECTION, SOLUTION INTRAVENOUS CONTINUOUS PRN
Status: CANCELLED | OUTPATIENT
Start: 2022-07-02

## 2022-07-01 RX ORDER — 0.9 % SODIUM CHLORIDE 0.9 %
1000 INTRAVENOUS SOLUTION INTRAVENOUS ONCE
Status: CANCELLED | OUTPATIENT
Start: 2022-07-02 | End: 2022-07-02

## 2022-07-01 RX ORDER — OXYCODONE HYDROCHLORIDE 5 MG/1
10 TABLET ORAL EVERY 4 HOURS PRN
Status: DISCONTINUED | OUTPATIENT
Start: 2022-07-01 | End: 2022-07-02 | Stop reason: HOSPADM

## 2022-07-01 RX ORDER — 0.9 % SODIUM CHLORIDE 0.9 %
1000 INTRAVENOUS SOLUTION INTRAVENOUS ONCE
Status: COMPLETED | OUTPATIENT
Start: 2022-07-01 | End: 2022-07-01

## 2022-07-01 RX ORDER — OXYCODONE HYDROCHLORIDE 5 MG/1
5 TABLET ORAL EVERY 4 HOURS PRN
Status: DISCONTINUED | OUTPATIENT
Start: 2022-07-01 | End: 2022-07-02 | Stop reason: HOSPADM

## 2022-07-01 RX ORDER — MORPHINE SULFATE 2 MG/ML
2 INJECTION, SOLUTION INTRAMUSCULAR; INTRAVENOUS PRN
Status: DISCONTINUED | OUTPATIENT
Start: 2022-07-01 | End: 2022-07-02 | Stop reason: HOSPADM

## 2022-07-01 RX ORDER — ONDANSETRON 2 MG/ML
8 INJECTION INTRAMUSCULAR; INTRAVENOUS EVERY 8 HOURS PRN
Status: DISCONTINUED | OUTPATIENT
Start: 2022-07-01 | End: 2022-07-02 | Stop reason: HOSPADM

## 2022-07-01 RX ORDER — HEPARIN SODIUM (PORCINE) LOCK FLUSH IV SOLN 100 UNIT/ML 100 UNIT/ML
500 SOLUTION INTRAVENOUS PRN
Status: CANCELLED | OUTPATIENT
Start: 2022-07-02

## 2022-07-01 RX ORDER — POTASSIUM CHLORIDE 29.8 MG/ML
20 INJECTION INTRAVENOUS PRN
Status: DISCONTINUED | OUTPATIENT
Start: 2022-07-01 | End: 2022-07-02 | Stop reason: HOSPADM

## 2022-07-01 RX ORDER — ONDANSETRON 2 MG/ML
8 INJECTION INTRAMUSCULAR; INTRAVENOUS EVERY 8 HOURS PRN
Status: CANCELLED | OUTPATIENT
Start: 2022-07-02

## 2022-07-01 RX ORDER — ACETAMINOPHEN 325 MG/1
650 TABLET ORAL ONCE
Status: COMPLETED | OUTPATIENT
Start: 2022-07-01 | End: 2022-07-01

## 2022-07-01 RX ORDER — PROCHLORPERAZINE EDISYLATE 5 MG/ML
10 INJECTION INTRAMUSCULAR; INTRAVENOUS EVERY 6 HOURS PRN
Status: DISCONTINUED | OUTPATIENT
Start: 2022-07-01 | End: 2022-07-02 | Stop reason: HOSPADM

## 2022-07-01 RX ORDER — POTASSIUM CHLORIDE 20 MEQ/1
40 TABLET, EXTENDED RELEASE ORAL PRN
Status: CANCELLED | OUTPATIENT
Start: 2022-07-02

## 2022-07-01 RX ORDER — OXYCODONE HYDROCHLORIDE 5 MG/1
5 TABLET ORAL EVERY 4 HOURS PRN
Status: CANCELLED | OUTPATIENT
Start: 2022-07-02

## 2022-07-01 RX ORDER — ONDANSETRON 4 MG/1
8 TABLET, FILM COATED ORAL EVERY 8 HOURS PRN
Status: CANCELLED | OUTPATIENT
Start: 2022-07-02

## 2022-07-01 RX ORDER — OXYCODONE HYDROCHLORIDE 5 MG/1
10 TABLET ORAL EVERY 4 HOURS PRN
Status: CANCELLED | OUTPATIENT
Start: 2022-07-02

## 2022-07-01 RX ORDER — MAGNESIUM SULFATE IN WATER 40 MG/ML
4000 INJECTION, SOLUTION INTRAVENOUS PRN
Status: DISCONTINUED | OUTPATIENT
Start: 2022-07-01 | End: 2022-07-02 | Stop reason: HOSPADM

## 2022-07-01 RX ORDER — HEPARIN SODIUM (PORCINE) LOCK FLUSH IV SOLN 100 UNIT/ML 100 UNIT/ML
500 SOLUTION INTRAVENOUS PRN
Status: DISCONTINUED | OUTPATIENT
Start: 2022-07-01 | End: 2022-07-02 | Stop reason: HOSPADM

## 2022-07-01 RX ADMIN — SODIUM CHLORIDE 1000 ML: 9 INJECTION, SOLUTION INTRAVENOUS at 09:17

## 2022-07-01 RX ADMIN — Medication 500 UNITS: at 14:01

## 2022-07-01 RX ADMIN — Medication 500 UNITS: at 14:00

## 2022-07-01 RX ADMIN — HYDROCORTISONE SODIUM SUCCINATE 100 MG: 100 INJECTION, POWDER, FOR SOLUTION INTRAMUSCULAR; INTRAVENOUS at 10:43

## 2022-07-01 RX ADMIN — ACETAMINOPHEN 650 MG: 325 TABLET ORAL at 10:43

## 2022-07-01 RX ADMIN — DIPHENHYDRAMINE HYDROCHLORIDE 25 MG: 25 TABLET ORAL at 10:43

## 2022-07-01 RX ADMIN — ONDANSETRON HYDROCHLORIDE 8 MG: 4 TABLET, FILM COATED ORAL at 09:35

## 2022-07-01 RX ADMIN — Medication 500 UNITS: at 14:02

## 2022-07-01 RX ADMIN — SODIUM CHLORIDE 1000 ML: 9 INJECTION, SOLUTION INTRAVENOUS at 12:00

## 2022-07-01 RX ADMIN — SODIUM CHLORIDE 1 ML: 9 INJECTION, SOLUTION INTRAVENOUS at 09:58

## 2022-07-01 ASSESSMENT — PAIN SCALES - GENERAL: PAINLEVEL_OUTOF10: 0

## 2022-07-01 ASSESSMENT — PAIN - FUNCTIONAL ASSESSMENT: PAIN_FUNCTIONAL_ASSESSMENT: ACTIVITIES ARE NOT PREVENTED

## 2022-07-01 NOTE — PROGRESS NOTES
Short Stay Communication Note  Yarymegan Cecy  Diagnosis: Multiple Myeloma  Primary MD: Dr. Karine Branham  Treatment: Melphalan   Day +0 of Auto Transplant   Pt seen in outpatient infusion today. Labs drawn and reviewed. CBC: Recent Labs     06/30/22 0928 07/01/22  0915   WBC 4.5 8.1   HGB 10.6* 10.0*   HCT 32.4* 30.1*   MCV 92.5 91.9   * 183     BMP/Mag:  Recent Labs     06/30/22  0919 07/01/22  0915    137   K 3.4* 4.1    103   CO2 25 26   PHOS 2.8 2.1*   BUN 15 14   CREATININE 0.7 0.8   MG 1.70* 1.90     Standing parameters for replacement for this patient:   1 unit of pack red blood cells for a hemoglobin < or equal to 7  1 pack of platelets for a platelet count < or equal to 10  40 MeQ of Potassium administered for a potassium level < or equal to 3.4  4g of Magnesium Sulfate for a magnesum level < or equal to 1.4  No transfusions required for the above lab values. Urinalysis last done: 3/19/2021 Urinalysis next due: 7/4/2022    Chest X-Ray last done: 6/20/2022 Chest X-Ray next due: 7/4/2022    Symptoms addressed and reported to care team this date: nausea  Treatments this date: IV Fluids    Reviewed medication schedule with pt and caregiver. Both able to verbalize all medications and schedule. Pt to be seen again tomorrow. Patient and caregiver verbalized understanding of discharge instructions including when and how to call the doctor and when to report  to the ER. Discharged ambulatory to home.     Electronically signed by Sunday Cabot, RN on 7/1/2022 at 2:07 PM
Transplant (T0) Progress Note      Chito Sam Blood Type: AB pos    7/1/2022  Start time:1120 Completion time: 1200    Transplant Type: Autologous    Product Type: PBSC (peripheral blood stem cell)    Product Unit Number: [id number] A0     [id number] B0     [id number] C0    Cell Count: 0.85 x 10^8 Volume: 210 mL      Product Manipulation:      Volume Reduction  No  Plasma Depletion  No  RBC Depletion  No    Catheter lumen used for infusion: Red Positive Blood Return: Yes    Donor Name: Magen Moore             Blood Type: AB pos    Relationship: Self                          Donor Sex: Female    Premeds Given: 650 mg of Tylenol, 25 mg of Benadryl, and 100 mg of Solu-Cortef    Adverse Reaction(s) and treatment: Baseline vital signs obtained prior to infusion and monitoring completed throughout per [facility] protocol - see flowsheets. All IVFs turned down to [facility] during stem cell infusion. Stem cell product(s) verified with 2nd RN Isauro Miranda prior to infusion using 2 patient identifiers. Infused via gravity with rate controlled by Lyly Yañez RN per [employer] protocols. Emergency medications epinephrine, benadryl, & hydrocortisone available as needed. Emergency medical equipment available as needed including telemetry monitoring throughout infusion, supplemental O2, suction equipment, and crash cart. RN at bedside throughout infusion.    Sunday Cabot, RN      Electronically signed by Sunday Cabot, RN on 7/1/2022 at 12:14 PM
PHOS 2.8 2.1*   BUN 15 14   CREATININE 0.7 0.8   MG 1.70* 1.90     LIVP:   Recent Labs     06/30/22  0919 07/01/22  0915   AST 21 17   ALT 34 30   BILIDIR <0.2 <0.2   BILITOT <0.2 0.3   ALKPHOS 81 74     Uric Acid:    Recent Labs     07/01/22  0915   LABURIC 6.3*     Coags:   Recent Labs     06/30/22  0919   PROTIME 13.6   INR 1.04        PROBLEM LIST:            1. Multiple myeloma   2. Dental disorder  3. HTN  4. Intestinal malabsorption (disorder)  5. Iron deficiency anemia due to chronic blood loss         TREATMENT:            1. RVD x 5 cycles:  - C1D1: 12/2021  - C5D1: 5/16/22     ASSESSMENT AND PLAN:            1. 1) Multiple myeloma, IgG Kappa, ISS 3: partial response   - t(14;20), gain of 1q21, monosomy 13.    - At diagnoses - M spike is 3.7, FLC ratio  132  - BMBx (12/7/21): 20% plasma cells & PET scan (1/12/22) showed diffuse bone marrow activity, no lytic lesions  - Started RVD on 12/27/21 and tolerated well   - M spike down to 0.5 and FLC ratio decreased to 15  - BMBx (3/18/22) after cycle 4: 40% cellularity and few percentage of clonal plasma cells   - Her transplant work up was delayed due to needing dental extraction and EGD   - EGD unremarkable and findings on PET scan are likely related to esophagitis   - Light chain trending up, will plan for 1-2 cycles of RVD prior to transplant   - TTE, PFTS, CXR normal  - Finished cycle 5 of RVD   - 6/10/22 Repeat bone marrow bx negative for plasma cells: Consistent with partial response      High dose Melphalan & Autologous SCT - Day 0     2. ID: Afebrile, no evidence of infection.    - Cont Valtrex ppx (6/30/22)  - Start Diflucan ppx today (7/1/22)  - Start Levaquin ppx when [address] < 1.5     3. Heme: Anemia 2/2 chemotherapy & myeloma  - H/o CRYSTAL: Received IV iron (3/21/22 & 3/28/22)  - Transfuse for Hgb < 7 and Platelets < 01T  - No transfusion today     4.  Metabolic: Hyperglycemia, Hyperuricemia  - Cont daily IVFs: will receive 2 L NS today with ASCT, then 1

## 2022-07-02 ENCOUNTER — HOSPITAL ENCOUNTER (OUTPATIENT)
Dept: ONCOLOGY | Age: 52
Setting detail: INFUSION SERIES
Discharge: HOME | End: 2022-07-02
Payer: COMMERCIAL

## 2022-07-02 VITALS
HEART RATE: 103 BPM | OXYGEN SATURATION: 98 % | SYSTOLIC BLOOD PRESSURE: 159 MMHG | DIASTOLIC BLOOD PRESSURE: 82 MMHG | RESPIRATION RATE: 20 BRPM | TEMPERATURE: 98.4 F

## 2022-07-02 DIAGNOSIS — Z52.011 AUTOLOGOUS DONOR OF STEM CELLS: Primary | ICD-10-CM

## 2022-07-02 DIAGNOSIS — C90.00 MULTIPLE MYELOMA, REMISSION STATUS UNSPECIFIED (HCC): ICD-10-CM

## 2022-07-02 LAB
ANION GAP SERPL CALCULATED.3IONS-SCNC: 6 MMOL/L (ref 3–16)
BASOPHILS ABSOLUTE: 0 K/UL (ref 0–0.2)
BASOPHILS RELATIVE PERCENT: 0.1 %
BUN BLDV-MCNC: 15 MG/DL (ref 7–20)
CALCIUM SERPL-MCNC: 8.3 MG/DL (ref 8.3–10.6)
CHLORIDE BLD-SCNC: 106 MMOL/L (ref 99–110)
CO2: 27 MMOL/L (ref 21–32)
CREAT SERPL-MCNC: 0.7 MG/DL (ref 0.6–1.1)
EOSINOPHILS ABSOLUTE: 0 K/UL (ref 0–0.6)
EOSINOPHILS RELATIVE PERCENT: 0 %
GFR AFRICAN AMERICAN: >60
GFR NON-AFRICAN AMERICAN: >60
GLUCOSE BLD-MCNC: 195 MG/DL (ref 70–99)
HCT VFR BLD CALC: 28.4 % (ref 36–48)
HEMOGLOBIN: 9.6 G/DL (ref 12–16)
LYMPHOCYTES ABSOLUTE: 0.2 K/UL (ref 1–5.1)
LYMPHOCYTES RELATIVE PERCENT: 1.9 %
MAGNESIUM: 2.1 MG/DL (ref 1.8–2.4)
MCH RBC QN AUTO: 30.7 PG (ref 26–34)
MCHC RBC AUTO-ENTMCNC: 33.7 G/DL (ref 31–36)
MCV RBC AUTO: 91.2 FL (ref 80–100)
MONOCYTES ABSOLUTE: 0.1 K/UL (ref 0–1.3)
MONOCYTES RELATIVE PERCENT: 1.6 %
NEUTROPHILS ABSOLUTE: 7.9 K/UL (ref 1.7–7.7)
NEUTROPHILS RELATIVE PERCENT: 96.4 %
PDW BLD-RTO: 16.3 % (ref 12.4–15.4)
PHOSPHORUS: 3 MG/DL (ref 2.5–4.9)
PLATELET # BLD: 174 K/UL (ref 135–450)
PMV BLD AUTO: 8 FL (ref 5–10.5)
POTASSIUM SERPL-SCNC: 3.6 MMOL/L (ref 3.5–5.1)
RBC # BLD: 3.11 M/UL (ref 4–5.2)
SODIUM BLD-SCNC: 139 MMOL/L (ref 136–145)
URIC ACID, SERUM: 6.6 MG/DL (ref 2.6–6)
WBC # BLD: 8.2 K/UL (ref 4–11)

## 2022-07-02 PROCEDURE — 83735 ASSAY OF MAGNESIUM: CPT

## 2022-07-02 PROCEDURE — 84100 ASSAY OF PHOSPHORUS: CPT

## 2022-07-02 PROCEDURE — 96361 HYDRATE IV INFUSION ADD-ON: CPT

## 2022-07-02 PROCEDURE — 85025 COMPLETE CBC W/AUTO DIFF WBC: CPT

## 2022-07-02 PROCEDURE — 6360000002 HC RX W HCPCS: Performed by: NURSE PRACTITIONER

## 2022-07-02 PROCEDURE — 36592 COLLECT BLOOD FROM PICC: CPT

## 2022-07-02 PROCEDURE — 84550 ASSAY OF BLOOD/URIC ACID: CPT

## 2022-07-02 PROCEDURE — 80048 BASIC METABOLIC PNL TOTAL CA: CPT

## 2022-07-02 PROCEDURE — 2580000003 HC RX 258: Performed by: NURSE PRACTITIONER

## 2022-07-02 PROCEDURE — 96360 HYDRATION IV INFUSION INIT: CPT

## 2022-07-02 RX ORDER — ONDANSETRON 2 MG/ML
8 INJECTION INTRAMUSCULAR; INTRAVENOUS EVERY 8 HOURS PRN
Status: CANCELLED | OUTPATIENT
Start: 2022-07-03

## 2022-07-02 RX ORDER — PROCHLORPERAZINE MALEATE 10 MG
10 TABLET ORAL EVERY 6 HOURS PRN
Status: CANCELLED | OUTPATIENT
Start: 2022-07-03

## 2022-07-02 RX ORDER — 0.9 % SODIUM CHLORIDE 0.9 %
1000 INTRAVENOUS SOLUTION INTRAVENOUS ONCE
Status: CANCELLED | OUTPATIENT
Start: 2022-07-03 | End: 2022-07-03

## 2022-07-02 RX ORDER — PROCHLORPERAZINE MALEATE 10 MG
10 TABLET ORAL EVERY 6 HOURS PRN
Status: DISCONTINUED | OUTPATIENT
Start: 2022-07-02 | End: 2022-07-03 | Stop reason: HOSPADM

## 2022-07-02 RX ORDER — ONDANSETRON 4 MG/1
8 TABLET, FILM COATED ORAL EVERY 8 HOURS PRN
Status: DISCONTINUED | OUTPATIENT
Start: 2022-07-02 | End: 2022-07-03 | Stop reason: HOSPADM

## 2022-07-02 RX ORDER — MAGNESIUM SULFATE IN WATER 40 MG/ML
4000 INJECTION, SOLUTION INTRAVENOUS PRN
Status: DISCONTINUED | OUTPATIENT
Start: 2022-07-02 | End: 2022-07-03 | Stop reason: HOSPADM

## 2022-07-02 RX ORDER — 0.9 % SODIUM CHLORIDE 0.9 %
1000 INTRAVENOUS SOLUTION INTRAVENOUS ONCE
Status: COMPLETED | OUTPATIENT
Start: 2022-07-02 | End: 2022-07-02

## 2022-07-02 RX ORDER — ONDANSETRON 4 MG/1
8 TABLET, FILM COATED ORAL EVERY 8 HOURS PRN
Status: CANCELLED | OUTPATIENT
Start: 2022-07-03

## 2022-07-02 RX ORDER — PETROLATUM, MENTHOL, UNSPECIFIED FORM, CAMPHOR (SYNTHETIC), AND PHENOL 59.14; 1; 1; .6 G/100G; G/100G; G/100G; G/100G
PASTE TOPICAL PRN
Status: CANCELLED | OUTPATIENT
Start: 2022-07-03

## 2022-07-02 RX ORDER — PROCHLORPERAZINE EDISYLATE 5 MG/ML
10 INJECTION INTRAMUSCULAR; INTRAVENOUS EVERY 6 HOURS PRN
Status: CANCELLED | OUTPATIENT
Start: 2022-07-03

## 2022-07-02 RX ORDER — POTASSIUM CHLORIDE 29.8 MG/ML
80 INJECTION INTRAVENOUS PRN
Status: DISCONTINUED | OUTPATIENT
Start: 2022-07-02 | End: 2022-07-03 | Stop reason: HOSPADM

## 2022-07-02 RX ORDER — MAGNESIUM SULFATE IN WATER 40 MG/ML
4000 INJECTION, SOLUTION INTRAVENOUS PRN
Status: CANCELLED | OUTPATIENT
Start: 2022-07-03

## 2022-07-02 RX ORDER — SODIUM CHLORIDE 9 MG/ML
INJECTION, SOLUTION INTRAVENOUS CONTINUOUS PRN
Status: CANCELLED | OUTPATIENT
Start: 2022-07-03

## 2022-07-02 RX ORDER — OXYCODONE HYDROCHLORIDE 5 MG/1
5 TABLET ORAL EVERY 4 HOURS PRN
Status: DISCONTINUED | OUTPATIENT
Start: 2022-07-02 | End: 2022-07-03 | Stop reason: HOSPADM

## 2022-07-02 RX ORDER — POTASSIUM CHLORIDE 20 MEQ/1
40 TABLET, EXTENDED RELEASE ORAL PRN
Status: DISCONTINUED | OUTPATIENT
Start: 2022-07-02 | End: 2022-07-03 | Stop reason: HOSPADM

## 2022-07-02 RX ORDER — OXYCODONE HYDROCHLORIDE 5 MG/1
5 TABLET ORAL EVERY 4 HOURS PRN
Status: CANCELLED | OUTPATIENT
Start: 2022-07-03

## 2022-07-02 RX ORDER — OXYCODONE HYDROCHLORIDE 5 MG/1
10 TABLET ORAL EVERY 4 HOURS PRN
Status: CANCELLED | OUTPATIENT
Start: 2022-07-03

## 2022-07-02 RX ORDER — POTASSIUM CHLORIDE 20 MEQ/1
40 TABLET, EXTENDED RELEASE ORAL PRN
Status: CANCELLED | OUTPATIENT
Start: 2022-07-03

## 2022-07-02 RX ORDER — POTASSIUM CHLORIDE 29.8 MG/ML
80 INJECTION INTRAVENOUS PRN
Status: CANCELLED | OUTPATIENT
Start: 2022-07-03

## 2022-07-02 RX ORDER — OXYCODONE HYDROCHLORIDE 5 MG/1
10 TABLET ORAL EVERY 4 HOURS PRN
Status: DISCONTINUED | OUTPATIENT
Start: 2022-07-02 | End: 2022-07-03 | Stop reason: HOSPADM

## 2022-07-02 RX ORDER — DIMETHICONE, CAMPHOR (SYNTHETIC), MENTHOL, AND PHENOL 1.1; .5; .625; .5 G/100G; G/100G; G/100G; G/100G
OINTMENT TOPICAL PRN
Status: DISCONTINUED | OUTPATIENT
Start: 2022-07-02 | End: 2022-07-03 | Stop reason: HOSPADM

## 2022-07-02 RX ORDER — SODIUM CHLORIDE 9 MG/ML
INJECTION, SOLUTION INTRAVENOUS CONTINUOUS PRN
Status: DISCONTINUED | OUTPATIENT
Start: 2022-07-02 | End: 2022-07-03 | Stop reason: HOSPADM

## 2022-07-02 RX ORDER — ONDANSETRON 2 MG/ML
8 INJECTION INTRAMUSCULAR; INTRAVENOUS EVERY 8 HOURS PRN
Status: DISCONTINUED | OUTPATIENT
Start: 2022-07-02 | End: 2022-07-03 | Stop reason: HOSPADM

## 2022-07-02 RX ORDER — PROCHLORPERAZINE EDISYLATE 5 MG/ML
10 INJECTION INTRAMUSCULAR; INTRAVENOUS EVERY 6 HOURS PRN
Status: DISCONTINUED | OUTPATIENT
Start: 2022-07-02 | End: 2022-07-03 | Stop reason: HOSPADM

## 2022-07-02 RX ORDER — HEPARIN SODIUM (PORCINE) LOCK FLUSH IV SOLN 100 UNIT/ML 100 UNIT/ML
500 SOLUTION INTRAVENOUS PRN
Status: DISCONTINUED | OUTPATIENT
Start: 2022-07-02 | End: 2022-07-03 | Stop reason: HOSPADM

## 2022-07-02 RX ORDER — HEPARIN SODIUM (PORCINE) LOCK FLUSH IV SOLN 100 UNIT/ML 100 UNIT/ML
500 SOLUTION INTRAVENOUS PRN
Status: CANCELLED | OUTPATIENT
Start: 2022-07-03

## 2022-07-02 RX ADMIN — SODIUM CHLORIDE 1000 ML: 9 INJECTION, SOLUTION INTRAVENOUS at 08:30

## 2022-07-02 RX ADMIN — SODIUM CHLORIDE, PRESERVATIVE FREE 500 UNITS: 5 INJECTION INTRAVENOUS at 10:49

## 2022-07-02 NOTE — PROGRESS NOTES
Short Stay Communication Note  Zuleika Boston  Diagnosis:  Primary MD:  Treatment: Melphalan Fludarabine/Cytoxan  Day +1 of Auto Transplant   Pt seen in outpatient infusion today. Labs drawn and reviewed. CBC: Recent Labs     06/30/22 0928 07/01/22  0915 07/02/22  0903   WBC 4.5 8.1 8.2   HGB 10.6* 10.0* 9.6*   HCT 32.4* 30.1* 28.4*   MCV 92.5 91.9 91.2   * 183 174     BMP/Mag:  Recent Labs     06/30/22  0919 07/01/22  0915 07/02/22  0903    137 139   K 3.4* 4.1 3.6    103 106   CO2 25 26 27   PHOS 2.8 2.1* 3.0   BUN 15 14 15   CREATININE 0.7 0.8 0.7   MG 1.70* 1.90 2.10     Standing parameters for replacement for this patient:   1 unit of pack red blood cells for a hemoglobin < or equal to 7  1 pack of platelets for a platelet count less than or equal to 10  40 MeQ of Potassium administered for a potassium level less than or equal to 3.4  4g of Magnesium Sulfate for a magnesum level less than or equal to 1.4  No transfusions required for the above lab values. Urinalysis last done:  Urinalysis next due: 7/4/2022    Chest X-Ray last done: 6/20/2022 Chest X-Ray next due: 7/4/2022    Symptoms addressed and reported to care team this date: Reports feeling \"blah\" cannot specify any specific symptom. Denies N/V/C/D, S/S URI. Treatments this date: labs and IV Fluids. CVC line wiped with CHG and patient educated on using CHG wipes after shower at home as well. Reviewed medication schedule with pt and caregiver. Both able to verbalize all medications and schedule. Pt to be seen again tomorrow. Patient and caregiver verbalized understanding of discharge instructions including when and how to call the doctor and when to report  to the ER. Discharged ambulatory to home.     Oral Damián, RN

## 2022-07-02 NOTE — PROGRESS NOTES
14 15   CREATININE 0.7 0.8 0.7   MG 1.70* 1.90 2.10     LIVP:   Recent Labs     06/30/22  0919 07/01/22  0915   AST 21 17   ALT 34 30   BILIDIR <0.2 <0.2   BILITOT <0.2 0.3   ALKPHOS 81 74     Uric Acid:    Recent Labs     07/02/22  0903   LABURIC 6.6*     Coags:   Recent Labs     06/30/22  0919   PROTIME 13.6   INR 1.04        PROBLEM LIST:            1. Multiple myeloma   2. Dental disorder  3. HTN  4. Intestinal malabsorption (disorder)  5. Iron deficiency anemia due to chronic blood loss         TREATMENT:            1. RVD x 5 cycles:  - C1D1: 12/2021  - C5D1: 5/16/22     ASSESSMENT AND PLAN:            1. 1) Multiple myeloma, IgG Kappa, ISS 3: partial response   - t(14;20), gain of 1q21, monosomy 13.    - At diagnoses - M spike is 3.7, FLC ratio  132  - BMBx (12/7/21): 20% plasma cells & PET scan (1/12/22) showed diffuse bone marrow activity, no lytic lesions  - Started RVD on 12/27/21 and tolerated well   - M spike down to 0.5 and FLC ratio decreased to 15  - BMBx (3/18/22) after cycle 4: 40% cellularity and few percentage of clonal plasma cells   - Her transplant work up was delayed due to needing dental extraction and EGD   - EGD unremarkable and findings on PET scan are likely related to esophagitis   - Light chain trending up, will plan for 1-2 cycles of RVD prior to transplant   - TTE, PFTS, CXR normal  - Finished cycle 5 of RVD   - 6/10/22 Repeat bone marrow bx negative for plasma cells: Consistent with partial response      High dose Melphalan & Autologous SCT - Day + 1      2. ID: Afebrile, no evidence of infection.    - Cont Valtrex ppx (6/30/22)  - Start Diflucan ppx today (7/1/22)  - Start Levaquin ppx when [address] < 1.5     3. Heme: Anemia 2/2 chemotherapy & myeloma  - H/o CRYSTAL: Received IV iron (3/21/22 & 3/28/22)  - Transfuse for Hgb < 7 and Platelets < 70X  - No transfusion today     4.  Metabolic: Hyperglycemia, Hyperuricemia  - Cont daily IVFs: will receive 2 L NS today with ASCT, then 1 L daily in OP Infusion  - Replace potassium and magnesium per PRN orders     5. GI / Nutrition:  Appetite and oral intake is good. - Cont low microbial diet   - Follow closely with dietary     6. Poor dentition  - completed partial extractions       - Disposition: Will return to OP Infusion tomorrow for ASCT then daily for toxicity assessment, labs and MD visit. The patient was seen and examined by Dr. Sylvia Jimenez, [facility].  Huong Gonzalez, [address] [place] [address]  [address] [place] [address]

## 2022-07-03 ENCOUNTER — HOSPITAL ENCOUNTER (OUTPATIENT)
Dept: ONCOLOGY | Age: 52
Setting detail: INFUSION SERIES
Discharge: HOME | End: 2022-07-03
Payer: COMMERCIAL

## 2022-07-03 ENCOUNTER — HOSPITAL ENCOUNTER (OUTPATIENT)
Dept: GENERAL RADIOLOGY | Age: 52
Discharge: HOME | End: 2022-07-03
Payer: COMMERCIAL

## 2022-07-03 VITALS
WEIGHT: 260.36 LBS | TEMPERATURE: 98.5 F | SYSTOLIC BLOOD PRESSURE: 134 MMHG | RESPIRATION RATE: 20 BRPM | OXYGEN SATURATION: 99 % | BODY MASS INDEX: 42.6 KG/M2 | DIASTOLIC BLOOD PRESSURE: 76 MMHG

## 2022-07-03 DIAGNOSIS — Z52.011 AUTOLOGOUS DONOR OF STEM CELLS: Primary | ICD-10-CM

## 2022-07-03 DIAGNOSIS — C90.00 MULTIPLE MYELOMA, REMISSION STATUS UNSPECIFIED (HCC): ICD-10-CM

## 2022-07-03 LAB
ANION GAP SERPL CALCULATED.3IONS-SCNC: 8 MMOL/L (ref 3–16)
BASOPHILS ABSOLUTE: 0 K/UL (ref 0–0.2)
BASOPHILS RELATIVE PERCENT: 0.2 %
BILIRUBIN URINE: NEGATIVE
BLOOD, URINE: ABNORMAL
BUN BLDV-MCNC: 13 MG/DL (ref 7–20)
CALCIUM SERPL-MCNC: 8.9 MG/DL (ref 8.3–10.6)
CHLORIDE BLD-SCNC: 94 MMOL/L (ref 99–110)
CLARITY: CLEAR
CO2: 30 MMOL/L (ref 21–32)
COLOR: YELLOW
CREAT SERPL-MCNC: 0.8 MG/DL (ref 0.6–1.1)
EOSINOPHILS ABSOLUTE: 0 K/UL (ref 0–0.6)
EOSINOPHILS RELATIVE PERCENT: 0 %
GFR AFRICAN AMERICAN: >60
GFR NON-AFRICAN AMERICAN: >60
GLUCOSE BLD-MCNC: 188 MG/DL (ref 70–99)
GLUCOSE URINE: NEGATIVE MG/DL
HCT VFR BLD CALC: 30.6 % (ref 36–48)
HEMOGLOBIN: 10.1 G/DL (ref 12–16)
KETONES, URINE: NEGATIVE MG/DL
LEUKOCYTE ESTERASE, URINE: NEGATIVE
LYMPHOCYTES ABSOLUTE: 0.1 K/UL (ref 1–5.1)
LYMPHOCYTES RELATIVE PERCENT: 2.3 %
MAGNESIUM: 1.9 MG/DL (ref 1.8–2.4)
MCH RBC QN AUTO: 30.3 PG (ref 26–34)
MCHC RBC AUTO-ENTMCNC: 33 G/DL (ref 31–36)
MCV RBC AUTO: 91.7 FL (ref 80–100)
MICROSCOPIC EXAMINATION: YES
MONOCYTES ABSOLUTE: 0 K/UL (ref 0–1.3)
MONOCYTES RELATIVE PERCENT: 1.9 %
NEUTROPHILS ABSOLUTE: 2.3 K/UL (ref 1.7–7.7)
NEUTROPHILS RELATIVE PERCENT: 95.6 %
NITRITE, URINE: NEGATIVE
PDW BLD-RTO: 16.5 % (ref 12.4–15.4)
PH UA: 6 (ref 5–8)
PHOSPHORUS: 3.3 MG/DL (ref 2.5–4.9)
PLATELET # BLD: 174 K/UL (ref 135–450)
PMV BLD AUTO: 7.4 FL (ref 5–10.5)
POTASSIUM SERPL-SCNC: 3.4 MMOL/L (ref 3.5–5.1)
PROTEIN UA: NEGATIVE MG/DL
RBC # BLD: 3.34 M/UL (ref 4–5.2)
RBC UA: NORMAL /HPF (ref 0–4)
SODIUM BLD-SCNC: 132 MMOL/L (ref 136–145)
SPECIFIC GRAVITY UA: 1.01 (ref 1–1.03)
URIC ACID, SERUM: 6.4 MG/DL (ref 2.6–6)
URINE TYPE: ABNORMAL
UROBILINOGEN, URINE: 0.2 E.U./DL
WBC # BLD: 2.4 K/UL (ref 4–11)
WBC UA: NORMAL /HPF (ref 0–5)

## 2022-07-03 PROCEDURE — 84550 ASSAY OF BLOOD/URIC ACID: CPT

## 2022-07-03 PROCEDURE — 84100 ASSAY OF PHOSPHORUS: CPT

## 2022-07-03 PROCEDURE — 83735 ASSAY OF MAGNESIUM: CPT

## 2022-07-03 PROCEDURE — 6370000000 HC RX 637 (ALT 250 FOR IP): Performed by: NURSE PRACTITIONER

## 2022-07-03 PROCEDURE — 81001 URINALYSIS AUTO W/SCOPE: CPT

## 2022-07-03 PROCEDURE — 85025 COMPLETE CBC W/AUTO DIFF WBC: CPT

## 2022-07-03 PROCEDURE — 96360 HYDRATION IV INFUSION INIT: CPT

## 2022-07-03 PROCEDURE — 96361 HYDRATE IV INFUSION ADD-ON: CPT

## 2022-07-03 PROCEDURE — 36592 COLLECT BLOOD FROM PICC: CPT

## 2022-07-03 PROCEDURE — 80048 BASIC METABOLIC PNL TOTAL CA: CPT

## 2022-07-03 PROCEDURE — 6360000002 HC RX W HCPCS: Performed by: NURSE PRACTITIONER

## 2022-07-03 PROCEDURE — 71045 X-RAY EXAM CHEST 1 VIEW: CPT

## 2022-07-03 PROCEDURE — 2580000003 HC RX 258: Performed by: NURSE PRACTITIONER

## 2022-07-03 RX ORDER — OXYCODONE HYDROCHLORIDE 5 MG/1
5 TABLET ORAL EVERY 4 HOURS PRN
Status: DISCONTINUED | OUTPATIENT
Start: 2022-07-03 | End: 2022-07-04 | Stop reason: HOSPADM

## 2022-07-03 RX ORDER — POTASSIUM CHLORIDE 29.8 MG/ML
80 INJECTION INTRAVENOUS PRN
Status: DISCONTINUED | OUTPATIENT
Start: 2022-07-03 | End: 2022-07-04 | Stop reason: HOSPADM

## 2022-07-03 RX ORDER — DIMETHICONE, CAMPHOR (SYNTHETIC), MENTHOL, AND PHENOL 1.1; .5; .625; .5 G/100G; G/100G; G/100G; G/100G
OINTMENT TOPICAL PRN
Status: DISCONTINUED | OUTPATIENT
Start: 2022-07-03 | End: 2022-07-04 | Stop reason: HOSPADM

## 2022-07-03 RX ORDER — CALCIUM CARBONATE 200(500)MG
1 TABLET,CHEWABLE ORAL DAILY
Qty: 30 TABLET | Refills: 0 | Status: ON HOLD | OUTPATIENT
Start: 2022-07-03 | End: 2022-07-13 | Stop reason: HOSPADM

## 2022-07-03 RX ORDER — PROCHLORPERAZINE MALEATE 10 MG
10 TABLET ORAL EVERY 6 HOURS PRN
Status: DISCONTINUED | OUTPATIENT
Start: 2022-07-03 | End: 2022-07-04 | Stop reason: HOSPADM

## 2022-07-03 RX ORDER — HEPARIN SODIUM (PORCINE) LOCK FLUSH IV SOLN 100 UNIT/ML 100 UNIT/ML
500 SOLUTION INTRAVENOUS PRN
Status: DISCONTINUED | OUTPATIENT
Start: 2022-07-03 | End: 2022-07-04 | Stop reason: HOSPADM

## 2022-07-03 RX ORDER — 0.9 % SODIUM CHLORIDE 0.9 %
1000 INTRAVENOUS SOLUTION INTRAVENOUS ONCE
Status: CANCELLED | OUTPATIENT
Start: 2022-07-04 | End: 2022-07-04

## 2022-07-03 RX ORDER — MAGNESIUM SULFATE IN WATER 40 MG/ML
4000 INJECTION, SOLUTION INTRAVENOUS PRN
Status: DISCONTINUED | OUTPATIENT
Start: 2022-07-03 | End: 2022-07-04 | Stop reason: HOSPADM

## 2022-07-03 RX ORDER — 0.9 % SODIUM CHLORIDE 0.9 %
1000 INTRAVENOUS SOLUTION INTRAVENOUS ONCE
Status: COMPLETED | OUTPATIENT
Start: 2022-07-03 | End: 2022-07-03

## 2022-07-03 RX ORDER — ONDANSETRON 2 MG/ML
8 INJECTION INTRAMUSCULAR; INTRAVENOUS EVERY 8 HOURS PRN
Status: DISCONTINUED | OUTPATIENT
Start: 2022-07-03 | End: 2022-07-04 | Stop reason: HOSPADM

## 2022-07-03 RX ORDER — OXYCODONE HYDROCHLORIDE 5 MG/1
10 TABLET ORAL EVERY 4 HOURS PRN
Status: DISCONTINUED | OUTPATIENT
Start: 2022-07-03 | End: 2022-07-04 | Stop reason: HOSPADM

## 2022-07-03 RX ORDER — POTASSIUM CHLORIDE 20 MEQ/1
40 TABLET, EXTENDED RELEASE ORAL PRN
Status: DISCONTINUED | OUTPATIENT
Start: 2022-07-03 | End: 2022-07-04 | Stop reason: HOSPADM

## 2022-07-03 RX ORDER — PROCHLORPERAZINE EDISYLATE 5 MG/ML
10 INJECTION INTRAMUSCULAR; INTRAVENOUS EVERY 6 HOURS PRN
Status: CANCELLED | OUTPATIENT
Start: 2022-07-04

## 2022-07-03 RX ORDER — POTASSIUM CHLORIDE 29.8 MG/ML
80 INJECTION INTRAVENOUS PRN
Status: CANCELLED | OUTPATIENT
Start: 2022-07-04

## 2022-07-03 RX ORDER — HEPARIN SODIUM (PORCINE) LOCK FLUSH IV SOLN 100 UNIT/ML 100 UNIT/ML
500 SOLUTION INTRAVENOUS PRN
Status: CANCELLED | OUTPATIENT
Start: 2022-07-04

## 2022-07-03 RX ORDER — PROCHLORPERAZINE EDISYLATE 5 MG/ML
10 INJECTION INTRAMUSCULAR; INTRAVENOUS EVERY 6 HOURS PRN
Status: DISCONTINUED | OUTPATIENT
Start: 2022-07-03 | End: 2022-07-04 | Stop reason: HOSPADM

## 2022-07-03 RX ORDER — OMEPRAZOLE 20 MG/1
20 CAPSULE, DELAYED RELEASE ORAL
Qty: 30 CAPSULE | Refills: 3 | Status: SHIPPED | OUTPATIENT
Start: 2022-07-03

## 2022-07-03 RX ORDER — OMEPRAZOLE 40 MG/1
40 CAPSULE, DELAYED RELEASE ORAL
Qty: 90 CAPSULE | Refills: 1 | Status: SHIPPED | OUTPATIENT
Start: 2022-07-03 | End: 2022-07-03 | Stop reason: SDUPTHER

## 2022-07-03 RX ORDER — SODIUM CHLORIDE 9 MG/ML
INJECTION, SOLUTION INTRAVENOUS CONTINUOUS PRN
Status: CANCELLED | OUTPATIENT
Start: 2022-07-04

## 2022-07-03 RX ORDER — SODIUM CHLORIDE 9 MG/ML
INJECTION, SOLUTION INTRAVENOUS CONTINUOUS PRN
Status: DISCONTINUED | OUTPATIENT
Start: 2022-07-03 | End: 2022-07-04 | Stop reason: HOSPADM

## 2022-07-03 RX ORDER — ONDANSETRON 4 MG/1
8 TABLET, FILM COATED ORAL EVERY 8 HOURS PRN
Status: DISCONTINUED | OUTPATIENT
Start: 2022-07-03 | End: 2022-07-04 | Stop reason: HOSPADM

## 2022-07-03 RX ORDER — PETROLATUM, MENTHOL, UNSPECIFIED FORM, CAMPHOR (SYNTHETIC), AND PHENOL 59.14; 1; 1; .6 G/100G; G/100G; G/100G; G/100G
PASTE TOPICAL PRN
Status: CANCELLED | OUTPATIENT
Start: 2022-07-04

## 2022-07-03 RX ORDER — OXYCODONE HYDROCHLORIDE 5 MG/1
10 TABLET ORAL EVERY 4 HOURS PRN
Status: CANCELLED | OUTPATIENT
Start: 2022-07-04

## 2022-07-03 RX ORDER — ONDANSETRON 2 MG/ML
8 INJECTION INTRAMUSCULAR; INTRAVENOUS EVERY 8 HOURS PRN
Status: CANCELLED | OUTPATIENT
Start: 2022-07-04

## 2022-07-03 RX ORDER — OXYCODONE HYDROCHLORIDE 5 MG/1
5 TABLET ORAL EVERY 4 HOURS PRN
Status: CANCELLED | OUTPATIENT
Start: 2022-07-04

## 2022-07-03 RX ORDER — MAGNESIUM SULFATE IN WATER 40 MG/ML
4000 INJECTION, SOLUTION INTRAVENOUS PRN
Status: CANCELLED | OUTPATIENT
Start: 2022-07-04

## 2022-07-03 RX ORDER — POTASSIUM CHLORIDE 20 MEQ/1
40 TABLET, EXTENDED RELEASE ORAL PRN
Status: CANCELLED | OUTPATIENT
Start: 2022-07-04

## 2022-07-03 RX ORDER — ONDANSETRON 4 MG/1
8 TABLET, FILM COATED ORAL EVERY 8 HOURS PRN
Status: CANCELLED | OUTPATIENT
Start: 2022-07-04

## 2022-07-03 RX ORDER — PROCHLORPERAZINE MALEATE 10 MG
10 TABLET ORAL EVERY 6 HOURS PRN
Status: CANCELLED | OUTPATIENT
Start: 2022-07-04

## 2022-07-03 RX ADMIN — SODIUM CHLORIDE, PRESERVATIVE FREE 500 UNITS: 5 INJECTION INTRAVENOUS at 10:57

## 2022-07-03 RX ADMIN — SODIUM CHLORIDE 1000 ML: 9 INJECTION, SOLUTION INTRAVENOUS at 08:51

## 2022-07-03 NOTE — PROGRESS NOTES
[facility]  [facility]    7/3/2022    Isabel La    :  1970    MRN:  [id number]    Referring MD: Sandhya Rodriguez APRN - NP  [place], [place] [address]      Subjective:  Was feeling a little nauseated this morning but was able to eat some crackers and drink some ginger ale. She is feeling miserable. ECOG PS:  (1) Restricted in physically strenuous activity, ambulatory and able to do work of light nature    KPS: 80% Normal activity with effort; some signs or symptoms of disease    Isolation:  None     Medications    Scheduled Meds:  Continuous Infusions:  PRN Meds:. ROS:  As noted above, otherwise remainder of 10-point ROS negative    Physical Exam:     I&O:  No intake or output data in the 24 hours ending 22 0855    Vital Signs: There were no vitals taken for this visit. Weight:    Wt Readings from Last 3 Encounters:   22 266 lb 15.6 oz (121.1 kg)   22 262 lb 3.2 oz (118.9 kg)   22 262 lb 3.2 oz (118.9 kg)       General: Awake, alert and oriented.   HEENT: Normocephalic, atraumatic, poor dentition, many teeth requiring extraction  NECK: supple without palpable adenopathy  BACK: Straight negative CVAT  SKIN: warm dry and intact without lesions rashes or masses  CHEST: CTA bilaterally without use of accessory muscles  CV: Normal S1 S2, RRR, no MRG  ABD: NT ND normoactive BS, no palpable masses or hepatosplenomegaly  EXTREMITIES: without edema, denies calf tenderness  NEURO: CN II - XII grossly intact  CATHETER: Left Subclavian Trifusion (GS: Sofie, 22)     Data:   CBC:   Recent Labs     22  0928 22  0915 22  0903   WBC 4.5 8.1 8.2   HGB 10.6* 10.0* 9.6*   HCT 32.4* 30.1* 28.4*   MCV 92.5 91.9 91.2   * 183 174     BMP/Mag:  Recent Labs     22  0919 22  0915 22  0903    137 139   K 3.4* 4.1 3.6    103 106   CO2 25 26 27   PHOS 2.8 2.1* 3.0   BUN 15 14 15 CREATININE 0.7 0.8 0.7   MG 1.70* 1.90 2.10     LIVP:   Recent Labs     06/30/22  0919 07/01/22  0915   AST 21 17   ALT 34 30   BILIDIR <0.2 <0.2   BILITOT <0.2 0.3   ALKPHOS 81 74     Uric Acid:    Recent Labs     07/02/22  0903   LABURIC 6.6*     Coags:   Recent Labs     06/30/22  0919   PROTIME 13.6   INR 1.04        PROBLEM LIST:            1. Multiple myeloma   2. Dental disorder  3. HTN  4. Intestinal malabsorption (disorder)  5. Iron deficiency anemia due to chronic blood loss         TREATMENT:            1. RVD x 5 cycles:  - C1D1: 12/2021  - C5D1: 5/16/22     ASSESSMENT AND PLAN:            1. 1) Multiple myeloma, IgG Kappa, ISS 3: partial response   - t(14;20), gain of 1q21, monosomy 13.    - At diagnoses - M spike is 3.7, FLC ratio  132  - BMBx (12/7/21): 20% plasma cells & PET scan (1/12/22) showed diffuse bone marrow activity, no lytic lesions  - Started RVD on 12/27/21 and tolerated well   - M spike down to 0.5 and FLC ratio decreased to 15  - BMBx (3/18/22) after cycle 4: 40% cellularity and few percentage of clonal plasma cells   - Her transplant work up was delayed due to needing dental extraction and EGD   - EGD unremarkable and findings on PET scan are likely related to esophagitis   - Light chain trending up, will plan for 1-2 cycles of RVD prior to transplant   - TTE, PFTS, CXR normal  - Finished cycle 5 of RVD   - 6/10/22 Repeat bone marrow bx negative for plasma cells: Consistent with partial response      High dose Melphalan & Autologous SCT - Day + 2     2. ID: Afebrile, no evidence of infection.    - Cont Valtrex ppx (6/30/22)  - Start Diflucan ppx today (7/1/22)  - Start Levaquin ppx when [address] < 1.5     3. Heme: Labs pending 7/3/22, Anemia 2/2 chemotherapy & myeloma  - H/o CRYSTAL: Received IV iron (3/21/22 & 3/28/22)  - Transfuse for Hgb < 7 and Platelets < 89L  - No transfusion today     4.  Metabolic: labs pending 9/7/15, Hyperglycemia, Hyperuricemia  - Cont daily IVFs: will receive 2 L NS today with ASCT, then 1 L daily in OP Infusion  - Replace potassium and magnesium per PRN orders     5. GI / Nutrition:  Appetite and oral intake is good. - Cont low microbial diet   - Follow closely with dietary     6. Poor dentition  - completed partial extractions       - Disposition: Will return to OP Infusion daily for toxicity assessment, labs and MD visit. The patient was seen and examined by Dr. Gely Martínez, [facility].  Aide Casillas, [address] [place] [address]

## 2022-07-04 ENCOUNTER — HOSPITAL ENCOUNTER (OUTPATIENT)
Dept: ONCOLOGY | Age: 52
Setting detail: INFUSION SERIES
Discharge: HOME | End: 2022-07-04
Payer: COMMERCIAL

## 2022-07-04 VITALS
OXYGEN SATURATION: 100 % | HEART RATE: 113 BPM | RESPIRATION RATE: 20 BRPM | DIASTOLIC BLOOD PRESSURE: 82 MMHG | TEMPERATURE: 98.4 F | SYSTOLIC BLOOD PRESSURE: 178 MMHG

## 2022-07-04 DIAGNOSIS — C90.00 MULTIPLE MYELOMA, REMISSION STATUS UNSPECIFIED (HCC): ICD-10-CM

## 2022-07-04 DIAGNOSIS — Z52.011 AUTOLOGOUS DONOR OF STEM CELLS: Primary | ICD-10-CM

## 2022-07-04 LAB
ALBUMIN SERPL-MCNC: 4.2 G/DL (ref 3.4–5)
ALP BLD-CCNC: 70 U/L (ref 40–129)
ALT SERPL-CCNC: 22 U/L (ref 10–40)
ANION GAP SERPL CALCULATED.3IONS-SCNC: 8 MMOL/L (ref 3–16)
AST SERPL-CCNC: 14 U/L (ref 15–37)
BASOPHILS ABSOLUTE: 0 K/UL (ref 0–0.2)
BASOPHILS RELATIVE PERCENT: 0.3 %
BILIRUB SERPL-MCNC: 0.4 MG/DL (ref 0–1)
BILIRUBIN DIRECT: <0.2 MG/DL (ref 0–0.3)
BILIRUBIN, INDIRECT: ABNORMAL MG/DL (ref 0–1)
BUN BLDV-MCNC: 13 MG/DL (ref 7–20)
CALCIUM SERPL-MCNC: 9.1 MG/DL (ref 8.3–10.6)
CHLORIDE BLD-SCNC: 96 MMOL/L (ref 99–110)
CO2: 29 MMOL/L (ref 21–32)
CREAT SERPL-MCNC: 0.7 MG/DL (ref 0.6–1.1)
EOSINOPHILS ABSOLUTE: 0 K/UL (ref 0–0.6)
EOSINOPHILS RELATIVE PERCENT: 0.4 %
GFR AFRICAN AMERICAN: >60
GFR NON-AFRICAN AMERICAN: >60
GLUCOSE BLD-MCNC: 149 MG/DL (ref 70–99)
HCT VFR BLD CALC: 31.3 % (ref 36–48)
HEMOGLOBIN: 10.5 G/DL (ref 12–16)
INR BLD: 1.05 (ref 0.87–1.14)
LACTATE DEHYDROGENASE: 198 U/L (ref 100–190)
LYMPHOCYTES ABSOLUTE: 0 K/UL (ref 1–5.1)
LYMPHOCYTES RELATIVE PERCENT: 2.1 %
MAGNESIUM: 2.1 MG/DL (ref 1.8–2.4)
MCH RBC QN AUTO: 30.3 PG (ref 26–34)
MCHC RBC AUTO-ENTMCNC: 33.6 G/DL (ref 31–36)
MCV RBC AUTO: 90.3 FL (ref 80–100)
MONOCYTES ABSOLUTE: 0 K/UL (ref 0–1.3)
MONOCYTES RELATIVE PERCENT: 1.6 %
NEUTROPHILS ABSOLUTE: 1.6 K/UL (ref 1.7–7.7)
NEUTROPHILS RELATIVE PERCENT: 95.6 %
PDW BLD-RTO: 16.2 % (ref 12.4–15.4)
PHOSPHORUS: 3.7 MG/DL (ref 2.5–4.9)
PLATELET # BLD: 177 K/UL (ref 135–450)
PMV BLD AUTO: 7.2 FL (ref 5–10.5)
POTASSIUM SERPL-SCNC: 3.7 MMOL/L (ref 3.5–5.1)
PROTHROMBIN TIME: 13.7 SEC (ref 11.7–14.5)
RBC # BLD: 3.46 M/UL (ref 4–5.2)
SODIUM BLD-SCNC: 133 MMOL/L (ref 136–145)
TOTAL PROTEIN: 7.1 G/DL (ref 6.4–8.2)
URIC ACID, SERUM: 5.5 MG/DL (ref 2.6–6)
WBC # BLD: 1.7 K/UL (ref 4–11)

## 2022-07-04 PROCEDURE — 6370000000 HC RX 637 (ALT 250 FOR IP): Performed by: NURSE PRACTITIONER

## 2022-07-04 PROCEDURE — 83615 LACTATE (LD) (LDH) ENZYME: CPT

## 2022-07-04 PROCEDURE — 84550 ASSAY OF BLOOD/URIC ACID: CPT

## 2022-07-04 PROCEDURE — 80048 BASIC METABOLIC PNL TOTAL CA: CPT

## 2022-07-04 PROCEDURE — 96360 HYDRATION IV INFUSION INIT: CPT

## 2022-07-04 PROCEDURE — 2580000003 HC RX 258: Performed by: NURSE PRACTITIONER

## 2022-07-04 PROCEDURE — 85610 PROTHROMBIN TIME: CPT

## 2022-07-04 PROCEDURE — 6360000002 HC RX W HCPCS: Performed by: NURSE PRACTITIONER

## 2022-07-04 PROCEDURE — 96374 THER/PROPH/DIAG INJ IV PUSH: CPT

## 2022-07-04 PROCEDURE — 83735 ASSAY OF MAGNESIUM: CPT

## 2022-07-04 PROCEDURE — 80076 HEPATIC FUNCTION PANEL: CPT

## 2022-07-04 PROCEDURE — 2580000003 HC RX 258: Performed by: INTERNAL MEDICINE

## 2022-07-04 PROCEDURE — 36592 COLLECT BLOOD FROM PICC: CPT

## 2022-07-04 PROCEDURE — 84100 ASSAY OF PHOSPHORUS: CPT

## 2022-07-04 PROCEDURE — 96361 HYDRATE IV INFUSION ADD-ON: CPT

## 2022-07-04 PROCEDURE — 85025 COMPLETE CBC W/AUTO DIFF WBC: CPT

## 2022-07-04 RX ORDER — POTASSIUM CHLORIDE 29.8 MG/ML
80 INJECTION INTRAVENOUS PRN
Status: CANCELLED | OUTPATIENT
Start: 2022-07-05

## 2022-07-04 RX ORDER — PROCHLORPERAZINE EDISYLATE 5 MG/ML
10 INJECTION INTRAMUSCULAR; INTRAVENOUS EVERY 6 HOURS PRN
Status: CANCELLED | OUTPATIENT
Start: 2022-07-05

## 2022-07-04 RX ORDER — POTASSIUM CHLORIDE 20 MEQ/1
40 TABLET, EXTENDED RELEASE ORAL PRN
Status: CANCELLED | OUTPATIENT
Start: 2022-07-05

## 2022-07-04 RX ORDER — SODIUM CHLORIDE 9 MG/ML
INJECTION, SOLUTION INTRAVENOUS CONTINUOUS PRN
Status: DISCONTINUED | OUTPATIENT
Start: 2022-07-04 | End: 2022-07-05 | Stop reason: HOSPADM

## 2022-07-04 RX ORDER — SODIUM CHLORIDE 9 MG/ML
INJECTION, SOLUTION INTRAVENOUS CONTINUOUS PRN
Status: CANCELLED | OUTPATIENT
Start: 2022-07-05

## 2022-07-04 RX ORDER — 0.9 % SODIUM CHLORIDE 0.9 %
1000 INTRAVENOUS SOLUTION INTRAVENOUS ONCE
Status: COMPLETED | OUTPATIENT
Start: 2022-07-04 | End: 2022-07-04

## 2022-07-04 RX ORDER — MAGNESIUM SULFATE IN WATER 40 MG/ML
4000 INJECTION, SOLUTION INTRAVENOUS PRN
Status: CANCELLED | OUTPATIENT
Start: 2022-07-05

## 2022-07-04 RX ORDER — PROCHLORPERAZINE MALEATE 10 MG
10 TABLET ORAL EVERY 6 HOURS PRN
Status: CANCELLED | OUTPATIENT
Start: 2022-07-05

## 2022-07-04 RX ORDER — MAGNESIUM SULFATE IN WATER 40 MG/ML
4000 INJECTION, SOLUTION INTRAVENOUS PRN
Status: DISCONTINUED | OUTPATIENT
Start: 2022-07-04 | End: 2022-07-05 | Stop reason: HOSPADM

## 2022-07-04 RX ORDER — OXYCODONE HYDROCHLORIDE 5 MG/1
10 TABLET ORAL EVERY 4 HOURS PRN
Status: CANCELLED | OUTPATIENT
Start: 2022-07-05

## 2022-07-04 RX ORDER — ONDANSETRON 4 MG/1
8 TABLET, FILM COATED ORAL EVERY 8 HOURS PRN
Status: DISCONTINUED | OUTPATIENT
Start: 2022-07-04 | End: 2022-07-05 | Stop reason: HOSPADM

## 2022-07-04 RX ORDER — POTASSIUM CHLORIDE 20 MEQ/1
40 TABLET, EXTENDED RELEASE ORAL PRN
Status: DISCONTINUED | OUTPATIENT
Start: 2022-07-04 | End: 2022-07-05 | Stop reason: HOSPADM

## 2022-07-04 RX ORDER — PETROLATUM, MENTHOL, UNSPECIFIED FORM, CAMPHOR (SYNTHETIC), AND PHENOL 59.14; 1; 1; .6 G/100G; G/100G; G/100G; G/100G
PASTE TOPICAL PRN
Status: CANCELLED | OUTPATIENT
Start: 2022-07-05

## 2022-07-04 RX ORDER — PROCHLORPERAZINE MALEATE 10 MG
10 TABLET ORAL EVERY 6 HOURS PRN
Status: DISCONTINUED | OUTPATIENT
Start: 2022-07-04 | End: 2022-07-05 | Stop reason: HOSPADM

## 2022-07-04 RX ORDER — OXYCODONE HYDROCHLORIDE 5 MG/1
10 TABLET ORAL EVERY 4 HOURS PRN
Status: DISCONTINUED | OUTPATIENT
Start: 2022-07-04 | End: 2022-07-05 | Stop reason: HOSPADM

## 2022-07-04 RX ORDER — DIMETHICONE, CAMPHOR (SYNTHETIC), MENTHOL, AND PHENOL 1.1; .5; .625; .5 G/100G; G/100G; G/100G; G/100G
OINTMENT TOPICAL PRN
Status: DISCONTINUED | OUTPATIENT
Start: 2022-07-04 | End: 2022-07-05 | Stop reason: HOSPADM

## 2022-07-04 RX ORDER — PROCHLORPERAZINE EDISYLATE 5 MG/ML
10 INJECTION INTRAMUSCULAR; INTRAVENOUS EVERY 6 HOURS PRN
Status: DISCONTINUED | OUTPATIENT
Start: 2022-07-04 | End: 2022-07-05 | Stop reason: HOSPADM

## 2022-07-04 RX ORDER — HEPARIN SODIUM (PORCINE) LOCK FLUSH IV SOLN 100 UNIT/ML 100 UNIT/ML
500 SOLUTION INTRAVENOUS PRN
Status: CANCELLED | OUTPATIENT
Start: 2022-07-05

## 2022-07-04 RX ORDER — OXYCODONE HYDROCHLORIDE 5 MG/1
5 TABLET ORAL EVERY 4 HOURS PRN
Status: CANCELLED | OUTPATIENT
Start: 2022-07-05

## 2022-07-04 RX ORDER — 0.9 % SODIUM CHLORIDE 0.9 %
1000 INTRAVENOUS SOLUTION INTRAVENOUS ONCE
Status: CANCELLED | OUTPATIENT
Start: 2022-07-05 | End: 2022-07-05

## 2022-07-04 RX ORDER — ONDANSETRON 2 MG/ML
8 INJECTION INTRAMUSCULAR; INTRAVENOUS EVERY 8 HOURS PRN
Status: DISCONTINUED | OUTPATIENT
Start: 2022-07-04 | End: 2022-07-05 | Stop reason: HOSPADM

## 2022-07-04 RX ORDER — POTASSIUM CHLORIDE 29.8 MG/ML
80 INJECTION INTRAVENOUS PRN
Status: DISCONTINUED | OUTPATIENT
Start: 2022-07-04 | End: 2022-07-05 | Stop reason: HOSPADM

## 2022-07-04 RX ORDER — OXYCODONE HYDROCHLORIDE 5 MG/1
5 TABLET ORAL EVERY 4 HOURS PRN
Status: DISCONTINUED | OUTPATIENT
Start: 2022-07-04 | End: 2022-07-05 | Stop reason: HOSPADM

## 2022-07-04 RX ORDER — ONDANSETRON 4 MG/1
8 TABLET, FILM COATED ORAL EVERY 8 HOURS PRN
Status: CANCELLED | OUTPATIENT
Start: 2022-07-05

## 2022-07-04 RX ORDER — ONDANSETRON 2 MG/ML
8 INJECTION INTRAMUSCULAR; INTRAVENOUS EVERY 8 HOURS PRN
Status: CANCELLED | OUTPATIENT
Start: 2022-07-05

## 2022-07-04 RX ORDER — HEPARIN SODIUM (PORCINE) LOCK FLUSH IV SOLN 100 UNIT/ML 100 UNIT/ML
500 SOLUTION INTRAVENOUS PRN
Status: DISCONTINUED | OUTPATIENT
Start: 2022-07-04 | End: 2022-07-05 | Stop reason: HOSPADM

## 2022-07-04 RX ADMIN — SODIUM CHLORIDE, PRESERVATIVE FREE 500 UNITS: 5 INJECTION INTRAVENOUS at 12:41

## 2022-07-04 RX ADMIN — SODIUM CHLORIDE 1000 ML: 9 INJECTION, SOLUTION INTRAVENOUS at 10:40

## 2022-07-04 RX ADMIN — SODIUM CHLORIDE 1000 ML: 9 INJECTION, SOLUTION INTRAVENOUS at 08:36

## 2022-07-04 RX ADMIN — PROCHLORPERAZINE EDISYLATE 10 MG: 5 INJECTION INTRAMUSCULAR; INTRAVENOUS at 09:30

## 2022-07-04 RX ADMIN — ONDANSETRON HYDROCHLORIDE 8 MG: 4 TABLET, FILM COATED ORAL at 08:40

## 2022-07-04 NOTE — PROGRESS NOTES
Short Stay Communication Note  Mychal Lawton  Diagnosis:  Primary MD:  Treatment: Melphalan Fludarabine/Cytoxan  Day +3 of Auto Transplant   Pt seen in outpatient infusion today. Labs drawn and reviewed. CBC:   Recent Labs     07/02/22  0903 07/03/22  0906 07/04/22  0845   WBC 8.2 2.4* 1.7*   HGB 9.6* 10.1* 10.5*   HCT 28.4* 30.6* 31.3*   MCV 91.2 91.7 90.3    174 177     BMP/Mag:  Recent Labs     07/02/22  0903 07/03/22  0906 07/04/22  0845    132* 133*   K 3.6 3.4* 3.7    94* 96*   CO2 27 30 29   PHOS 3.0 3.3 3.7   BUN 15 13 13   CREATININE 0.7 0.8 0.7   MG 2.10 1.90 2.10     Standing parameters for replacement for this patient:   1 unit of pack red blood cells for a hemoglobin < or equal to 7  1 pack of platelets for a platelet count less than or equal to 10  40 MeQ of Potassium administered for a potassium level less than or equal to 3.4  4g of Magnesium Sulfate for a magnesum level less than or equal to 1.4  No transfusions required for the above lab values. Urinalysis last done: 7/3/2022 Urinalysis next due: 7/11/2022    Chest X-Ray last done: 7/3/2022 Chest X-Ray next due: 7/11/2022    Symptoms addressed and reported to care team this date: Reports having no appetite and nausea. States she cannot eat anything. Total intake yesterday included some applesauce, chips, ice cream. 10 mg IV compazine and 8 mg PO zofran administered. Treatments this date: labs and 2000 ml IV Fluids. CVC line wiped with CHG. Reviewed medication schedule with pt and caregiver. Both able to verbalize all medications and schedule. Pt to be seen again tomorrow. Patient and caregiver verbalized understanding of discharge instructions including when and how to call the doctor and when to report  to the ER. Discharged ambulatory to home.     Manjula Maher RN
 94* 96*   CO2 27 30 29   PHOS 3.0 3.3 3.7   BUN 15 13 13   CREATININE 0.7 0.8 0.7   MG 2.10 1.90 2.10     LIVP:   Recent Labs     07/04/22  0845   AST 14*   ALT 22   BILIDIR <0.2   BILITOT 0.4   ALKPHOS 70     Uric Acid:    Recent Labs     07/04/22  0845   LABURIC 5.5     Coags:   Recent Labs     07/04/22  0845   PROTIME 13.7   INR 1.05        PROBLEM LIST:            1. Multiple myeloma   2. Dental disorder  3. HTN  4. Intestinal malabsorption (disorder)  5. Iron deficiency anemia due to chronic blood loss         TREATMENT:            1. RVD x 5 cycles:  - C1D1: 12/2021  - C5D1: 5/16/22     ASSESSMENT AND PLAN:            1. 1) Multiple myeloma, IgG Kappa, ISS 3: partial response   - t(14;20), gain of 1q21, monosomy 13.    - At diagnoses - M spike is 3.7, FLC ratio  132  - BMBx (12/7/21): 20% plasma cells & PET scan (1/12/22) showed diffuse bone marrow activity, no lytic lesions  - Started RVD on 12/27/21 and tolerated well   - M spike down to 0.5 and FLC ratio decreased to 15  - BMBx (3/18/22) after cycle 4: 40% cellularity and few percentage of clonal plasma cells   - Her transplant work up was delayed due to needing dental extraction and EGD   - EGD unremarkable and findings on PET scan are likely related to esophagitis   - Light chain trending up, will plan for 1-2 cycles of RVD prior to transplant   - TTE, PFTS, CXR normal  - Finished cycle 5 of RVD   - 6/10/22 Repeat bone marrow bx negative for plasma cells: Consistent with partial response      High dose Melphalan & Autologous SCT - Day + 3     2. ID: Afebrile, no evidence of infection.    - Cont Valtrex ppx (6/30/22)  - Start Diflucan ppx today (7/1/22)  - Start Levaquin ppx when [address] < 1.5     3. Heme: Labs pending 7/3/22, Anemia 2/2 chemotherapy & myeloma  - H/o CRYSTAL: Received IV iron (3/21/22 & 3/28/22)  - Transfuse for Hgb < 7 and Platelets < 29Q  - No transfusion today     4. Metabolic: Plan to give 2L NS today as oral intake is poor.     - Cont

## 2022-07-05 ENCOUNTER — HOSPITAL ENCOUNTER (OUTPATIENT)
Dept: ONCOLOGY | Age: 52
Setting detail: INFUSION SERIES
Discharge: HOME | End: 2022-07-05
Payer: COMMERCIAL

## 2022-07-05 VITALS
OXYGEN SATURATION: 99 % | DIASTOLIC BLOOD PRESSURE: 65 MMHG | HEIGHT: 66 IN | HEART RATE: 119 BPM | SYSTOLIC BLOOD PRESSURE: 109 MMHG | BODY MASS INDEX: 41.06 KG/M2 | WEIGHT: 255.51 LBS | RESPIRATION RATE: 18 BRPM | TEMPERATURE: 98.1 F

## 2022-07-05 DIAGNOSIS — C90.00 MULTIPLE MYELOMA, REMISSION STATUS UNSPECIFIED (HCC): ICD-10-CM

## 2022-07-05 DIAGNOSIS — Z52.011 AUTOLOGOUS DONOR OF STEM CELLS: Primary | ICD-10-CM

## 2022-07-05 LAB
ANION GAP SERPL CALCULATED.3IONS-SCNC: 8 MMOL/L (ref 3–16)
BASOPHILS ABSOLUTE: 0 K/UL (ref 0–0.2)
BASOPHILS RELATIVE PERCENT: 0.3 %
BUN BLDV-MCNC: 15 MG/DL (ref 7–20)
CALCIUM SERPL-MCNC: 9.1 MG/DL (ref 8.3–10.6)
CHLORIDE BLD-SCNC: 99 MMOL/L (ref 99–110)
CO2: 28 MMOL/L (ref 21–32)
CREAT SERPL-MCNC: 0.8 MG/DL (ref 0.6–1.1)
EOSINOPHILS ABSOLUTE: 0 K/UL (ref 0–0.6)
EOSINOPHILS RELATIVE PERCENT: 0.3 %
GFR AFRICAN AMERICAN: >60
GFR NON-AFRICAN AMERICAN: >60
GLUCOSE BLD-MCNC: 204 MG/DL (ref 70–99)
HCT VFR BLD CALC: 30.6 % (ref 36–48)
HEMOGLOBIN: 10.2 G/DL (ref 12–16)
LYMPHOCYTES ABSOLUTE: 0 K/UL (ref 1–5.1)
LYMPHOCYTES RELATIVE PERCENT: 0.8 %
MAGNESIUM: 2.1 MG/DL (ref 1.8–2.4)
MCH RBC QN AUTO: 30.3 PG (ref 26–34)
MCHC RBC AUTO-ENTMCNC: 33.3 G/DL (ref 31–36)
MCV RBC AUTO: 91 FL (ref 80–100)
MONOCYTES ABSOLUTE: 0 K/UL (ref 0–1.3)
MONOCYTES RELATIVE PERCENT: 0.5 %
NEUTROPHILS ABSOLUTE: 3.9 K/UL (ref 1.7–7.7)
NEUTROPHILS RELATIVE PERCENT: 98.1 %
PDW BLD-RTO: 16.2 % (ref 12.4–15.4)
PHOSPHORUS: 2.8 MG/DL (ref 2.5–4.9)
PLATELET # BLD: 116 K/UL (ref 135–450)
PMV BLD AUTO: 7.1 FL (ref 5–10.5)
POTASSIUM SERPL-SCNC: 3.6 MMOL/L (ref 3.5–5.1)
RBC # BLD: 3.36 M/UL (ref 4–5.2)
SODIUM BLD-SCNC: 135 MMOL/L (ref 136–145)
URIC ACID, SERUM: 5.8 MG/DL (ref 2.6–6)
WBC # BLD: 4 K/UL (ref 4–11)

## 2022-07-05 PROCEDURE — 96361 HYDRATE IV INFUSION ADD-ON: CPT

## 2022-07-05 PROCEDURE — 85025 COMPLETE CBC W/AUTO DIFF WBC: CPT

## 2022-07-05 PROCEDURE — 83735 ASSAY OF MAGNESIUM: CPT

## 2022-07-05 PROCEDURE — 84100 ASSAY OF PHOSPHORUS: CPT

## 2022-07-05 PROCEDURE — 6360000002 HC RX W HCPCS: Performed by: NURSE PRACTITIONER

## 2022-07-05 PROCEDURE — 2580000003 HC RX 258: Performed by: NURSE PRACTITIONER

## 2022-07-05 PROCEDURE — 84550 ASSAY OF BLOOD/URIC ACID: CPT

## 2022-07-05 PROCEDURE — 36592 COLLECT BLOOD FROM PICC: CPT

## 2022-07-05 PROCEDURE — 96360 HYDRATION IV INFUSION INIT: CPT

## 2022-07-05 PROCEDURE — 80048 BASIC METABOLIC PNL TOTAL CA: CPT

## 2022-07-05 RX ORDER — OXYCODONE HYDROCHLORIDE 5 MG/1
10 TABLET ORAL EVERY 4 HOURS PRN
Status: CANCELLED | OUTPATIENT
Start: 2022-07-06

## 2022-07-05 RX ORDER — MAGNESIUM SULFATE IN WATER 40 MG/ML
4000 INJECTION, SOLUTION INTRAVENOUS PRN
Status: DISCONTINUED | OUTPATIENT
Start: 2022-07-05 | End: 2022-07-06 | Stop reason: HOSPADM

## 2022-07-05 RX ORDER — PROCHLORPERAZINE MALEATE 10 MG
10 TABLET ORAL EVERY 6 HOURS PRN
Status: DISCONTINUED | OUTPATIENT
Start: 2022-07-05 | End: 2022-07-06 | Stop reason: HOSPADM

## 2022-07-05 RX ORDER — OXYCODONE HYDROCHLORIDE 5 MG/1
5 TABLET ORAL EVERY 4 HOURS PRN
Status: DISCONTINUED | OUTPATIENT
Start: 2022-07-05 | End: 2022-07-06 | Stop reason: HOSPADM

## 2022-07-05 RX ORDER — ONDANSETRON 4 MG/1
8 TABLET, FILM COATED ORAL EVERY 8 HOURS PRN
Status: DISCONTINUED | OUTPATIENT
Start: 2022-07-05 | End: 2022-07-06 | Stop reason: HOSPADM

## 2022-07-05 RX ORDER — ONDANSETRON 2 MG/ML
8 INJECTION INTRAMUSCULAR; INTRAVENOUS EVERY 8 HOURS PRN
Status: CANCELLED | OUTPATIENT
Start: 2022-07-06

## 2022-07-05 RX ORDER — 0.9 % SODIUM CHLORIDE 0.9 %
1000 INTRAVENOUS SOLUTION INTRAVENOUS ONCE
Status: COMPLETED | OUTPATIENT
Start: 2022-07-05 | End: 2022-07-05

## 2022-07-05 RX ORDER — PROCHLORPERAZINE EDISYLATE 5 MG/ML
10 INJECTION INTRAMUSCULAR; INTRAVENOUS EVERY 6 HOURS PRN
Status: CANCELLED | OUTPATIENT
Start: 2022-07-06

## 2022-07-05 RX ORDER — SODIUM CHLORIDE 9 MG/ML
INJECTION, SOLUTION INTRAVENOUS CONTINUOUS PRN
Status: CANCELLED | OUTPATIENT
Start: 2022-07-06

## 2022-07-05 RX ORDER — PROCHLORPERAZINE EDISYLATE 5 MG/ML
10 INJECTION INTRAMUSCULAR; INTRAVENOUS EVERY 6 HOURS PRN
Status: DISCONTINUED | OUTPATIENT
Start: 2022-07-05 | End: 2022-07-06 | Stop reason: HOSPADM

## 2022-07-05 RX ORDER — SODIUM CHLORIDE 9 MG/ML
INJECTION, SOLUTION INTRAVENOUS CONTINUOUS PRN
Status: DISCONTINUED | OUTPATIENT
Start: 2022-07-05 | End: 2022-07-06 | Stop reason: HOSPADM

## 2022-07-05 RX ORDER — HEPARIN SODIUM (PORCINE) LOCK FLUSH IV SOLN 100 UNIT/ML 100 UNIT/ML
500 SOLUTION INTRAVENOUS PRN
Status: DISCONTINUED | OUTPATIENT
Start: 2022-07-05 | End: 2022-07-06 | Stop reason: HOSPADM

## 2022-07-05 RX ORDER — MAGNESIUM SULFATE IN WATER 40 MG/ML
4000 INJECTION, SOLUTION INTRAVENOUS PRN
Status: CANCELLED | OUTPATIENT
Start: 2022-07-06

## 2022-07-05 RX ORDER — PETROLATUM, MENTHOL, UNSPECIFIED FORM, CAMPHOR (SYNTHETIC), AND PHENOL 59.14; 1; 1; .6 G/100G; G/100G; G/100G; G/100G
PASTE TOPICAL PRN
Status: CANCELLED | OUTPATIENT
Start: 2022-07-06

## 2022-07-05 RX ORDER — POTASSIUM CHLORIDE 20 MEQ/1
40 TABLET, EXTENDED RELEASE ORAL PRN
Status: CANCELLED | OUTPATIENT
Start: 2022-07-06

## 2022-07-05 RX ORDER — ONDANSETRON 4 MG/1
8 TABLET, FILM COATED ORAL EVERY 8 HOURS PRN
Status: CANCELLED | OUTPATIENT
Start: 2022-07-06

## 2022-07-05 RX ORDER — POTASSIUM CHLORIDE 29.8 MG/ML
80 INJECTION INTRAVENOUS PRN
Status: CANCELLED | OUTPATIENT
Start: 2022-07-06

## 2022-07-05 RX ORDER — DIMETHICONE, CAMPHOR (SYNTHETIC), MENTHOL, AND PHENOL 1.1; .5; .625; .5 G/100G; G/100G; G/100G; G/100G
OINTMENT TOPICAL PRN
Status: DISCONTINUED | OUTPATIENT
Start: 2022-07-05 | End: 2022-07-06 | Stop reason: HOSPADM

## 2022-07-05 RX ORDER — POTASSIUM CHLORIDE 20 MEQ/1
40 TABLET, EXTENDED RELEASE ORAL PRN
Status: DISCONTINUED | OUTPATIENT
Start: 2022-07-05 | End: 2022-07-06 | Stop reason: HOSPADM

## 2022-07-05 RX ORDER — OXYCODONE HYDROCHLORIDE 5 MG/1
5 TABLET ORAL EVERY 4 HOURS PRN
Status: CANCELLED | OUTPATIENT
Start: 2022-07-06

## 2022-07-05 RX ORDER — OXYCODONE HYDROCHLORIDE 5 MG/1
10 TABLET ORAL EVERY 4 HOURS PRN
Status: DISCONTINUED | OUTPATIENT
Start: 2022-07-05 | End: 2022-07-06 | Stop reason: HOSPADM

## 2022-07-05 RX ORDER — POTASSIUM CHLORIDE 29.8 MG/ML
20 INJECTION INTRAVENOUS PRN
Status: DISCONTINUED | OUTPATIENT
Start: 2022-07-05 | End: 2022-07-06 | Stop reason: HOSPADM

## 2022-07-05 RX ORDER — ONDANSETRON 2 MG/ML
8 INJECTION INTRAMUSCULAR; INTRAVENOUS EVERY 8 HOURS PRN
Status: DISCONTINUED | OUTPATIENT
Start: 2022-07-05 | End: 2022-07-06 | Stop reason: HOSPADM

## 2022-07-05 RX ORDER — 0.9 % SODIUM CHLORIDE 0.9 %
1000 INTRAVENOUS SOLUTION INTRAVENOUS ONCE
Status: CANCELLED | OUTPATIENT
Start: 2022-07-06 | End: 2022-07-06

## 2022-07-05 RX ORDER — PROCHLORPERAZINE MALEATE 10 MG
10 TABLET ORAL EVERY 6 HOURS PRN
Status: CANCELLED | OUTPATIENT
Start: 2022-07-06

## 2022-07-05 RX ORDER — HEPARIN SODIUM (PORCINE) LOCK FLUSH IV SOLN 100 UNIT/ML 100 UNIT/ML
500 SOLUTION INTRAVENOUS PRN
Status: CANCELLED | OUTPATIENT
Start: 2022-07-06

## 2022-07-05 RX ADMIN — SODIUM CHLORIDE, PRESERVATIVE FREE 500 UNITS: 5 INJECTION INTRAVENOUS at 10:59

## 2022-07-05 RX ADMIN — SODIUM CHLORIDE 1000 ML: 9 INJECTION, SOLUTION INTRAVENOUS at 08:56

## 2022-07-05 ASSESSMENT — PAIN SCALES - GENERAL: PAINLEVEL_OUTOF10: 0

## 2022-07-05 NOTE — PROGRESS NOTES
[facility]  Autologous Progress Note    2022    Renzo Coe    :  1970    MRN:  [id number]    Referring MD: Patito Sharpe APRN - NP  [place],  [address]      Subjective:  Feeling remarkably better today after being consistent with nausea medicine, starting PPI, and having BM this morning. ECOG PS:  (1) Restricted in physically strenuous activity, ambulatory and able to do work of light nature    KPS: 80% Normal activity with effort; some signs or symptoms of disease    Isolation:  None     Medications    Scheduled Meds:  Continuous Infusions:  PRN Meds:. ROS:  As noted above, otherwise remainder of 10-point ROS negative    Physical Exam:     I&O:  No intake or output data in the 24 hours ending 22 1039    Vital Signs:  /65   Pulse (!) 119   Temp 98.1 °F (36.7 °C) (Oral)   Resp 18   Ht 5' 5.55\" (1.665 m)   Wt 255 lb 8.2 oz (115.9 kg)   SpO2 99%   BMI 41.81 kg/m²     Weight:    Wt Readings from Last 3 Encounters:   22 255 lb 8.2 oz (115.9 kg)   22 260 lb 5.8 oz (118.1 kg)   22 266 lb 15.6 oz (121.1 kg)       General: Awake, alert and oriented.   HEENT: Normocephalic, atraumatic, poor dentition, many teeth requiring extraction  NECK: supple without palpable adenopathy  BACK: Straight negative CVAT  SKIN: warm dry and intact without lesions rashes or masses  CHEST: CTA bilaterally without use of accessory muscles  CV: Normal S1 S2, RRR, no MRG  ABD: NT ND normoactive BS, no palpable masses or hepatosplenomegaly  EXTREMITIES: without edema, denies calf tenderness  NEURO: CN II - XII grossly intact  CATHETER: Left Subclavian Trifusion (GS: Sofie, 22)     Data:   CBC:   Recent Labs     22  0906 22  0845 22  0904   WBC 2.4* 1.7* 4.0   HGB 10.1* 10.5* 10.2*   HCT 30.6* 31.3* 30.6*   MCV 91.7 90.3 91.0    177 116*     BMP/Mag:  Recent Labs     22  0906 22  0845 22  0904   NA 132* 133* 135*   K 3.4* 3.7 3.6   CL 94* 96* 99   CO2 30 29 28   PHOS 3.3 3.7 2.8   BUN 13 13 15   CREATININE 0.8 0.7 0.8   MG 1.90 2.10 2.10     LIVP:   Recent Labs     07/04/22  0845   AST 14*   ALT 22   BILIDIR <0.2   BILITOT 0.4   ALKPHOS 70     Uric Acid:    Recent Labs     07/05/22  0904   LABURIC 5.8     Coags:   Recent Labs     07/04/22  0845   PROTIME 13.7   INR 1.05        PROBLEM LIST:            1. Multiple myeloma   2. Dental disorder  3. HTN  4. Intestinal malabsorption (disorder)  5. Iron deficiency anemia due to chronic blood loss         TREATMENT:            1. RVD x 5 cycles:  - C1D1: 12/2021  - C5D1: 5/16/22   2. High Dose Melphalan f/b autologous SCT 7/1/22     ASSESSMENT AND PLAN:            1. 1) Multiple myeloma, IgG Kappa, ISS 3: partial response   - t(14;20), gain of 1q21, monosomy 13.    - At diagnoses - M spike is 3.7, FLC ratio  132  - BMBx (12/7/21): 20% plasma cells & PET scan (1/12/22) showed diffuse bone marrow activity, no lytic lesions  - Started RVD on 12/27/21 and tolerated well   - M spike down to 0.5 and FLC ratio decreased to 15  - BMBx (3/18/22) after cycle 4: 40% cellularity and few percentage of clonal plasma cells   - Her transplant work up was delayed due to needing dental extraction and EGD   - EGD unremarkable and findings on PET scan are likely related to esophagitis   - Light chain trending up, will plan for 1-2 cycles of RVD prior to transplant   - TTE, PFTS, CXR normal  - Finished cycle 5 of RVD   - 6/10/22 Repeat bone marrow bx negative for plasma cells: Consistent with partial response      High dose Melphalan & Autologous SCT - Day +4     2. ID: Afebrile, no evidence of infection.    - Cont Valtrex & Diflucan ppx  - Start Levaquin ppx when ANC < 1.5     3. Heme: Anemia & Thrombocytopenia 2/2 chemotherapy  - H/o CRYSTAL: Received IV iron (3/21/22 & 3/28/22)  - Transfuse for Hgb < 7 and Platelets < 94Z  - No transfusion today  - Start daily GCSF 7/6/22     4. Metabolic: HypoNa, Hyperglycemia  - Cont daily IVFs: PO intake much better, will give just 1L supplemental IVFs today  - Replace potassium and magnesium per PRN orders     5. GI / Nutrition:  Appetite and oral intake is good. - Cont low microbial diet   - Follow closely with dietary     6. Poor dentition  - completed partial extractions       - Disposition: Will return to OP Infusion daily for toxicity assessment, labs and MD visit.        SHY Sanches - CNP

## 2022-07-05 NOTE — PROGRESS NOTES
Short Stay Communication Note  Yaya Bautista  Diagnosis: Multiple Myeloma  Primary MD: Josiah Morton  Treatment: Melphalan   Day +4 of Auto Transplant   Pt seen in outpatient infusion today. Labs drawn and reviewed. CBC: Recent Labs     07/03/22  0906 07/04/22  0845 07/05/22  0904   WBC 2.4* 1.7* 4.0   HGB 10.1* 10.5* 10.2*   HCT 30.6* 31.3* 30.6*   MCV 91.7 90.3 91.0    177 116*     BMP/Mag:  Recent Labs     07/03/22  0906 07/04/22  0845 07/05/22  0904   * 133* 135*   K 3.4* 3.7 3.6   CL 94* 96* 99   CO2 30 29 28   PHOS 3.3 3.7 2.8   BUN 13 13 15   CREATININE 0.8 0.7 0.8   MG 1.90 2.10 2.10     Standing parameters for replacement for this patient:   1 unit of pack red blood cells for a hemoglobin < or equal to 7  1 pack of platelets for a platelet count < or equal to 10  40 MeQ of Potassium administered for a potassium level <or equal to 3.4  4g of Magnesium Sulfate for a magnesum level < or equal to 1.4  No transfusions required for the above lab values. Urinalysis last done: 7/3/2022 Urinalysis next due: 7/10/2022    Chest X-Ray last done: 7/3/2022 Chest X-Ray next due: 7/3/2022    Symptoms addressed and reported to care team this date: none  Treatments this date: IV Fluids    Reviewed medication schedule with pt and caregiver. Both able to verbalize all medications and schedule. Pt to be seen again tomorrow. Patient and caregiver verbalized understanding of discharge instructions including when and how to call the doctor and when to report  to the ER. Discharged ambulatory to home.     Electronically signed by Colten Sparks RN on 7/5/2022 at 11:57 AM

## 2022-07-06 ENCOUNTER — HOSPITAL ENCOUNTER (OUTPATIENT)
Dept: ONCOLOGY | Age: 52
Setting detail: INFUSION SERIES
Discharge: HOME | End: 2022-07-06
Payer: COMMERCIAL

## 2022-07-06 VITALS
OXYGEN SATURATION: 99 % | RESPIRATION RATE: 20 BRPM | TEMPERATURE: 97.9 F | HEIGHT: 66 IN | DIASTOLIC BLOOD PRESSURE: 82 MMHG | BODY MASS INDEX: 40.78 KG/M2 | WEIGHT: 253.75 LBS | HEART RATE: 99 BPM | SYSTOLIC BLOOD PRESSURE: 134 MMHG

## 2022-07-06 DIAGNOSIS — C90.00 MULTIPLE MYELOMA, REMISSION STATUS UNSPECIFIED (HCC): ICD-10-CM

## 2022-07-06 DIAGNOSIS — Z52.011 AUTOLOGOUS DONOR OF STEM CELLS: Primary | ICD-10-CM

## 2022-07-06 LAB
ALBUMIN SERPL-MCNC: 4 G/DL (ref 3.4–5)
ALP BLD-CCNC: 79 U/L (ref 40–129)
ALT SERPL-CCNC: 32 U/L (ref 10–40)
ANION GAP SERPL CALCULATED.3IONS-SCNC: 8 MMOL/L (ref 3–16)
ANISOCYTOSIS: ABNORMAL
AST SERPL-CCNC: 21 U/L (ref 15–37)
BILIRUB SERPL-MCNC: 0.6 MG/DL (ref 0–1)
BILIRUBIN DIRECT: <0.2 MG/DL (ref 0–0.3)
BILIRUBIN, INDIRECT: NORMAL MG/DL (ref 0–1)
BUN BLDV-MCNC: 12 MG/DL (ref 7–20)
CALCIUM SERPL-MCNC: 9.1 MG/DL (ref 8.3–10.6)
CHLORIDE BLD-SCNC: 98 MMOL/L (ref 99–110)
CO2: 27 MMOL/L (ref 21–32)
CREAT SERPL-MCNC: 0.7 MG/DL (ref 0.6–1.1)
GFR AFRICAN AMERICAN: >60
GFR NON-AFRICAN AMERICAN: >60
GLUCOSE BLD-MCNC: 191 MG/DL (ref 70–99)
HCT VFR BLD CALC: 29.1 % (ref 36–48)
HEMOGLOBIN: 9.6 G/DL (ref 12–16)
LACTATE DEHYDROGENASE: 193 U/L (ref 100–190)
MAGNESIUM: 2.1 MG/DL (ref 1.8–2.4)
MCH RBC QN AUTO: 29.8 PG (ref 26–34)
MCHC RBC AUTO-ENTMCNC: 32.9 G/DL (ref 31–36)
MCV RBC AUTO: 90.5 FL (ref 80–100)
PDW BLD-RTO: 15.6 % (ref 12.4–15.4)
PHOSPHORUS: 3.1 MG/DL (ref 2.5–4.9)
PLATELET # BLD: 98 K/UL (ref 135–450)
PLATELET SLIDE REVIEW: ABNORMAL
PMV BLD AUTO: 6.8 FL (ref 5–10.5)
POTASSIUM SERPL-SCNC: 3.4 MMOL/L (ref 3.5–5.1)
RBC # BLD: 3.21 M/UL (ref 4–5.2)
SODIUM BLD-SCNC: 133 MMOL/L (ref 136–145)
TOTAL PROTEIN: 7 G/DL (ref 6.4–8.2)
URIC ACID, SERUM: 5.3 MG/DL (ref 2.6–6)
WBC # BLD: 0.5 K/UL (ref 4–11)

## 2022-07-06 PROCEDURE — 80076 HEPATIC FUNCTION PANEL: CPT

## 2022-07-06 PROCEDURE — 96360 HYDRATION IV INFUSION INIT: CPT

## 2022-07-06 PROCEDURE — 83735 ASSAY OF MAGNESIUM: CPT

## 2022-07-06 PROCEDURE — 96361 HYDRATE IV INFUSION ADD-ON: CPT

## 2022-07-06 PROCEDURE — 96372 THER/PROPH/DIAG INJ SC/IM: CPT

## 2022-07-06 PROCEDURE — 85025 COMPLETE CBC W/AUTO DIFF WBC: CPT

## 2022-07-06 PROCEDURE — 6360000002 HC RX W HCPCS: Performed by: NURSE PRACTITIONER

## 2022-07-06 PROCEDURE — 84100 ASSAY OF PHOSPHORUS: CPT

## 2022-07-06 PROCEDURE — 2580000003 HC RX 258: Performed by: NURSE PRACTITIONER

## 2022-07-06 PROCEDURE — 83615 LACTATE (LD) (LDH) ENZYME: CPT

## 2022-07-06 PROCEDURE — 84550 ASSAY OF BLOOD/URIC ACID: CPT

## 2022-07-06 PROCEDURE — 36592 COLLECT BLOOD FROM PICC: CPT

## 2022-07-06 PROCEDURE — 80048 BASIC METABOLIC PNL TOTAL CA: CPT

## 2022-07-06 RX ORDER — HEPARIN SODIUM (PORCINE) LOCK FLUSH IV SOLN 100 UNIT/ML 100 UNIT/ML
500 SOLUTION INTRAVENOUS PRN
Status: DISCONTINUED | OUTPATIENT
Start: 2022-07-06 | End: 2022-07-07 | Stop reason: HOSPADM

## 2022-07-06 RX ORDER — LEVOFLOXACIN 500 MG/1
500 TABLET, FILM COATED ORAL DAILY
Qty: 30 TABLET | Refills: 0 | Status: ON HOLD
Start: 2022-07-06 | End: 2022-07-13 | Stop reason: HOSPADM

## 2022-07-06 RX ORDER — 0.9 % SODIUM CHLORIDE 0.9 %
1000 INTRAVENOUS SOLUTION INTRAVENOUS ONCE
Status: COMPLETED | OUTPATIENT
Start: 2022-07-06 | End: 2022-07-06

## 2022-07-06 RX ORDER — POTASSIUM CHLORIDE 20 MEQ/1
40 TABLET, EXTENDED RELEASE ORAL PRN
Status: CANCELLED | OUTPATIENT
Start: 2022-07-07

## 2022-07-06 RX ORDER — PROCHLORPERAZINE MALEATE 10 MG
10 TABLET ORAL EVERY 6 HOURS PRN
Status: CANCELLED | OUTPATIENT
Start: 2022-07-07

## 2022-07-06 RX ORDER — OXYCODONE HYDROCHLORIDE 5 MG/1
5 TABLET ORAL EVERY 4 HOURS PRN
Status: CANCELLED | OUTPATIENT
Start: 2022-07-07

## 2022-07-06 RX ORDER — DIMETHICONE, CAMPHOR (SYNTHETIC), MENTHOL, AND PHENOL 1.1; .5; .625; .5 G/100G; G/100G; G/100G; G/100G
OINTMENT TOPICAL PRN
Status: DISCONTINUED | OUTPATIENT
Start: 2022-07-06 | End: 2022-07-07 | Stop reason: HOSPADM

## 2022-07-06 RX ORDER — OXYCODONE HYDROCHLORIDE 5 MG/1
10 TABLET ORAL EVERY 4 HOURS PRN
Status: DISCONTINUED | OUTPATIENT
Start: 2022-07-06 | End: 2022-07-07 | Stop reason: HOSPADM

## 2022-07-06 RX ORDER — SODIUM CHLORIDE 9 MG/ML
INJECTION, SOLUTION INTRAVENOUS CONTINUOUS PRN
Status: CANCELLED | OUTPATIENT
Start: 2022-07-07

## 2022-07-06 RX ORDER — ONDANSETRON 2 MG/ML
8 INJECTION INTRAMUSCULAR; INTRAVENOUS EVERY 8 HOURS PRN
Status: DISCONTINUED | OUTPATIENT
Start: 2022-07-06 | End: 2022-07-07 | Stop reason: HOSPADM

## 2022-07-06 RX ORDER — ONDANSETRON 2 MG/ML
8 INJECTION INTRAMUSCULAR; INTRAVENOUS EVERY 8 HOURS PRN
Status: CANCELLED | OUTPATIENT
Start: 2022-07-07

## 2022-07-06 RX ORDER — OXYCODONE HYDROCHLORIDE 5 MG/1
5 TABLET ORAL EVERY 4 HOURS PRN
Status: DISCONTINUED | OUTPATIENT
Start: 2022-07-06 | End: 2022-07-07 | Stop reason: HOSPADM

## 2022-07-06 RX ORDER — MAGNESIUM SULFATE IN WATER 40 MG/ML
4000 INJECTION, SOLUTION INTRAVENOUS PRN
Status: CANCELLED | OUTPATIENT
Start: 2022-07-07

## 2022-07-06 RX ORDER — POTASSIUM CHLORIDE 29.8 MG/ML
80 INJECTION INTRAVENOUS PRN
Status: DISCONTINUED | OUTPATIENT
Start: 2022-07-06 | End: 2022-07-07 | Stop reason: HOSPADM

## 2022-07-06 RX ORDER — PETROLATUM, MENTHOL, UNSPECIFIED FORM, CAMPHOR (SYNTHETIC), AND PHENOL 59.14; 1; 1; .6 G/100G; G/100G; G/100G; G/100G
PASTE TOPICAL PRN
Status: CANCELLED | OUTPATIENT
Start: 2022-07-07

## 2022-07-06 RX ORDER — PROCHLORPERAZINE EDISYLATE 5 MG/ML
10 INJECTION INTRAMUSCULAR; INTRAVENOUS EVERY 6 HOURS PRN
Status: CANCELLED | OUTPATIENT
Start: 2022-07-07

## 2022-07-06 RX ORDER — ONDANSETRON 4 MG/1
8 TABLET, FILM COATED ORAL EVERY 8 HOURS PRN
Status: DISCONTINUED | OUTPATIENT
Start: 2022-07-06 | End: 2022-07-07 | Stop reason: HOSPADM

## 2022-07-06 RX ORDER — ONDANSETRON 4 MG/1
8 TABLET, FILM COATED ORAL EVERY 8 HOURS PRN
Status: CANCELLED | OUTPATIENT
Start: 2022-07-07

## 2022-07-06 RX ORDER — POTASSIUM CHLORIDE 20 MEQ/1
40 TABLET, EXTENDED RELEASE ORAL PRN
Status: DISCONTINUED | OUTPATIENT
Start: 2022-07-06 | End: 2022-07-07 | Stop reason: HOSPADM

## 2022-07-06 RX ORDER — PROCHLORPERAZINE MALEATE 10 MG
10 TABLET ORAL EVERY 6 HOURS PRN
Status: DISCONTINUED | OUTPATIENT
Start: 2022-07-06 | End: 2022-07-07 | Stop reason: HOSPADM

## 2022-07-06 RX ORDER — OXYCODONE HYDROCHLORIDE 5 MG/1
10 TABLET ORAL EVERY 4 HOURS PRN
Status: CANCELLED | OUTPATIENT
Start: 2022-07-07

## 2022-07-06 RX ORDER — SODIUM CHLORIDE 9 MG/ML
INJECTION, SOLUTION INTRAVENOUS CONTINUOUS PRN
Status: DISCONTINUED | OUTPATIENT
Start: 2022-07-06 | End: 2022-07-07 | Stop reason: HOSPADM

## 2022-07-06 RX ORDER — 0.9 % SODIUM CHLORIDE 0.9 %
1000 INTRAVENOUS SOLUTION INTRAVENOUS ONCE
Status: CANCELLED | OUTPATIENT
Start: 2022-07-07 | End: 2022-07-07

## 2022-07-06 RX ORDER — HEPARIN SODIUM (PORCINE) LOCK FLUSH IV SOLN 100 UNIT/ML 100 UNIT/ML
500 SOLUTION INTRAVENOUS PRN
Status: CANCELLED | OUTPATIENT
Start: 2022-07-07

## 2022-07-06 RX ORDER — MAGNESIUM SULFATE IN WATER 40 MG/ML
4000 INJECTION, SOLUTION INTRAVENOUS PRN
Status: DISCONTINUED | OUTPATIENT
Start: 2022-07-06 | End: 2022-07-07 | Stop reason: HOSPADM

## 2022-07-06 RX ORDER — PROCHLORPERAZINE EDISYLATE 5 MG/ML
10 INJECTION INTRAMUSCULAR; INTRAVENOUS EVERY 6 HOURS PRN
Status: DISCONTINUED | OUTPATIENT
Start: 2022-07-06 | End: 2022-07-07 | Stop reason: HOSPADM

## 2022-07-06 RX ORDER — POTASSIUM CHLORIDE 29.8 MG/ML
80 INJECTION INTRAVENOUS PRN
Status: CANCELLED | OUTPATIENT
Start: 2022-07-07

## 2022-07-06 RX ADMIN — SODIUM CHLORIDE, PRESERVATIVE FREE 500 UNITS: 5 INJECTION INTRAVENOUS at 11:11

## 2022-07-06 RX ADMIN — SODIUM CHLORIDE, PRESERVATIVE FREE 500 UNITS: 5 INJECTION INTRAVENOUS at 11:10

## 2022-07-06 RX ADMIN — FILGRASTIM-AAFI 300 MCG: 480 INJECTION, SOLUTION SUBCUTANEOUS at 10:49

## 2022-07-06 RX ADMIN — SODIUM CHLORIDE, PRESERVATIVE FREE 500 UNITS: 5 INJECTION INTRAVENOUS at 11:09

## 2022-07-06 RX ADMIN — SODIUM CHLORIDE 1000 ML: 9 INJECTION, SOLUTION INTRAVENOUS at 08:57

## 2022-07-06 ASSESSMENT — PAIN SCALES - GENERAL: PAINLEVEL_OUTOF10: 0

## 2022-07-06 NOTE — PROGRESS NOTES
[facility]  [facility]    2022    Han Cook    :  1970    MRN:  [id number]    Referring MD: Eliezer Sears, [address],  [address]      Subjective: Feels unsure about eating choices, will talk to dietitian today. Denies fever, nausea relieved by Zofran and Compazine, trying to eat as much as she can. Feeling very tired today. ECOG PS:  (1) Restricted in physically strenuous activity, ambulatory and able to do work of light nature    KPS: 80% Normal activity with effort; some signs or symptoms of disease    Isolation:  None     Medications    Scheduled Meds:  Continuous Infusions:  PRN Meds:. ROS:  As noted above, otherwise remainder of 10-point ROS negative    Physical Exam:     I&O:  No intake or output data in the 24 hours ending 22 0836    Vital Signs: There were no vitals taken for this visit. Weight:    Wt Readings from Last 3 Encounters:   22 255 lb 8.2 oz (115.9 kg)   22 260 lb 5.8 oz (118.1 kg)   22 266 lb 15.6 oz (121.1 kg)       General: Awake, alert and oriented.   HEENT: Normocephalic, atraumatic, poor dentition, many teeth requiring extraction  NECK: supple without palpable adenopathy  BACK: Straight negative CVAT  SKIN: warm dry and intact without lesions rashes or masses  CHEST: CTA bilaterally without use of accessory muscles  CV: Normal S1 S2, RRR, no MRG  ABD: NT ND normoactive BS, no palpable masses or hepatosplenomegaly  EXTREMITIES: without edema, denies calf tenderness  NEURO: CN II - XII grossly intact  CATHETER: Left Subclavian Trifusion (GS: Sofie, 22)     Data:   CBC:   Recent Labs     22  0906 22  0845 22  0904   WBC 2.4* 1.7* 4.0   HGB 10.1* 10.5* 10.2*   HCT 30.6* 31.3* 30.6*   MCV 91.7 90.3 91.0    177 116*     BMP/Mag:  Recent Labs     22  0906 22  0845 22  0904   * 133* 135*   K 3.4* 3.7 3.6   CL 94* 96* 99   CO2 30 29 28 PHOS 3.3 3.7 2.8   BUN 13 13 15   CREATININE 0.8 0.7 0.8   MG 1.90 2.10 2.10     LIVP:   Recent Labs     07/04/22  0845   AST 14*   ALT 22   BILIDIR <0.2   BILITOT 0.4   ALKPHOS 70     Uric Acid:    Recent Labs     07/05/22  0904   LABURIC 5.8     Coags:   Recent Labs     07/04/22  0845   PROTIME 13.7   INR 1.05        PROBLEM LIST:            1. Multiple myeloma   2. Dental disorder  3. HTN  4. Intestinal malabsorption (disorder)  5. Iron deficiency anemia due to chronic blood loss         TREATMENT:            1. RVD x 5 cycles:  - C1D1: 12/2021  - C5D1: 5/16/22   2. High Dose Melphalan f/b autologous SCT 7/1/22     ASSESSMENT AND PLAN:            1. 1) Multiple myeloma, IgG Kappa, ISS 3: partial response   - t(14;20), gain of 1q21, monosomy 13.    - At diagnoses - M spike is 3.7, FLC ratio  132  - BMBx (12/7/21): 20% plasma cells & PET scan (1/12/22) showed diffuse bone marrow activity, no lytic lesions  - Started RVD on 12/27/21 and tolerated well   - M spike down to 0.5 and FLC ratio decreased to 15  - BMBx (3/18/22) after cycle 4: 40% cellularity and few percentage of clonal plasma cells   - Her transplant work up was delayed due to needing dental extraction and EGD   - EGD unremarkable and findings on PET scan are likely related to esophagitis   - Light chain trending up, will plan for 1-2 cycles of RVD prior to transplant   - TTE, PFTS, CXR normal  - Finished cycle 5 of RVD   - 6/10/22 Repeat bone marrow bx negative for plasma cells: Consistent with partial response      High dose Melphalan & Autologous SCT - Day + 5     2. ID: Afebrile, no evidence of infection.    - Cont Valtrex & Diflucan ppx  - Start Levaquin ppx when ANC < 1.5     3. Heme: Anemia & Thrombocytopenia 2/2 chemotherapy  - H/o CRYSTAL: Received IV iron (3/21/22 & 3/28/22)  - Transfuse for Hgb < 7 and Platelets < 72R  - No transfusion today  - Start daily GCSF 7/6/22     4.  Metabolic: HypoNa, Hyperglycemia  - Cont daily IVFs: PO intake much Yes - the patient is able to be screened

## 2022-07-06 NOTE — PROGRESS NOTES
Short Stay Communication Note  Syed Montes  Diagnosis: Multiple Myeloma  Primary MD: Dr. Loreto Gonzáles  Treatment: Melphalan   Day +5 of Auto Transplant   Pt seen in outpatient infusion today. Labs drawn and reviewed. CBC: Recent Labs     07/04/22  0845 07/05/22  0904 07/06/22  0909   WBC 1.7* 4.0 0.5*   HGB 10.5* 10.2* 9.6*   HCT 31.3* 30.6* 29.1*   MCV 90.3 91.0 90.5    116* 98*     BMP/Mag:  Recent Labs     07/04/22  0845 07/05/22  0904 07/06/22  0909   * 135* 133*   K 3.7 3.6 3.4*   CL 96* 99 98*   CO2 29 28 27   PHOS 3.7 2.8 3.1   BUN 13 15 12   CREATININE 0.7 0.8 0.7   MG 2.10 2.10 2.10     Standing parameters for replacement for this patient:   1 unit of pack red blood cells for a hemoglobin < or equal to 7  1 pack of platelets for a platelet count < or equal to 10  40 MeQ of Potassium administered for a potassium level < or equal to 3.4  4g of Magnesium Sulfate for a magnesum level < or equal to 1.4  No transfusions required for the above lab values. Urinalysis last done: 7/3/2022 Urinalysis next due: 7/10/2022    Chest X-Ray last done: 7/3/2022 Chest X-Ray next due: 7/10/2022    Symptoms addressed and reported to care team this date: diet, education packet given to patient. Treatments this date: IV Fluids, lab draw. Pt needs potassium replacement. Stated she would take ordered Potassium at home. Reviewed medication schedule with pt and caregiver. Both able to verbalize all medications and schedule. Pt to be seen again tomorrow. Patient and caregiver verbalized understanding of discharge instructions including when and how to call the doctor and when to report  to the ER. Discharged ambulatory to home.     Electronically signed by Samy Álvarez RN on 7/6/2022 at 11:16 AM

## 2022-07-07 ENCOUNTER — HOSPITAL ENCOUNTER (OUTPATIENT)
Dept: ONCOLOGY | Age: 52
Setting detail: INFUSION SERIES
Discharge: HOME | End: 2022-07-07
Payer: COMMERCIAL

## 2022-07-07 VITALS
RESPIRATION RATE: 18 BRPM | SYSTOLIC BLOOD PRESSURE: 133 MMHG | BODY MASS INDEX: 40.14 KG/M2 | OXYGEN SATURATION: 99 % | TEMPERATURE: 98.7 F | HEIGHT: 66 IN | HEART RATE: 101 BPM | WEIGHT: 249.78 LBS | DIASTOLIC BLOOD PRESSURE: 84 MMHG

## 2022-07-07 DIAGNOSIS — Z52.011 AUTOLOGOUS DONOR OF STEM CELLS: Primary | ICD-10-CM

## 2022-07-07 DIAGNOSIS — C90.00 MULTIPLE MYELOMA, REMISSION STATUS UNSPECIFIED (HCC): ICD-10-CM

## 2022-07-07 LAB
ALBUMIN SERPL-MCNC: 4.4 G/DL (ref 3.4–5)
ALP BLD-CCNC: 82 U/L (ref 40–129)
ALT SERPL-CCNC: 29 U/L (ref 10–40)
ANION GAP SERPL CALCULATED.3IONS-SCNC: 13 MMOL/L (ref 3–16)
AST SERPL-CCNC: 16 U/L (ref 15–37)
BILIRUB SERPL-MCNC: 0.6 MG/DL (ref 0–1)
BILIRUBIN DIRECT: <0.2 MG/DL (ref 0–0.3)
BILIRUBIN, INDIRECT: NORMAL MG/DL (ref 0–1)
BUN BLDV-MCNC: 10 MG/DL (ref 7–20)
CALCIUM SERPL-MCNC: 9.5 MG/DL (ref 8.3–10.6)
CHLORIDE BLD-SCNC: 99 MMOL/L (ref 99–110)
CO2: 25 MMOL/L (ref 21–32)
CREAT SERPL-MCNC: 0.7 MG/DL (ref 0.6–1.1)
GFR AFRICAN AMERICAN: >60
GFR NON-AFRICAN AMERICAN: >60
GLUCOSE BLD-MCNC: 153 MG/DL (ref 70–99)
HCT VFR BLD CALC: 29.4 % (ref 36–48)
HEMOGLOBIN: 9.9 G/DL (ref 12–16)
INR BLD: 1.01 (ref 0.87–1.14)
MAGNESIUM: 2 MG/DL (ref 1.8–2.4)
MCH RBC QN AUTO: 30 PG (ref 26–34)
MCHC RBC AUTO-ENTMCNC: 33.5 G/DL (ref 31–36)
MCV RBC AUTO: 89.6 FL (ref 80–100)
PDW BLD-RTO: 15.7 % (ref 12.4–15.4)
PHOSPHORUS: 2.8 MG/DL (ref 2.5–4.9)
PLATELET # BLD: 68 K/UL (ref 135–450)
PMV BLD AUTO: 6.9 FL (ref 5–10.5)
POTASSIUM SERPL-SCNC: 3.4 MMOL/L (ref 3.5–5.1)
PROTHROMBIN TIME: 13.2 SEC (ref 11.7–14.5)
RBC # BLD: 3.28 M/UL (ref 4–5.2)
SODIUM BLD-SCNC: 137 MMOL/L (ref 136–145)
TOTAL PROTEIN: 7.6 G/DL (ref 6.4–8.2)
URIC ACID, SERUM: 5.1 MG/DL (ref 2.6–6)
WBC # BLD: 0.3 K/UL (ref 4–11)

## 2022-07-07 PROCEDURE — 6360000002 HC RX W HCPCS: Performed by: NURSE PRACTITIONER

## 2022-07-07 PROCEDURE — 80048 BASIC METABOLIC PNL TOTAL CA: CPT

## 2022-07-07 PROCEDURE — 36592 COLLECT BLOOD FROM PICC: CPT

## 2022-07-07 PROCEDURE — 84550 ASSAY OF BLOOD/URIC ACID: CPT

## 2022-07-07 PROCEDURE — 96361 HYDRATE IV INFUSION ADD-ON: CPT

## 2022-07-07 PROCEDURE — 85610 PROTHROMBIN TIME: CPT

## 2022-07-07 PROCEDURE — 85025 COMPLETE CBC W/AUTO DIFF WBC: CPT

## 2022-07-07 PROCEDURE — 84100 ASSAY OF PHOSPHORUS: CPT

## 2022-07-07 PROCEDURE — 2580000003 HC RX 258: Performed by: NURSE PRACTITIONER

## 2022-07-07 PROCEDURE — 96360 HYDRATION IV INFUSION INIT: CPT

## 2022-07-07 PROCEDURE — 6370000000 HC RX 637 (ALT 250 FOR IP): Performed by: NURSE PRACTITIONER

## 2022-07-07 PROCEDURE — 96372 THER/PROPH/DIAG INJ SC/IM: CPT

## 2022-07-07 PROCEDURE — 83735 ASSAY OF MAGNESIUM: CPT

## 2022-07-07 PROCEDURE — 80076 HEPATIC FUNCTION PANEL: CPT

## 2022-07-07 RX ORDER — MAGNESIUM SULFATE IN WATER 40 MG/ML
4000 INJECTION, SOLUTION INTRAVENOUS PRN
Status: CANCELLED | OUTPATIENT
Start: 2022-07-08

## 2022-07-07 RX ORDER — DIMETHICONE, CAMPHOR (SYNTHETIC), MENTHOL, AND PHENOL 1.1; .5; .625; .5 G/100G; G/100G; G/100G; G/100G
OINTMENT TOPICAL PRN
Status: DISCONTINUED | OUTPATIENT
Start: 2022-07-07 | End: 2022-07-08 | Stop reason: HOSPADM

## 2022-07-07 RX ORDER — PROCHLORPERAZINE EDISYLATE 5 MG/ML
10 INJECTION INTRAMUSCULAR; INTRAVENOUS EVERY 6 HOURS PRN
Status: CANCELLED | OUTPATIENT
Start: 2022-07-08

## 2022-07-07 RX ORDER — SODIUM CHLORIDE 9 MG/ML
INJECTION, SOLUTION INTRAVENOUS CONTINUOUS PRN
Status: DISCONTINUED | OUTPATIENT
Start: 2022-07-07 | End: 2022-07-08 | Stop reason: HOSPADM

## 2022-07-07 RX ORDER — OXYCODONE HYDROCHLORIDE 5 MG/1
5 TABLET ORAL EVERY 4 HOURS PRN
Status: DISCONTINUED | OUTPATIENT
Start: 2022-07-07 | End: 2022-07-08 | Stop reason: HOSPADM

## 2022-07-07 RX ORDER — HEPARIN SODIUM (PORCINE) LOCK FLUSH IV SOLN 100 UNIT/ML 100 UNIT/ML
500 SOLUTION INTRAVENOUS PRN
Status: DISCONTINUED | OUTPATIENT
Start: 2022-07-07 | End: 2022-07-08 | Stop reason: HOSPADM

## 2022-07-07 RX ORDER — OXYCODONE HYDROCHLORIDE 5 MG/1
10 TABLET ORAL EVERY 4 HOURS PRN
Status: DISCONTINUED | OUTPATIENT
Start: 2022-07-07 | End: 2022-07-07 | Stop reason: SDUPTHER

## 2022-07-07 RX ORDER — POTASSIUM CHLORIDE 20 MEQ/1
40 TABLET, EXTENDED RELEASE ORAL PRN
Status: DISCONTINUED | OUTPATIENT
Start: 2022-07-07 | End: 2022-07-08 | Stop reason: HOSPADM

## 2022-07-07 RX ORDER — POTASSIUM CHLORIDE 29.8 MG/ML
80 INJECTION INTRAVENOUS PRN
Status: CANCELLED | OUTPATIENT
Start: 2022-07-08

## 2022-07-07 RX ORDER — POTASSIUM CHLORIDE 29.8 MG/ML
20 INJECTION INTRAVENOUS PRN
Status: DISCONTINUED | OUTPATIENT
Start: 2022-07-07 | End: 2022-07-08 | Stop reason: HOSPADM

## 2022-07-07 RX ORDER — OXYCODONE HYDROCHLORIDE 5 MG/1
10 TABLET ORAL EVERY 4 HOURS PRN
Status: DISCONTINUED | OUTPATIENT
Start: 2022-07-07 | End: 2022-07-08 | Stop reason: HOSPADM

## 2022-07-07 RX ORDER — ONDANSETRON 4 MG/1
8 TABLET, FILM COATED ORAL EVERY 8 HOURS PRN
Status: DISCONTINUED | OUTPATIENT
Start: 2022-07-07 | End: 2022-07-08 | Stop reason: HOSPADM

## 2022-07-07 RX ORDER — MAGNESIUM SULFATE IN WATER 40 MG/ML
4000 INJECTION, SOLUTION INTRAVENOUS PRN
Status: DISCONTINUED | OUTPATIENT
Start: 2022-07-07 | End: 2022-07-08 | Stop reason: HOSPADM

## 2022-07-07 RX ORDER — PROCHLORPERAZINE MALEATE 10 MG
10 TABLET ORAL EVERY 6 HOURS PRN
Status: DISCONTINUED | OUTPATIENT
Start: 2022-07-07 | End: 2022-07-08 | Stop reason: HOSPADM

## 2022-07-07 RX ORDER — PETROLATUM, MENTHOL, UNSPECIFIED FORM, CAMPHOR (SYNTHETIC), AND PHENOL 59.14; 1; 1; .6 G/100G; G/100G; G/100G; G/100G
PASTE TOPICAL PRN
Status: CANCELLED | OUTPATIENT
Start: 2022-07-08

## 2022-07-07 RX ORDER — PROCHLORPERAZINE EDISYLATE 5 MG/ML
10 INJECTION INTRAMUSCULAR; INTRAVENOUS EVERY 6 HOURS PRN
Status: DISCONTINUED | OUTPATIENT
Start: 2022-07-07 | End: 2022-07-08 | Stop reason: HOSPADM

## 2022-07-07 RX ORDER — ONDANSETRON 2 MG/ML
8 INJECTION INTRAMUSCULAR; INTRAVENOUS EVERY 8 HOURS PRN
Status: DISCONTINUED | OUTPATIENT
Start: 2022-07-07 | End: 2022-07-08 | Stop reason: HOSPADM

## 2022-07-07 RX ORDER — OXYCODONE HYDROCHLORIDE 5 MG/1
5 TABLET ORAL EVERY 4 HOURS PRN
Status: DISCONTINUED | OUTPATIENT
Start: 2022-07-07 | End: 2022-07-07 | Stop reason: SDUPTHER

## 2022-07-07 RX ORDER — ONDANSETRON 2 MG/ML
8 INJECTION INTRAMUSCULAR; INTRAVENOUS EVERY 8 HOURS PRN
Status: CANCELLED | OUTPATIENT
Start: 2022-07-08

## 2022-07-07 RX ORDER — POTASSIUM CHLORIDE 20 MEQ/1
40 TABLET, EXTENDED RELEASE ORAL PRN
Status: CANCELLED | OUTPATIENT
Start: 2022-07-08

## 2022-07-07 RX ORDER — 0.9 % SODIUM CHLORIDE 0.9 %
1000 INTRAVENOUS SOLUTION INTRAVENOUS ONCE
Status: CANCELLED | OUTPATIENT
Start: 2022-07-08 | End: 2022-07-08

## 2022-07-07 RX ORDER — HEPARIN SODIUM (PORCINE) LOCK FLUSH IV SOLN 100 UNIT/ML 100 UNIT/ML
500 SOLUTION INTRAVENOUS PRN
Status: CANCELLED | OUTPATIENT
Start: 2022-07-08

## 2022-07-07 RX ORDER — ONDANSETRON 4 MG/1
8 TABLET, FILM COATED ORAL EVERY 8 HOURS PRN
Status: CANCELLED | OUTPATIENT
Start: 2022-07-08

## 2022-07-07 RX ORDER — 0.9 % SODIUM CHLORIDE 0.9 %
1000 INTRAVENOUS SOLUTION INTRAVENOUS ONCE
Status: COMPLETED | OUTPATIENT
Start: 2022-07-07 | End: 2022-07-07

## 2022-07-07 RX ORDER — OXYCODONE HYDROCHLORIDE 5 MG/1
10 TABLET ORAL EVERY 4 HOURS PRN
Status: CANCELLED | OUTPATIENT
Start: 2022-07-08

## 2022-07-07 RX ORDER — PROCHLORPERAZINE MALEATE 10 MG
10 TABLET ORAL EVERY 6 HOURS PRN
Status: CANCELLED | OUTPATIENT
Start: 2022-07-08

## 2022-07-07 RX ORDER — OXYCODONE HYDROCHLORIDE 5 MG/1
5 TABLET ORAL EVERY 4 HOURS PRN
Status: CANCELLED | OUTPATIENT
Start: 2022-07-08

## 2022-07-07 RX ORDER — SODIUM CHLORIDE 9 MG/ML
INJECTION, SOLUTION INTRAVENOUS CONTINUOUS PRN
Status: CANCELLED | OUTPATIENT
Start: 2022-07-08

## 2022-07-07 RX ADMIN — SODIUM CHLORIDE, PRESERVATIVE FREE 1000 UNITS: 5 INJECTION INTRAVENOUS at 11:28

## 2022-07-07 RX ADMIN — FILGRASTIM-AAFI 300 MCG: 300 INJECTION, SOLUTION SUBCUTANEOUS at 10:52

## 2022-07-07 RX ADMIN — POTASSIUM BICARBONATE 40 MEQ: 782 TABLET, EFFERVESCENT ORAL at 10:51

## 2022-07-07 RX ADMIN — SODIUM CHLORIDE 1000 ML: 9 INJECTION, SOLUTION INTRAVENOUS at 09:28

## 2022-07-07 NOTE — PROGRESS NOTES
Short Stay Communication Note  Stephanie Hernández  Diagnosis: Multiple Myeloma  Primary MD: Dr. Elicia Anthony  Treatment: Melphalan   Day +6 of Auto Transplant   Pt seen in outpatient infusion today. Labs drawn and reviewed. CBC:   Recent Labs     07/05/22  0904 07/06/22  0909 07/07/22  0942   WBC 4.0 0.5* 0.3*   HGB 10.2* 9.6* 9.9*   HCT 30.6* 29.1* 29.4*   MCV 91.0 90.5 89.6   * 98* 68*     BMP/Mag:  Recent Labs     07/05/22  0904 07/06/22  0909 07/07/22  0942   * 133* 137   K 3.6 3.4* 3.4*   CL 99 98* 99   CO2 28 27 25   PHOS 2.8 3.1 2.8   BUN 15 12 10   CREATININE 0.8 0.7 0.7   MG 2.10 2.10 2.00     Standing parameters for replacement for this patient:   1 unit of pack red blood cells for a hemoglobin < or equal to 7  1 pack of platelets for a platelet count < or equal to 10  40 MeQ of Potassium administered for a potassium level < or equal to 3.4  4g of Magnesium Sulfate for a magnesum level < or equal to 1.4  No transfusions required for the above lab values. Urinalysis last done: 7/3/2022 Urinalysis next due: 7/10/2022    Chest X-Ray last done: 7/3/2022 Chest X-Ray next due: 7/10/2022    Symptoms addressed and reported to care team this date: diet, diarrhea  Treatments this date: IV Fluids, lab draw, filgrastim injection. 40 mEq effervescent potassium tablets administered. If another episode of diarrhea tomorrow, stool sample to be collected 7/8/2022. Reviewed medication schedule with pt and caregiver. Both able to verbalize all medications and schedule. Pt to be seen again tomorrow. Patient and caregiver verbalized understanding of discharge instructions including when and how to call the doctor and when to report  to the ER. Discharged ambulatory to home.     Electronically signed by Alan Samano RN on 7/7/2022 at 11:39 AM

## 2022-07-07 NOTE — PROGRESS NOTES
Oncology Nutrition Assessment       RECOMMENDATIONS:  1. PO Diet: Low Microbial Diet; bland/low fiber/lactose free, small/frequent meals; Encourage dietary protein at meal attempts. Monitor BS and need for [facility] diet modifier - pt w/DM. 2. ONS: trial of Ana Mckeon; add any ONS as tolerated BID to aid w/meeting needs s/p BMT. 3. Nutrition Education: Discussed nutrition management for N/V/D; declined handouts 7/7/22. Provided Food Safety and Diet recommendations booklet with review 6/30/22. NUTRITION ASSESSMENT:   Nutritional summary & status: D+6 from AUTO BMT; noted nausea per EMR/MD report. Received request from RN re: pt unsure what to eat/asking for ONS samples. Pt self-reporting very low PO intake, but attempting to consume small/frequent meals; using compazine. Did not want to try Ensure b/c felt tired/queezy. Reports diarrhea and gas. RD also  notes pt w/weight loss, thus pt at risk for malnutrition. RD reviewed foods to include; provided suggestions for ONS and protein sources. RD will continue to monitor PO nutrition. Sent samples of KF home w/pt. Admission/PMH: High Dose Melphalan preparative regimen then AUTO BMT 7/1/22 for IgG Kappa Multiple Myeloma; PMH of DM, HTN & anemia     MALNUTRITION ASSESSMENT  Context of Malnutrition: Acute Illness (on chronic )   Malnutrition Status:  At risk for malnutrition (Comment)  Findings of the 6 clinical characteristics of malnutrition (Minimum of 2 out of 6 clinical characteristics is required to make the diagnosis of moderate or severe Protein Calorie Malnutrition based on AND/ASPEN Guidelines):  Energy Intake: Less than/equal to 75% of estimated energy requirements    Energy Intake Time: last 3 days; pt not recording accurately   Weight Loss %: 5% loss or greater    Weight loss Time: Greater than or equal to 1 month      NUTRITION DIAGNOSIS   Inadequate oral intake related to altered GI function as evidenced by intake 26-50%,intake 51-75%,nausea,weight loss    NUTRITION INTERVENTION  Food and/or Nutrient Delivery:  Modify Current Diet,Snacks (Comment),Start Oral Nutrition Supplement  Nutrition Education/Counseling:  Education initiated   Goals:  pt will maintain adequate PO intake by consuming greater than 75% of meals and snacks s/p BMT to promote meeting incresaed nutrient needs. Nutrition Monitoring and Evaluation:   Food/Nutrient Intake Outcomes:  Food and Nutrient Intake,Supplement Intake  Physical Signs/Symptoms Outcomes:  Nausea or Vomiting,Diarrhea,Biochemical Data,Weight     OBJECTIVE DATA: Significant to nutrition assessment  · Nutrition-Focused Physical Findings: pt self-reports diarrhea x2; +nausea; altered taste and gerd  · Labs: Reviewed; WBC (0.3); BS-153; K+- 3.4,  A1c 6.2 (4/26/22)  · Meds: Reviewed; metformin  · Wounds: None       CURRENT NUTRITION THERAPIES  PO Diet: General; Low Microbial   ONS: none   PO Intake: 26-50%,%   PO Supplement Intake:None Ordered  Additional Sources of Calories/IVF:1L NS daily      ANTHROPOMETRICS  Current Height: 5' 5.5\" (166.4 cm)  Current Weight: 249 lb 12.5 oz (113.3 kg)   Admission weight: 262 lb (pre-bmt weight)  Ideal Body Weight (IBW): 128 lbs  (58 kg)    Usual Bodyweight 260 lb (117.9 kg)   Weight Changes - 13 lb in 2 weeks: 4.9% loss     BMI: 41    Wt Readings from Last 50 Encounters:   06/20/22 262 lb 3.2 oz (118.9 kg)   05/16/22 260 lb 9.3 oz (118.2 kg)   04/26/22 262 lb (118.8 kg)   11/19/21 252 lb 1.5 oz (114.4 kg)   10/26/21 259 lb (117.5 kg)   03/29/21 261 lb (118.4 kg)   03/19/21 261 lb (118.4 kg)   09/15/20 262 lb (118.8 kg)       COMPARATIVE STANDARDS  Energy (kcal):  0428-7852 (15-18)     Protein (g):  73-84 (1.3-1.5)       Fluid (ml/day):  0106-1832    Consult dietitian if nutrition interventions essential to patient care are needed.      Anastacia Oropeza, PHYLLIS, LD

## 2022-07-07 NOTE — PROGRESS NOTES
[facility]  Autologous Progress Note    2022    Sherif Chang    :  1970    MRN:  [id number]    Referring MD: Kaushal Jameson, [address],  [address]      Subjective: Feeling tired, eating okay, trying to push fluids and calories. Nausea better with Zofran and Compazine. ECOG PS:  (1) Restricted in physically strenuous activity, ambulatory and able to do work of light nature    KPS: 80% Normal activity with effort; some signs or symptoms of disease    Isolation:  None     Medications    Scheduled Meds:  Continuous Infusions:  PRN Meds:. ROS:  As noted above, otherwise remainder of 10-point ROS negative    Physical Exam:     I&O:  No intake or output data in the 24 hours ending 22 09    Vital Signs: There were no vitals taken for this visit. Weight:    Wt Readings from Last 3 Encounters:   22 253 lb 12 oz (115.1 kg)   22 255 lb 8.2 oz (115.9 kg)   22 260 lb 5.8 oz (118.1 kg)       General: Awake, alert and oriented.   HEENT: Normocephalic, atraumatic, poor dentition, many teeth requiring extraction  NECK: supple without palpable adenopathy  BACK: Straight negative CVAT  SKIN: warm dry and intact without lesions rashes or masses  CHEST: CTA bilaterally without use of accessory muscles  CV: Normal S1 S2, RRR, no MRG  ABD: NT ND normoactive BS, no palpable masses or hepatosplenomegaly  EXTREMITIES: without edema, denies calf tenderness  NEURO: CN II - XII grossly intact  CATHETER: Left Subclavian Trifusion (GS: Sofie, 22)     Data:   CBC:   Recent Labs     22  0904 22  0909   WBC 4.0 0.5*   HGB 10.2* 9.6*   HCT 30.6* 29.1*   MCV 91.0 90.5   * 98*     BMP/Mag:  Recent Labs     22  0904 22  0909   * 133*   K 3.6 3.4*   CL 99 98*   CO2 28 27   PHOS 2.8 3.1   BUN 15 12   CREATININE 0.8 0.7   MG 2.10 2.10     LIVP:   Recent Labs     22  0909   AST 21   ALT 32   BILIDIR <0.2   BILITOT 0. 6   ALKPHOS 79     Uric Acid:    Recent Labs     07/06/22  0909   LABURIC 5.3     Coags:   No results for input(s): PROTIME, INR, APTT in the last 72 hours. PROBLEM LIST:            1. Multiple myeloma   2. Dental disorder  3. HTN  4. Intestinal malabsorption (disorder)  5. Iron deficiency anemia due to chronic blood loss         TREATMENT:            1. RVD x 5 cycles:  - C1D1: 12/2021  - C5D1: 5/16/22   2. High Dose Melphalan f/b autologous SCT 7/1/22     ASSESSMENT AND PLAN:            1. 1) Multiple myeloma, IgG Kappa, ISS 3: partial response   - t(14;20), gain of 1q21, monosomy 13.    - At diagnoses - M spike is 3.7, FLC ratio  132  - BMBx (12/7/21): 20% plasma cells & PET scan (1/12/22) showed diffuse bone marrow activity, no lytic lesions  - Started RVD on 12/27/21 and tolerated well   - M spike down to 0.5 and FLC ratio decreased to 15  - BMBx (3/18/22) after cycle 4: 40% cellularity and few percentage of clonal plasma cells   - Her transplant work up was delayed due to needing dental extraction and EGD   - EGD unremarkable and findings on PET scan are likely related to esophagitis   - Light chain trending up, will plan for 1-2 cycles of RVD prior to transplant   - TTE, PFTS, CXR normal  - Finished cycle 5 of RVD   - 6/10/22 Repeat bone marrow bx negative for plasma cells: Consistent with partial response      High dose Melphalan & Autologous SCT - Day + 6     2. ID: Afebrile, no evidence of infection.    - Cont Valtrex & Diflucan ppx  - Cont Levaquin ppx when ANC < 1.5 (started 7/6/22)      3. Heme: Pancytopenia 2/2 chemotherapy  - H/o CRYSTAL: Received IV iron (3/21/22 & 3/28/22)  - Transfuse for Hgb < 7 and Platelets < 49K  - No transfusion today  - Cont daily GCSF (started 7/6/22)      4.  Metabolic: HypoNa, Hyperglycemia  - Cont daily IVFs: 1L supplemental IVFs daily in OP Infusion  - Cont Klor-Con 20 mEq daily   - Replace potassium and magnesium per PRN orders     5. GI / Nutrition:  Appetite and oral intake is okay  - Cont daily PPI  - Cont PRN Zofran & Compazine    - Cont low microbial diet   - Follow closely with dietary     6. Poor dentition  - completed partial extractions       - Disposition: Will return to OP Infusion daily for toxicity assessment, labs and MD visit.        Silviano Vergara, APRN - CNP

## 2022-07-08 ENCOUNTER — HOSPITAL ENCOUNTER (INPATIENT)
Age: 52
LOS: 5 days | Discharge: HOME | DRG: 720 | End: 2022-07-13
Attending: STUDENT IN AN ORGANIZED HEALTH CARE EDUCATION/TRAINING PROGRAM | Admitting: STUDENT IN AN ORGANIZED HEALTH CARE EDUCATION/TRAINING PROGRAM
Payer: COMMERCIAL

## 2022-07-08 ENCOUNTER — HOSPITAL ENCOUNTER (OUTPATIENT)
Dept: ONCOLOGY | Age: 52
Setting detail: INFUSION SERIES
Discharge: HOME | End: 2022-07-08
Payer: COMMERCIAL

## 2022-07-08 ENCOUNTER — HOSPITAL ENCOUNTER (OUTPATIENT)
Dept: GENERAL RADIOLOGY | Age: 52
Discharge: HOME | DRG: 720 | End: 2022-07-08
Payer: COMMERCIAL

## 2022-07-08 VITALS
DIASTOLIC BLOOD PRESSURE: 86 MMHG | SYSTOLIC BLOOD PRESSURE: 131 MMHG | WEIGHT: 250 LBS | TEMPERATURE: 99 F | RESPIRATION RATE: 18 BRPM | OXYGEN SATURATION: 100 % | HEART RATE: 130 BPM | BODY MASS INDEX: 40.97 KG/M2

## 2022-07-08 DIAGNOSIS — Z52.011 AUTOLOGOUS DONOR OF STEM CELLS: Primary | ICD-10-CM

## 2022-07-08 DIAGNOSIS — C90.00 MULTIPLE MYELOMA, REMISSION STATUS UNSPECIFIED (HCC): ICD-10-CM

## 2022-07-08 PROBLEM — R50.81 NEUTROPENIC FEVER (HCC): Status: ACTIVE | Noted: 2022-07-08

## 2022-07-08 PROBLEM — D70.9 NEUTROPENIC FEVER (HCC): Status: ACTIVE | Noted: 2022-07-08

## 2022-07-08 LAB
ALBUMIN SERPL-MCNC: 4.3 G/DL (ref 3.4–5)
ALP BLD-CCNC: 76 U/L (ref 40–129)
ALT SERPL-CCNC: 35 U/L (ref 10–40)
ANION GAP SERPL CALCULATED.3IONS-SCNC: 10 MMOL/L (ref 3–16)
AST SERPL-CCNC: 24 U/L (ref 15–37)
BILIRUB SERPL-MCNC: 0.6 MG/DL (ref 0–1)
BILIRUBIN DIRECT: <0.2 MG/DL (ref 0–0.3)
BILIRUBIN URINE: NEGATIVE
BILIRUBIN, INDIRECT: NORMAL MG/DL (ref 0–1)
BLOOD, URINE: NEGATIVE
BUN BLDV-MCNC: 9 MG/DL (ref 7–20)
C DIFF TOXIN/ANTIGEN: NORMAL
CALCIUM SERPL-MCNC: 9.5 MG/DL (ref 8.3–10.6)
CHLORIDE BLD-SCNC: 97 MMOL/L (ref 99–110)
CLARITY: CLEAR
CO2: 27 MMOL/L (ref 21–32)
COLOR: YELLOW
CREAT SERPL-MCNC: 0.7 MG/DL (ref 0.6–1.1)
GFR AFRICAN AMERICAN: >60
GFR NON-AFRICAN AMERICAN: >60
GLUCOSE BLD-MCNC: 160 MG/DL (ref 70–99)
GLUCOSE BLD-MCNC: 186 MG/DL (ref 70–99)
GLUCOSE BLD-MCNC: 196 MG/DL (ref 70–99)
GLUCOSE URINE: NEGATIVE MG/DL
HCT VFR BLD CALC: 29.1 % (ref 36–48)
HEMOGLOBIN: 9.9 G/DL (ref 12–16)
INR BLD: 1.14 (ref 0.87–1.14)
KETONES, URINE: NEGATIVE MG/DL
LACTATE DEHYDROGENASE: 169 U/L (ref 100–190)
LACTIC ACID: 0.7 MMOL/L (ref 0.4–2)
LEUKOCYTE ESTERASE, URINE: NEGATIVE
MAGNESIUM: 2 MG/DL (ref 1.8–2.4)
MCH RBC QN AUTO: 30 PG (ref 26–34)
MCHC RBC AUTO-ENTMCNC: 34 G/DL (ref 31–36)
MCV RBC AUTO: 88.3 FL (ref 80–100)
MICROSCOPIC EXAMINATION: NORMAL
NITRITE, URINE: NEGATIVE
PDW BLD-RTO: 14.7 % (ref 12.4–15.4)
PERFORMED ON: ABNORMAL
PERFORMED ON: ABNORMAL
PH UA: 6 (ref 5–8)
PHOSPHORUS: 3.2 MG/DL (ref 2.5–4.9)
PLATELET # BLD: 40 K/UL (ref 135–450)
PLATELET SLIDE REVIEW: ABNORMAL
PMV BLD AUTO: 7.1 FL (ref 5–10.5)
POTASSIUM SERPL-SCNC: 3.7 MMOL/L (ref 3.5–5.1)
PROTEIN UA: NEGATIVE MG/DL
PROTHROMBIN TIME: 14.5 SEC (ref 11.7–14.5)
RBC # BLD: 3.3 M/UL (ref 4–5.2)
RBC # BLD: NORMAL 10*6/UL
SODIUM BLD-SCNC: 134 MMOL/L (ref 136–145)
SPECIFIC GRAVITY UA: 1.01 (ref 1–1.03)
TOTAL PROTEIN: 7.3 G/DL (ref 6.4–8.2)
TROPONIN: <0.01 NG/ML
URIC ACID, SERUM: 4.7 MG/DL (ref 2.6–6)
URINE TYPE: NORMAL
UROBILINOGEN, URINE: 0.2 E.U./DL
WBC # BLD: 0.1 K/UL (ref 4–11)

## 2022-07-08 PROCEDURE — 84550 ASSAY OF BLOOD/URIC ACID: CPT

## 2022-07-08 PROCEDURE — 6370000000 HC RX 637 (ALT 250 FOR IP): Performed by: INTERNAL MEDICINE

## 2022-07-08 PROCEDURE — 87186 SC STD MICRODIL/AGAR DIL: CPT

## 2022-07-08 PROCEDURE — 36592 COLLECT BLOOD FROM PICC: CPT

## 2022-07-08 PROCEDURE — 80076 HEPATIC FUNCTION PANEL: CPT

## 2022-07-08 PROCEDURE — 6370000000 HC RX 637 (ALT 250 FOR IP): Performed by: NURSE PRACTITIONER

## 2022-07-08 PROCEDURE — 6360000002 HC RX W HCPCS: Performed by: NURSE PRACTITIONER

## 2022-07-08 PROCEDURE — 96375 TX/PRO/DX INJ NEW DRUG ADDON: CPT

## 2022-07-08 PROCEDURE — A4217 STERILE WATER/SALINE, 500 ML: HCPCS | Performed by: NURSE PRACTITIONER

## 2022-07-08 PROCEDURE — 87253 VIRUS INOCULATE TISSUE ADDL: CPT

## 2022-07-08 PROCEDURE — 87086 URINE CULTURE/COLONY COUNT: CPT

## 2022-07-08 PROCEDURE — 87040 BLOOD CULTURE FOR BACTERIA: CPT

## 2022-07-08 PROCEDURE — 93005 ELECTROCARDIOGRAM TRACING: CPT | Performed by: NURSE PRACTITIONER

## 2022-07-08 PROCEDURE — 87150 DNA/RNA AMPLIFIED PROBE: CPT

## 2022-07-08 PROCEDURE — 81003 URINALYSIS AUTO W/O SCOPE: CPT

## 2022-07-08 PROCEDURE — 87070 CULTURE OTHR SPECIMN AEROBIC: CPT

## 2022-07-08 PROCEDURE — 87205 SMEAR GRAM STAIN: CPT

## 2022-07-08 PROCEDURE — 87252 VIRUS INOCULATION TISSUE: CPT

## 2022-07-08 PROCEDURE — 87103 BLOOD FUNGUS CULTURE: CPT

## 2022-07-08 PROCEDURE — 84484 ASSAY OF TROPONIN QUANT: CPT

## 2022-07-08 PROCEDURE — 99211 OFF/OP EST MAY X REQ PHY/QHP: CPT

## 2022-07-08 PROCEDURE — 80048 BASIC METABOLIC PNL TOTAL CA: CPT

## 2022-07-08 PROCEDURE — 2060000000 HC ICU INTERMEDIATE R&B

## 2022-07-08 PROCEDURE — 85025 COMPLETE CBC W/AUTO DIFF WBC: CPT

## 2022-07-08 PROCEDURE — 83615 LACTATE (LD) (LDH) ENZYME: CPT

## 2022-07-08 PROCEDURE — 2580000003 HC RX 258: Performed by: NURSE PRACTITIONER

## 2022-07-08 PROCEDURE — 96365 THER/PROPH/DIAG IV INF INIT: CPT

## 2022-07-08 PROCEDURE — 96360 HYDRATION IV INFUSION INIT: CPT

## 2022-07-08 PROCEDURE — 85610 PROTHROMBIN TIME: CPT

## 2022-07-08 PROCEDURE — 87102 FUNGUS ISOLATION CULTURE: CPT

## 2022-07-08 PROCEDURE — 83605 ASSAY OF LACTIC ACID: CPT

## 2022-07-08 PROCEDURE — 96372 THER/PROPH/DIAG INJ SC/IM: CPT

## 2022-07-08 PROCEDURE — 96361 HYDRATE IV INFUSION ADD-ON: CPT

## 2022-07-08 PROCEDURE — 71045 X-RAY EXAM CHEST 1 VIEW: CPT

## 2022-07-08 PROCEDURE — 87324 CLOSTRIDIUM AG IA: CPT

## 2022-07-08 PROCEDURE — 83735 ASSAY OF MAGNESIUM: CPT

## 2022-07-08 PROCEDURE — 87449 NOS EACH ORGANISM AG IA: CPT

## 2022-07-08 PROCEDURE — 96374 THER/PROPH/DIAG INJ IV PUSH: CPT

## 2022-07-08 PROCEDURE — 84100 ASSAY OF PHOSPHORUS: CPT

## 2022-07-08 RX ORDER — ACETAMINOPHEN 325 MG/1
650 TABLET ORAL EVERY 4 HOURS PRN
Status: DISCONTINUED | OUTPATIENT
Start: 2022-07-08 | End: 2022-07-13 | Stop reason: HOSPADM

## 2022-07-08 RX ORDER — LISINOPRIL 2.5 MG/1
2.5 TABLET ORAL DAILY
Status: CANCELLED | OUTPATIENT
Start: 2022-07-08

## 2022-07-08 RX ORDER — GABAPENTIN 300 MG/1
300 CAPSULE ORAL NIGHTLY PRN
Status: CANCELLED | OUTPATIENT
Start: 2022-07-08

## 2022-07-08 RX ORDER — VALACYCLOVIR HYDROCHLORIDE 500 MG/1
500 TABLET, FILM COATED ORAL 2 TIMES DAILY
Status: CANCELLED | OUTPATIENT
Start: 2022-07-08

## 2022-07-08 RX ORDER — MAGNESIUM SULFATE IN WATER 40 MG/ML
4000 INJECTION, SOLUTION INTRAVENOUS PRN
Status: DISCONTINUED | OUTPATIENT
Start: 2022-07-08 | End: 2022-07-08

## 2022-07-08 RX ORDER — ACETAMINOPHEN 325 MG/1
650 TABLET ORAL EVERY 4 HOURS PRN
Status: DISCONTINUED | OUTPATIENT
Start: 2022-07-08 | End: 2022-07-08

## 2022-07-08 RX ORDER — OXYCODONE HYDROCHLORIDE 5 MG/1
5 TABLET ORAL EVERY 4 HOURS PRN
Status: DISCONTINUED | OUTPATIENT
Start: 2022-07-08 | End: 2022-07-09 | Stop reason: HOSPADM

## 2022-07-08 RX ORDER — OXYCODONE HYDROCHLORIDE 5 MG/1
10 TABLET ORAL EVERY 4 HOURS PRN
Status: DISCONTINUED | OUTPATIENT
Start: 2022-07-08 | End: 2022-07-09 | Stop reason: HOSPADM

## 2022-07-08 RX ORDER — ONDANSETRON 2 MG/ML
8 INJECTION INTRAMUSCULAR; INTRAVENOUS EVERY 6 HOURS PRN
Status: DISCONTINUED | OUTPATIENT
Start: 2022-07-08 | End: 2022-07-08 | Stop reason: SDUPTHER

## 2022-07-08 RX ORDER — POTASSIUM CHLORIDE 20 MEQ/1
40 TABLET, EXTENDED RELEASE ORAL PRN
Status: CANCELLED | OUTPATIENT
Start: 2022-07-09

## 2022-07-08 RX ORDER — ONDANSETRON 4 MG/1
8 TABLET, FILM COATED ORAL EVERY 8 HOURS PRN
Status: CANCELLED | OUTPATIENT
Start: 2022-07-08

## 2022-07-08 RX ORDER — ONDANSETRON HYDROCHLORIDE 8 MG/1
8 TABLET, FILM COATED ORAL EVERY 8 HOURS PRN
Status: DISCONTINUED | OUTPATIENT
Start: 2022-07-08 | End: 2022-07-08 | Stop reason: SDUPTHER

## 2022-07-08 RX ORDER — PROCHLORPERAZINE MALEATE 10 MG
10 TABLET ORAL EVERY 4 HOURS PRN
Status: DISCONTINUED | OUTPATIENT
Start: 2022-07-08 | End: 2022-07-08 | Stop reason: SDUPTHER

## 2022-07-08 RX ORDER — PROCHLORPERAZINE MALEATE 10 MG
10 TABLET ORAL EVERY 6 HOURS PRN
Status: DISCONTINUED | OUTPATIENT
Start: 2022-07-08 | End: 2022-07-09 | Stop reason: HOSPADM

## 2022-07-08 RX ORDER — GLUCAGON 1 MG/ML
1 KIT INJECTION PRN
Status: CANCELLED | OUTPATIENT
Start: 2022-07-08

## 2022-07-08 RX ORDER — PROCHLORPERAZINE EDISYLATE 5 MG/ML
10 INJECTION INTRAMUSCULAR; INTRAVENOUS EVERY 4 HOURS PRN
Status: CANCELLED | OUTPATIENT
Start: 2022-07-08

## 2022-07-08 RX ORDER — INSULIN LISPRO 100 [IU]/ML
0-6 INJECTION, SOLUTION INTRAVENOUS; SUBCUTANEOUS
Status: DISCONTINUED | OUTPATIENT
Start: 2022-07-08 | End: 2022-07-13 | Stop reason: HOSPADM

## 2022-07-08 RX ORDER — PROCHLORPERAZINE MALEATE 10 MG
10 TABLET ORAL EVERY 4 HOURS PRN
Status: CANCELLED | OUTPATIENT
Start: 2022-07-08

## 2022-07-08 RX ORDER — FLUCONAZOLE 200 MG/1
400 TABLET ORAL DAILY
Status: DISCONTINUED | OUTPATIENT
Start: 2022-07-09 | End: 2022-07-13

## 2022-07-08 RX ORDER — ACETAMINOPHEN 325 MG/1
650 TABLET ORAL EVERY 4 HOURS PRN
Status: DISCONTINUED | OUTPATIENT
Start: 2022-07-08 | End: 2022-07-09 | Stop reason: HOSPADM

## 2022-07-08 RX ORDER — FLUCONAZOLE 100 MG/1
400 TABLET ORAL DAILY
Status: CANCELLED | OUTPATIENT
Start: 2022-07-08

## 2022-07-08 RX ORDER — DIPHENHYDRAMINE HCL 25 MG
25 TABLET ORAL NIGHTLY PRN
Status: DISCONTINUED | OUTPATIENT
Start: 2022-07-08 | End: 2022-07-13 | Stop reason: HOSPADM

## 2022-07-08 RX ORDER — PROCHLORPERAZINE EDISYLATE 5 MG/ML
10 INJECTION INTRAMUSCULAR; INTRAVENOUS EVERY 4 HOURS PRN
Status: DISCONTINUED | OUTPATIENT
Start: 2022-07-08 | End: 2022-07-08 | Stop reason: SDUPTHER

## 2022-07-08 RX ORDER — PANTOPRAZOLE SODIUM 40 MG/1
40 TABLET, DELAYED RELEASE ORAL
Status: CANCELLED | OUTPATIENT
Start: 2022-07-09

## 2022-07-08 RX ORDER — ONDANSETRON 2 MG/ML
8 INJECTION INTRAMUSCULAR; INTRAVENOUS EVERY 8 HOURS PRN
Status: CANCELLED | OUTPATIENT
Start: 2022-07-08

## 2022-07-08 RX ORDER — DEXTROSE MONOHYDRATE 50 MG/ML
100 INJECTION, SOLUTION INTRAVENOUS PRN
Status: DISCONTINUED | OUTPATIENT
Start: 2022-07-08 | End: 2022-07-13 | Stop reason: HOSPADM

## 2022-07-08 RX ORDER — DEXTROSE MONOHYDRATE 25 G/50ML
12.5 INJECTION, SOLUTION INTRAVENOUS PRN
Status: CANCELLED | OUTPATIENT
Start: 2022-07-08

## 2022-07-08 RX ORDER — POTASSIUM CHLORIDE 29.8 MG/ML
80 INJECTION INTRAVENOUS PRN
Status: DISCONTINUED | OUTPATIENT
Start: 2022-07-08 | End: 2022-07-09 | Stop reason: HOSPADM

## 2022-07-08 RX ORDER — HEPARIN SODIUM (PORCINE) LOCK FLUSH IV SOLN 100 UNIT/ML 100 UNIT/ML
500 SOLUTION INTRAVENOUS PRN
Status: CANCELLED | OUTPATIENT
Start: 2022-07-09

## 2022-07-08 RX ORDER — ONDANSETRON 2 MG/ML
8 INJECTION INTRAMUSCULAR; INTRAVENOUS EVERY 8 HOURS PRN
Status: CANCELLED | OUTPATIENT
Start: 2022-07-09

## 2022-07-08 RX ORDER — DEXTROSE MONOHYDRATE 50 MG/ML
100 INJECTION, SOLUTION INTRAVENOUS PRN
Status: CANCELLED | OUTPATIENT
Start: 2022-07-08

## 2022-07-08 RX ORDER — PROCHLORPERAZINE EDISYLATE 5 MG/ML
10 INJECTION INTRAMUSCULAR; INTRAVENOUS EVERY 6 HOURS PRN
Status: CANCELLED | OUTPATIENT
Start: 2022-07-09

## 2022-07-08 RX ORDER — ONDANSETRON 4 MG/1
8 TABLET, FILM COATED ORAL EVERY 8 HOURS PRN
Status: CANCELLED | OUTPATIENT
Start: 2022-07-09

## 2022-07-08 RX ORDER — SODIUM CHLORIDE 9 MG/ML
25 INJECTION, SOLUTION INTRAVENOUS PRN
Status: DISCONTINUED | OUTPATIENT
Start: 2022-07-08 | End: 2022-07-13 | Stop reason: HOSPADM

## 2022-07-08 RX ORDER — POTASSIUM CHLORIDE 29.8 MG/ML
80 INJECTION INTRAVENOUS PRN
Status: CANCELLED | OUTPATIENT
Start: 2022-07-09

## 2022-07-08 RX ORDER — PROCHLORPERAZINE MALEATE 10 MG
10 TABLET ORAL EVERY 6 HOURS PRN
Status: CANCELLED | OUTPATIENT
Start: 2022-07-09

## 2022-07-08 RX ORDER — SODIUM CHLORIDE 0.9 % (FLUSH) 0.9 %
5-40 SYRINGE (ML) INJECTION EVERY 12 HOURS SCHEDULED
Status: DISCONTINUED | OUTPATIENT
Start: 2022-07-08 | End: 2022-07-13 | Stop reason: HOSPADM

## 2022-07-08 RX ORDER — HEPARIN SODIUM (PORCINE) LOCK FLUSH IV SOLN 100 UNIT/ML 100 UNIT/ML
500 SOLUTION INTRAVENOUS PRN
Status: DISCONTINUED | OUTPATIENT
Start: 2022-07-08 | End: 2022-07-09 | Stop reason: HOSPADM

## 2022-07-08 RX ORDER — SODIUM CHLORIDE 9 MG/ML
INJECTION, SOLUTION INTRAVENOUS CONTINUOUS PRN
Status: CANCELLED | OUTPATIENT
Start: 2022-07-09

## 2022-07-08 RX ORDER — ACETAMINOPHEN 325 MG/1
650 TABLET ORAL EVERY 4 HOURS PRN
Status: DISCONTINUED | OUTPATIENT
Start: 2022-07-08 | End: 2022-07-08 | Stop reason: SDUPTHER

## 2022-07-08 RX ORDER — CYCLOBENZAPRINE HCL 10 MG
5 TABLET ORAL 2 TIMES DAILY PRN
Status: DISCONTINUED | OUTPATIENT
Start: 2022-07-08 | End: 2022-07-13 | Stop reason: HOSPADM

## 2022-07-08 RX ORDER — SODIUM CHLORIDE 9 MG/ML
INJECTION, SOLUTION INTRAVENOUS CONTINUOUS
Status: DISCONTINUED | OUTPATIENT
Start: 2022-07-08 | End: 2022-07-12

## 2022-07-08 RX ORDER — MAGNESIUM SULFATE IN WATER 40 MG/ML
4000 INJECTION, SOLUTION INTRAVENOUS PRN
Status: CANCELLED | OUTPATIENT
Start: 2022-07-09

## 2022-07-08 RX ORDER — MAGNESIUM SULFATE IN WATER 40 MG/ML
4000 INJECTION, SOLUTION INTRAVENOUS PRN
Status: DISCONTINUED | OUTPATIENT
Start: 2022-07-08 | End: 2022-07-09 | Stop reason: HOSPADM

## 2022-07-08 RX ORDER — PROCHLORPERAZINE EDISYLATE 5 MG/ML
10 INJECTION INTRAMUSCULAR; INTRAVENOUS EVERY 6 HOURS PRN
Status: DISCONTINUED | OUTPATIENT
Start: 2022-07-08 | End: 2022-07-09 | Stop reason: HOSPADM

## 2022-07-08 RX ORDER — SODIUM CHLORIDE 9 MG/ML
INJECTION, SOLUTION INTRAVENOUS CONTINUOUS PRN
Status: DISCONTINUED | OUTPATIENT
Start: 2022-07-08 | End: 2022-07-13 | Stop reason: HOSPADM

## 2022-07-08 RX ORDER — 0.9 % SODIUM CHLORIDE 0.9 %
1000 INTRAVENOUS SOLUTION INTRAVENOUS ONCE
Status: CANCELLED | OUTPATIENT
Start: 2022-07-09 | End: 2022-07-09

## 2022-07-08 RX ORDER — POTASSIUM CHLORIDE 29.8 MG/ML
20 INJECTION INTRAVENOUS PRN
Status: DISCONTINUED | OUTPATIENT
Start: 2022-07-08 | End: 2022-07-08

## 2022-07-08 RX ORDER — OXYCODONE HYDROCHLORIDE 5 MG/1
5 TABLET ORAL EVERY 4 HOURS PRN
Status: CANCELLED | OUTPATIENT
Start: 2022-07-09

## 2022-07-08 RX ORDER — 0.9 % SODIUM CHLORIDE 0.9 %
1000 INTRAVENOUS SOLUTION INTRAVENOUS ONCE
Status: COMPLETED | OUTPATIENT
Start: 2022-07-08 | End: 2022-07-08

## 2022-07-08 RX ORDER — ONDANSETRON 2 MG/ML
8 INJECTION INTRAMUSCULAR; INTRAVENOUS EVERY 8 HOURS PRN
Status: DISCONTINUED | OUTPATIENT
Start: 2022-07-08 | End: 2022-07-09 | Stop reason: HOSPADM

## 2022-07-08 RX ORDER — INSULIN LISPRO 100 [IU]/ML
0-3 INJECTION, SOLUTION INTRAVENOUS; SUBCUTANEOUS NIGHTLY
Status: DISCONTINUED | OUTPATIENT
Start: 2022-07-08 | End: 2022-07-13 | Stop reason: HOSPADM

## 2022-07-08 RX ORDER — PANTOPRAZOLE SODIUM 40 MG/1
40 TABLET, DELAYED RELEASE ORAL
Status: DISCONTINUED | OUTPATIENT
Start: 2022-07-09 | End: 2022-07-10 | Stop reason: DRUGHIGH

## 2022-07-08 RX ORDER — HYDROCHLOROTHIAZIDE 25 MG/1
25 TABLET ORAL EVERY MORNING
Status: CANCELLED | OUTPATIENT
Start: 2022-07-08

## 2022-07-08 RX ORDER — CYCLOBENZAPRINE HCL 10 MG
5 TABLET ORAL 2 TIMES DAILY PRN
Status: CANCELLED | OUTPATIENT
Start: 2022-07-08

## 2022-07-08 RX ORDER — VALACYCLOVIR HYDROCHLORIDE 500 MG/1
500 TABLET, FILM COATED ORAL 2 TIMES DAILY
Status: DISCONTINUED | OUTPATIENT
Start: 2022-07-08 | End: 2022-07-13 | Stop reason: HOSPADM

## 2022-07-08 RX ORDER — SODIUM CHLORIDE, SODIUM LACTATE, POTASSIUM CHLORIDE, AND CALCIUM CHLORIDE .6; .31; .03; .02 G/100ML; G/100ML; G/100ML; G/100ML
1000 INJECTION, SOLUTION INTRAVENOUS ONCE
Status: COMPLETED | OUTPATIENT
Start: 2022-07-08 | End: 2022-07-08

## 2022-07-08 RX ORDER — ACETAMINOPHEN 325 MG/1
650 TABLET ORAL EVERY 4 HOURS PRN
Status: CANCELLED | OUTPATIENT
Start: 2022-07-08

## 2022-07-08 RX ORDER — POTASSIUM CHLORIDE 20 MEQ/1
40 TABLET, EXTENDED RELEASE ORAL PRN
Status: DISCONTINUED | OUTPATIENT
Start: 2022-07-08 | End: 2022-07-09 | Stop reason: HOSPADM

## 2022-07-08 RX ORDER — SODIUM CHLORIDE 0.9 % (FLUSH) 0.9 %
5-40 SYRINGE (ML) INJECTION PRN
Status: DISCONTINUED | OUTPATIENT
Start: 2022-07-08 | End: 2022-07-13 | Stop reason: HOSPADM

## 2022-07-08 RX ORDER — AMLODIPINE BESYLATE 5 MG/1
5 TABLET ORAL DAILY
Status: CANCELLED | OUTPATIENT
Start: 2022-07-08

## 2022-07-08 RX ORDER — GABAPENTIN 300 MG/1
300 CAPSULE ORAL 2 TIMES DAILY PRN
Status: DISCONTINUED | OUTPATIENT
Start: 2022-07-08 | End: 2022-07-13 | Stop reason: HOSPADM

## 2022-07-08 RX ORDER — PETROLATUM, MENTHOL, UNSPECIFIED FORM, CAMPHOR (SYNTHETIC), AND PHENOL 59.14; 1; 1; .6 G/100G; G/100G; G/100G; G/100G
PASTE TOPICAL PRN
Status: CANCELLED | OUTPATIENT
Start: 2022-07-09

## 2022-07-08 RX ORDER — SODIUM CHLORIDE 9 MG/ML
INJECTION, SOLUTION INTRAVENOUS CONTINUOUS PRN
Status: DISCONTINUED | OUTPATIENT
Start: 2022-07-08 | End: 2022-07-09 | Stop reason: HOSPADM

## 2022-07-08 RX ORDER — DIMETHICONE, CAMPHOR (SYNTHETIC), MENTHOL, AND PHENOL 1.1; .5; .625; .5 G/100G; G/100G; G/100G; G/100G
OINTMENT TOPICAL PRN
Status: DISCONTINUED | OUTPATIENT
Start: 2022-07-08 | End: 2022-07-09 | Stop reason: HOSPADM

## 2022-07-08 RX ORDER — OXYCODONE HYDROCHLORIDE 5 MG/1
10 TABLET ORAL EVERY 4 HOURS PRN
Status: CANCELLED | OUTPATIENT
Start: 2022-07-09

## 2022-07-08 RX ORDER — CALCIUM CARBONATE 200(500)MG
1 TABLET,CHEWABLE ORAL DAILY
Status: CANCELLED | OUTPATIENT
Start: 2022-07-08

## 2022-07-08 RX ORDER — ONDANSETRON 4 MG/1
8 TABLET, FILM COATED ORAL EVERY 8 HOURS PRN
Status: DISCONTINUED | OUTPATIENT
Start: 2022-07-08 | End: 2022-07-09 | Stop reason: HOSPADM

## 2022-07-08 RX ADMIN — ACETAMINOPHEN 325MG 650 MG: 325 TABLET ORAL at 19:24

## 2022-07-08 RX ADMIN — PROCHLORPERAZINE EDISYLATE 10 MG: 5 INJECTION INTRAMUSCULAR; INTRAVENOUS at 11:51

## 2022-07-08 RX ADMIN — SODIUM CHLORIDE: 9 INJECTION, SOLUTION INTRAVENOUS at 15:23

## 2022-07-08 RX ADMIN — CEFEPIME 2000 MG: 2 INJECTION, POWDER, FOR SOLUTION INTRAVENOUS at 13:25

## 2022-07-08 RX ADMIN — FILGRASTIM-AAFI 300 MCG: 300 INJECTION, SOLUTION SUBCUTANEOUS at 14:10

## 2022-07-08 RX ADMIN — VALACYCLOVIR HYDROCHLORIDE 500 MG: 500 TABLET, FILM COATED ORAL at 21:59

## 2022-07-08 RX ADMIN — CEFEPIME 2000 MG: 2 INJECTION, POWDER, FOR SOLUTION INTRAVENOUS at 21:58

## 2022-07-08 RX ADMIN — INSULIN LISPRO 1 UNITS: 100 INJECTION, SOLUTION INTRAVENOUS; SUBCUTANEOUS at 17:16

## 2022-07-08 RX ADMIN — INSULIN LISPRO 1 UNITS: 100 INJECTION, SOLUTION INTRAVENOUS; SUBCUTANEOUS at 22:00

## 2022-07-08 RX ADMIN — ONDANSETRON HYDROCHLORIDE 8 MG: 2 INJECTION, SOLUTION INTRAMUSCULAR; INTRAVENOUS at 10:15

## 2022-07-08 RX ADMIN — SODIUM CHLORIDE 1000 ML: 9 INJECTION, SOLUTION INTRAVENOUS at 09:40

## 2022-07-08 RX ADMIN — SODIUM CHLORIDE 15 ML: 900 IRRIGANT IRRIGATION at 17:39

## 2022-07-08 RX ADMIN — SODIUM CHLORIDE, POTASSIUM CHLORIDE, SODIUM LACTATE AND CALCIUM CHLORIDE 1000 ML: 600; 310; 30; 20 INJECTION, SOLUTION INTRAVENOUS at 15:22

## 2022-07-08 ASSESSMENT — PAIN SCALES - GENERAL
PAINLEVEL_OUTOF10: 0
PAINLEVEL_OUTOF10: 0

## 2022-07-08 NOTE — PROGRESS NOTES
Pharmacy Note - Extended Infusion Beta-Lactam Adjustment    Cefepime ordered for treatment of Sepsis. Per [facility] Extended Infusion Beta-Lactam Policy, Cefepime will be changed to 2000 mg 8hr EI. Estimated Creatinine Clearance: Estimated Creatinine Clearance: 119 mL/min (based on SCr of 0.7 mg/dL). Dialysis Status, ALEJANDRA, CKD: NONE  BMI: There is no height or weight on file to calculate BMI. Rationale for Adjustment: Agent is renally eliminated and demonstrates time-dependent effect on bacterial eradication. Extended-infusion dosing strategy aims to enhance microbiologic and clinical efficacy. Pharmacy will continue to monitor renal function, cultures and sensitivities (where available) and adjust dose as necessary. Please call with any questions.     Jaime Coe, PharmD  [id number]  7/8/2022 3:36 PM

## 2022-07-08 NOTE — PROGRESS NOTES
4 Eyes Admission Assessment     I agree as the admission nurse that 2 RN's have performed a thorough Head to Toe Skin Assessment on the patient. ALL assessment sites listed below have been assessed on admission. Areas assessed by both nurses:   [x]   Head, Face, and Ears   [x]   Shoulders, Back, and Chest  [x]   Arms, Elbows, and Hands   [x]   Coccyx, Sacrum, and Ischium  [x]   Legs, Feet, and Heels        Does the Patient have Skin Breakdown?   No         Dk Prevention initiated:  Yes   Wound Care Orders initiated:  No      [facility] nurse consulted for Pressure Injury (Stage 3,4, Unstageable, DTI, NWPT, and Complex wounds) or Dk score 18 or lower:  No      Nurse 1 eSignature: Electronically signed by Shauna Blum RN on 7/8/22 at 3:46 PM EDT    **SHARE this note so that the co-signing nurse is able to place an eSignature**    Nurse 2 eSignature: Electronically signed by Oleksandr Landeros RN on 7/8/22 at 5:08 PM EDT

## 2022-07-08 NOTE — H&P
[facility] History and Physical        Attending Physician: No att. providers found    Primary Care: Gera Fletcher, APRN - CNP       Referring MD: Dominique Peralta, [address]  [address] [facility],  [address] [place] [address]    Name: Moraima Torres :  1970  MRN:  [id number]    Admission: 2022      Date: 2022    Reason for Admission: Neutropenic Fever concerning for Sepsis    History of Present Illness:   Jo Griffin is a 46year old female with hx of HTN & anemia who was diagnosed with multiple myeloma and was referred to Dr. Bharti Vásquez for evaluation of autologous transplantation. Jo Griffin was referred to Dr Geraldo Flores for anemia in . She has history of iron def anemia and underwent hysterectomy when she was 28. Her iron studies were unremarkable this time. Myeloma labs were ordered. Her kappa light chain ratio was 132. SPEP suggested M spike of 3.7  g/dL of IgG kappa. She underwent a bone marrow biopsy which showed 20% plasma cells. She has t(14:20), gain of 1q21, monosomy 13. She had a PET scan (22) which showed diffuse marrow signaling, no lytic lesions. She had increased uptake in esophageous and EGD has been recommended. She has never had a colonoscopy or EGD. She was started on RVD on 22 and has currently completed 5 cycles (C5D1: 22). Her repeat bone marrow bx (6/10/22) was negative for plasma cells, consistent with partial response. She then started Granix 22 and collected stem cells on 22.      She recieved Melphalan (200mg/m^2) x 1 dose in OP Infusion on 22. This was followed by an Autologous Stem Cell Transplant of 3.66 x 10^6 [id number]/kg PBSCs in 210 mL on 22. She continued to follow up daily in OP Infusion for Provider visit, labs and toxicity assessment. During this time she has struggled with mild nausea and loss of appetite but overall was doing well at home. Today she is Day + 7 and presented to OP Infusion initially feeling well.  Upon arrival she stated she is eating more, much more energy, did have some diarrhea overnight, will test for C-Diff today. Around 10:15 am she vomited once in bathroom. She received IV Zofran and became tachycardic and temperature continues to rise. EKG shows Tachycardia with NSR. Bld Cultures drawn and empiric antibiotics started. She will now be admitted for neutropenic fever and sepsis evaluation and management. She denies visual changes, headache, sinus congestion, sore throat, mouth pain, cough, increased shortness of breath, chest pain, abdominal pain, constipation, dysuria, hematuria, lower extremity pain or swelling. Past Surgical History:   Procedure Laterality Date    CATHETER INSERTION N/A 6/20/2022    INSERT TRIFUSION CATHETER performed by Micheline Lee MD at [place] [address]  11/19/2021    CT BONE MARROW BIOPSY 11/19/2021 [facility] CT    CT BONE MARROW BIOPSY  3/18/2022    CT BONE MARROW BIOPSY 3/18/2022 [facility] CT SCAN    CT BONE MARROW BIOPSY  6/10/2022    CT BONE MARROW BIOPSY 6/10/2022 [facility] CT SCAN    DENTAL SURGERY      HYSTERECTOMY (CERVIX STATUS UNKNOWN)      SHOULDER SURGERY         Past Medical History:   Diagnosis Date    Anxiety     Chronic back pain     Depression     Hypertension     [facility] myeloma not having achieved remission ([facility] [address]     Prediabetes     Type 2 diabetes mellitus with hyperglycemia, without long-term current use of insulin ([facility])     Type 2 diabetes mellitus with hyperlipidemia ([facility] [address] 4/26/2022       Prior to Admission medications    Medication Sig Start Date End Date Taking?  Authorizing Provider   levoFLOXacin (LEVAQUIN) 500 MG tablet Take 1 tablet by mouth daily Start 7/6/22. 7/6/22 8/5/22  Shelby Chandrakant, APRN - NP   calcium carbonate (ANTACID) 500 MG chewable tablet Take 1 tablet by mouth daily 7/3/22 8/2/22  Shelby Chandrakant, APRN - NP   omeprazole (PRILOSEC) 20 MG delayed release capsule Take 1 capsule by mouth every morning (before breakfast) 7/3/22   [facility]   gabapentin (NEURONTIN) 300 MG capsule TAKE ONE CAPSULE BY MOUTH DAILY AS NEEDED FOR PAIN 6/30/22 3/2/23  [facility]   valACYclovir (VALTREX) 500 MG tablet Take 1 tablet by mouth 2 times daily 6/30/22   [facility], SHY - NP   fluconazole (DIFLUCAN) 200 MG tablet Take 2 tablets by mouth daily 7/1/22 7/31/22  [facility]   prochlorperazine (COMPAZINE) 10 MG tablet Take 1 tablet by mouth every 6 hours as needed (nausea / vomiting) 6/30/22   [facility], APRN - NP   ondansetron (ZOFRAN ODT) 8 MG TBDP disintegrating tablet Place 1 tablet under the tongue every 8 hours as needed for Nausea or Vomiting 6/30/22 7/10/22  Lissetteithann Mayes, APRN - NP   potassium chloride (KLOR-CON M) 20 MEQ extended release tablet Take 1 tablet by mouth daily 6/30/22   [facility], APRN - NP   lisinopril (PRINIVIL;ZESTRIL) 2.5 MG tablet Take 1 tablet by mouth daily 4/26/22   SHY Urban CNP   metFORMIN (GLUCOPHAGE) 500 MG tablet Take 1 tablet by mouth daily (with breakfast) 4/26/22   SHY Urban CNP   amLODIPine (NORVASC) 5 MG tablet Take 1 tablet daily 4/26/22   SHY Urban CNP   cyclobenzaprine (FLEXERIL) 5 MG tablet Take 1 tablet BID as needed 4/26/22   SHY Urban CNP   hydroCHLOROthiazide (HYDRODIURIL) 25 MG tablet Take 1 tablet by mouth every morning 4/26/22   SHY Urban CNP       No Known Allergies    Family History   Problem Relation Age of Onset    Hypertension Mother     Diabetes Father     Hypertension Father     Cancer Father         leukemia    Heart Attack Father     Cancer Maternal Grandmother         liver        Social History     Socioeconomic History    Marital status:      Spouse name: Not on file    Number of children: 2    Years of education: Not on file    Highest education level: Not on file   Occupational History    Occupation: day care worker   Tobacco Use    Smoking status: Never Smoker    Smokeless tobacco: Never Used   Vaping Use    Vaping Use: Never used   Substance and Sexual Activity    Alcohol use: Not Currently    Drug use: Yes     Types: Marijuana Trevor Chavez     Comment: medical marijuana - smokes 2 per day    Sexual activity: Not on file   Other Topics Concern    Not on file   Social History Narrative    Lives with daughter, mother, and significant other     Social Determinants of Health     Financial Resource Strain: Low Risk     Difficulty of Paying Living Expenses: Not hard at all   Food Insecurity: No Food Insecurity    Worried About Running Out of Food in the Last Year: Never true    [address] in the Last Year: Never true   Transportation Needs:     Lack of Transportation (Medical): Not on file    Lack of Transportation (Non-Medical):  Not on file   Physical Activity:     Days of Exercise per Week: Not on file    Minutes of Exercise per Session: Not on file   Stress:     Feeling of Stress : Not on file   Social Connections:     Frequency of Communication with Friends and Family: Not on file    Frequency of Social Gatherings with Friends and Family: Not on file    Attends Sabianism Services: Not on file    Active Member of [address] [place] [address] or Organizations: Not on file    Attends Club or Organization Meetings: Not on file    Marital Status: Not on file   Intimate Partner Violence:     Fear of Current or Ex-Partner: Not on file    Emotionally Abused: Not on file    Physically Abused: Not on file    Sexually Abused: Not on file   Housing Stability:     Unable to Pay for Housing in the Last Year: Not on file    Number of [place] in the Last Year: Not on file    Unstable Housing in the Last Year: Not on file        ROS:  As noted above, otherwise remainder of 10-point ROS negative      Physical Exam:     Vital Signs:  /86   Pulse (!) 130   Temp 99 °F (37.2 °C) (Oral)   Resp 18   Wt 250 lb (113.4 kg)   SpO2 100%   BMI 40.97 kg/m²     Weight:    Wt Readings from Last 3 Encounters:   07/08/22 250 lb (113.4 kg)   07/07/22 249 lb 12.5 oz (113.3 kg)   07/06/22 253 lb 12 oz (115.1 kg)       KPS: 80% Normal activity with effort; some signs or symptoms of disease    ECOG PS:  (1) Restricted in physically strenuous activity, ambulatory and able to do work of light nature  General: Awake, alert and oriented. HEENT: Normocephalic, atraumatic, poor dentition, many teeth requiring extraction  NECK: supple without palpable adenopathy  BACK: Straight negative CVAT  SKIN: warm dry and intact without lesions rashes or masses  CHEST: CTA bilaterally without use of accessory muscles  CV: Normal S1 S2, RRR, no MRG  ABD: NT ND normoactive BS, no palpable masses or hepatosplenomegaly  EXTREMITIES: without edema, denies calf tenderness  NEURO: CN II - XII grossly intact  CATHETER: Left Subclavian Trifusion (GSRonna Charissa, 6/20/22)     Laboratory Data:  CBC:   Recent Labs     07/06/22  0909 07/07/22  0942 07/08/22 0919   WBC 0.5* 0.3* 0.1*   HGB 9.6* 9.9* 9.9*   HCT 29.1* 29.4* 29.1*   MCV 90.5 89.6 88.3   PLT 98* 68* 40*     BMP/Mag:  Recent Labs     07/06/22  0909 07/07/22  0942 07/08/22  0919   * 137 134*   K 3.4* 3.4* 3.7   CL 98* 99 97*   CO2 27 25 27   PHOS 3.1 2.8 3.2   BUN 12 10 9   CREATININE 0.7 0.7 0.7   MG 2.10 2.00 2.00     LIVP:   Recent Labs     07/06/22  0909 07/07/22  0942 07/08/22  0919   AST 21 16 24   ALT 32 29 35   BILIDIR <0.2 <0.2 <0.2   BILITOT 0.6 0.6 0.6   ALKPHOS 79 82 76     Coags:   Recent Labs     07/07/22  0942   PROTIME 13.2   INR 1.01     Uric Acid   Recent Labs     07/06/22  0909 07/07/22  0942 07/08/22  0919   LABURIC 5.3 5.1 4.7       PROBLEM LIST:         1. Multiple myeloma   2. Dental disorder  3. HTN  4. Intestinal malabsorption (disorder)  5. Iron deficiency anemia due to chronic blood loss         TREATMENT:            1.  RVD x 5 cycles:  - C1D1: 12/2021  - C5D1: 5/16/22   2.  High Dose Melphalan f/b autologous SCT 7/1/22     ASSESSMENT AND PLAN:            1. 1) Multiple myeloma, IgG Kappa, ISS 3: partial response   - t(14;20), gain of 1q21, monosomy 13.    - At diagnoses - M spike is 3.7, FLC ratio  132  - BMBx (12/7/21): 20% plasma cells & PET scan (1/12/22) showed diffuse bone marrow activity, no lytic lesions  - Started RVD on 12/27/21 and tolerated well   - M spike down to 0.5 and FLC ratio decreased to 15  - BMBx (3/18/22) after cycle 4: 40% cellularity and few percentage of clonal plasma cells   - Her transplant work up was delayed due to needing dental extraction and EGD   - EGD unremarkable and findings on PET scan are likely related to esophagitis   - Light chain trending up, will plan for 1-2 cycles of RVD prior to transplant   - TTE, PFTS, CXR normal  - Finished cycle 5 of RVD   - 6/10/22 Repeat bone marrow bx negative for plasma cells: Consistent with partial response      High dose Melphalan & Autologous SCT - Day + 7     2. ID: Chills, low grade fever (T-max: 99.8), tachycardia concerning for sepsis,  diarrhea multiple times overnight 7/7/22  - C-Diff 7/8/22: Negative  - Pan Cx 7/8/22: Pending  - Lactic Acid 7/8/22: Pending   - CXR 7/8/22: Pending  - EKG 7/8/22: Tachycardia NSR  - Cont Valtrex & Diflucan ppx  - Start Cefepime Day + 1 (7/8/22)      3. Heme: Pancytopenia 2/2 chemotherapy  - H/o CRYSTAL: Received IV iron (3/21/22 & 3/28/22)  - Transfuse for Hgb < 7 and Platelets < 60O  - No transfusion today  - Cont daily GCSF (started 7/4/80)      4. Metabolic / DM: HypoNa, Hyperglycemia  - Start IVF hydration: NS @100 mL/hr  - Replace potassium and magnesium per PRN orders  DM:  - s/p Metformin as OP  - Start low SSI 7/8/22     5. GI / Nutrition:  Appetite and oral intake is improving  - Cont low microbial diet   - Follow closely with dietary  GERD:   - Cont daily PPI  - Cont tums PRN  Nausea / Vomiting: emesis x 1 7/8/22  - IV Zofran once 7/8/22  - Cont PRN Zofran & Compazine       6. Poor dentition  - completed partial extractions    - DVT Prophylaxis: Platelets <31,470 cells/dL - prophylactic lovenox on hold and mechanical prophylaxis with bilateral SCDs while in bed in place. Contraindications to pharmacologic prophylaxis: Thrombocytopenia  Contraindications to mechanical prophylaxis: None    - Disposition:  Uncertain at this time    The patient was seen and examined by Dr. Teresa Gray. This admission history and physical has been discussed and agreed upon by Dr. Teresa Gray.     SHY Oneill - NP

## 2022-07-08 NOTE — PROGRESS NOTES
Short Stay Communication Note  Sury Quiroga  Diagnosis: Multiple Myeloma  Primary MD: Dr. Annel Payne  Treatment: Melphalan   Day +7of Auto Transplant   Pt seen in outpatient infusion today. Labs drawn and reviewed. CBC:   Recent Labs     07/06/22  0909 07/07/22  0942 07/08/22 0919   WBC 0.5* 0.3* 0.1*   HGB 9.6* 9.9* 9.9*   HCT 29.1* 29.4* 29.1*   MCV 90.5 89.6 88.3   PLT 98* 68* 40*     BMP/Mag:  Recent Labs     07/06/22  0909 07/07/22  0942 07/08/22 0919   * 137 134*   K 3.4* 3.4* 3.7   CL 98* 99 97*   CO2 27 25 27   PHOS 3.1 2.8 3.2   BUN 12 10 9   CREATININE 0.7 0.7 0.7   MG 2.10 2.00 2.00     Standing parameters for replacement for this patient:   1 unit of pack red blood cells for a hemoglobin < or equal to 7  1 pack of platelets for a platelet count < or equal to 10  40 MeQ of Potassium administered for a potassium level < or equal to 3.4  4g of Magnesium Sulfate for a magnesum level < or equal to 1.4  No transfusions required for the above lab values. Urinalysis last done: 7/3/2022 Urinalysis next due: 7/10/2022    Chest X-Ray last done: 7/8/2022 Chest X-Ray next due: 7/10/2022    Symptoms addressed and reported to care team this date: diarrhea, vomiting, low grade fever. Pt to be admitted per Dr. Pia Ruiz. Treatments this date: IV Fluids, lab draw, filgrastim injection, blood cultures x 2, cefepime, CXR, EKG, throat swabs, stool specimen. See results. Pt admitted to [facility] at 1430. Report called to Ann Sampson RN. Neutropenic protocol initiated.     Electronically signed by Medina Fairchild RN on 7/8/2022 at 3:40 PM

## 2022-07-08 NOTE — FLOWSHEET NOTE
07/08/22 1535   Encounter Summary   Encounter Overview/Reason  Initial Encounter   Service Provided For: Patient   Referral/Consult From: Nursing Supervisor/Manager   Support System Unknown   Last Encounter    (es 7/8)   Complexity of Encounter Moderate   Begin Time 1528   End Time  1537   Total Time Calculated 9 min   Assessment/Intervention/Outcome   Assessment Coping   Intervention Active listening;Explored/Affirmed feelings, thoughts, concerns;Prayer (assurance of)/[place]   Outcome Coping;Engaged in conversation;Expressed feelings, needs, and concerns;Receptive   Plan and Referrals   Plan/Referrals Other (Comment)  (as needed)

## 2022-07-09 LAB
ANION GAP SERPL CALCULATED.3IONS-SCNC: 7 MMOL/L (ref 3–16)
BUN BLDV-MCNC: 6 MG/DL (ref 7–20)
CALCIUM SERPL-MCNC: 8.6 MG/DL (ref 8.3–10.6)
CHLORIDE BLD-SCNC: 103 MMOL/L (ref 99–110)
CO2: 25 MMOL/L (ref 21–32)
CREAT SERPL-MCNC: 0.6 MG/DL (ref 0.6–1.1)
GFR AFRICAN AMERICAN: >60
GFR NON-AFRICAN AMERICAN: >60
GLUCOSE BLD-MCNC: 101 MG/DL (ref 70–99)
GLUCOSE BLD-MCNC: 125 MG/DL (ref 70–99)
GLUCOSE BLD-MCNC: 129 MG/DL (ref 70–99)
GLUCOSE BLD-MCNC: 130 MG/DL (ref 70–99)
GLUCOSE BLD-MCNC: 136 MG/DL (ref 70–99)
HCT VFR BLD CALC: 21.7 % (ref 36–48)
HEMOGLOBIN: 7.5 G/DL (ref 12–16)
MCH RBC QN AUTO: 30.4 PG (ref 26–34)
MCHC RBC AUTO-ENTMCNC: 34.6 G/DL (ref 31–36)
MCV RBC AUTO: 87.8 FL (ref 80–100)
PDW BLD-RTO: 14.8 % (ref 12.4–15.4)
PERFORMED ON: ABNORMAL
PLATELET # BLD: 15 K/UL (ref 135–450)
PMV BLD AUTO: 6.9 FL (ref 5–10.5)
POTASSIUM SERPL-SCNC: 3.3 MMOL/L (ref 3.5–5.1)
RBC # BLD: 2.47 M/UL (ref 4–5.2)
REPORT: NORMAL
SODIUM BLD-SCNC: 135 MMOL/L (ref 136–145)
URIC ACID, SERUM: 3.5 MG/DL (ref 2.6–6)
URINE CULTURE, ROUTINE: NORMAL
WBC # BLD: 0.1 K/UL (ref 4–11)

## 2022-07-09 PROCEDURE — 6360000002 HC RX W HCPCS: Performed by: NURSE PRACTITIONER

## 2022-07-09 PROCEDURE — 85025 COMPLETE CBC W/AUTO DIFF WBC: CPT

## 2022-07-09 PROCEDURE — 2580000003 HC RX 258: Performed by: NURSE PRACTITIONER

## 2022-07-09 PROCEDURE — 36592 COLLECT BLOOD FROM PICC: CPT

## 2022-07-09 PROCEDURE — 80048 BASIC METABOLIC PNL TOTAL CA: CPT

## 2022-07-09 PROCEDURE — 94761 N-INVAS EAR/PLS OXIMETRY MLT: CPT

## 2022-07-09 PROCEDURE — 84550 ASSAY OF BLOOD/URIC ACID: CPT

## 2022-07-09 PROCEDURE — 6370000000 HC RX 637 (ALT 250 FOR IP): Performed by: NURSE PRACTITIONER

## 2022-07-09 PROCEDURE — 6370000000 HC RX 637 (ALT 250 FOR IP): Performed by: INTERNAL MEDICINE

## 2022-07-09 PROCEDURE — 2060000000 HC ICU INTERMEDIATE R&B

## 2022-07-09 RX ORDER — MAGNESIUM SULFATE IN WATER 40 MG/ML
4000 INJECTION, SOLUTION INTRAVENOUS PRN
Status: DISCONTINUED | OUTPATIENT
Start: 2022-07-09 | End: 2022-07-13 | Stop reason: HOSPADM

## 2022-07-09 RX ORDER — POTASSIUM CHLORIDE 29.8 MG/ML
20 INJECTION INTRAVENOUS PRN
Status: DISCONTINUED | OUTPATIENT
Start: 2022-07-09 | End: 2022-07-13 | Stop reason: HOSPADM

## 2022-07-09 RX ORDER — LOPERAMIDE HYDROCHLORIDE 2 MG/1
4 CAPSULE ORAL ONCE
Status: COMPLETED | OUTPATIENT
Start: 2022-07-09 | End: 2022-07-09

## 2022-07-09 RX ORDER — LOPERAMIDE HYDROCHLORIDE 2 MG/1
2 CAPSULE ORAL 4 TIMES DAILY PRN
Status: DISCONTINUED | OUTPATIENT
Start: 2022-07-09 | End: 2022-07-13 | Stop reason: HOSPADM

## 2022-07-09 RX ADMIN — CEFEPIME 2000 MG: 2 INJECTION, POWDER, FOR SOLUTION INTRAVENOUS at 08:18

## 2022-07-09 RX ADMIN — SODIUM CHLORIDE 15 ML: 900 IRRIGANT IRRIGATION at 22:15

## 2022-07-09 RX ADMIN — SODIUM CHLORIDE, PRESERVATIVE FREE 10 ML: 5 INJECTION INTRAVENOUS at 22:15

## 2022-07-09 RX ADMIN — POTASSIUM CHLORIDE 20 MEQ: 400 INJECTION, SOLUTION INTRAVENOUS at 12:47

## 2022-07-09 RX ADMIN — POTASSIUM CHLORIDE 20 MEQ: 400 INJECTION, SOLUTION INTRAVENOUS at 09:55

## 2022-07-09 RX ADMIN — CEFEPIME 2000 MG: 2 INJECTION, POWDER, FOR SOLUTION INTRAVENOUS at 15:58

## 2022-07-09 RX ADMIN — PANTOPRAZOLE SODIUM 40 MG: 40 TABLET, DELAYED RELEASE ORAL at 06:56

## 2022-07-09 RX ADMIN — SODIUM CHLORIDE 15 ML: 900 IRRIGANT IRRIGATION at 15:59

## 2022-07-09 RX ADMIN — FLUCONAZOLE 400 MG: 200 TABLET ORAL at 08:05

## 2022-07-09 RX ADMIN — SODIUM CHLORIDE: 9 INJECTION, SOLUTION INTRAVENOUS at 02:56

## 2022-07-09 RX ADMIN — POTASSIUM CHLORIDE 20 MEQ: 400 INJECTION, SOLUTION INTRAVENOUS at 12:15

## 2022-07-09 RX ADMIN — FILGRASTIM-AAFI 300 MCG: 300 INJECTION, SOLUTION SUBCUTANEOUS at 08:06

## 2022-07-09 RX ADMIN — LOPERAMIDE HYDROCHLORIDE 4 MG: 2 CAPSULE ORAL at 08:05

## 2022-07-09 RX ADMIN — SODIUM CHLORIDE: 9 INJECTION, SOLUTION INTRAVENOUS at 13:40

## 2022-07-09 RX ADMIN — SODIUM CHLORIDE 15 ML: 900 IRRIGANT IRRIGATION at 11:51

## 2022-07-09 RX ADMIN — SODIUM CHLORIDE, PRESERVATIVE FREE 10 ML: 5 INJECTION INTRAVENOUS at 08:05

## 2022-07-09 RX ADMIN — POTASSIUM CHLORIDE 20 MEQ: 400 INJECTION, SOLUTION INTRAVENOUS at 10:36

## 2022-07-09 RX ADMIN — VALACYCLOVIR HYDROCHLORIDE 500 MG: 500 TABLET, FILM COATED ORAL at 08:06

## 2022-07-09 RX ADMIN — VALACYCLOVIR HYDROCHLORIDE 500 MG: 500 TABLET, FILM COATED ORAL at 22:15

## 2022-07-09 ASSESSMENT — PAIN SCALES - GENERAL
PAINLEVEL_OUTOF10: 0
PAINLEVEL_OUTOF10: 0

## 2022-07-09 NOTE — PROGRESS NOTES
[facility] Progress Note    2022     Chito Sam    MRN: [id number]    : 1970    Referring MD: Karine Branham, [address]  [facility],  [address]    SUBJECTIVE:  Patient is doing welI. ECOG PS:  (2) Ambulatory and capable of self care, unable to carry out work activity, up and about > 50% or waking hours    KPS: 80% Normal activity with effort; some signs or symptoms of disease    Isolation: None    Medications    Scheduled Meds:   loperamide  4 mg Oral Once    sodium chloride flush  5-40 mL IntraVENous 2 times per day    Saline Mouthwash  15 mL Swish & Spit 4x Daily AC & HS    filgrastim-aafi  300 mcg SubCUTAneous Daily    fluconazole  400 mg Oral Daily    pantoprazole  40 mg Oral QAM AC    valACYclovir  500 mg Oral BID    cefepime  2,000 mg IntraVENous Q8H    insulin lispro  0-6 Units SubCUTAneous TID WC    insulin lispro  0-3 Units SubCUTAneous Nightly     Continuous Infusions:   sodium chloride      sodium chloride      sodium chloride 125 mL/hr at 22 0653    dextrose       PRN Meds:. loperamide **FOLLOWED BY** loperamide, diphenhydrAMINE, sodium chloride, sodium chloride flush, sodium chloride, Saline Mouthwash, alteplase (CATHFLO) with sterile water injection, cyclobenzaprine, gabapentin, glucose, dextrose bolus **OR** dextrose bolus, dextrose, acetaminophen    ROS:  As noted above, otherwise remainder of 10-point ROS negative    Physical Exam:     I&O:      Intake/Output Summary (Last 24 hours) at 2022 0757  Last data filed at 2022 5605  Gross per 24 hour   Intake 3556.94 ml   Output 850 ml   Net 2706.94 ml       Vital Signs:  /83   Pulse 98   Temp 98.6 °F (37 °C) (Oral)   Resp 16   Ht 5' 5\" (1.651 m)   Wt 256 lb 13.4 oz (116.5 kg)   SpO2 98%   BMI 42.74 kg/m²     Weight:    Wt Readings from Last 3 Encounters:   22 256 lb 13.4 oz (116.5 kg)   22 250 lb (113.4 kg)   22 249 lb 12.5 oz (113.3 kg)            ECOG PS:  (1) Restricted in physically strenuous activity, ambulatory and able to do work of light nature  General: Awake, alert and oriented. HEENT: Normocephalic, atraumatic, poor dentition, many teeth requiring extraction  NECK: supple without palpable adenopathy  BACK: Straight negative CVAT  SKIN: warm dry and intact without lesions rashes or masses  CHEST: CTA bilaterally without use of accessory muscles  CV: Normal S1 S2, RRR, no MRG  ABD: NT ND normoactive BS, no palpable masses or hepatosplenomegaly  EXTREMITIES: without edema, denies calf tenderness  NEURO: CN II - XII grossly intact  CATHETER: Left Subclavian Trifusion ([facility], 6/20/22)     Data    CBC:   Recent Labs     07/07/22  0942 07/08/22  0919 07/09/22  0401   WBC 0.3* 0.1* 0.1*   HGB 9.9* 9.9* 7.5*   HCT 29.4* 29.1* 21.7*   MCV 89.6 88.3 87.8   PLT 68* 40* 15*     BMP/Mag:  Recent Labs     07/06/22  0909 07/06/22  0909 07/07/22  0942 07/08/22  0919 07/09/22  0401   *   < > 137 134* 135*   K 3.4*   < > 3.4* 3.7 3.3*   CL 98*   < > 99 97* 103   CO2 27   < > 25 27 25   PHOS 3.1  --  2.8 3.2  --    BUN 12   < > 10 9 6*   CREATININE 0.7   < > 0.7 0.7 0.6   MG 2.10  --  2.00 2.00  --     < > = values in this interval not displayed. LIVP:   Recent Labs     07/06/22  0909 07/07/22  0942 07/08/22  0919   AST 21 16 24   ALT 32 29 35   BILIDIR <0.2 <0.2 <0.2   BILITOT 0.6 0.6 0.6   ALKPHOS 79 82 76     Coags:   Recent Labs     07/07/22  0942 07/08/22  1628   PROTIME 13.2 14.5   INR 1.01 1.14     Uric Acid   Recent Labs     07/07/22  0942 07/08/22  0919 07/09/22  0401   LABURIC 5.1 4.7 3.5       PROBLEM LIST:              1. Multiple myeloma   2. Dental disorder  3. HTN  4. Intestinal malabsorption (disorder)  5. Iron deficiency anemia due to chronic blood loss    Post transplant complications  1. Klebsiella Pneumoniae bacteremia         TREATMENT:            1. RVD x 5 cycles:  - C1D1: 12/2021  - C5D1: 5/16/22   2.  High Dose Melphalan f/b autologous SCT 7/1/22     ASSESSMENT AND PLAN:            1. 1) Multiple myeloma, IgG Kappa, ISS 3: partial response   - t(14;20), gain of 1q21, monosomy 13.    - At diagnoses - M spike is 3.7, FLC ratio  132  - BMBx (12/7/21): 20% plasma cells & PET scan (1/12/22) showed diffuse bone marrow activity, no lytic lesions  - Started RVD on 12/27/21 and tolerated well   - M spike down to 0.5 and FLC ratio decreased to 15  - BMBx (3/18/22) after cycle 4: 40% cellularity and few percentage of clonal plasma cells   - Her transplant work up was delayed due to needing dental extraction and EGD   - EGD unremarkable and findings on PET scan are likely related to esophagitis   - Light chain trending up, will plan for 1-2 cycles of RVD prior to transplant   - TTE, PFTS, CXR normal  - Finished cycle 5 of RVD   - 6/10/22 Repeat bone marrow bx negative for plasma cells: Consistent with partial response      High dose Melphalan & Autologous SCT - Day + 8     2. ID: Sepsis r/t Klebsiella pneumoniae bacteremia  - C-Diff 7/8/22: Negative  - Pan Cx 7/8/22: Klebsiella pneumoniae in 1 bottle  - Lactic Acid 7/8/22: 0.7  - CXR 7/8/22: No acute pathology  - EKG 7/8/22: Tachycardia NSR  - Cont Valtrex & Diflucan ppx  - Start Cefepime Day + 2 (7/8/22)      3. Heme: Pancytopenia 2/2 chemotherapy  - H/o CRYSTAL: Received IV iron (3/21/22 & 3/28/22)  - Transfuse for Hgb < 7 and Platelets < 69Q  - No transfusion today  - Cont daily GCSF (started 0/8/80)      4. Metabolic / DM: HypoNa, Hyperglycemia, hypoK+, + I/O  - Decrease NS @75 mL/hr  - Replace potassium and magnesium per PRN orders  DM:  - s/p Metformin as OP  - Start low SSI 7/8/22     5. GI / Nutrition:  Appetite and oral intake is improving  - Cont low microbial diet   - Follow closely with dietary  GERD:   - Cont daily PPI  - Cont tums PRN  Nausea / Vomiting: emesis x 1 7/8/22  - IV Zofran once 7/8/22  - Cont PRN Zofran & Compazine       6.  Poor dentition  - completed partial extractions     - DVT Prophylaxis: Platelets <31,140 cells/dL - prophylactic lovenox on hold and mechanical prophylaxis with bilateral SCDs while in bed in place.   Contraindications to pharmacologic prophylaxis: Thrombocytopenia  Contraindications to mechanical prophylaxis: None    - Disposition: Once afebrile and WBC >1    The patient was seen and examined by Dr. Elroy Del Rosario MD

## 2022-07-09 NOTE — PROGRESS NOTES
Department of Pharmacy    Notification received from laboratory of positive blood culture results.     Organism(s) detected: Klebsiella pneumoniae   Applicable Antimicrobial Resistance Genes: not detected as of yet    Coverage provided by Cefepime [not ESBL]  Recommendations reviewed with Dr. Abran Cuba

## 2022-07-09 NOTE — PROGRESS NOTES
Comprehensive Nutrition Assessment    Type and Reason for Visit:  Positive Nutrition Screen    Nutrition Recommendations/Plan:   1. Continue regular, low microbial diet  2. Add Rodriguez & Gene OND BID  3. Encourage PO intakes  4. Monitor nutrition adequacy, pertinent labs, bowel habits, wt changes, and clinical progress     Malnutrition Assessment:  Malnutrition Status: At risk for malnutrition (Comment) (07/09/22 1242)    Context:  Acute Illness       Nutrition Assessment:    Positive nutrition screen r/t weight loss, poor intakes. Pt admitted for neutropenic fever. High Dose Melphalan preparative regimen then AUTO BMT 7/1/22 for IgG Kappa Multiple Myeloma. D+7 from AUTO BMT. Pt reports good appetite this morning, denies nausea. States ate ~50% of breakfast this AM and hungry for lunch during visit. Pt agreeable to RD adding Rodriguez & Gene ONS BID for pt to drink after/between meals. Pt denied any nutrition r/t questions at this time, states understands low microbial diet and that cannot have fast food/family bring in food. Encouraged PO intakes and importance of adequate nutrition. Will continue to [facility]. Nutrition Related Findings:    Na 135. K 3.3. Wound Type: None       Current Nutrition Intake & Therapies:    Average Meal Intake: Unable to assess  Average Supplements Intake: None Ordered  ADULT DIET; Regular; Low Microbial    Anthropometric Measures:  Height: 5' 5\" (165.1 cm)  Ideal Body Weight (IBW): 125 lbs (57 kg)       Current Body Weight: 258 lb (117 kg), 206.4 % IBW.  Weight Source: Bed Scale  Current BMI (kg/m2): 42.9                          BMI Categories: Obese Class 3 (BMI 40.0 or greater)    Estimated Daily Nutrient Needs:  Energy Requirements Based On: Kcal/kg (30-35)  Weight Used for Energy Requirements: Ideal  Energy (kcal/day): 6206-6865  Weight Used for Protein Requirements: Ideal (1.2-1.5)  Protein (g/day): 68-86  Method Used for Fluid Requirements: 1 ml/kcal  Fluid (ml/day): 9933-0925    Nutrition Diagnosis:   · Increased nutrient needs related to increase demand for energy/nutrients as evidenced by  (s/p chemo/BMT)    Nutrition Interventions:   Food and/or Nutrient Delivery: Continue Current Diet,Start Oral Nutrition Supplement  Nutrition Education/Counseling: Education declined  Coordination of Nutrition Care: Continue to monitor while inpatient  Plan of Care discussed with: Patient, RN    Goals:     Goals: PO intake 50% or greater,prior to discharge       Nutrition Monitoring and Evaluation:   Behavioral-Environmental Outcomes: None Identified  Food/Nutrient Intake Outcomes: Food and Nutrient Intake,Supplement Intake  Physical Signs/Symptoms Outcomes: Biochemical Data,Nutrition Focused Physical Findings,Weight,Nausea or Vomiting    Discharge Planning:    Continue current diet,Continue Oral Nutrition Supplement     [address] [facility], [address]: [id number]

## 2022-07-09 NOTE — PLAN OF CARE
Problem: Safety - Adult  Goal: Free from fall injury  7/9/2022 1108 by Rom Banks RN  Note: Orthostatic vital signs obtained at start of shift - see flowsheet for details. Pt does not meet criteria for orthostasis. Pt is a Med fall risk. See Dayna Buckle Fall Score and ABCDS Injury Risk assessments. - Screening for Orthostasis AND not a [place] Risk per OCONNOR/ABCDS: Pt bed is in low position, side rails up, call light and belongings are in reach. Fall risk light is on outside pts room. Pt encouraged to call for assistance as needed. Will continue with hourly rounds for PO intake, pain needs, toileting and repositioning as needed. Problem: Skin/Tissue Integrity - Adult  Goal: Incisions, wounds, or drain sites healing without S/S of infection  Note: CVC site remains free of signs/symptoms of infection. No drainage, edema, erythema, pain, or warmth noted at site. Dressing changes continue per protocol and on an as needed basis - see flowsheet. Compliant with BCC Bath Protocol:  Performed CHG bath today per [facility] protocol utilizing CHG solution in the shower. CVC site cleansed with CHG wipe over dressing, skin surrounding dressing, and first 6\" of IV tubing. Pt tolerated well. Continued to encourage daily CHG bathing per [facility] protocol. Problem: Hematologic - Adult  Goal: Maintains hematologic stability  Note: Patient's hemoglobin this AM:   Recent Labs     07/09/22  0401   HGB 7.5*     Patient's platelet count this AM:   Recent Labs     07/09/22  0401   PLT 15*    Thrombocytopenia Precautions in place. Patient showing no signs or symptoms of active bleeding. Transfusion not indicated at this time. Patient verbalizes understanding of all instructions. Will continue to assess and implement POC. Call light within reach and hourly rounding in place.

## 2022-07-10 LAB
ABO/RH: NORMAL
ANION GAP SERPL CALCULATED.3IONS-SCNC: 9 MMOL/L (ref 3–16)
ANTIBODY SCREEN: NORMAL
BLOOD BANK DISPENSE STATUS: NORMAL
BLOOD BANK PRODUCT CODE: NORMAL
BPU ID: NORMAL
BUN BLDV-MCNC: 4 MG/DL (ref 7–20)
CALCIUM SERPL-MCNC: 8.8 MG/DL (ref 8.3–10.6)
CHLORIDE BLD-SCNC: 105 MMOL/L (ref 99–110)
CO2: 24 MMOL/L (ref 21–32)
CREAT SERPL-MCNC: 0.7 MG/DL (ref 0.6–1.1)
DESCRIPTION BLOOD BANK: NORMAL
EKG ATRIAL RATE: 127 BPM
EKG DIAGNOSIS: NORMAL
EKG P AXIS: 66 DEGREES
EKG P-R INTERVAL: 130 MS
EKG Q-T INTERVAL: 320 MS
EKG QRS DURATION: 80 MS
EKG QTC CALCULATION (BAZETT): 465 MS
EKG R AXIS: 40 DEGREES
EKG T AXIS: 36 DEGREES
EKG VENTRICULAR RATE: 127 BPM
GFR AFRICAN AMERICAN: >60
GFR NON-AFRICAN AMERICAN: >60
GLUCOSE BLD-MCNC: 130 MG/DL (ref 70–99)
GLUCOSE BLD-MCNC: 134 MG/DL (ref 70–99)
GLUCOSE BLD-MCNC: 144 MG/DL (ref 70–99)
GLUCOSE BLD-MCNC: 184 MG/DL (ref 70–99)
HCT VFR BLD CALC: 20.8 % (ref 36–48)
HEMOGLOBIN: 7.1 G/DL (ref 12–16)
MCH RBC QN AUTO: 30.1 PG (ref 26–34)
MCHC RBC AUTO-ENTMCNC: 34.1 G/DL (ref 31–36)
MCV RBC AUTO: 88.2 FL (ref 80–100)
PDW BLD-RTO: 14.5 % (ref 12.4–15.4)
PERFORMED ON: ABNORMAL
PLATELET # BLD: 8 K/UL (ref 135–450)
PMV BLD AUTO: 7.9 FL (ref 5–10.5)
POTASSIUM SERPL-SCNC: 3.9 MMOL/L (ref 3.5–5.1)
RBC # BLD: 2.35 M/UL (ref 4–5.2)
SODIUM BLD-SCNC: 138 MMOL/L (ref 136–145)
THROAT CULTURE: NORMAL
URIC ACID, SERUM: 3.5 MG/DL (ref 2.6–6)
WBC # BLD: 0.1 K/UL (ref 4–11)

## 2022-07-10 PROCEDURE — 6360000002 HC RX W HCPCS: Performed by: NURSE PRACTITIONER

## 2022-07-10 PROCEDURE — 2580000003 HC RX 258: Performed by: NURSE PRACTITIONER

## 2022-07-10 PROCEDURE — 2060000000 HC ICU INTERMEDIATE R&B

## 2022-07-10 PROCEDURE — 6370000000 HC RX 637 (ALT 250 FOR IP): Performed by: NURSE PRACTITIONER

## 2022-07-10 PROCEDURE — 6370000000 HC RX 637 (ALT 250 FOR IP): Performed by: INTERNAL MEDICINE

## 2022-07-10 PROCEDURE — P9036 PLATELET PHERESIS IRRADIATED: HCPCS

## 2022-07-10 PROCEDURE — 94761 N-INVAS EAR/PLS OXIMETRY MLT: CPT

## 2022-07-10 PROCEDURE — 93010 ELECTROCARDIOGRAM REPORT: CPT | Performed by: INTERNAL MEDICINE

## 2022-07-10 PROCEDURE — 80048 BASIC METABOLIC PNL TOTAL CA: CPT

## 2022-07-10 PROCEDURE — 6360000002 HC RX W HCPCS: Performed by: INTERNAL MEDICINE

## 2022-07-10 PROCEDURE — 36592 COLLECT BLOOD FROM PICC: CPT

## 2022-07-10 PROCEDURE — 86850 RBC ANTIBODY SCREEN: CPT

## 2022-07-10 PROCEDURE — 36430 TRANSFUSION BLD/BLD COMPNT: CPT

## 2022-07-10 PROCEDURE — 86901 BLOOD TYPING SEROLOGIC RH(D): CPT

## 2022-07-10 PROCEDURE — 85025 COMPLETE CBC W/AUTO DIFF WBC: CPT

## 2022-07-10 PROCEDURE — 84550 ASSAY OF BLOOD/URIC ACID: CPT

## 2022-07-10 PROCEDURE — 86900 BLOOD TYPING SEROLOGIC ABO: CPT

## 2022-07-10 RX ORDER — SODIUM CHLORIDE 9 MG/ML
INJECTION, SOLUTION INTRAVENOUS PRN
Status: DISCONTINUED | OUTPATIENT
Start: 2022-07-10 | End: 2022-07-13 | Stop reason: HOSPADM

## 2022-07-10 RX ORDER — CALCIUM CARBONATE 200(500)MG
500 TABLET,CHEWABLE ORAL 3 TIMES DAILY PRN
Status: DISCONTINUED | OUTPATIENT
Start: 2022-07-10 | End: 2022-07-13 | Stop reason: HOSPADM

## 2022-07-10 RX ORDER — PANTOPRAZOLE SODIUM 20 MG/1
20 TABLET, DELAYED RELEASE ORAL
Status: DISCONTINUED | OUTPATIENT
Start: 2022-07-11 | End: 2022-07-13 | Stop reason: HOSPADM

## 2022-07-10 RX ORDER — ONDANSETRON 2 MG/ML
4 INJECTION INTRAMUSCULAR; INTRAVENOUS EVERY 6 HOURS PRN
Status: DISCONTINUED | OUTPATIENT
Start: 2022-07-10 | End: 2022-07-13 | Stop reason: HOSPADM

## 2022-07-10 RX ADMIN — BENZOCAINE AND MENTHOL 1 LOZENGE: 15; 3.6 LOZENGE ORAL at 23:49

## 2022-07-10 RX ADMIN — SODIUM CHLORIDE 15 ML: 900 IRRIGANT IRRIGATION at 09:47

## 2022-07-10 RX ADMIN — SODIUM CHLORIDE, PRESERVATIVE FREE 10 ML: 5 INJECTION INTRAVENOUS at 23:40

## 2022-07-10 RX ADMIN — CEFEPIME 2000 MG: 2 INJECTION, POWDER, FOR SOLUTION INTRAVENOUS at 16:47

## 2022-07-10 RX ADMIN — ONDANSETRON 4 MG: 2 INJECTION INTRAMUSCULAR; INTRAVENOUS at 04:06

## 2022-07-10 RX ADMIN — CEFEPIME 2000 MG: 2 INJECTION, POWDER, FOR SOLUTION INTRAVENOUS at 00:23

## 2022-07-10 RX ADMIN — ACETAMINOPHEN 325MG 650 MG: 325 TABLET ORAL at 20:19

## 2022-07-10 RX ADMIN — SODIUM CHLORIDE: 9 INJECTION, SOLUTION INTRAVENOUS at 03:22

## 2022-07-10 RX ADMIN — PANTOPRAZOLE SODIUM 40 MG: 40 TABLET, DELAYED RELEASE ORAL at 06:31

## 2022-07-10 RX ADMIN — CEFEPIME 2000 MG: 2 INJECTION, POWDER, FOR SOLUTION INTRAVENOUS at 09:36

## 2022-07-10 RX ADMIN — INSULIN LISPRO 1 UNITS: 100 INJECTION, SOLUTION INTRAVENOUS; SUBCUTANEOUS at 16:51

## 2022-07-10 RX ADMIN — SODIUM CHLORIDE, PRESERVATIVE FREE 10 ML: 5 INJECTION INTRAVENOUS at 09:46

## 2022-07-10 RX ADMIN — SODIUM CHLORIDE 15 ML: 900 IRRIGANT IRRIGATION at 15:21

## 2022-07-10 RX ADMIN — VALACYCLOVIR HYDROCHLORIDE 500 MG: 500 TABLET, FILM COATED ORAL at 23:39

## 2022-07-10 RX ADMIN — FILGRASTIM-AAFI 300 MCG: 300 INJECTION, SOLUTION SUBCUTANEOUS at 09:45

## 2022-07-10 RX ADMIN — CEFEPIME 2000 MG: 2 INJECTION, POWDER, FOR SOLUTION INTRAVENOUS at 23:39

## 2022-07-10 RX ADMIN — BENZOCAINE AND MENTHOL 1 LOZENGE: 15; 3.6 LOZENGE ORAL at 20:19

## 2022-07-10 RX ADMIN — BENZOCAINE AND MENTHOL 1 LOZENGE: 15; 3.6 LOZENGE ORAL at 16:49

## 2022-07-10 RX ADMIN — VALACYCLOVIR HYDROCHLORIDE 500 MG: 500 TABLET, FILM COATED ORAL at 09:44

## 2022-07-10 RX ADMIN — FLUCONAZOLE 400 MG: 200 TABLET ORAL at 09:44

## 2022-07-10 RX ADMIN — SODIUM CHLORIDE 15 ML: 900 IRRIGANT IRRIGATION at 06:31

## 2022-07-10 RX ADMIN — CALCIUM CARBONATE 500 MG: 500 TABLET, CHEWABLE ORAL at 13:18

## 2022-07-10 RX ADMIN — SODIUM CHLORIDE 15 ML: 900 IRRIGANT IRRIGATION at 23:40

## 2022-07-10 ASSESSMENT — PAIN DESCRIPTION - ORIENTATION
ORIENTATION: MID
ORIENTATION: INNER

## 2022-07-10 ASSESSMENT — PAIN SCALES - GENERAL
PAINLEVEL_OUTOF10: 0
PAINLEVEL_OUTOF10: 0
PAINLEVEL_OUTOF10: 3
PAINLEVEL_OUTOF10: 0
PAINLEVEL_OUTOF10: 4

## 2022-07-10 ASSESSMENT — PAIN DESCRIPTION - DESCRIPTORS
DESCRIPTORS: BURNING
DESCRIPTORS: BURNING

## 2022-07-10 ASSESSMENT — PAIN DESCRIPTION - LOCATION
LOCATION: THROAT
LOCATION: THROAT

## 2022-07-10 NOTE — PLAN OF CARE
Problem: Safety - Adult  Goal: Free from fall injury  Outcome: Progressing     Problem: Skin/Tissue Integrity - Adult  Goal: Incisions, wounds, or drain sites healing without S/S of infection  Outcome: Progressing     Problem: Discharge Planning  Goal: Discharge to home or other facility with appropriate resources  Outcome: Progressing     Problem: Hematologic - Adult  Goal: Maintains hematologic stability  Outcome: Progressing     Problem: ABCDS Injury Assessment  Goal: Absence of physical injury  Outcome: Progressing

## 2022-07-10 NOTE — PROGRESS NOTES
[address] [employer] Progress Note    7/10/2022     Saurabh Officer    MRN: [id number]    : 1970    Referring MD: Joann Morales, [address] [facility],  [address]    SUBJECTIVE:  Patient is doing welI.     ECOG PS:  (2) Ambulatory and capable of self care, unable to carry out work activity, up and about > 50% or waking hours    KPS: 80% Normal activity with effort; some signs or symptoms of disease    Isolation: None    Medications    Scheduled Meds:   sodium chloride flush  5-40 mL IntraVENous 2 times per day    Saline Mouthwash  15 mL Swish & Spit 4x Daily AC & HS    filgrastim-aafi  300 mcg SubCUTAneous Daily    fluconazole  400 mg Oral Daily    pantoprazole  40 mg Oral QAM AC    valACYclovir  500 mg Oral BID    cefepime  2,000 mg IntraVENous Q8H    insulin lispro  0-6 Units SubCUTAneous TID WC    insulin lispro  0-3 Units SubCUTAneous Nightly     Continuous Infusions:   sodium chloride      sodium chloride      sodium chloride      sodium chloride 75 mL/hr at 07/10/22 0632    dextrose       PRN Meds:.ondansetron, calcium carbonate, sodium chloride, [COMPLETED] loperamide **FOLLOWED BY** loperamide, potassium chloride, magnesium sulfate, diphenhydrAMINE, sodium chloride, sodium chloride flush, sodium chloride, Saline Mouthwash, alteplase (CATHFLO) with sterile water injection, cyclobenzaprine, gabapentin, glucose, dextrose bolus **OR** dextrose bolus, dextrose, acetaminophen    ROS:  As noted above, otherwise remainder of 10-point ROS negative    Physical Exam:     I&O:      Intake/Output Summary (Last 24 hours) at 7/10/2022 0745  Last data filed at 7/10/2022 3916  Gross per 24 hour   Intake 4795.48 ml   Output 2650 ml   Net 2145.48 ml       Vital Signs:  BP (!) 140/85   Pulse (!) 105   Temp 98.8 °F (37.1 °C) (Oral)   Resp 18   Ht 5' 5\" (1.651 m)   Wt 258 lb (117 kg)   SpO2 100%   BMI 42.93 kg/m²     Weight:    Wt Readings from Last 3 Encounters:   22 258 lb (117 kg)   07/08/22 250 lb (113.4 kg)   07/07/22 249 lb 12.5 oz (113.3 kg)            ECOG PS:  (1) Restricted in physically strenuous activity, ambulatory and able to do work of light nature  General: Awake, alert and oriented. HEENT: Normocephalic, atraumatic, poor dentition, many teeth requiring extraction  NECK: supple without palpable adenopathy  BACK: Straight negative CVAT  SKIN: warm dry and intact without lesions rashes or masses  CHEST: CTA bilaterally without use of accessory muscles  CV: Normal S1 S2, RRR, no MRG  ABD: NT ND normoactive BS, no palpable masses or hepatosplenomegaly  EXTREMITIES: without edema, denies calf tenderness  NEURO: CN II - XII grossly intact  CATHETER: Left Subclavian Trifusion ([facility], 6/20/22)     Data    CBC:   Recent Labs     07/08/22  0919 07/09/22  0401 07/10/22  0315   WBC 0.1* 0.1* 0.1*   HGB 9.9* 7.5* 7.1*   HCT 29.1* 21.7* 20.8*   MCV 88.3 87.8 88.2   PLT 40* 15* 8*     BMP/Mag:  Recent Labs     07/07/22  0942 07/07/22 0942 07/08/22 0919 07/09/22  0401 07/10/22  0315      < > 134* 135* 138   K 3.4*   < > 3.7 3.3* 3.9   CL 99   < > 97* 103 105   CO2 25   < > 27 25 24   PHOS 2.8  --  3.2  --   --    BUN 10   < > 9 6* 4*   CREATININE 0.7   < > 0.7 0.6 0.7   MG 2.00  --  2.00  --   --     < > = values in this interval not displayed. LIVP:   Recent Labs     07/07/22  0942 07/08/22 0919   AST 16 24   ALT 29 35   BILIDIR <0.2 <0.2   BILITOT 0.6 0.6   ALKPHOS 82 76     Coags:   Recent Labs     07/07/22  0942 07/08/22  1628   PROTIME 13.2 14.5   INR 1.01 1.14     Uric Acid   Recent Labs     07/08/22  0919 07/09/22  0401 07/10/22  0315   LABURIC 4.7 3.5 3.5       PROBLEM LIST:              1. Multiple myeloma   2. Dental disorder  3. HTN  4. Intestinal malabsorption (disorder)  5. Iron deficiency anemia due to chronic blood loss    Post transplant complications  1. Klebsiella Pneumoniae bacteremia         TREATMENT:            1.  RVD x 5 cycles:  - C1D1: 12/2021  - C5D1: 5/16/22   2. High Dose Melphalan f/b autologous SCT 7/1/22     ASSESSMENT AND PLAN:            1. 1) Multiple myeloma, IgG Kappa, ISS 3: partial response   - t(14;20), gain of 1q21, monosomy 13.    - At diagnoses - M spike is 3.7, FLC ratio  132  - BMBx (12/7/21): 20% plasma cells & PET scan (1/12/22) showed diffuse bone marrow activity, no lytic lesions  - Started RVD on 12/27/21 and tolerated well   - M spike down to 0.5 and FLC ratio decreased to 15  - BMBx (3/18/22) after cycle 4: 40% cellularity and few percentage of clonal plasma cells   - Her transplant work up was delayed due to needing dental extraction and EGD   - EGD unremarkable and findings on PET scan are likely related to esophagitis   - Light chain trending up, will plan for 1-2 cycles of RVD prior to transplant   - TTE, PFTS, CXR normal  - Finished cycle 5 of RVD   - 6/10/22 Repeat bone marrow bx negative for plasma cells: Consistent with partial response      High dose Melphalan & Autologous SCT - Day + 9     2. ID: Sepsis r/t Klebsiella pneumoniae bacteremia  - C-Diff 7/8/22: Negative  - Pan Cx 7/8/22: Klebsiella pneumoniae x 2 bottles  - Lactic Acid 7/8/22: 0.7  - CXR 7/8/22: No acute pathology  - EKG 7/8/22: Tachycardia NSR  - Cont Valtrex & Diflucan ppx  - Start Cefepime Day + 3 (7/8/22)      3. Heme: Pancytopenia 2/2 chemotherapy  - H/o CRYSTAL: Received IV iron (3/21/22 & 3/28/22)  - Transfuse for Hgb < 7 and Platelets < 94N  - Plt transfusion today  - Cont daily GCSF (started 9/9/89)      4. Metabolic / DM: HypoNa, Hyperglycemia, positive I/O  - Decrease NS @50 mL/hr  - Replace potassium and magnesium per PRN orders  DM:  - s/p Metformin as OP  - Start low SSI 7/8/22     5. GI / Nutrition:  Appetite and oral intake is improving  - Cont low microbial diet   - Follow closely with dietary  GERD:   - Cont daily PPI  - Cont tums PRN  Nausea / Vomiting: emesis x 1 7/8/22  - IV Zofran once 7/8/22  - Cont PRN Zofran & Compazine       6. Poor dentition  - completed partial extractions     - DVT Prophylaxis: Platelets <20,004 cells/dL - prophylactic lovenox on hold and mechanical prophylaxis with bilateral SCDs while in bed in place.   Contraindications to pharmacologic prophylaxis: Thrombocytopenia  Contraindications to mechanical prophylaxis: None    - Disposition: Once afebrile and WBC >1    The patient was seen and examined by Dr. Laura Murry, APRN - CNP

## 2022-07-11 ENCOUNTER — HOSPITAL ENCOUNTER (OUTPATIENT)
Dept: ONCOLOGY | Age: 52
Setting detail: INFUSION SERIES
Discharge: HOME | End: 2022-07-11

## 2022-07-11 ENCOUNTER — APPOINTMENT (OUTPATIENT)
Dept: GENERAL RADIOLOGY | Age: 52
DRG: 720 | End: 2022-07-11
Attending: STUDENT IN AN ORGANIZED HEALTH CARE EDUCATION/TRAINING PROGRAM
Payer: COMMERCIAL

## 2022-07-11 LAB
ALBUMIN SERPL-MCNC: 3.8 G/DL (ref 3.4–5)
ALP BLD-CCNC: 64 U/L (ref 40–129)
ALT SERPL-CCNC: 28 U/L (ref 10–40)
ANION GAP SERPL CALCULATED.3IONS-SCNC: 10 MMOL/L (ref 3–16)
APTT: 25.9 SEC (ref 23–34.3)
AST SERPL-CCNC: 12 U/L (ref 15–37)
BACTERIA: ABNORMAL /HPF
BILIRUB SERPL-MCNC: <0.2 MG/DL (ref 0–1)
BILIRUBIN DIRECT: <0.2 MG/DL (ref 0–0.3)
BILIRUBIN URINE: NEGATIVE
BILIRUBIN, INDIRECT: ABNORMAL MG/DL (ref 0–1)
BLOOD CULTURE, ROUTINE: ABNORMAL
BLOOD, URINE: ABNORMAL
BUN BLDV-MCNC: 5 MG/DL (ref 7–20)
CALCIUM SERPL-MCNC: 9.2 MG/DL (ref 8.3–10.6)
CHLORIDE BLD-SCNC: 104 MMOL/L (ref 99–110)
CLARITY: CLEAR
CO2: 26 MMOL/L (ref 21–32)
COLOR: YELLOW
CREAT SERPL-MCNC: 0.6 MG/DL (ref 0.6–1.1)
CULTURE, BLOOD 2: ABNORMAL
CULTURE, BLOOD 2: ABNORMAL
EPITHELIAL CELLS, UA: ABNORMAL /HPF (ref 0–5)
GFR AFRICAN AMERICAN: >60
GFR NON-AFRICAN AMERICAN: >60
GLUCOSE BLD-MCNC: 105 MG/DL (ref 70–99)
GLUCOSE BLD-MCNC: 111 MG/DL (ref 70–99)
GLUCOSE BLD-MCNC: 138 MG/DL (ref 70–99)
GLUCOSE BLD-MCNC: 142 MG/DL (ref 70–99)
GLUCOSE URINE: NEGATIVE MG/DL
HCT VFR BLD CALC: 20.7 % (ref 36–48)
HEMOGLOBIN: 7.2 G/DL (ref 12–16)
INR BLD: 1.09 (ref 0.87–1.14)
KETONES, URINE: NEGATIVE MG/DL
LACTATE DEHYDROGENASE: 165 U/L (ref 100–190)
LEUKOCYTE ESTERASE, URINE: NEGATIVE
MAGNESIUM: 1.9 MG/DL (ref 1.8–2.4)
MCH RBC QN AUTO: 30.6 PG (ref 26–34)
MCHC RBC AUTO-ENTMCNC: 35 G/DL (ref 31–36)
MCV RBC AUTO: 87.6 FL (ref 80–100)
MICROSCOPIC EXAMINATION: YES
NITRITE, URINE: NEGATIVE
ORGANISM: ABNORMAL
PDW BLD-RTO: 14 % (ref 12.4–15.4)
PERFORMED ON: ABNORMAL
PH UA: 6 (ref 5–8)
PHOSPHORUS: 4 MG/DL (ref 2.5–4.9)
PLATELET # BLD: 30 K/UL (ref 135–450)
PMV BLD AUTO: 7.9 FL (ref 5–10.5)
POTASSIUM SERPL-SCNC: 3.9 MMOL/L (ref 3.5–5.1)
PROTEIN UA: NEGATIVE MG/DL
PROTHROMBIN TIME: 14 SEC (ref 11.7–14.5)
RBC # BLD: 2.36 M/UL (ref 4–5.2)
RBC UA: ABNORMAL /HPF (ref 0–4)
SODIUM BLD-SCNC: 140 MMOL/L (ref 136–145)
SPECIFIC GRAVITY UA: 1.01 (ref 1–1.03)
TOTAL PROTEIN: 6.3 G/DL (ref 6.4–8.2)
URIC ACID, SERUM: 3.5 MG/DL (ref 2.6–6)
URINE TYPE: ABNORMAL
UROBILINOGEN, URINE: 0.2 E.U./DL
WBC # BLD: 0.3 K/UL (ref 4–11)
WBC UA: ABNORMAL /HPF (ref 0–5)

## 2022-07-11 PROCEDURE — 85610 PROTHROMBIN TIME: CPT

## 2022-07-11 PROCEDURE — 6370000000 HC RX 637 (ALT 250 FOR IP): Performed by: INTERNAL MEDICINE

## 2022-07-11 PROCEDURE — 80076 HEPATIC FUNCTION PANEL: CPT

## 2022-07-11 PROCEDURE — 84550 ASSAY OF BLOOD/URIC ACID: CPT

## 2022-07-11 PROCEDURE — 94760 N-INVAS EAR/PLS OXIMETRY 1: CPT

## 2022-07-11 PROCEDURE — 85025 COMPLETE CBC W/AUTO DIFF WBC: CPT

## 2022-07-11 PROCEDURE — 85730 THROMBOPLASTIN TIME PARTIAL: CPT

## 2022-07-11 PROCEDURE — 2060000000 HC ICU INTERMEDIATE R&B

## 2022-07-11 PROCEDURE — 81001 URINALYSIS AUTO W/SCOPE: CPT

## 2022-07-11 PROCEDURE — 80048 BASIC METABOLIC PNL TOTAL CA: CPT

## 2022-07-11 PROCEDURE — 97165 OT EVAL LOW COMPLEX 30 MIN: CPT

## 2022-07-11 PROCEDURE — 6360000002 HC RX W HCPCS: Performed by: NURSE PRACTITIONER

## 2022-07-11 PROCEDURE — 97161 PT EVAL LOW COMPLEX 20 MIN: CPT

## 2022-07-11 PROCEDURE — 71045 X-RAY EXAM CHEST 1 VIEW: CPT

## 2022-07-11 PROCEDURE — 83735 ASSAY OF MAGNESIUM: CPT

## 2022-07-11 PROCEDURE — 2580000003 HC RX 258: Performed by: NURSE PRACTITIONER

## 2022-07-11 PROCEDURE — 84100 ASSAY OF PHOSPHORUS: CPT

## 2022-07-11 PROCEDURE — 6370000000 HC RX 637 (ALT 250 FOR IP): Performed by: NURSE PRACTITIONER

## 2022-07-11 PROCEDURE — 83615 LACTATE (LD) (LDH) ENZYME: CPT

## 2022-07-11 RX ADMIN — BENZOCAINE AND MENTHOL 1 LOZENGE: 15; 3.6 LOZENGE ORAL at 20:49

## 2022-07-11 RX ADMIN — SODIUM CHLORIDE 15 ML: 900 IRRIGANT IRRIGATION at 20:50

## 2022-07-11 RX ADMIN — PANTOPRAZOLE SODIUM 20 MG: 20 TABLET, DELAYED RELEASE ORAL at 06:37

## 2022-07-11 RX ADMIN — BENZOCAINE AND MENTHOL 1 LOZENGE: 15; 3.6 LOZENGE ORAL at 04:05

## 2022-07-11 RX ADMIN — BENZOCAINE AND MENTHOL 1 LOZENGE: 15; 3.6 LOZENGE ORAL at 16:19

## 2022-07-11 RX ADMIN — CEFEPIME 2000 MG: 2 INJECTION, POWDER, FOR SOLUTION INTRAVENOUS at 16:14

## 2022-07-11 RX ADMIN — FILGRASTIM-AAFI 300 MCG: 300 INJECTION, SOLUTION SUBCUTANEOUS at 08:41

## 2022-07-11 RX ADMIN — SODIUM CHLORIDE, PRESERVATIVE FREE 10 ML: 5 INJECTION INTRAVENOUS at 08:41

## 2022-07-11 RX ADMIN — CEFEPIME 2000 MG: 2 INJECTION, POWDER, FOR SOLUTION INTRAVENOUS at 23:29

## 2022-07-11 RX ADMIN — VALACYCLOVIR HYDROCHLORIDE 500 MG: 500 TABLET, FILM COATED ORAL at 20:49

## 2022-07-11 RX ADMIN — INSULIN LISPRO 1 UNITS: 100 INJECTION, SOLUTION INTRAVENOUS; SUBCUTANEOUS at 16:19

## 2022-07-11 RX ADMIN — FLUCONAZOLE 400 MG: 200 TABLET ORAL at 08:40

## 2022-07-11 RX ADMIN — CEFEPIME 2000 MG: 2 INJECTION, POWDER, FOR SOLUTION INTRAVENOUS at 08:45

## 2022-07-11 RX ADMIN — BENZOCAINE AND MENTHOL 1 LOZENGE: 15; 3.6 LOZENGE ORAL at 09:12

## 2022-07-11 RX ADMIN — VALACYCLOVIR HYDROCHLORIDE 500 MG: 500 TABLET, FILM COATED ORAL at 08:40

## 2022-07-11 ASSESSMENT — PAIN DESCRIPTION - LOCATION
LOCATION: THROAT

## 2022-07-11 ASSESSMENT — PAIN DESCRIPTION - ORIENTATION: ORIENTATION: MID

## 2022-07-11 ASSESSMENT — PAIN SCALES - GENERAL
PAINLEVEL_OUTOF10: 0
PAINLEVEL_OUTOF10: 0

## 2022-07-11 ASSESSMENT — PAIN DESCRIPTION - DESCRIPTORS
DESCRIPTORS: ACHING;BURNING
DESCRIPTORS: SORE;BURNING
DESCRIPTORS: ACHING;BURNING

## 2022-07-11 NOTE — PROGRESS NOTES
Occupational Therapy  Facility/Department: [facility]  Occupational Therapy Initial Assessment [address] Mobility Program Evaluation     Name: Eva Bautista  : 1970  MRN: [id number]  Date of Service: 2022    Discharge Recommendations: Eva Bautista scored a 24/24 on the AM-PAC ADL Inpatient form. Current research shows that an AM-PAC score of 18 or greater is typically associated with a discharge to the patient's home setting. Please see assessment section for further patient specific details. If patient discharges prior to next session this note will serve as a discharge summary. Please see below for the latest assessment towards goals. OT Equipment Recommendations  Equipment Needed: No       Patient Diagnosis(es): There were no encounter diagnoses. Past Medical History:  has a past medical history of Anxiety, Chronic back pain, Depression, Hypertension, Multiple myeloma not having achieved remission ([facility] [address] Prediabetes, Type 2 diabetes mellitus with hyperglycemia, without long-term current use of insulin ([facility] [address] and Type 2 diabetes mellitus with hyperlipidemia ([facility] [address] Past Surgical History:  has a past surgical history that includes Hysterectomy; shoulder surgery; CT BIOPSY BONE MARROW (2021); CT BIOPSY BONE MARROW (3/18/2022); CT BIOPSY BONE MARROW (6/10/2022); Dental surgery; and Catheter Insertion (N/A, 2022). Assessment   Assessment: Pt from home, admit for transplant. Pt IND and active at baseline. Pt seen for [address] [employer] mobility program introduction. Therapy initiated to educate on the importance of mobility, completion of ADLs, daily exercise, and activity log to promote fx mobility and performance with ADL throughout hospitalization. Pt verbalized understanding. Will cont to monitor 1x week for [address] mobility program. Planning DC home.   Decision Making: Low Complexity  REQUIRES OT FOLLOW-UP: Yes  Activity Tolerance  Activity Tolerance: Patient Tolerated for laundry)  Home Access: Ramped entrance  Bathroom Shower/Tub: Tub/Shower unit  Bathroom Toilet: Standard (has raised toilet seat available if needed)  Bathroom Equipment: Shower chair (has grab bar for shower but hasn't put it on)  Home Equipment:  (has access to canes, walkers, w/c if needed)  Has the patient had two or more falls in the past year or any fall with injury in the past year?: No  ADL Assistance: Independent  Homemaking Assistance: Independent (Reports family usually cooks for her)  Ambulation Assistance: Independent (without AD)  Transfer Assistance: Independent  Active : No (family transports)  Type of Occupation: currently not working  [address]: sleeping and watching tv, listening to books  Additional Comments: Reports daughter works from home. \"Someone always there. \"       Objective   Heart Rate: 98  Heart Rate Source: Monitor  BP: (!) 145/56  BP Location: Right upper arm  BP Method: Automatic  Patient Position: Lying left side  MAP (Calculated): 85.67  Resp: 18  SpO2: 99 %  O2 Device: None (Room air)             Safety Devices  Type of Devices: Call light within reach; Left in bed  Bed Mobility Training  Bed Mobility Training: Yes  Supine to Sit: Independent  Sit to Supine: Independent  Balance  Sitting: Intact  Standing: Intact  Transfer Training  Transfer Training: Yes  Overall Level of Assistance: Independent  Sit to Stand: Independent  Stand to Sit: Independent  Gait  Overall Level of Assistance: Independent  Assistive Device:  (none)     AROM: Within functional limits (BUE)  Strength: Within functional limits (BUE)  ADL  Feeding: Independent  Grooming: Independent (stance at sink wash hands)  Toileting: Independent                 Cognition  Overall Cognitive Status: WNL  Orientation  Overall Orientation Status: Within Normal Limits                  Education Given To: Patient  Education Provided: Role of Therapy; ADL Adaptive Strategies; Plan of Care;Transfer Training;Home Exercise Program  Education Method: Demonstration;Verbal  Barriers to Learning: None  Education Outcome: Verbalized understanding;Demonstrated understanding                        AM-PAC Score        AM-PAC Inpatient Daily Activity Raw Score: 24 (07/11/22 1222)  AM-PAC Inpatient ADL T-Scale Score : 57.54 (07/11/22 1222)  ADL Inpatient CMS 0-100% Score: 0 (07/11/22 1222)  ADL Inpatient CMS G-Code Modifier : CH (07/11/22 1222)    Goals  Short Term Goals  Time Frame for Short term goals: dc  Short Term Goal 1: Pt will maintain fx IND with ADLs during hospital stay  Short Term Goal 2: Pt will verbalize/ demonstrate IND with activity log and HEP           Therapy Time   Individual Concurrent Group Co-treatment   Time In 1027         Time Out 1042         Minutes 15           Timed Code Treatment Minutes:   15    Total Treatment Minutes:  [address], OT

## 2022-07-11 NOTE — PROGRESS NOTES
Behavioral Health Intervention Note    Assessment/Plan: Leona Meraz is a 46y.o.  year-old female is here for neutropenic fever following autologous stem cell transplant. Main concerns at this time include no behavioral health concerns. Current outpatient mental health treatment includes none. Behavioral health recommendations for treatment team include none at this time. Attended clinical rounds with treatment team. Met with patient afterwards. She reports that she is doing well emotionally. Explains that she is okay with staying in the hospital for the next week or so. Patient reports that she is willing to do what she needs to do to recover from her autologous stem cell transplant. Patient reports good social support from her two daughters. Interventions: Supportive listening, Assessment of current needs, Setting plan for value guided action    --------------------------------------------------------------------------------------------    At initial encounter with patient, discussed role of psychologist including addressing emotional and behavioral needs while receiving care at the [facility] [address]. Discussed short-term nature of services. Also discussed with patient that a component of provider's role is completing the pre-surgical psychological evaluations for bone marrow transplant and CAR-T. Informed patient of psychologist's various roles in order for the patient to be fully informed when consenting to behavioral health treatment. Patient was agreeable to engaging in care with psychologist. Discussed limits of confidentiality including that psychologist coordinates with patient's treatment team and other limits of confidentiality were discussed as needed.

## 2022-07-11 NOTE — PROGRESS NOTES
[facility] Progress Note    2022     Aneudy Carson    MRN: [id number]    : 1970    Referring MD: Nina Hernandez, [address]  [facility],  [address]    SUBJECTIVE:  Feeling much better, still mild mucositis pain.     ECOG PS:  (2) Ambulatory and capable of self care, unable to carry out work activity, up and about > 50% or waking hours    KPS: 80% Normal activity with effort; some signs or symptoms of disease    Isolation: None    Medications    Scheduled Meds:   pantoprazole  20 mg Oral QAM AC    sodium chloride flush  5-40 mL IntraVENous 2 times per day    Saline Mouthwash  15 mL Swish & Spit 4x Daily AC & HS    filgrastim-aafi  300 mcg SubCUTAneous Daily    fluconazole  400 mg Oral Daily    valACYclovir  500 mg Oral BID    cefepime  2,000 mg IntraVENous Q8H    insulin lispro  0-6 Units SubCUTAneous TID WC    insulin lispro  0-3 Units SubCUTAneous Nightly     Continuous Infusions:   sodium chloride      sodium chloride      sodium chloride      sodium chloride 50 mL/hr at 22 0632    dextrose       PRN Meds:.ondansetron, calcium carbonate, sodium chloride, benzocaine-menthol, [COMPLETED] loperamide **FOLLOWED BY** loperamide, potassium chloride, magnesium sulfate, diphenhydrAMINE, sodium chloride, sodium chloride flush, sodium chloride, Saline Mouthwash, alteplase (CATHFLO) with sterile water injection, cyclobenzaprine, gabapentin, glucose, dextrose bolus **OR** dextrose bolus, dextrose, acetaminophen    ROS:  As noted above, otherwise remainder of 10-point ROS negative    Physical Exam:     I&O:      Intake/Output Summary (Last 24 hours) at 2022 0715  Last data filed at 2022 9907  Gross per 24 hour   Intake 4111.97 ml   Output 2425 ml   Net 1686.97 ml       Vital Signs:  /62   Pulse 89   Temp 98.2 °F (36.8 °C) (Oral)   Resp 16   Ht 5' 5\" (1.651 m)   Wt 258 lb (117 kg)   SpO2 99%   BMI 42.93 kg/m²     Weight:    Wt Readings from Last 3 Encounters:   07/10/22 258 lb (117 kg)   07/08/22 250 lb (113.4 kg)   07/07/22 249 lb 12.5 oz (113.3 kg)            ECOG PS:  (1) Restricted in physically strenuous activity, ambulatory and able to do work of light nature  General: Awake, alert and oriented. HEENT: Normocephalic, atraumatic, poor dentition, many teeth requiring extraction  NECK: supple without palpable adenopathy  BACK: Straight negative CVAT  SKIN: warm dry and intact without lesions rashes or masses  CHEST: CTA bilaterally without use of accessory muscles  CV: Normal S1 S2, RRR, no MRG  ABD: NT ND normoactive BS, no palpable masses or hepatosplenomegaly  EXTREMITIES: without edema, denies calf tenderness  NEURO: CN II - XII grossly intact  CATHETER: Left SC Trifusion (Rachel, 6/20/22) - CDI    Data    CBC:   Recent Labs     07/09/22  0401 07/10/22  0315 07/11/22  0355   WBC 0.1* 0.1* 0.3*   HGB 7.5* 7.1* 7.2*   HCT 21.7* 20.8* 20.7*   MCV 87.8 88.2 87.6   PLT 15* 8* 30*     BMP/Mag:  Recent Labs     07/08/22  0919 07/08/22  0919 07/09/22  0401 07/10/22  0315 07/11/22  0355 07/11/22  0412   *   < > 135* 138 140  --    K 3.7   < > 3.3* 3.9 3.9  --    CL 97*   < > 103 105 104  --    CO2 27   < > 25 24 26  --    PHOS 3.2  --   --   --  4.0  --    BUN 9   < > 6* 4* 5*  --    CREATININE 0.7   < > 0.6 0.7 0.6  --    MG 2.00  --   --   --   --  1.90    < > = values in this interval not displayed. LIVP:   Recent Labs     07/08/22  0919 07/11/22  0355   AST 24 12*   ALT 35 28   BILIDIR <0.2 <0.2   BILITOT 0.6 <0.2   ALKPHOS 76 64     Coags:   Recent Labs     07/08/22  1628 07/11/22  0412   PROTIME 14.5 14.0   INR 1.14 1.09   APTT  --  25.9     Uric Acid   Recent Labs     07/09/22  0401 07/10/22  0315 07/11/22  0355   LABURIC 3.5 3.5 3.5       PROBLEM LIST:             1. Multiple myeloma   2. Dental disorder  3. HTN  4. Intestinal malabsorption (disorder)  5. Iron deficiency anemia due to chronic blood loss    Post transplant complications:    1. Klebsiella Pneumoniae bacteremia     TREATMENT:            1. RVD x 5 cycles (12/2021 - 5/16/22)   2. High Dose Melphalan f/b autologous SCT 7/1/22     ASSESSMENT AND PLAN:            1. 1) Multiple myeloma, IgG Kappa, ISS 3: partial response   - t(14;20), gain of 1q21, monosomy 13.    - At diagnoses:  M spike is 3.7, FLC ratio  132  - BMBx (12/7/21): 20% plasma cells & PET scan (1/12/22) showed diffuse bone marrow activity, no lytic lesions  - BMBx (3/18/22) after cycle 4: 40% cellularity and few percentage of clonal plasma cells   - BM bx/asp (6/10/22) - negative for plasma cells    PLAN:   HD [id number] and ASCT (7/1/22)  - Post transplant maintenance:  Revlmid     Day + 10     2. ID: Sepsis r/t Klebsiella pneumoniae bacteremia (POA); now afebrile and sepsis has resolved    - Blood cx x 2 bottles (7/8/22): Klebsiella pneumoniae; sensitivities pending   - CXR (7/8/22): No acute pathology  - Cont Valtrex & Diflucan ppx  - Cont Cefepime Day + 4 (started 7/8/22), change to Rocephin upon discharge.      3. Heme: Pancytopenia 2/2 chemotherapy  - H/o CRYSTAL: Received IV iron (3/21/22 & 3/28/22)  - Transfuse for Hgb < 7 and Platelets < 42X  - No transfusion today  - Cont daily GCSF (started 9/2/04)      4. Metabolic / DM: Hyperglycemia w/ stable renal fxn  - Cont IVFs:  NS @50 mL/hr  - Replace potassium and magnesium per PRN orders    5. Endocrine:  - H/o Type 2 DM  T2DM:  - S/p Metformin - resume on d/c  - Cont low regimen Lispro SSI, AC&HS     6. GI / Nutrition:    - H/o esophagitis   Nutrition:  Appetite and oral intake is improving  - Cont low microbial diet   - Follow closely with dietary  GERD:   - Cont daily PPI  - Cont TUMEs as needed   Nausea / Vomiting: improving   - Cont PRN Zofran & Compazine    Diarrhea:  - C. Diff (7/8/22): Negative  - Cont Imodium as needed     7.  Poor dentition:    - S/p partial extractions     - DVT Prophylaxis: Platelets <58,915 cells/dL - prophylactic lovenox on hold and mechanical prophylaxis with bilateral SCDs while in bed in place.   Contraindications to pharmacologic prophylaxis: Thrombocytopenia  Contraindications to mechanical prophylaxis: None    - Disposition: Once afebrile and WBC >1    The patient was seen and examined by SHY Norton MD  [facility]  Please contact me through [address]

## 2022-07-11 NOTE — PROGRESS NOTES
Patient complaining of a headache. Has an order for tylenol but only if the patient has a fever. Sent secured message to Dr. Steph Menendez to confirm modifying the order to include mild pain as the patient has been afebrile for over 48 hours. Confirmed yes. Order has been modified.

## 2022-07-11 NOTE — PROGRESS NOTES
Physical Therapy  Facility/Department: [facility]  Physical Therapy Initial Assessment    Name: Beau Oliveira  : 1970  MRN: [id number]  Date of Service: 2022    Discharge Recommendations:    Beau Oliveira scored a 24/24 on the AM-PAC short mobility form. Current research shows that an AM-PAC score of 18 or greater is typically associated with a discharge to the patient's home setting. At this time, this patient demonstrates the endurance and safety to discharge home. Please see assessment section for further patient specific details. If patient discharges prior to next session this note will serve as a discharge summary. Please see below for the latest assessment towards goals. Patient Diagnosis(es): There were no encounter diagnoses. Past Medical History:  has a past medical history of Anxiety, Chronic back pain, Depression, Hypertension, Multiple myeloma not having achieved remission ([address] Prediabetes, Type 2 diabetes mellitus with hyperglycemia, without long-term current use of insulin ([address] and Type 2 diabetes mellitus with hyperlipidemia ([facility] [address] Past Surgical History:  has a past surgical history that includes Hysterectomy; shoulder surgery; CT BIOPSY BONE MARROW (2021); CT BIOPSY BONE MARROW (3/18/2022); CT BIOPSY BONE MARROW (6/10/2022); Dental surgery; and Catheter Insertion (N/A, 2022). Assessment   Assessment: Pt here for neutropenic fever s/p stem cell transplant on 2022. Pt doing well and is currently independent with all mobility, including ambulating in vance. Therapy initiated to educate pt on importance of mobility, daily exercises and keeping activity log to promote mobility throughout hospitalization. Pt verbalized understanding of all education provided.   Will continue to monitor 1x/week throughout hospitalization for mobilitly program  Decision Making: Low Complexity  Requires PT Follow-Up: Yes     Plan   Plan  Plan: (1x/week)  Current Treatment Recommendations: Patient/Caregiver education & training,Functional mobility training  Safety Devices  Type of Devices: Call light within reach,Left in bed (no alarms in use)     Restrictions  Restrictions/Precautions  Restrictions/Precautions: Up as Tolerated  Position Activity Restriction  Other position/activity restrictions: If platelet count equal to or less than 10 but the patient has received a platelet transfusion, physical therapy or occupational therapy may proceed with treatment. Subjective   General  Chart Reviewed: Yes  Additional Pertinent Hx: Pt is a 46 y.o. female admitted 7/8 for neutropenic fever and sepsis evaluation and management. PMH:HTN, anemia, multiple myeloma, s/p stem cell transplant 7/1/22  Referring Practitioner: SHY Pryor NP  Referral Date : 07/08/22  Subjective  Subjective: Pt found standing in room. Agreeable to PT. Denies pain         Social/Functional History  Social/Functional History  Lives With:  (daughter, mother and boyfriend)  Type of Home: House  Home Layout: Able to Live on Main level with bedroom/bathroom (goes to [facility] for laundry)  Home Access: Ramped entrance  Bathroom Shower/Tub: Tub/Shower unit  Bathroom Toilet: Standard (has raised toilet seat available if needed)  Bathroom Equipment: Shower chair (has grab bar for shower but hasn't put it on)  Home Equipment:  (has access to canes, walkers, w/c if needed)  Has the patient had two or more falls in the past year or any fall with injury in the past year?: No  ADL Assistance: Independent  Homemaking Assistance: Independent (Reports family usually cooks for her)  Ambulation Assistance: Independent (without AD)  Transfer Assistance: Independent  Active : No (family transports)  Type of Occupation: currently not working  [place]: sleeping and watching tv, listening to books  Additional Comments: Reports daughter works from home.   \"Someone always

## 2022-07-11 NOTE — PLAN OF CARE
Problem: Safety - Adult  Goal: Free from fall injury  Flowsheets  Taken 2022 1511  Free From Fall Injury: Instruct family/caregiver on patient safety  Taken 2022 1459  Free From Fall Injury: Instruct family/caregiver on patient safety  Note: Orthostatic vital signs obtained at start of shift - see flowsheet for details. Pt does not meet criteria for orthostasis. Pt is a Med fall risk. See Izell  Fall Score and ABCDS Injury Risk assessments. +- Screening for Orthostasis AND not a [place] Risk per OCONNOR/ABCDS: Pt bed is in low position, side rails up, call light and belongings are in reach. Fall risk light is on outside pts room. Pt encouraged to call for assistance as needed. Will continue with hourly rounds for PO intake, pain needs, toileting and repositioning as needed. Problem: Discharge Planning  Goal: Discharge to home or other facility with appropriate resources  Flowsheets (Taken 2022 0835)  Discharge to home or other facility with appropriate resources: Identify barriers to discharge with patient and caregiver  Note: Will continue to update both client and family with any changes top plan of care     Problem: Hematologic - Adult  Goal: Maintains hematologic stability  Flowsheets (Taken 2022 0835)  Maintains hematologic stability: Assess for signs and symptoms of bleeding or hemorrhage  Note: Patient's hemoglobin this AM:   Recent Labs     22  0355   HGB 7.2*     Patient's platelet count this AM:   Recent Labs     22  0355   PLT 30*    Thrombocytopenia Precautions in place. Patient showing no signs or symptoms of active bleeding. Transfusion not indicated at this time. Patient verbalizes understanding of all instructions. Will continue to assess and implement POC. Call light within reach and hourly rounding in place.        Problem: ABCDS Injury Assessment  Goal: Absence of physical injury  Recent Flowsheet Documentation  Taken 2022 1459 by Michail Media, RN  Absence of Physical Injury: Implement safety measures based on patient assessment

## 2022-07-12 ENCOUNTER — HOSPITAL ENCOUNTER (OUTPATIENT)
Dept: ONCOLOGY | Age: 52
Setting detail: INFUSION SERIES
Discharge: HOME | End: 2022-07-12

## 2022-07-12 LAB
ANION GAP SERPL CALCULATED.3IONS-SCNC: 11 MMOL/L (ref 3–16)
BASOPHILS ABSOLUTE: 0 K/UL (ref 0–0.2)
BASOPHILS RELATIVE PERCENT: 0.3 %
BUN BLDV-MCNC: 4 MG/DL (ref 7–20)
CALCIUM SERPL-MCNC: 9.1 MG/DL (ref 8.3–10.6)
CHLORIDE BLD-SCNC: 105 MMOL/L (ref 99–110)
CO2: 26 MMOL/L (ref 21–32)
CREAT SERPL-MCNC: 0.6 MG/DL (ref 0.6–1.1)
EOSINOPHILS ABSOLUTE: 0 K/UL (ref 0–0.6)
EOSINOPHILS RELATIVE PERCENT: 0.2 %
GFR AFRICAN AMERICAN: >60
GFR NON-AFRICAN AMERICAN: >60
GLUCOSE BLD-MCNC: 108 MG/DL (ref 70–99)
GLUCOSE BLD-MCNC: 114 MG/DL (ref 70–99)
GLUCOSE BLD-MCNC: 126 MG/DL (ref 70–99)
GLUCOSE BLD-MCNC: 130 MG/DL (ref 70–99)
GLUCOSE BLD-MCNC: 147 MG/DL (ref 70–99)
HCT VFR BLD CALC: 22 % (ref 36–48)
HEMOGLOBIN: 7.4 G/DL (ref 12–16)
LYMPHOCYTES ABSOLUTE: 0.2 K/UL (ref 1–5.1)
LYMPHOCYTES RELATIVE PERCENT: 28.2 %
MCH RBC QN AUTO: 29.9 PG (ref 26–34)
MCHC RBC AUTO-ENTMCNC: 33.8 G/DL (ref 31–36)
MCV RBC AUTO: 88.4 FL (ref 80–100)
MONOCYTES ABSOLUTE: 0.2 K/UL (ref 0–1.3)
MONOCYTES RELATIVE PERCENT: 31.1 %
NEUTROPHILS ABSOLUTE: 0.3 K/UL (ref 1.7–7.7)
NEUTROPHILS RELATIVE PERCENT: 40.2 %
PDW BLD-RTO: 14.8 % (ref 12.4–15.4)
PERFORMED ON: ABNORMAL
PLATELET # BLD: 35 K/UL (ref 135–450)
PMV BLD AUTO: 8 FL (ref 5–10.5)
POTASSIUM SERPL-SCNC: 3.6 MMOL/L (ref 3.5–5.1)
RBC # BLD: 2.49 M/UL (ref 4–5.2)
SODIUM BLD-SCNC: 142 MMOL/L (ref 136–145)
URIC ACID, SERUM: 3.7 MG/DL (ref 2.6–6)
WBC # BLD: 0.8 K/UL (ref 4–11)

## 2022-07-12 PROCEDURE — 85025 COMPLETE CBC W/AUTO DIFF WBC: CPT

## 2022-07-12 PROCEDURE — 2060000000 HC ICU INTERMEDIATE R&B

## 2022-07-12 PROCEDURE — 2580000003 HC RX 258: Performed by: NURSE PRACTITIONER

## 2022-07-12 PROCEDURE — 84550 ASSAY OF BLOOD/URIC ACID: CPT

## 2022-07-12 PROCEDURE — 36592 COLLECT BLOOD FROM PICC: CPT

## 2022-07-12 PROCEDURE — 6370000000 HC RX 637 (ALT 250 FOR IP): Performed by: NURSE PRACTITIONER

## 2022-07-12 PROCEDURE — 80048 BASIC METABOLIC PNL TOTAL CA: CPT

## 2022-07-12 PROCEDURE — 6370000000 HC RX 637 (ALT 250 FOR IP): Performed by: INTERNAL MEDICINE

## 2022-07-12 PROCEDURE — 6360000002 HC RX W HCPCS: Performed by: NURSE PRACTITIONER

## 2022-07-12 PROCEDURE — 94760 N-INVAS EAR/PLS OXIMETRY 1: CPT

## 2022-07-12 RX ORDER — SODIUM CHLORIDE AND POTASSIUM CHLORIDE .9; .15 G/100ML; G/100ML
SOLUTION INTRAVENOUS CONTINUOUS
Status: DISCONTINUED | OUTPATIENT
Start: 2022-07-12 | End: 2022-07-13

## 2022-07-12 RX ADMIN — PANTOPRAZOLE SODIUM 20 MG: 20 TABLET, DELAYED RELEASE ORAL at 06:47

## 2022-07-12 RX ADMIN — VALACYCLOVIR HYDROCHLORIDE 500 MG: 500 TABLET, FILM COATED ORAL at 08:22

## 2022-07-12 RX ADMIN — INSULIN LISPRO 1 UNITS: 100 INJECTION, SOLUTION INTRAVENOUS; SUBCUTANEOUS at 17:53

## 2022-07-12 RX ADMIN — FLUCONAZOLE 400 MG: 200 TABLET ORAL at 08:21

## 2022-07-12 RX ADMIN — CEFEPIME 2000 MG: 2 INJECTION, POWDER, FOR SOLUTION INTRAVENOUS at 08:26

## 2022-07-12 RX ADMIN — FILGRASTIM-AAFI 300 MCG: 300 INJECTION, SOLUTION SUBCUTANEOUS at 08:21

## 2022-07-12 RX ADMIN — POTASSIUM CHLORIDE AND SODIUM CHLORIDE: 900; 150 INJECTION, SOLUTION INTRAVENOUS at 08:27

## 2022-07-12 RX ADMIN — VALACYCLOVIR HYDROCHLORIDE 500 MG: 500 TABLET, FILM COATED ORAL at 21:19

## 2022-07-12 RX ADMIN — CEFEPIME 2000 MG: 2 INJECTION, POWDER, FOR SOLUTION INTRAVENOUS at 17:52

## 2022-07-12 NOTE — PROGRESS NOTES
[email or web address] Progress Note    2022     Isabel La    MRN: [id number]    : 1970    Referring MD: Teofilo York, [address]  [address],  [facility] [address]    SUBJECTIVE:  Feeling well. ECOG PS:  (2) Ambulatory and capable of self care, unable to carry out work activity, up and about > 50% or waking hours    KPS: 80% Normal activity with effort; some signs or symptoms of disease    Isolation: None    Medications    Scheduled Meds:   cefepime  2,000 mg IntraVENous Q8H    [START ON 2022] cefTRIAXone (ROCEPHIN) IV  2,000 mg IntraVENous Q24H    pantoprazole  20 mg Oral QAM AC    sodium chloride flush  5-40 mL IntraVENous 2 times per day    Saline Mouthwash  15 mL Swish & Spit 4x Daily AC & HS    filgrastim-aafi  300 mcg SubCUTAneous Daily    fluconazole  400 mg Oral Daily    valACYclovir  500 mg Oral BID    insulin lispro  0-6 Units SubCUTAneous TID WC    insulin lispro  0-3 Units SubCUTAneous Nightly     Continuous Infusions:   0.9% NaCl with KCl 20 mEq      sodium chloride      sodium chloride      sodium chloride      dextrose       PRN Meds:.ondansetron, calcium carbonate, sodium chloride, benzocaine-menthol, [COMPLETED] loperamide **FOLLOWED BY** loperamide, potassium chloride, magnesium sulfate, diphenhydrAMINE, sodium chloride, sodium chloride flush, sodium chloride, Saline Mouthwash, alteplase (CATHFLO) with sterile water injection, cyclobenzaprine, gabapentin, glucose, dextrose bolus **OR** dextrose bolus, dextrose, acetaminophen    ROS:  As noted above, otherwise remainder of 10-point ROS negative    Physical Exam:     I&O:      Intake/Output Summary (Last 24 hours) at 2022 0739  Last data filed at 2022 0653  Gross per 24 hour   Intake 1197 ml   Output 1500 ml   Net -303 ml       Vital Signs:  /72   Pulse 94   Temp 98 °F (36.7 °C) (Oral)   Resp 20   Ht 5' 5\" (1.651 m)   Wt 258 lb (117 kg)   SpO2 98%   BMI 42.93 kg/m²     Weight:     Wt Readings from Last 3 Encounters:   07/11/22 258 lb (117 kg)   07/08/22 250 lb (113.4 kg)   07/07/22 249 lb 12.5 oz (113.3 kg)            ECOG PS:  (1) Restricted in physically strenuous activity, ambulatory and able to do work of light nature  General: Awake, alert and oriented. HEENT: Normocephalic, atraumatic, poor dentition, many teeth requiring extraction  NECK: supple without palpable adenopathy  BACK: Straight negative CVAT  SKIN: warm dry and intact without lesions rashes or masses  CHEST: CTA bilaterally without use of accessory muscles  CV: Normal S1 S2, RRR, no MRG  ABD: NT ND normoactive BS, no palpable masses or hepatosplenomegaly  EXTREMITIES: without edema, denies calf tenderness  NEURO: CN II - XII grossly intact  CATHETER: Left SC Trifusion (Rachel, 6/20/22) - CDI    Data    CBC:   Recent Labs     07/10/22  0315 07/11/22  0355 07/12/22  0349   WBC 0.1* 0.3* 0.8*   HGB 7.1* 7.2* 7.4*   HCT 20.8* 20.7* 22.0*   MCV 88.2 87.6 88.4   PLT 8* 30* 35*     BMP/Mag:  Recent Labs     07/10/22  0315 07/11/22  0355 07/11/22  0412 07/12/22  0349    140  --  142   K 3.9 3.9  --  3.6    104  --  105   CO2 24 26  --  26   PHOS  --  4.0  --   --    BUN 4* 5*  --  4*   CREATININE 0.7 0.6  --  0.6   MG  --   --  1.90  --      LIVP:   Recent Labs     07/11/22 0355   AST 12*   ALT 28   BILIDIR <0.2   BILITOT <0.2   ALKPHOS 64     Coags:   Recent Labs     07/11/22 0412   PROTIME 14.0   INR 1.09   APTT 25.9     Uric Acid   Recent Labs     07/10/22  0315 07/11/22  0355 07/12/22  0349   LABURIC 3.5 3.5 3.7       PROBLEM LIST:             1. Multiple myeloma   2. Dental disorder  3. HTN  4. Intestinal malabsorption (disorder)  5. Iron deficiency anemia due to chronic blood loss    Post transplant complications:    1. Klebsiella Pneumoniae bacteremia / sepsis     TREATMENT:            1. RVD x 5 cycles (12/2021 - 5/16/22)   2. High Dose Melphalan f/b autologous SCT 7/1/22     ASSESSMENT AND PLAN:            1. 1) Multiple myeloma, IgG Kappa, ISS 3: partial response   - t(14;20), gain of 1q21, monosomy 13.    - At diagnoses:  M spike is 3.7, FLC ratio  132  - BMBx (12/7/21): 20% plasma cells & PET scan (1/12/22) showed diffuse bone marrow activity, no lytic lesions  - BMBx (3/18/22) after cycle 4: 40% cellularity and few percentage of clonal plasma cells   - BM bx/asp (6/10/22) - negative for plasma cells    PLAN:   HD [id number] and ASCT (7/1/22)  - Post transplant maintenance:  Revlmid     Day + 11     2. ID: Sepsis r/t Klebsiella pneumoniae bacteremia (POA); now afebrile and sepsis has resolved    - Blood cx x 2 bottles (7/8/22): Klebsiella pneumoniae; sensitivities pending   - CXR (7/8/22): No acute pathology  - Cont Valtrex & Diflucan ppx  - Cont Cefepime Day + 5 (7/8/22 - 7/13/22), change to Rocephin on 7/13/22 and continue through 7/21/22     3. Heme: Pancytopenia 2/2 chemotherapy  - H/o CRYSTAL: Received IV iron (3/21/22 & 3/28/22)  - Transfuse for Hgb < 7 and Platelets < 81V  - No transfusion today  - Cont daily GCSF (started 4/0/64)      4. Metabolic / DM: Hyperglycemia w/ stable renal fxn  - Cont IVFs:  NS + KCl 20 meq @50 mL/hr  - Replace potassium and magnesium per PRN orders    5. Endocrine:  - H/o Type 2 DM  T2DM:  - S/p Metformin - resume on d/c  - Cont low regimen Lispro SSI, AC&HS     6. GI / Nutrition:    - H/o esophagitis   Nutrition:  Appetite and oral intake is improving  - Cont low microbial diet   - Follow closely with dietary  GERD:   - Cont PPI daily   - Cont TUMEs as needed   Nausea / Vomiting: improving   - Cont PRN Zofran & Compazine    Diarrhea:  Improved   - C. Diff (7/8/22): Negative  - Cont Imodium as needed  Mucositis:  Chemo induced; improving   - Still able to eat and drink     7. Poor dentition:    - S/p partial extractions     - DVT Prophylaxis: Platelets <69,445 cells/dL - prophylactic lovenox on hold and mechanical prophylaxis with bilateral SCDs while in bed in place.   Contraindications to pharmacologic prophylaxis: Thrombocytopenia  Contraindications to mechanical prophylaxis: None    - Disposition: Once afebrile and WBC >1, possibly on Wednesday or Thursday     The patient was seen and examined by SHY Cash MD  [facility]  Please contact me through [facility] [address]

## 2022-07-12 NOTE — PLAN OF CARE
Problem: Safety - Adult  Goal: Free from fall injury  7/12/2022 0604 by Liana Hamilton RN  Outcome: Progressing  Flowsheets (Taken 7/11/2022 2044)  Free From Fall Injury: Instruct family/caregiver on patient safety     Problem: Discharge Planning  Goal: Discharge to home or other facility with appropriate resources  7/12/2022 0604 by Liana Hamilton RN  Outcome: Progressing  Flowsheets (Taken 7/11/2022 2044)  Discharge to home or other facility with appropriate resources: Identify barriers to discharge with patient and caregiver     Problem: Skin/Tissue Integrity - Adult  Goal: Incisions, wounds, or drain sites healing without S/S of infection  Outcome: Progressing  Flowsheets (Taken 7/11/2022 2044)  Incisions, Wounds, or Drain Sites Healing Without Sign and Symptoms of Infection: ADMISSION and DAILY: Assess and document risk factors for pressure ulcer development     Problem: Hematologic - Adult  Goal: Maintains hematologic stability  7/12/2022 0604 by Liana Hamilton RN  Outcome: Progressing  Flowsheets (Taken 7/11/2022 2044)  Maintains hematologic stability:   Assess for signs and symptoms of bleeding or hemorrhage   Monitor labs for bleeding or clotting disorders     Problem: ABCDS Injury Assessment  Goal: Absence of physical injury  Recent Flowsheet Documentation  Taken 7/11/2022 2044 by Liana Hamilton RN  Absence of Physical Injury: Implement safety measures based on patient assessment     Problem: Pain  Goal: Verbalizes/displays adequate comfort level or baseline comfort level  Flowsheets (Taken 7/12/2022 0604)  Verbalizes/displays adequate comfort level or baseline comfort level: Encourage patient to monitor pain and request assistance

## 2022-07-12 NOTE — PLAN OF CARE
Problem: Safety - Adult  Goal: Free from fall injury  Flowsheets  Taken 7/11/2022 1511  Free From Fall Injury: Instruct family/caregiver on patient safety  Taken 7/11/2022 1459  Free From Fall Injury: Instruct family/caregiver on patient safety  Note: Orthostatic vital signs obtained at start of shift - see flowsheet for details. Pt does not meet criteria for orthostasis. Pt is a Med fall risk. See Jil Emir Fall Score and ABCDS Injury Risk assessments. +- Screening for Orthostasis AND not a [place] Risk per OCONNOR/ABCDS: Pt bed is in low position, side rails up, call light and belongings are in reach. Fall risk light is on outside pts room. Pt encouraged to call for assistance as needed. Will continue with hourly rounds for PO intake, pain needs, toileting and repositioning as needed.        Problem: Discharge Planning  Goal: Discharge to home or other facility with appropriate resources  Flowsheets (Taken 7/11/2022 0835)  Discharge to home or other facility with appropriate resources: Identify barriers to discharge with patient and caregiver  Note: Will continue to update both client and family with any changes top plan of care     Problem: Hematologic - Adult  Goal: Maintains hematologic stability  Flowsheets (Taken 7/11/2022 0835)  Maintains hematologic stability: Assess for signs and symptoms of bleeding or hemorrhage  Problem: ABCDS Injury Assessment  Goal: Absence of physical injury  Recent Flowsheet Documentation  Taken 7/11/2022 1459 by Ricco Brizuela RN  Absence of Physical Injury: Implement safety measures based on patient assessment

## 2022-07-12 NOTE — CARE COORDINATION
2:54 PM  Referrals to [facility] and [facility] for home IV abx. [facility] can provide teachings in the event that the patient cannot get home care. Planning for start of care for Thursday, July 14th.

## 2022-07-13 VITALS
SYSTOLIC BLOOD PRESSURE: 148 MMHG | DIASTOLIC BLOOD PRESSURE: 81 MMHG | WEIGHT: 260.8 LBS | TEMPERATURE: 98.3 F | HEART RATE: 93 BPM | RESPIRATION RATE: 15 BRPM | HEIGHT: 65 IN | OXYGEN SATURATION: 97 % | BODY MASS INDEX: 43.45 KG/M2

## 2022-07-13 LAB
ALBUMIN SERPL-MCNC: 3.9 G/DL (ref 3.4–5)
ALP BLD-CCNC: 67 U/L (ref 40–129)
ALT SERPL-CCNC: 33 U/L (ref 10–40)
ANION GAP SERPL CALCULATED.3IONS-SCNC: 8 MMOL/L (ref 3–16)
AST SERPL-CCNC: 26 U/L (ref 15–37)
BANDED NEUTROPHILS RELATIVE PERCENT: 12 % (ref 0–7)
BASOPHILS ABSOLUTE: 0 K/UL (ref 0–0.2)
BASOPHILS RELATIVE PERCENT: 0 %
BILIRUB SERPL-MCNC: 0.3 MG/DL (ref 0–1)
BILIRUBIN DIRECT: <0.2 MG/DL (ref 0–0.3)
BILIRUBIN, INDIRECT: NORMAL MG/DL (ref 0–1)
BUN BLDV-MCNC: 3 MG/DL (ref 7–20)
CALCIUM SERPL-MCNC: 9.1 MG/DL (ref 8.3–10.6)
CHLORIDE BLD-SCNC: 105 MMOL/L (ref 99–110)
CO2: 27 MMOL/L (ref 21–32)
CREAT SERPL-MCNC: 0.6 MG/DL (ref 0.6–1.1)
EOSINOPHILS ABSOLUTE: 0 K/UL (ref 0–0.6)
EOSINOPHILS RELATIVE PERCENT: 0 %
GFR AFRICAN AMERICAN: >60
GFR NON-AFRICAN AMERICAN: >60
GLUCOSE BLD-MCNC: 123 MG/DL (ref 70–99)
GLUCOSE BLD-MCNC: 95 MG/DL (ref 70–99)
HCT VFR BLD CALC: 22.8 % (ref 36–48)
HEMOGLOBIN: 7.8 G/DL (ref 12–16)
HYPOCHROMIA: ABNORMAL
LACTATE DEHYDROGENASE: 254 U/L (ref 100–190)
LYMPHOCYTES ABSOLUTE: 0.2 K/UL (ref 1–5.1)
LYMPHOCYTES RELATIVE PERCENT: 8 %
MCH RBC QN AUTO: 30.4 PG (ref 26–34)
MCHC RBC AUTO-ENTMCNC: 34.3 G/DL (ref 31–36)
MCV RBC AUTO: 88.8 FL (ref 80–100)
METAMYELOCYTES RELATIVE PERCENT: 2 %
MONOCYTES ABSOLUTE: 0.5 K/UL (ref 0–1.3)
MONOCYTES RELATIVE PERCENT: 18 %
MYELOCYTE PERCENT: 1 %
NEUTROPHILS ABSOLUTE: 2 K/UL (ref 1.7–7.7)
NEUTROPHILS RELATIVE PERCENT: 59 %
PDW BLD-RTO: 14.5 % (ref 12.4–15.4)
PERFORMED ON: NORMAL
PHOSPHORUS: 3.7 MG/DL (ref 2.5–4.9)
PLATELET # BLD: 50 K/UL (ref 135–450)
PMV BLD AUTO: 8.5 FL (ref 5–10.5)
POTASSIUM SERPL-SCNC: 3.7 MMOL/L (ref 3.5–5.1)
RBC # BLD: 2.56 M/UL (ref 4–5.2)
SODIUM BLD-SCNC: 140 MMOL/L (ref 136–145)
TOTAL PROTEIN: 6.6 G/DL (ref 6.4–8.2)
URIC ACID, SERUM: 4.2 MG/DL (ref 2.6–6)
WBC # BLD: 2.7 K/UL (ref 4–11)

## 2022-07-13 PROCEDURE — 2580000003 HC RX 258: Performed by: NURSE PRACTITIONER

## 2022-07-13 PROCEDURE — 36592 COLLECT BLOOD FROM PICC: CPT

## 2022-07-13 PROCEDURE — 80076 HEPATIC FUNCTION PANEL: CPT

## 2022-07-13 PROCEDURE — 6360000002 HC RX W HCPCS: Performed by: NURSE PRACTITIONER

## 2022-07-13 PROCEDURE — 80048 BASIC METABOLIC PNL TOTAL CA: CPT

## 2022-07-13 PROCEDURE — 84550 ASSAY OF BLOOD/URIC ACID: CPT

## 2022-07-13 PROCEDURE — 6370000000 HC RX 637 (ALT 250 FOR IP): Performed by: NURSE PRACTITIONER

## 2022-07-13 PROCEDURE — 6370000000 HC RX 637 (ALT 250 FOR IP): Performed by: INTERNAL MEDICINE

## 2022-07-13 PROCEDURE — 85025 COMPLETE CBC W/AUTO DIFF WBC: CPT

## 2022-07-13 PROCEDURE — 84100 ASSAY OF PHOSPHORUS: CPT

## 2022-07-13 PROCEDURE — 83615 LACTATE (LD) (LDH) ENZYME: CPT

## 2022-07-13 RX ORDER — HEPARIN SODIUM (PORCINE) LOCK FLUSH IV SOLN 100 UNIT/ML 100 UNIT/ML
1500 SOLUTION INTRAVENOUS ONCE
Status: COMPLETED | OUTPATIENT
Start: 2022-07-13 | End: 2022-07-13

## 2022-07-13 RX ORDER — ONDANSETRON 8 MG/1
8 TABLET, ORALLY DISINTEGRATING ORAL EVERY 8 HOURS PRN
Qty: 30 TABLET | Refills: 2
Start: 2022-07-13 | End: 2022-07-23

## 2022-07-13 RX ORDER — LOPERAMIDE HYDROCHLORIDE 2 MG/1
2 CAPSULE ORAL 4 TIMES DAILY PRN
COMMUNITY
Start: 2022-07-13 | End: 2022-07-23

## 2022-07-13 RX ORDER — CEFTRIAXONE 500 MG/1
2000 INJECTION, POWDER, FOR SOLUTION INTRAMUSCULAR; INTRAVENOUS EVERY 24 HOURS
Qty: 32 EACH | Refills: 0
Start: 2022-07-14 | End: 2022-07-22

## 2022-07-13 RX ADMIN — POTASSIUM CHLORIDE AND SODIUM CHLORIDE: 900; 150 INJECTION, SOLUTION INTRAVENOUS at 02:49

## 2022-07-13 RX ADMIN — CEFEPIME 2000 MG: 2 INJECTION, POWDER, FOR SOLUTION INTRAVENOUS at 00:30

## 2022-07-13 RX ADMIN — VALACYCLOVIR HYDROCHLORIDE 500 MG: 500 TABLET, FILM COATED ORAL at 08:01

## 2022-07-13 RX ADMIN — SODIUM CHLORIDE, PRESERVATIVE FREE 10 ML: 5 INJECTION INTRAVENOUS at 08:02

## 2022-07-13 RX ADMIN — PANTOPRAZOLE SODIUM 20 MG: 20 TABLET, DELAYED RELEASE ORAL at 08:01

## 2022-07-13 RX ADMIN — SODIUM CHLORIDE: 9 INJECTION, SOLUTION INTRAVENOUS at 08:10

## 2022-07-13 RX ADMIN — HEPARIN 1500 UNITS: 100 SYRINGE at 14:20

## 2022-07-13 RX ADMIN — CEFTRIAXONE SODIUM 2000 MG: 2 INJECTION, POWDER, FOR SOLUTION INTRAMUSCULAR; INTRAVENOUS at 10:34

## 2022-07-13 RX ADMIN — FILGRASTIM-AAFI 300 MCG: 300 INJECTION, SOLUTION SUBCUTANEOUS at 08:01

## 2022-07-13 NOTE — PLAN OF CARE
Problem: Safety - Adult  Goal: Free from fall injury  Outcome: Progressing  Flowsheets (Taken 7/12/2022 1520 by Cristian Calvillo RN)  Free From Fall Injury: Instruct family/caregiver on patient safety     Problem: Discharge Planning  Goal: Discharge to home or other facility with appropriate resources  Outcome: Progressing  Flowsheets (Taken 7/12/2022 2116)  Discharge to home or other facility with appropriate resources: Identify barriers to discharge with patient and caregiver     Problem: Skin/Tissue Integrity - Adult  Goal: Incisions, wounds, or drain sites healing without S/S of infection  Outcome: Progressing  Flowsheets (Taken 7/11/2022 2044)  Incisions, Wounds, or Drain Sites Healing Without Sign and Symptoms of Infection: ADMISSION and DAILY: Assess and document risk factors for pressure ulcer development     Problem: Hematologic - Adult  Goal: Maintains hematologic stability  Recent Flowsheet Documentation  Taken 7/12/2022 2116 by Anne-Marie Goel RN  Maintains hematologic stability: Assess for signs and symptoms of bleeding or hemorrhage     Problem: ABCDS Injury Assessment  Goal: Absence of physical injury  Outcome: Progressing  Flowsheets (Taken 7/12/2022 1520 by Cristian Calvillo RN)  Absence of Physical Injury: Implement safety measures based on patient assessment     Problem: Nutrition Deficit:  Goal: Optimize nutritional status  Outcome: Progressing  Flowsheets (Taken 7/13/2022 0603)  Nutrient intake appropriate for improving, restoring, or maintaining nutritional needs:   Assess nutritional status and recommend course of action   Monitor oral intake, labs, and treatment plans     Problem: Pain  Goal: Verbalizes/displays adequate comfort level or baseline comfort level  Outcome: Progressing  Flowsheets (Taken 7/12/2022 0604)  Verbalizes/displays adequate comfort level or baseline comfort level: Encourage patient to monitor pain and request assistance

## 2022-07-13 NOTE — PLAN OF CARE
Problem: Safety - Adult  Goal: Free from fall injury  7/13/2022 1729 by Bala Laboy RN  Outcome: Adequate for Discharge     Problem: Discharge Planning  Goal: Discharge to home or other facility with appropriate resources  7/13/2022 1729 by Bala Laboy RN  Outcome: Adequate for Discharge     Problem: Skin/Tissue Integrity - Adult  Goal: Incisions, wounds, or drain sites healing without S/S of infection  7/13/2022 1729 by aBla Laboy RN  Outcome: Adequate for Discharge     Problem: Hematologic - Adult  Goal: Maintains hematologic stability  Outcome: Adequate for Discharge     Problem: ABCDS Injury Assessment  Goal: Absence of physical injury  7/13/2022 1729 by Bala Laboy RN  Outcome: Adequate for Discharge     Problem: Nutrition Deficit:  Goal: Optimize nutritional status  7/13/2022 1729 by Bala Laboy RN  Outcome: Adequate for Discharge     Problem: Pain  Goal: Verbalizes/displays adequate comfort level or baseline comfort level  7/13/2022 1729 by Bala Laboy RN  Outcome: Adequate for Discharge     Problem: Chronic Conditions and Co-morbidities  Goal: Patient's chronic conditions and co-morbidity symptoms are monitored and maintained or improved  Outcome: Adequate for Discharge

## 2022-07-13 NOTE — DISCHARGE INSTR - COC
Continuity of Care Form    Patient Name: Andre Damon   :  1970  MRN:  [id number]    Admit date:  2022  Discharge date:  ***    Code Status Order: Full Code   Advance Directives:      Admitting Physician:  Jerel Banda DO  PCP: Arjun Cardenas, APRN - CNP    Discharging Nurse: [facility] Unit/Room#: 0565/1179-39  Discharging Unit Phone Number: ***    Emergency Contact:   Extended Emergency Contact Information  Primary Emergency Contact: [address] [place] [address] Phone: [phone]  Mobile Phone: [phone]  Relation: Child  Secondary Emergency Contact: Anil Eller. Phone: [phone]  Mobile Phone: [phone]  Relation: Parent    Past Surgical History:  Past Surgical History:   Procedure Laterality Date    CATHETER INSERTION N/A 2022    INSERT TRIFUSION CATHETER performed by Claudeen Schlichter, MD at [address]  2021    CT BONE MARROW BIOPSY 2021 [facility]    CT BONE MARROW BIOPSY  3/18/2022    CT BONE MARROW BIOPSY 3/18/2022 [address] SCAN    CT BONE MARROW BIOPSY  6/10/2022    CT BONE MARROW BIOPSY 6/10/2022 [facility] CT SCAN    DENTAL SURGERY      HYSTERECTOMY (CERVIX STATUS UNKNOWN)      SHOULDER SURGERY         Immunization History:   Immunization History   Administered Date(s) Administered    COVID-19, PFIZER PURPLE top, DILUTE for use, (age 15 y+), 30mcg/0.3mL 10/22/2021, 2021    Influenza, Quadv, IM, PF (6 mo and older Fluzone, Flulaval, Fluarix, and 3 yrs and older Afluria) 09/15/2020    MMR 09/15/2020    Tdap (Boostrix, Adacel) 2017    Zoster Recombinant (Shingrix) 2021, 2021       Active Problems:  Patient Active Problem List   Diagnosis Code    Essential hypertension I10    Chronic midline low back pain with right-sided sciatica M54.41, G89.29    Moderate episode of recurrent major depressive disorder (HCC) F33.1    Acute diffuse otitis externa of left ear H60.312    Osteoarthritis of shoulder M19.019    Other specified disorders of rotator cuff syndrome of shoulder and allied disorders M75.100    Iron deficiency anemia D50.9    Morbidly obese (HCC) E66.01    Multiple myeloma (HCC) C90.00    Intestinal malabsorption K90.9    Anemia due to chronic blood loss D50.0    Obesity, diabetes, and hypertension syndrome (HCC) E11.69, E66.9, E11.59, I15.2    Type 2 diabetes mellitus with hyperlipidemia ([facility]) E11.69, E78.5    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    Neutropenic fever ([facility] [address] D70.9, R50.81       Isolation/Infection:   Isolation            No Isolation          Patient Infection Status       Infection Onset Added Last Indicated Last Indicated By Review Planned Expiration Resolved Resolved By    None active    Resolved    C-diff Rule Out 07/08/22 07/08/22 07/08/22 Clostridium Difficile Toxin/Antigen (Ordered)   07/08/22 Rule-Out Test Resulted            Nurse Assessment:  Last Vital Signs: BP (!) 155/90   Pulse 91   Temp 98.3 °F (36.8 °C) (Oral)   Resp 16   Ht 5' 5\" (1.651 m)   Wt 260 lb 12.8 oz (118.3 kg)   SpO2 98%   BMI 43.40 kg/m²     Last documented pain score (0-10 scale): Pain Level: 0  Last Weight:   Wt Readings from Last 1 Encounters:   07/13/22 260 lb 12.8 oz (118.3 kg)     Mental Status:  {IP PT MENTAL STATUS:82727}    IV Access:  { CAYET[facility]O IV ACCESS:373327889}    Nursing Mobility/ADLs:  Walking   {[facility] DME QUQA:197294856}  Transfer  {[facility] DME OSVO:220495436}  Bathing  {[facility] DME OZZZ:336421510}  Dressing  {[facility] DME ZZZW:048769811}  Toileting  {[facility] DME WLYI:589864702}  Feeding  {[facility] DME HKFQ:602530113}  Med Admin  {[facility] DME JCWO:810025817}  Med Delivery   { [facility] MED Delivery:340564887}    Wound Care Documentation and Therapy:  Puncture 11/19/21 Back Lower; Right (Active)   Number of days: 235       Incision 06/20/22 Chest Upper;Right (Active)   Dressing Status Clean;Dry; Intact 06/20/22 1100   Dressing/Treatment Tegaderm/transparent film dressing 06/20/22 1100   Drainage Amount None 06/20/22 1100   Number of days: 23       Incision 22 Back Left; Lower;Medial (Active)   Number of days: 116        Elimination:  Continence: Bowel: {YES / DD:21410}  Bladder: {YES / GW:30102}  Urinary Catheter: {Urinary Catheter:229716730}   Colostomy/Ileostomy/Ileal Conduit: {YES / GV:75212}       Date of Last BM: ***    Intake/Output Summary (Last 24 hours) at 2022 0803  Last data filed at 2022 0659  Gross per 24 hour   Intake 1854 ml   Output 2400 ml   Net -546 ml     I/O last 3 completed shifts:   In: 3051 [P.O.:440; I.V.:2611]  Out: 46 [Urine:3400]    Safety Concerns:     [facility] Safety Concerns:211630092}    Impairments/Disabilities:      [facility] Impairments/Disabilities:932745874}    Nutrition Therapy:  Current Nutrition Therapy:   [facility] Diet List:057501744}    Routes of Feeding: {P DME Other Feedings:756280705}  Liquids: {Slp liquid thickness:17820}  Daily Fluid Restriction: {CHP DME Yes amt example:784963775}  Last Modified Barium Swallow with Video (Video Swallowing Test): {Done Not Done SGYE:051242576}    Treatments at the Time of Hospital Discharge:   Respiratory Treatments: ***  Oxygen Therapy:  {Therapy; copd oxygen:72981}  Ventilator:    { CC Vent EYLB:683719041}    Rehab Therapies: {THERAPEUTIC INTERVENTION:4712867869}  Weight Bearing Status/Restrictions: [facility]  Weight Bearin}  Other Medical Equipment (for information only, NOT a DME order):  {EQUIPMENT:400795763}  Other Treatments: ***    Patient's personal belongings (please select all that are sent with patient):  {P DME Belongings:416563698}    RN SIGNATURE:  {Esignature:911592352}    CASE MANAGEMENT/SOCIAL WORK SECTION    Inpatient Status Date: ***    Readmission Risk Assessment Score:  Readmission Risk              Risk of Unplanned Readmission:  19           Discharging to Facility/ Agency   Name: [facility]:   Name: [facility]    / signature: Electronically signed by NAHID Sullivan on 22

## 2022-07-13 NOTE — CARE COORDINATION
Case Management Assessment            Discharge Note                    Date / Time of Note: 7/13/2022 11:29 AM                  Discharge Note Completed by: NAHID Cortez    Patient Name: Carlos Eduardo Wilson   YOB: 1970  Diagnosis: Neutropenic fever ([facility] [address] [D70.9, R50.81]   Date / Time: 7/8/2022  2:49 PM    Current PCP: SHY Watkins CNP  Clinic patient: No    Hospitalization in the last 30 days: Yes    Advance Directives:  Code Status: Full Code  [place] DNR form completed and on chart: No    Financial:  Payor: Juliana Hernandez / Plan: Liz Lopez / Product Type: *No Product type* /      Pharmacy:    [facility] [address] [phone] [place] [phone]  [address] [place] [address]  Phone: [phone] Fax: [phone]      Assistance purchasing medications?:    Assistance provided by Case Management: None at this time    Does patient want to participate in local refill/ meds to beds program?:      Meds To Beds General Rules:  1. Can ONLY be done Monday- Friday between 8:30am-5pm  2. Prescription(s) must be in pharmacy by 3pm to be filled same day  3. Copy of patient's insurance/ prescription drug card and patient face sheet must be sent along with the prescription(s)  4. Cost of Rx cannot be added to hospital bill. If financial assistance is needed, please contact unit  or ;  or  CANNOT provide pharmacy voucher for patients co-pays  5.  Patients can then  the prescription on their way out of the hospital at discharge, or pharmacy can deliver to the bedside if staff is available. (payment due at time of pick-up or delivery - cash, check, or card accepted)     Able to afford home medications/ co-pay costs: Yes    ADLS:  Current PT AM-PAC Score: 24 /24  Current OT AM-PAC Score: 24 /24      DISCHARGE Disposition: [facility] [address]: [facility] Infusion: [facility] Phone: na Fax: na    LOC at discharge: Not Applicable  CAYETANO Completed: Yes    Notification completed in HENS/PAS?:  Not Applicable    IMM Completed:   No    Transportation:  Transportation PLAN for discharge: family   Mode of Transport: Private Car  Reason for medical transport: Not Applicable  Name of Tyler Craig,[address]: Not Applicable  Time of Transport: afternoon    Transport form completed: No    Home Care:  [address] ordered at discharge: Yes  [address]: Allison Alfonso  Phone: [phone]  Fax: [phone]  Orders faxed: Yes    Durable Medical Equipment:  DME Provider: none  Equipment obtained during hospitalization: none    Home Oxygen and Respiratory Equipment:  Oxygen needed at discharge?: No  [address]: Not Applicable  Portable tank available for discharge?: No    Dialysis:  Dialysis patient: No    Dialysis Center:  Not Applicable    Hospice Services:  Location: Not Applicable  Agency: Not Applicable    Consents signed: No    Referrals made at [facility] for outpatient continued care:  Not Applicable    Additional CM Notes: Patient to discharge with home IV abx. Confirmed start of care for tomorrow. [facility] to provide nursing care. [facility] to provide infusion. All are in agreement to the patient's discharge plan. The Plan for Transition of Care is related to the following treatment goals of Neutropenic fever ([facility] [address] [D70.9, R50.81]    The Patient and/or patient representative Josette Singh and her family were provided with a choice of provider and agrees with the discharge plan Yes    Freedom of choice list was provided with basic dialogue that supports the patient's individualized plan of care/goals and shares the quality data associated with the providers.  Yes    Care Transitions patient: No    Rohan Egan [address]  Case Management Department  Ph: [phone]  Fax: [phone]

## 2022-07-13 NOTE — CARE COORDINATION
Reviewed discharge instructions with patient. Reviewed discharge medications including dosing, schedule, indication, and adverse reactions. Reviewed which medications were already taken today and next dosage due for each medication. Reviewed follow up appointments that have been made in [facility] patient of appointment at [facility] tomorrow and that they will call with time and if they do not call to please call them . Low microbial diet, activity restrictions, and increased risk of infection were reviewed. Patient is being discharged with IV access d/t need for ongoing therapy:      Type:  Triple Lumen                       Date of placement:  6/20/22  Plan:continue   Next dressing change due on: 7/18/22    CVC care and maintenance was reviewed. Patient verbalized understanding of all instructions and questions were answered to her. satisfaction. Signed discharge instructions were given to the patient and a copy placed in the paper-lite chart. Patient discharged to home per wheelchair with family member.       Alex Lim RN

## 2022-07-13 NOTE — DISCHARGE SUMMARY
[facility] Discharge Summary             Attending Physician: Yeison Johnson DO    Referring MD: Yeison Johnson DO  [address]    Name: Elayne Li :  1970  MRN:  [id number]    Admission: 2022   Discharge:   22    Date: 2022    Diagnosis on admit: Neutropenic Fever / Sepsis    Procedures: Routine chest x-ray, laboratories, EKG, IV fluid hydration, Panculture for fevers, IV antimicrobial therapy, Blood Product Infusions      Medications:      Medication List      START taking these medications    cefTRIAXone 500 MG injection  Commonly known as: ROCEPHIN  Infuse 2,000 mg intravenously every 24 hours for 8 days  Start taking on: 2022     loperamide 2 MG capsule  Commonly known as: IMODIUM  Take 1 capsule by mouth 4 times daily as needed for Diarrhea        CONTINUE taking these medications    amLODIPine 5 MG tablet  Commonly known as: NORVASC  Take 1 tablet daily  Notes to patient: Amlodipine (Norvasc)  USE--  Treats high blood pressure (lowers blood pressure)  SIDE EFFECTS-- feeling tired, dizziness     cyclobenzaprine 5 MG tablet  Commonly known as: FLEXERIL  Take 1 tablet BID as needed  Notes to patient: Cyclobenzaprine  (Flexeril)  USE--  Relax muscles  SIDE EFFECTS--  Sleepiness, dizziness     hydroCHLOROthiazide 25 MG tablet  Commonly known as: HYDRODIURIL  Take 1 tablet by mouth every morning     lisinopril 2.5 MG tablet  Commonly known as: PRINIVIL;ZESTRIL  Take 1 tablet by mouth daily     metFORMIN 500 MG tablet  Commonly known as: GLUCOPHAGE  Take 1 tablet by mouth daily (with breakfast)     omeprazole 20 MG delayed release capsule  Commonly known as: PRILOSEC  Take 1 capsule by mouth every morning (before breakfast)     ondansetron 8 MG Tbdp disintegrating tablet  Commonly known as: Zofran ODT  Place 1 tablet under the tongue every 8 hours as needed for Nausea or Vomiting     potassium chloride 20 MEQ extended release tablet  Commonly known as: KLOR-CON M  Take 1 tablet by mouth daily     prochlorperazine 10 MG tablet  Commonly known as: COMPAZINE  Take 1 tablet by mouth every 6 hours as needed (nausea / vomiting)     valACYclovir 500 MG tablet  Commonly known as: VALTREX  Take 1 tablet by mouth 2 times daily  Notes to patient: This medication is an anti-viral drug. Used to help prevent viral infections when your going through treatment or a stem cell transplant    Side Effects;  headache, nausea, vomiting, dizziness         STOP taking these medications    calcium carbonate 500 MG chewable tablet  Commonly known as: Antacid     fluconazole 200 MG tablet  Commonly known as: Diflucan     gabapentin 300 MG capsule  Commonly known as: NEURONTIN     levoFLOXacin 500 MG tablet  Commonly known as: LEVAQUIN           Where to Get Your Medications      You can get these medications from any pharmacy    You don't need a prescription for these medications  · loperamide 2 MG capsule     Information about where to get these medications is not yet available    Ask your nurse or doctor about these medications  · cefTRIAXone 500 MG injection  · ondansetron 8 MG Tbdp disintegrating tablet         Reason for Admission: Management of Neutropenic Fever    Hospital Course:     Dg Main is a 47 yo female w/ IgG Kappa Multiple Myeloma. Her PMH is also significant for HTN, intestinal malabsorption disorders, esophagitis and CRYSTAL. On diagnosis of her myeloma her kappa light chain ratio was 132 and SPEP/SIFE revealed a M-spike of 3.7  g/dL, IgG kappa.  She underwent BM bx/asp showed 20% plasma cells. She has t(14:20), gain of 1q21, monosomy 13. PET scan (1/12/22) showed diffuse marrow signaling, no lytic lesions. She had increased uptake in esophageous and EGD. She eventually underwent EGD/colonoscopy (4/28/22) that revealed mild esophagitis and normal colonoscopy. She was started on RVD (12/27/22) and completed 5 cycles (C5D1: 5/16/22).  Her repeat BM bx/asp (6/10/22) was negative for plasma cells, consistent with partial response.     She recieved Melphalan (200mg/m^2) x 1 dose in OP Infusion (6/30/22) followed by infusion of peripheral blood progenitor cells w/ 3.66 x 10^6 [id number]/kg in 210 mL on 7/1/22. She was followed daily in outpatient infusion for provider visit, labs and toxicity assessment. During this time, she experienced mild nausea and loss of appetite but overall was doing well at home. When she presented on day + 7 (7/8/22) she was feeling fairly well. Her appetite was slightly improved as well as her energy. She had developed diarrhea over the previous night, so C. Diff was sent, which was negative. Then around 1015 she experienced vomiting and then became tachycardic and elevated temperature. EKG showed ST w/ HR in the 140s. She appeared septic, so she was pan-cultured, given IVF bolus and empirically started on Cefepime. Her blood cultures x 2 bottles were positive for klebsiella pneumoniae. She stayed on Cefepime for 5 days and then transitioned to Rocephin and will complete 14 antibiotic days on 7/21/22. She feels well and her WBC count has recovered on G-CSF. She is eating, drinking and ambulating adequately for discharge home today. She will return to [facility] tomorrow for provider visit, and labs. She will receive IV Ceftrixone daily via [facility] starting 7/14/22-7/21/22.       Physical Exam:     Vital Signs:  BP (!) 155/90   Pulse 91   Temp 98.3 °F (36.8 °C) (Oral)   Resp 16   Ht 5' 5\" (1.651 m)   Wt 260 lb 12.8 oz (118.3 kg)   SpO2 98%   BMI 43.40 kg/m²     Weight:    Wt Readings from Last 3 Encounters:   07/13/22 260 lb 12.8 oz (118.3 kg)   07/08/22 250 lb (113.4 kg)   07/07/22 249 lb 12.5 oz (113.3 kg)       KPS: 80% Normal activity with effort; some signs or symptoms of disease    PE performed by Dr. Cher Marquez PS:  (1) Restricted in physically strenuous activity, ambulatory and able to do work of light nature  General: Awake, alert and oriented.   HEENT: Normocephalic, atraumatic, poor dentition, many teeth requiring extraction  NECK: supple without palpable adenopathy  BACK: Straight negative CVAT  SKIN: warm dry and intact without lesions rashes or masses  CHEST: CTA bilaterally without use of accessory muscles  CV: Normal S1 S2, RRR, no MRG  ABD: NT ND normoactive BS, no palpable masses or hepatosplenomegaly  EXTREMITIES: without edema, denies calf tenderness  NEURO: CN II - XII grossly intact  CATHETER: Left SC Trifusion Mackenzie Valdivia, 6/20/22) - CDI    Discharge Laboratory Data:  CBC:   Recent Labs     07/11/22 0355 07/12/22 0349 07/13/22  0405   WBC 0.3* 0.8* 2.7*   HGB 7.2* 7.4* 7.8*   HCT 20.7* 22.0* 22.8*   MCV 87.6 88.4 88.8   PLT 30* 35* 50*     BMP/Mag:  Recent Labs     07/11/22 0355 07/11/22 0412 07/12/22 0349 07/13/22  0405     --  142 140   K 3.9  --  3.6 3.7     --  105 105   CO2 26  --  26 27   PHOS 4.0  --   --  3.7   BUN 5*  --  4* 3*   CREATININE 0.6  --  0.6 0.6   MG  --  1.90  --   --      LIVP:   Recent Labs     07/11/22 0355 07/13/22  0405   AST 12* 26   ALT 28 33   BILIDIR <0.2 <0.2   BILITOT <0.2 0.3   ALKPHOS 64 67     Coags:   Recent Labs     07/11/22 0412   PROTIME 14.0   INR 1.09   APTT 25.9     Uric Acid   Recent Labs     07/11/22 0355 07/12/22 0349 07/13/22  0405   LABURIC 3.5 3.7 4.2       Susceptibility      Klebsiella pneumoniae (2)    Antibiotic Interpretation Microscan  Method Status    ampicillin Resistant >=32 mcg/mL BACTERIAL SUSCEPTIBILITY PANEL BY LAURA     ampicillin-sulbactam Sensitive 4 mcg/mL BACTERIAL SUSCEPTIBILITY PANEL BY LAURA     ceFAZolin Sensitive <=4 mcg/mL BACTERIAL SUSCEPTIBILITY PANEL BY LAURA     cefepime Sensitive <=0.12 mcg/mL BACTERIAL SUSCEPTIBILITY PANEL BY LAURA     cefTRIAXone Sensitive <=0.25 mcg/mL BACTERIAL SUSCEPTIBILITY PANEL BY LAURA     ciprofloxacin Sensitive <=0.25 mcg/mL BACTERIAL SUSCEPTIBILITY PANEL BY LAURA     ertapenem Sensitive <=0.12 mcg/mL BACTERIAL SUSCEPTIBILITY PANEL BY LAURA     gentamicin Sensitive <=1 mcg/mL BACTERIAL SUSCEPTIBILITY PANEL BY LAURA     levofloxacin Sensitive <=0.12 mcg/mL BACTERIAL SUSCEPTIBILITY PANEL BY LAURA     piperacillin-tazobactam Sensitive 16 mcg/mL BACTERIAL SUSCEPTIBILITY PANEL BY LAURA     trimethoprim-sulfamethoxazole Sensitive <=20 mcg/mL BACTERIAL SUSCEPTIBILITY PANEL BY LAURA            PROBLEM LIST:             1. IigG Kappa Multiple myeloma   2. Dental disorder  3. HTN  4. Intestinal malabsorption (disorder)  5. Iron deficiency anemia due to chronic blood loss     Post transplant complications:    1. Klebsiella Pneumoniae bacteremia / sepsis    TREATMENT:            1. RVD x 5 cycles (12/2021 - 5/16/22)   2. High Dose Melphalan f/b autologous SCT (7/1/22) - 3.66 x10^6 [id number]/kg     ASSESSMENT AND PLAN:            1. 1) Multiple myeloma, IgG Kappa, ISS 3: partial response   - t(14;20), gain of 1q21, monosomy 13.    - At diagnoses:  M spike is 3.7, FLC ratio  132  - BMBx (12/7/21): 20% plasma cells & PET scan (1/12/22) showed diffuse bone marrow activity, no lytic lesions  - BMBx (3/18/22) after cycle 4: 40% cellularity and few percentage of clonal plasma cells   - BM bx/asp (6/10/22) - negative for plasma cells     PLAN:   HD [id number] and ASCT (7/1/22)  - Post transplant maintenance:  Revlmid      Day + 12     2. ID: Sepsis r/t Klebsiella pneumoniae bacteremia (POA); now afebrile and sepsis has resolved    - Blood cx x 2 bottles (7/8/22): Klebsiella pneumoniae; see sensitivities above  - Cont Valtrex ppx  - Rocephin Day + 1/9 (7/13/22 - 7/21/22) via Home Health    Abx History:  Cefepime x 5 days (7/8/22 - 7/12/22)       3. Heme: Pancytopenia 2/2 chemotherapy  - H/o CRYSTAL: S/p IV iron (3/21/22 & 3/28/22)  - Transfuse for Hgb < 7 and Platelets < 18L  - No transfusion today  - S/p G-CSF (7/6/22 - 9/53/24)      4. Metabolic / DM: Hyperglycemia w/ stable renal fxn  - Resume KCl     5.  Endocrine:  - H/o Type 2 DM  T2DM:  - Resume Metformin      6. GI / Nutrition:    - H/o esophagitis   Nutrition:  Appetite and oral intake is improving  - Cont low microbial diet   GERD:   - Cont PPI daily   - Cont TUMs as needed   Nausea / Vomiting: improving   - Cont PRN Zofran & Compazine    Diarrhea:  Improved   - C. Diff (7/8/22): Negative  - Cont Imodium as needed  Mucositis:  Chemo induced; improving      7. Poor dentition:    - S/p partial extractions      Condition on discharge:  stable    Discharge Instructions: Return to [facility] on Thursday, 7/13/22 for labs (CBC w/ diff, CMP, Mag & Phos) and provider visit. The patient was advised on activity and dietary restrictions. The patient was advised to follow up in the emergency department or contact the physician with any unresolved nausea/vomiting/diarrhea/pain or temperature greater than 100.5 F or any other unusual symptoms. This discharge summary and plan was discussed and agreed upon with Dr. Agustín Krause.     Jessica Dickinson, SHY - CNP

## 2022-07-13 NOTE — PROGRESS NOTES
Patient is being discharged with IV access d/t need for ongoing therapy:      Type:  Trifusion                     Date of placement:  6/20/22  Plan:continue   Next dressing change due on: 7/20/22  Cap changes due on: 7/20/22  CVC care and maintenance was reviewed with patient and spouse. Pt verbalizes understanding of line care and maintenance. Patient verbalized understanding of all instructions and questions were answered to her. satisfaction. Signed discharge instructions were given to the patient and a copy placed in the paper-lite chart. Patient discharged to home per wheelchair with spouse.       Marjan Jimenez RN

## 2022-07-18 LAB
Lab: NORMAL
REPORT: NORMAL
THIS TEST SENT TO: NORMAL

## 2022-07-21 LAB — FINAL REPORT: NORMAL

## 2022-07-28 ENCOUNTER — OFFICE VISIT (OUTPATIENT)
Dept: SURGERY | Age: 52
End: 2022-07-28
Payer: COMMERCIAL

## 2022-07-28 VITALS
WEIGHT: 248 LBS | OXYGEN SATURATION: 98 % | SYSTOLIC BLOOD PRESSURE: 142 MMHG | HEIGHT: 65 IN | HEART RATE: 109 BPM | DIASTOLIC BLOOD PRESSURE: 91 MMHG | TEMPERATURE: 98 F | BODY MASS INDEX: 41.32 KG/M2

## 2022-07-28 DIAGNOSIS — C90.01 MULTIPLE MYELOMA IN REMISSION (HCC): Primary | ICD-10-CM

## 2022-07-28 LAB — FINAL REPORT: NORMAL

## 2022-07-28 PROCEDURE — 36589 REMOVAL TUNNELED CV CATH: CPT | Performed by: SURGERY

## 2022-07-28 PROCEDURE — 99999 PR OFFICE/OUTPT VISIT,PROCEDURE ONLY: CPT | Performed by: SURGERY

## 2022-07-28 NOTE — PROGRESS NOTES
TUNNELED CATHETER REMOVAL - PROCEDURE NOTE:    Pre/Post Operative diagnosis: Presence of tunneled CVC requiring removal  Surgeon: Brenda Carreno MD  Procedure: Removal of tunneled CVC    Anesthesia: Local  EBL: 5 cc  Complications: None    Indications:  Patient here for left sided tunneled central venous catheter removal, as recommended by patient's oncologist. Most recent oncology note and CBC reviewed. Previous operative note reviewed as necessary. Informed consent obtained. Risks of procedure explained to patient, including but not limited to: bleeding, infection, non wound healing, poor cosmetic result, and need for subsequent procedures. Procedure Details:  Patient slight head up position in office procedure room. Left neck and upper chest prepped and draped in sterile fashion using chlorhexidine prep solution, including the catheter as it exits the skin. Previously placed sutures were cut. 10 cc of 1% lidocaine used for local anesthetic around the exit site. Curved hemostat used to slowly and methodically dissect the cuff free from subcutaneous tissues. Once cuff was confirmed mobile and free, the catheter was removed during full inhalation. Direct pressure was used to obtain hemostasis. Sterile guaze and Tegaderm used for dressing. All counts correct at end of procedure. Wound care and discharge instructions were explained to patient, as below. Patient discharged home.

## 2022-07-28 NOTE — PATIENT INSTRUCTIONS
DISCHARGE INSTRUCTIONS:    You may shower in 12 hours. Remove the dressing before showering. Let water run over your incision site. There is no need to scrub it. After showering, pat dry and cover with a small dry guaze and tegaderm dressing (or tape). After about 1 week, a Band-Aid can be used. Avoid tub baths and swimming until the wound has completely healed. Healing time may vary, but typically takes 2-4 weeks. You may have a small amount of bleeding or drainage for a few days, but this will go away as the wound heals from the inside out. Call the office ([phone]) right away or come to the emergency department if you experience fever, chills, swelling, decreased appetite, worsening pain, redness, foul drainage, or uncontrolled bleeding from your wound. Please call the office at [phone] if you have ANY questions or concerns about your treatment. We encourage your feedback and comments about your experience with us and strive to bring you amazing patient care. If you had a negative experience in any way, please inform us so can improve our processes.

## 2022-08-05 LAB
Lab: NORMAL
REPORT: NORMAL
THIS TEST SENT TO: NORMAL

## 2022-08-08 LAB
CULTURE, FUNGUS BLOOD: NORMAL
CULTURE, FUNGUS BLOOD: NORMAL
FUNGUS (MYCOLOGY) CULTURE: NORMAL
FUNGUS STAIN: NORMAL

## 2022-08-17 ENCOUNTER — HOSPITAL ENCOUNTER (OUTPATIENT)
Age: 52
Discharge: HOME | End: 2022-08-17
Payer: COMMERCIAL

## 2022-08-17 ENCOUNTER — HOSPITAL ENCOUNTER (OUTPATIENT)
Dept: GENERAL RADIOLOGY | Age: 52
Discharge: HOME | End: 2022-08-17
Payer: COMMERCIAL

## 2022-08-17 DIAGNOSIS — M25.571 RIGHT ANKLE PAIN, UNSPECIFIED CHRONICITY: ICD-10-CM

## 2022-08-17 DIAGNOSIS — C90.00 MULTIPLE MYELOMA, REMISSION STATUS UNSPECIFIED (HCC): ICD-10-CM

## 2022-08-17 PROCEDURE — 73610 X-RAY EXAM OF ANKLE: CPT

## 2022-10-04 ENCOUNTER — HOSPITAL ENCOUNTER (OUTPATIENT)
Dept: ONCOLOGY | Age: 52
Setting detail: INFUSION SERIES
Discharge: HOME | End: 2022-10-04
Payer: COMMERCIAL

## 2022-10-04 VITALS
OXYGEN SATURATION: 98 % | SYSTOLIC BLOOD PRESSURE: 131 MMHG | TEMPERATURE: 97.7 F | RESPIRATION RATE: 16 BRPM | DIASTOLIC BLOOD PRESSURE: 76 MMHG | HEART RATE: 81 BPM

## 2022-10-04 LAB
BASOPHILS ABSOLUTE: 0 K/UL (ref 0–0.2)
BASOPHILS RELATIVE PERCENT: 0.3 %
EOSINOPHILS ABSOLUTE: 0.1 K/UL (ref 0–0.6)
EOSINOPHILS RELATIVE PERCENT: 1.8 %
HCT VFR BLD CALC: 35 % (ref 36–48)
HEMOGLOBIN: 11.5 G/DL (ref 12–16)
LYMPHOCYTES ABSOLUTE: 0.8 K/UL (ref 1–5.1)
LYMPHOCYTES RELATIVE PERCENT: 23.9 %
MCH RBC QN AUTO: 28.4 PG (ref 26–34)
MCHC RBC AUTO-ENTMCNC: 32.8 G/DL (ref 31–36)
MCV RBC AUTO: 86.6 FL (ref 80–100)
MONOCYTES ABSOLUTE: 0.3 K/UL (ref 0–1.3)
MONOCYTES RELATIVE PERCENT: 10.2 %
NEUTROPHILS ABSOLUTE: 2.1 K/UL (ref 1.7–7.7)
NEUTROPHILS RELATIVE PERCENT: 63.8 %
PDW BLD-RTO: 15 % (ref 12.4–15.4)
PLATELET # BLD: 267 K/UL (ref 135–450)
PMV BLD AUTO: 7.1 FL (ref 5–10.5)
RBC # BLD: 4.04 M/UL (ref 4–5.2)
WBC # BLD: 3.3 K/UL (ref 4–11)

## 2022-10-04 PROCEDURE — 88264 CHROMOSOME ANALYSIS 20-25: CPT

## 2022-10-04 PROCEDURE — 88305 TISSUE EXAM BY PATHOLOGIST: CPT

## 2022-10-04 PROCEDURE — 96374 THER/PROPH/DIAG INJ IV PUSH: CPT

## 2022-10-04 PROCEDURE — 88341 IMHCHEM/IMCYTCHM EA ADD ANTB: CPT

## 2022-10-04 PROCEDURE — 2700000000 HC OXYGEN THERAPY PER DAY

## 2022-10-04 PROCEDURE — 88275 CYTOGENETICS 100-300: CPT

## 2022-10-04 PROCEDURE — 88311 DECALCIFY TISSUE: CPT

## 2022-10-04 PROCEDURE — 38221 DX BONE MARROW BIOPSIES: CPT

## 2022-10-04 PROCEDURE — 88271 CYTOGENETICS DNA PROBE: CPT

## 2022-10-04 PROCEDURE — 88185 FLOWCYTOMETRY/TC ADD-ON: CPT

## 2022-10-04 PROCEDURE — 94761 N-INVAS EAR/PLS OXIMETRY MLT: CPT

## 2022-10-04 PROCEDURE — 88342 IMHCHEM/IMCYTCHM 1ST ANTB: CPT

## 2022-10-04 PROCEDURE — 96375 TX/PRO/DX INJ NEW DRUG ADDON: CPT

## 2022-10-04 PROCEDURE — 6360000002 HC RX W HCPCS: Performed by: NURSE PRACTITIONER

## 2022-10-04 PROCEDURE — 85025 COMPLETE CBC W/AUTO DIFF WBC: CPT

## 2022-10-04 PROCEDURE — 88184 FLOWCYTOMETRY/ TC 1 MARKER: CPT

## 2022-10-04 PROCEDURE — 88313 SPECIAL STAINS GROUP 2: CPT

## 2022-10-04 PROCEDURE — 88237 TISSUE CULTURE BONE MARROW: CPT

## 2022-10-04 RX ORDER — FENTANYL CITRATE 50 UG/ML
100 INJECTION, SOLUTION INTRAMUSCULAR; INTRAVENOUS
Status: COMPLETED | OUTPATIENT
Start: 2022-10-04 | End: 2022-10-04

## 2022-10-04 RX ORDER — MIDAZOLAM HYDROCHLORIDE 1 MG/ML
5 INJECTION INTRAMUSCULAR; INTRAVENOUS
Status: COMPLETED | OUTPATIENT
Start: 2022-10-04 | End: 2022-10-04

## 2022-10-04 RX ORDER — FLUMAZENIL 0.1 MG/ML
0.5 INJECTION, SOLUTION INTRAVENOUS PRN
Status: DISCONTINUED | OUTPATIENT
Start: 2022-10-04 | End: 2022-10-05 | Stop reason: HOSPADM

## 2022-10-04 RX ORDER — NALOXONE HYDROCHLORIDE 0.4 MG/ML
0.4 INJECTION, SOLUTION INTRAMUSCULAR; INTRAVENOUS; SUBCUTANEOUS PRN
Status: DISCONTINUED | OUTPATIENT
Start: 2022-10-04 | End: 2022-10-05 | Stop reason: HOSPADM

## 2022-10-04 RX ADMIN — MIDAZOLAM HYDROCHLORIDE 3 MG: 1 INJECTION, SOLUTION INTRAMUSCULAR; INTRAVENOUS at 09:11

## 2022-10-04 RX ADMIN — FENTANYL CITRATE 50 MCG: 50 INJECTION, SOLUTION INTRAMUSCULAR; INTRAVENOUS at 09:11

## 2022-10-04 NOTE — PROGRESS NOTES
Pt scheduled for bone marrow biopsy with conscious sedation for diagnosis of multiple myeloma. Pt's history, allergies, and medication list reviewed by nursing and physician prior to start of procedure. Pt assessed and deemed fit for procedure by physician. Pre-Procedural Assessment:  Signed, Dated, and timed Informed Consent on the chart  VS's obtained and documented  Pt has a patent IV site (see flowsheets)  Pt has been NPO since 10/3/2022 at 20:00  Current Medications, Allergies, and recent H&P reviewed and on chart  Emergency medications and equipment available (crash cart, narcan, flumazenil, O2, suction, etc)  Time out performed prior to procedure (see sedation documentation)  Pre-Procedure Shyanne Score: 10    Procedure:  Nursing present throughout procedure to assess, assist, and monitor. Vital Signs monitored throughout - see sedation documentation. Patient to be discharged from OPO once Shyanne Score of 8 or better is achieved or once pre-procedural Shyanne Score is obtained. Pt received 3mg Versed and 50mcg Fentanyl prior to start of procedure. Post-Procedure:  Pt vital signs stable. Discharge teaching provided (see AVS). Post procedure Shyanne Score 10. Pt and family verbalized understanding of all instructions. Pt discharged from [address] [place] [address] with friend who will drive them home.

## 2022-10-04 NOTE — PROGRESS NOTES
ETCO2  Monitoring done for conscious sedation. Patient is on 2   liters/min via nasal cannula for procedure.     Baseline information:  HR: 85 BP: 142/97  RR: 18 LOC: alert  ETCO2: 41 SpO2: 19    5 minutes after sedation administered:  HR: 93 BP: 134/99  RR: 14 LOC: lethargic  ETCO2: 39 SpO2: 94 Increased O2 to 5 lpm    10 min after sedation administered:  HR: 79 BP: 150/92 RR: 14 LOC: lethargic  ETCO2: 20 SpO2: 98    15 minutes after sedation administered:  HR: 76 BP: 140/91  RR: 11 LOC:lethargic/ arrousable  ETCO2: 18 SpO2: 95    Post-Procedure:  HR: 74 BP: 142/93  RR: 16 LOC:   ETCO2: 25 SpO2: 99 pt sleepy/awake      Patient/caregiver was educated on the proper method of use:  Yes      Level of patient/caregiver understanding able to:   [x] Verbalize understanding   [] Demonstrate understanding       [] Teach back        [] Needs reinforcement       []  No available caregiver               []  Other:     Response to education:  Good

## 2022-10-05 NOTE — PROCEDURES
Procedure: Bone Marrow Biopsy and Needle Aspirate   Indication: Multiple myeloma post autologous transplant    Anesthesia:  Lidocaine 1% 10 mL    Conscious sedation: 3 mg of Versed, 50 mcg of fentanyl  Respiratory therapist was present in the room    Patient given risks and benefits of procedure. Consent signed and time out performed. Patient placed in the left lateral decubitus position. Area cleaned and prepped in a sterile fashion with chloraprep. Sterile drape applied. Lidocaine 1% -10ml administered to the subcutaneous tissue and periosteimum of the right  iliac crest. Using sterile technique and using an aspirate needle a bone marrow aspirate was performed. A puncture was made with the provided scalpel, then using an Jamshidi needle, a biopsy was taken from the right posterior iliac crest. A sterile bandage was applied. No significant bleeding. Sample sent for histology, flow cytometry, FISH, cytogenetics and molecular studies. Patient tolerated procedure well.      Estimated Blood Loss: 10 cc of aspirate     Cade Nichole, DO

## 2022-10-14 DIAGNOSIS — M54.41 CHRONIC MIDLINE LOW BACK PAIN WITH RIGHT-SIDED SCIATICA: ICD-10-CM

## 2022-10-14 DIAGNOSIS — G89.29 CHRONIC MIDLINE LOW BACK PAIN WITH RIGHT-SIDED SCIATICA: ICD-10-CM

## 2022-10-14 RX ORDER — CYCLOBENZAPRINE HCL 5 MG
TABLET ORAL
Qty: 60 TABLET | Refills: 0 | Status: SHIPPED | OUTPATIENT
Start: 2022-10-14

## 2022-10-14 NOTE — TELEPHONE ENCOUNTER
Medication:   Requested Prescriptions     Pending Prescriptions Disp Refills    cyclobenzaprine (FLEXERIL) 5 MG tablet [Pharmacy Med Name: Cyclobenzaprine HCl 5 MG Oral Tablet] 60 tablet 0     Sig: Take 1 tablet by mouth twice daily as needed        Last Filled:      Patient Phone Number: [phone] (home)     Last appt: 4/26/2022   Next appt: Visit date not found    Last OARRS: No flowsheet data found.

## 2022-10-20 ENCOUNTER — TELEPHONE (OUTPATIENT)
Dept: ONCOLOGY | Age: 52
End: 2022-10-20

## 2022-10-20 NOTE — TELEPHONE ENCOUNTER
Behavioral Health Follow-up Note    Purpose for call: Psychologist called patient for routine 100-day follow-up post-autologous stem cell transplant. She is Day 119 today. Subjective:    Patient reports that she is doing well. Denies behavioral health needs. Recommendations/Plan:  Patient can call for assistance as needed.

## 2023-03-21 ENCOUNTER — PRE-PROCEDURE TELEPHONE (OUTPATIENT)
Dept: CARDIAC CATH/INVASIVE PROCEDURES | Age: 53
End: 2023-03-21

## 2023-03-21 NOTE — PROGRESS NOTES
Called patient about procedure. Told to be here at ___1030___ for procedure at _1200_____. Must be NPO after midnight but can take morning medication with sips of water. Patient stated they _are not on_ blood thinners  ___________ prior to procedure as directed by the office. Told to have a responsible adult with them to take them home and stay with them afterwards, if they do not get admitted to hospital. Also, to bring a current list of medications. No other questions or concerns.

## 2023-03-22 ENCOUNTER — HOSPITAL ENCOUNTER (OUTPATIENT)
Dept: INTERVENTIONAL RADIOLOGY/VASCULAR | Age: 53
Discharge: HOME | End: 2023-03-22
Payer: COMMERCIAL

## 2023-03-22 VITALS — TEMPERATURE: 97.6 F | HEIGHT: 64 IN | WEIGHT: 247 LBS | BODY MASS INDEX: 42.17 KG/M2

## 2023-03-22 DIAGNOSIS — C90.01 MULTIPLE MYELOMA IN REMISSION (HCC): ICD-10-CM

## 2023-03-22 PROCEDURE — 36561 INSERT TUNNELED CV CATH: CPT

## 2023-03-22 PROCEDURE — 2709999900 HC NON-CHARGEABLE SUPPLY

## 2023-03-22 PROCEDURE — 6360000002 HC RX W HCPCS

## 2023-03-22 PROCEDURE — 76937 US GUIDE VASCULAR ACCESS: CPT

## 2023-03-22 PROCEDURE — 85610 PROTHROMBIN TIME: CPT

## 2023-03-22 PROCEDURE — 99152 MOD SED SAME PHYS/QHP 5/>YRS: CPT

## 2023-03-22 PROCEDURE — C1894 INTRO/SHEATH, NON-LASER: HCPCS

## 2023-03-22 PROCEDURE — 77001 FLUOROGUIDE FOR VEIN DEVICE: CPT

## 2023-03-22 PROCEDURE — C1788 PORT, INDWELLING, IMP: HCPCS

## 2023-03-22 PROCEDURE — 2500000003 HC RX 250 WO HCPCS

## 2023-03-22 NOTE — DISCHARGE INSTRUCTIONS
[facility]  Cardiovascular Special Procedures  IMPLANTABLE VENOUS ACCESS DEVICE          Patient Information:     Maintain dressing after the procedure for 24 - 48 hours and then apply a new dressing supplied to you, do not use any ointment. Leave dressing on for 7 days. If the dressing becomes moist, you should change the dressing. If there is any leakage at the incision site, notify your doctor. Keep site clean and dry for 7 days and observe for any signs of infection. Leave steri-strips on until edges become loose and fall off. Do not pull them off. Bruising and mild discomfort are expected. You may apply an ice bag to the incision area to minimize bruising, discomfort, and swelling. Contact your physician who placed the Port for any bleeding or extreme pain. Other symptoms to report are as follows:     Fever. Skin warm to touch. Numbness or weakness. Swelling of neck or arm. Redness or Tenderness along the portal site and/or the catheter tract. Drainage from the portal site, or if dressing is damp or saturated. Refrain from heavy lifting for greater than 10 pounds for 10-14 days. This is important while the incision is healing. Strenuous activities and exercise should be approached gradually. You may shower as long as you keep the incision dry for the first 4 days. The incision should not be immersed in water until it is completely healed. Most of the sutures will be absorbed over the next few weeks. If you have                     sutures that need to be removed, we will arrange this with you. If you can see            any sutures 4 weeks after the port was placed, please call us and we will remove them     Caring for the Device:    When not in use, your nurse will flush the catheter monthly with a heparinized saline solution to prevent the catheter from occluding.  When in use, regular needle changes and cleaning of the skin around the

## 2023-03-23 LAB — INR BLD: 1.2 (ref 0.88–1.12)

## 2023-04-19 ENCOUNTER — PRE-PROCEDURE TELEPHONE (OUTPATIENT)
Dept: CARDIAC CATH/INVASIVE PROCEDURES | Age: 53
End: 2023-04-19

## 2023-04-19 NOTE — PROGRESS NOTES
Called patient about procedure. Told to be here at __0930____ for procedure at __1100____. Must be NPO after midnight but can take morning medication with sips of water. Patient stated they are not___ blood thinners  ___________ prior to procedure as directed by the office. Told to have a responsible adult with them to take them home and stay with them afterwards, if they do not get admitted to hospital. Also, to bring a current list of medications. No other questions or concerns.

## 2023-04-20 ENCOUNTER — HOSPITAL ENCOUNTER (OUTPATIENT)
Dept: INTERVENTIONAL RADIOLOGY/VASCULAR | Age: 53
Discharge: HOME | End: 2023-04-20
Payer: COMMERCIAL

## 2023-04-20 VITALS — BODY MASS INDEX: 41.32 KG/M2 | TEMPERATURE: 97.8 F | HEIGHT: 64 IN | WEIGHT: 242 LBS

## 2023-04-20 DIAGNOSIS — C90.00 MYELOMA KIDNEY (HCC): ICD-10-CM

## 2023-04-20 DIAGNOSIS — N08 MYELOMA KIDNEY (HCC): ICD-10-CM

## 2023-04-20 PROCEDURE — 36561 INSERT TUNNELED CV CATH: CPT

## 2023-04-20 PROCEDURE — 77001 FLUOROGUIDE FOR VEIN DEVICE: CPT

## 2023-04-20 PROCEDURE — 2500000003 HC RX 250 WO HCPCS

## 2023-04-20 PROCEDURE — 99153 MOD SED SAME PHYS/QHP EA: CPT

## 2023-04-20 PROCEDURE — 36590 REMOVAL TUNNELED CV CATH: CPT

## 2023-04-20 PROCEDURE — C1788 PORT, INDWELLING, IMP: HCPCS

## 2023-04-20 PROCEDURE — 6360000002 HC RX W HCPCS

## 2023-04-20 PROCEDURE — 99152 MOD SED SAME PHYS/QHP 5/>YRS: CPT

## 2023-04-20 PROCEDURE — 76937 US GUIDE VASCULAR ACCESS: CPT

## 2023-04-20 PROCEDURE — C1894 INTRO/SHEATH, NON-LASER: HCPCS

## 2023-04-20 PROCEDURE — 2580000003 HC RX 258

## 2023-04-20 NOTE — H&P
Patient:  Maria Del Carmen Hill   :   1970      Relevant clinical history, particularly as it involves the pending procedure, was reviewed and discussed. The procedure including risks and benefits was discussed at length with the patient (or designated family member) and all questions were answered. Informed consent to proceed with the procedure was given. Vital signs were monitored and documented by the Radiology nurse. Targeted physical examination  Heart : regular rate and rhythm  Lungs : clear, breathing easily  Condition : stable    [facility] nurses notes reviewed and agreed. Past Medical History:        Diagnosis Date    Anxiety     Chronic back pain     Depression     Hypertension     Multiple myeloma not having achieved remission ([facility] [facility]     Prediabetes     Type 2 diabetes mellitus with hyperglycemia, without long-term current use of insulin ([facility] [address] [facility]     Type 2 diabetes mellitus with hyperlipidemia ([facility] [address] 2022       Past Surgical History:           Procedure Laterality Date    CATHETER INSERTION N/A 2022    INSERT TRIFUSION CATHETER performed by Hilda Jacobs MD at [address] [place] [address]  2021    CT BONE MARROW BIOPSY 2021 [facility]    CT BONE MARROW BIOPSY  3/18/2022    CT BONE MARROW BIOPSY 3/18/2022 [address] SCAN    CT BONE MARROW BIOPSY  6/10/2022    CT BONE MARROW BIOPSY 6/10/2022 [facility] SCAN    DENTAL SURGERY      HYSTERECTOMY (CERVIX STATUS UNKNOWN)      IR PORT PLACEMENT EQUAL OR GREATER THAN 5 YEARS  3/22/2023    IR PORT PLACEMENT EQUAL OR GREATER THAN 5 YEARS 3/22/2023 [address] SPECIAL PROCEDURES    SHOULDER SURGERY         Allergies:  Patient has no known allergies.     Medications:   Home Meds  Current Outpatient Medications on File Prior to Encounter   Medication Sig Dispense Refill    cyclobenzaprine (FLEXERIL) 5 MG tablet Take 1 tablet by mouth twice daily as needed 60 tablet 0    omeprazole (PRILOSEC) 20 MG delayed release capsule Take 1 capsule by mouth

## 2023-04-20 NOTE — PROCEDURES
IR Brief Postoperative Note    Иван Ashby  YOB: 1970  [id number]    Pre-operative Diagnosis: port    Post-operative Diagnosis: Same    Procedure: left chest port placement, ok for use.  Right chest port removal    Anesthesia: mod    Surgeons/Assistants: anam    Estimated Blood Loss: Minimal    Complications: none    Specimens: were not obtained    See full procedure dictation to follow      Chris Navarro MD MD  4/20/2023

## 2023-04-20 NOTE — DISCHARGE INSTRUCTIONS
special instructions  ____ Rest for 24 hours    ____ Up as tolerated  ____ Increase activity as tolerated    Wound/Dressing Instructions:  ____ See other instructions  ____ May shower, tomorrow  ____ Remove bandage within 24 hours    MEDICATION INSTRUCTIONS:    ____ See Medication Reconciliation Sheet        Call: _________________________________     FOLLOW-UP APPOINTMENT    Follow up with MD as directed . Belongings returned to patient and/or family: Yes. The Discharge Instructions have been explained to me. I understand and can verbalize these instructions.

## 2023-05-11 DIAGNOSIS — G89.29 CHRONIC MIDLINE LOW BACK PAIN WITH RIGHT-SIDED SCIATICA: ICD-10-CM

## 2023-05-11 DIAGNOSIS — M54.41 CHRONIC MIDLINE LOW BACK PAIN WITH RIGHT-SIDED SCIATICA: ICD-10-CM

## 2023-05-17 RX ORDER — CYCLOBENZAPRINE HCL 5 MG
TABLET ORAL
Qty: 60 TABLET | Refills: 0 | OUTPATIENT
Start: 2023-05-17

## 2023-06-29 ENCOUNTER — OFFICE VISIT (OUTPATIENT)
Dept: PRIMARY CARE CLINIC | Age: 53
End: 2023-06-29

## 2023-06-29 VITALS
HEART RATE: 115 BPM | BODY MASS INDEX: 41.92 KG/M2 | TEMPERATURE: 96.8 F | WEIGHT: 244.2 LBS | SYSTOLIC BLOOD PRESSURE: 126 MMHG | DIASTOLIC BLOOD PRESSURE: 76 MMHG | OXYGEN SATURATION: 98 %

## 2023-06-29 DIAGNOSIS — E11.9 DIABETES MELLITUS WITH COINCIDENT HYPERTENSION (HCC): Chronic | ICD-10-CM

## 2023-06-29 DIAGNOSIS — M54.41 CHRONIC MIDLINE LOW BACK PAIN WITH RIGHT-SIDED SCIATICA: ICD-10-CM

## 2023-06-29 DIAGNOSIS — E66.9 OBESITY, DIABETES, AND HYPERTENSION SYNDROME (HCC): ICD-10-CM

## 2023-06-29 DIAGNOSIS — I15.2 OBESITY, DIABETES, AND HYPERTENSION SYNDROME (HCC): ICD-10-CM

## 2023-06-29 DIAGNOSIS — E78.5 TYPE 2 DIABETES MELLITUS WITH HYPERLIPIDEMIA (HCC): Chronic | ICD-10-CM

## 2023-06-29 DIAGNOSIS — C90.01 MULTIPLE MYELOMA IN REMISSION (HCC): Primary | Chronic | ICD-10-CM

## 2023-06-29 DIAGNOSIS — G89.29 CHRONIC MIDLINE LOW BACK PAIN WITH RIGHT-SIDED SCIATICA: ICD-10-CM

## 2023-06-29 DIAGNOSIS — I10 DIABETES MELLITUS WITH COINCIDENT HYPERTENSION (HCC): Chronic | ICD-10-CM

## 2023-06-29 DIAGNOSIS — E11.59 OBESITY, DIABETES, AND HYPERTENSION SYNDROME (HCC): ICD-10-CM

## 2023-06-29 DIAGNOSIS — E11.69 TYPE 2 DIABETES MELLITUS WITH HYPERLIPIDEMIA (HCC): Chronic | ICD-10-CM

## 2023-06-29 DIAGNOSIS — Z12.31 SCREENING MAMMOGRAM FOR BREAST CANCER: ICD-10-CM

## 2023-06-29 DIAGNOSIS — K21.9 GASTROESOPHAGEAL REFLUX DISEASE WITHOUT ESOPHAGITIS: Chronic | ICD-10-CM

## 2023-06-29 DIAGNOSIS — E11.69 OBESITY, DIABETES, AND HYPERTENSION SYNDROME (HCC): ICD-10-CM

## 2023-06-29 PROBLEM — D69.6 ACQUIRED THROMBOCYTOPENIA (HCC): Status: ACTIVE | Noted: 2023-06-29

## 2023-06-29 PROBLEM — T45.1X5S AGRANULOCYTOSIS DUE TO AND NOT CONCURRENT WITH ADMINISTRATION OF ANTINEOPLASTIC AGENT (HCC): Status: ACTIVE | Noted: 2023-06-29

## 2023-06-29 PROBLEM — D64.9 ANEMIA: Status: ACTIVE | Noted: 2023-06-29

## 2023-06-29 PROBLEM — D70.2 AGRANULOCYTOSIS DUE TO AND NOT CONCURRENT WITH ADMINISTRATION OF ANTINEOPLASTIC AGENT (HCC): Status: ACTIVE | Noted: 2023-06-29

## 2023-06-29 RX ORDER — CETIRIZINE HYDROCHLORIDE 10 MG/1
10 TABLET ORAL DAILY
COMMUNITY

## 2023-06-29 RX ORDER — GABAPENTIN 300 MG/1
300 CAPSULE ORAL 3 TIMES DAILY
COMMUNITY
End: 2023-06-29 | Stop reason: SDUPTHER

## 2023-06-29 RX ORDER — GABAPENTIN 300 MG/1
300 CAPSULE ORAL 3 TIMES DAILY
Qty: 90 CAPSULE | Refills: 1 | Status: SHIPPED | OUTPATIENT
Start: 2023-06-29 | End: 2023-07-29

## 2023-06-29 RX ORDER — CYCLOBENZAPRINE HCL 5 MG
TABLET ORAL
Qty: 60 TABLET | Refills: 0 | Status: SHIPPED | OUTPATIENT
Start: 2023-06-29

## 2023-06-29 RX ORDER — LISINOPRIL 10 MG/1
10 TABLET ORAL DAILY
Qty: 90 TABLET | Refills: 0 | Status: SHIPPED | OUTPATIENT
Start: 2023-06-29

## 2023-06-29 RX ORDER — OMEPRAZOLE 20 MG/1
20 CAPSULE, DELAYED RELEASE ORAL
Qty: 30 CAPSULE | Refills: 3 | Status: SHIPPED | OUTPATIENT
Start: 2023-06-29

## 2023-06-29 RX ORDER — CALCIUM CARBONATE 500(1250)
500 TABLET ORAL DAILY
COMMUNITY

## 2023-06-29 SDOH — ECONOMIC STABILITY: INCOME INSECURITY: HOW HARD IS IT FOR YOU TO PAY FOR THE VERY BASICS LIKE FOOD, HOUSING, MEDICAL CARE, AND HEATING?: PATIENT DECLINED

## 2023-06-29 SDOH — ECONOMIC STABILITY: HOUSING INSECURITY
IN THE LAST 12 MONTHS, WAS THERE A TIME WHEN YOU DID NOT HAVE A STEADY PLACE TO SLEEP OR SLEPT IN A SHELTER (INCLUDING NOW)?: NO

## 2023-06-29 SDOH — ECONOMIC STABILITY: FOOD INSECURITY: WITHIN THE PAST 12 MONTHS, YOU WORRIED THAT YOUR FOOD WOULD RUN OUT BEFORE YOU GOT MONEY TO BUY MORE.: NEVER TRUE

## 2023-06-29 SDOH — ECONOMIC STABILITY: FOOD INSECURITY: WITHIN THE PAST 12 MONTHS, THE FOOD YOU BOUGHT JUST DIDN'T LAST AND YOU DIDN'T HAVE MONEY TO GET MORE.: NEVER TRUE

## 2023-06-29 ASSESSMENT — PATIENT HEALTH QUESTIONNAIRE - PHQ9
SUM OF ALL RESPONSES TO PHQ QUESTIONS 1-9: 0
4. FEELING TIRED OR HAVING LITTLE ENERGY: 0
5. POOR APPETITE OR OVEREATING: 0
3. TROUBLE FALLING OR STAYING ASLEEP: 0
7. TROUBLE CONCENTRATING ON THINGS, SUCH AS READING THE NEWSPAPER OR WATCHING TELEVISION: 0
8. MOVING OR SPEAKING SO SLOWLY THAT OTHER PEOPLE COULD HAVE NOTICED. OR THE OPPOSITE, BEING SO FIGETY OR RESTLESS THAT YOU HAVE BEEN MOVING AROUND A LOT MORE THAN USUAL: 0
1. LITTLE INTEREST OR PLEASURE IN DOING THINGS: 0
6. FEELING BAD ABOUT YOURSELF - OR THAT YOU ARE A FAILURE OR HAVE LET YOURSELF OR YOUR FAMILY DOWN: 0
SUM OF ALL RESPONSES TO PHQ QUESTIONS 1-9: 0
SUM OF ALL RESPONSES TO PHQ QUESTIONS 1-9: 0
9. THOUGHTS THAT YOU WOULD BE BETTER OFF DEAD, OR OF HURTING YOURSELF: 0
10. IF YOU CHECKED OFF ANY PROBLEMS, HOW DIFFICULT HAVE THESE PROBLEMS MADE IT FOR YOU TO DO YOUR WORK, TAKE CARE OF THINGS AT HOME, OR GET ALONG WITH OTHER PEOPLE: 0
SUM OF ALL RESPONSES TO PHQ9 QUESTIONS 1 & 2: 0
2. FEELING DOWN, DEPRESSED OR HOPELESS: 0
SUM OF ALL RESPONSES TO PHQ QUESTIONS 1-9: 0

## 2023-06-30 PROBLEM — D64.9 ANEMIA: Status: RESOLVED | Noted: 2023-06-29 | Resolved: 2023-06-30

## 2023-06-30 PROBLEM — I10 DIABETES MELLITUS WITH COINCIDENT HYPERTENSION (HCC): Status: ACTIVE | Noted: 2023-06-30

## 2023-06-30 PROBLEM — R50.81 NEUTROPENIC FEVER (HCC): Status: RESOLVED | Noted: 2022-07-08 | Resolved: 2023-06-30

## 2023-06-30 PROBLEM — D70.9 NEUTROPENIC FEVER (HCC): Status: RESOLVED | Noted: 2022-07-08 | Resolved: 2023-06-30

## 2023-06-30 PROBLEM — E11.9 DIABETES MELLITUS WITH COINCIDENT HYPERTENSION (HCC): Status: ACTIVE | Noted: 2023-06-30

## 2023-06-30 ASSESSMENT — ENCOUNTER SYMPTOMS
CONSTIPATION: 0
COUGH: 0
NAUSEA: 0
DIARRHEA: 0
SHORTNESS OF BREATH: 0
VOMITING: 0

## 2023-07-27 ENCOUNTER — OFFICE VISIT (OUTPATIENT)
Dept: PRIMARY CARE CLINIC | Age: 53
End: 2023-07-27
Payer: COMMERCIAL

## 2023-07-27 VITALS
DIASTOLIC BLOOD PRESSURE: 84 MMHG | OXYGEN SATURATION: 98 % | SYSTOLIC BLOOD PRESSURE: 138 MMHG | BODY MASS INDEX: 42.5 KG/M2 | HEART RATE: 78 BPM | TEMPERATURE: 97.7 F | WEIGHT: 247.6 LBS

## 2023-07-27 DIAGNOSIS — E11.69 OBESITY, DIABETES, AND HYPERTENSION SYNDROME (HCC): Primary | Chronic | ICD-10-CM

## 2023-07-27 DIAGNOSIS — E11.59 OBESITY, DIABETES, AND HYPERTENSION SYNDROME (HCC): Primary | Chronic | ICD-10-CM

## 2023-07-27 DIAGNOSIS — I15.2 OBESITY, DIABETES, AND HYPERTENSION SYNDROME (HCC): Primary | Chronic | ICD-10-CM

## 2023-07-27 DIAGNOSIS — I10 ESSENTIAL HYPERTENSION: Chronic | ICD-10-CM

## 2023-07-27 DIAGNOSIS — C90.01 MULTIPLE MYELOMA IN REMISSION (HCC): Chronic | ICD-10-CM

## 2023-07-27 DIAGNOSIS — E66.9 OBESITY, DIABETES, AND HYPERTENSION SYNDROME (HCC): Primary | Chronic | ICD-10-CM

## 2023-07-27 PROCEDURE — 3017F COLORECTAL CA SCREEN DOC REV: CPT | Performed by: FAMILY MEDICINE

## 2023-07-27 PROCEDURE — 3074F SYST BP LT 130 MM HG: CPT | Performed by: FAMILY MEDICINE

## 2023-07-27 PROCEDURE — 2022F DILAT RTA XM EVC RTNOPTHY: CPT | Performed by: FAMILY MEDICINE

## 2023-07-27 PROCEDURE — 3078F DIAST BP <80 MM HG: CPT | Performed by: FAMILY MEDICINE

## 2023-07-27 PROCEDURE — 3046F HEMOGLOBIN A1C LEVEL >9.0%: CPT | Performed by: FAMILY MEDICINE

## 2023-07-27 PROCEDURE — 99214 OFFICE O/P EST MOD 30 MIN: CPT | Performed by: FAMILY MEDICINE

## 2023-07-27 PROCEDURE — 1036F TOBACCO NON-USER: CPT | Performed by: FAMILY MEDICINE

## 2023-07-27 PROCEDURE — G8417 CALC BMI ABV UP PARAM F/U: HCPCS | Performed by: FAMILY MEDICINE

## 2023-07-27 PROCEDURE — G8427 DOCREV CUR MEDS BY ELIG CLIN: HCPCS | Performed by: FAMILY MEDICINE

## 2023-07-27 RX ORDER — LISINOPRIL 10 MG/1
10 TABLET ORAL DAILY
Qty: 90 TABLET | Refills: 1 | Status: SHIPPED | OUTPATIENT
Start: 2023-07-27

## 2023-07-27 RX ORDER — HYDROCHLOROTHIAZIDE 25 MG/1
25 TABLET ORAL EVERY MORNING
Qty: 90 TABLET | Refills: 1 | Status: SHIPPED | OUTPATIENT
Start: 2023-07-27

## 2023-07-27 ASSESSMENT — ENCOUNTER SYMPTOMS
COUGH: 0
VOMITING: 0
SHORTNESS OF BREATH: 0
NAUSEA: 0
CONSTIPATION: 0
DIARRHEA: 0

## 2023-07-27 NOTE — PROGRESS NOTES
call back during office hours for these matters.        Electronically signed by Britta Mcmillan DO on 7/27/2023 at 1:27 PM.

## 2023-09-21 ENCOUNTER — HOSPITAL ENCOUNTER (OUTPATIENT)
Dept: MAMMOGRAPHY | Age: 53
Discharge: HOME | End: 2023-09-21
Payer: COMMERCIAL

## 2023-09-21 VITALS — BODY MASS INDEX: 42.85 KG/M2 | WEIGHT: 251 LBS | HEIGHT: 64 IN

## 2023-09-21 DIAGNOSIS — Z12.31 VISIT FOR SCREENING MAMMOGRAM: ICD-10-CM

## 2023-09-21 PROCEDURE — 77067 SCR MAMMO BI INCL CAD: CPT

## 2023-10-25 ENCOUNTER — OFFICE VISIT (OUTPATIENT)
Dept: PRIMARY CARE CLINIC | Age: 53
End: 2023-10-25

## 2023-10-25 VITALS
SYSTOLIC BLOOD PRESSURE: 148 MMHG | WEIGHT: 248.6 LBS | DIASTOLIC BLOOD PRESSURE: 90 MMHG | HEART RATE: 76 BPM | TEMPERATURE: 97 F | OXYGEN SATURATION: 99 % | BODY MASS INDEX: 42.67 KG/M2

## 2023-10-25 DIAGNOSIS — G89.29 CHRONIC MIDLINE LOW BACK PAIN WITH RIGHT-SIDED SCIATICA: ICD-10-CM

## 2023-10-25 DIAGNOSIS — I10 ESSENTIAL HYPERTENSION: Primary | Chronic | ICD-10-CM

## 2023-10-25 DIAGNOSIS — M54.41 CHRONIC MIDLINE LOW BACK PAIN WITH RIGHT-SIDED SCIATICA: ICD-10-CM

## 2023-10-25 RX ORDER — GABAPENTIN 300 MG/1
300 CAPSULE ORAL 3 TIMES DAILY
Qty: 270 CAPSULE | Refills: 2 | Status: SHIPPED | OUTPATIENT
Start: 2023-10-25 | End: 2024-01-23

## 2023-10-25 RX ORDER — AMLODIPINE BESYLATE 5 MG/1
5 TABLET ORAL DAILY
Qty: 90 TABLET | Refills: 1 | Status: SHIPPED | OUTPATIENT
Start: 2023-10-25

## 2023-10-25 RX ORDER — LISINOPRIL 10 MG/1
10 TABLET ORAL DAILY
Qty: 90 TABLET | Refills: 1 | Status: SHIPPED | OUTPATIENT
Start: 2023-10-25

## 2023-10-25 ASSESSMENT — ENCOUNTER SYMPTOMS
DIARRHEA: 0
CONSTIPATION: 0
SHORTNESS OF BREATH: 0
NAUSEA: 0
VOMITING: 0
COUGH: 0

## 2023-10-25 NOTE — PROGRESS NOTES
Elda Jackson (:  1970) is a 48 y.o. female,Established patient, here for evaluation of the following chief complaint(s):  Hypertension    2022 -- colonoscopy completed     SUBJECTIVE:  10.25.23  3 mo follow up    Hypertension -- not controlled  - had some cramping, discussed with her oncologist and was suggested to d/c the hctz  - will stop hctz, pt has not been taking any of her meds  - restart amlodipine 5 mg, lisinopril 10 mg.   - follow in 3 weeks      23:   Hypertension follow up    Last visit -- Stopped amlodipine 5 mg. Continued hctz 25 and lisinopril 10. Could increase lisinopril to 20 in future if needed    Recent visit to [facility]INAS at [facility] for MM workup/treatment:    PLAN: HD [id number] and ASCT (22)  - Post transplant maintenance: Revlmid  - Patient did not meet inclusion criteria #3 of obtaining VGPR or better for clinical trial  - revlimid 10 mg daily (started 2022), taking aspirin  - MMP stable, will follow up today  - hold zometa today with upcoming dental work  - cont calcium-vit D  2. ID: afebrile  - Cont Valtrex ppx  - evusheld 22  - Post transplant vaccines: #1 (22) , #2 (3/14/23), #3 (23)  3. Heme: anemia 2/2 chemotherapy  - H/o CRYSTAL: S/p IV iron (3/21/22 & 3/28/22)  - Transfuse for Hgb < 7 and Platelets < 26E  - No transfusion today  4. Metabolic / DM: Hyperglycemia w/ stable renal fxn  - Increase KCl 20 mEq BID (23)  5. Endocrine:  - H/o Type 2 DM  - currently off metformin      23:    Prev saw Lydia Hutchinson. Last mammogram was multiple years ago -- will order today  Last colonoscopy End of 2022 -- 2022 -- colonoscopy completed -- need to update    T2DM - on metformin, unsure if she was true DM or on the border of it. Multiple myeloma -- in remission, numbers are good. Follows with [facility] at [facility] [employer]:  Stop metformin for now, A1c 6.6  Stop amlodipine 5 mg.   Continue hctz 25 and lisinopril

## 2024-01-04 ENCOUNTER — HOSPITAL ENCOUNTER (OUTPATIENT)
Dept: GENERAL RADIOLOGY | Age: 54
Discharge: HOME | End: 2024-01-04
Payer: COMMERCIAL

## 2024-01-04 DIAGNOSIS — C90.00 MULTIPLE MYELOMA, REMISSION STATUS UNSPECIFIED (HCC): ICD-10-CM

## 2024-01-04 PROCEDURE — 71046 X-RAY EXAM CHEST 2 VIEWS: CPT

## 2024-01-24 ENCOUNTER — HOSPITAL ENCOUNTER (OUTPATIENT)
Dept: ONCOLOGY | Age: 54
Setting detail: INFUSION SERIES
Discharge: HOME | End: 2024-01-24
Payer: COMMERCIAL

## 2024-01-24 ENCOUNTER — HOSPITAL ENCOUNTER (OUTPATIENT)
Age: 54
Setting detail: SPECIMEN
Discharge: HOME | End: 2024-01-24
Payer: COMMERCIAL

## 2024-01-24 VITALS
SYSTOLIC BLOOD PRESSURE: 123 MMHG | OXYGEN SATURATION: 96 % | RESPIRATION RATE: 18 BRPM | DIASTOLIC BLOOD PRESSURE: 72 MMHG | TEMPERATURE: 98.1 F | HEART RATE: 76 BPM

## 2024-01-24 LAB
BASOPHILS # BLD: 0 K/UL (ref 0–0.2)
BASOPHILS NFR BLD: 0.5 %
DEPRECATED RDW RBC AUTO: 17.8 % (ref 12.4–15.4)
EOSINOPHIL # BLD: 0.1 K/UL (ref 0–0.6)
EOSINOPHIL NFR BLD: 2.8 %
HCT VFR BLD AUTO: 29.7 % (ref 36–48)
HGB BLD-MCNC: 9.6 G/DL (ref 12–16)
LYMPHOCYTES # BLD: 1.2 K/UL (ref 1–5.1)
LYMPHOCYTES NFR BLD: 36.3 %
MCH RBC QN AUTO: 27.7 PG (ref 26–34)
MCHC RBC AUTO-ENTMCNC: 32.3 G/DL (ref 31–36)
MCV RBC AUTO: 86 FL (ref 80–100)
MONOCYTES # BLD: 0.3 K/UL (ref 0–1.3)
MONOCYTES NFR BLD: 9.1 %
NEUTROPHILS # BLD: 1.7 K/UL (ref 1.7–7.7)
NEUTROPHILS NFR BLD: 51.3 %
PLATELET # BLD AUTO: 188 K/UL (ref 135–450)
PMV BLD AUTO: 7.7 FL (ref 5–10.5)
RBC # BLD AUTO: 3.45 M/UL (ref 4–5.2)
WBC # BLD AUTO: 3.2 K/UL (ref 4–11)

## 2024-01-24 PROCEDURE — 88305 TISSUE EXAM BY PATHOLOGIST: CPT

## 2024-01-24 PROCEDURE — 88313 SPECIAL STAINS GROUP 2: CPT

## 2024-01-24 PROCEDURE — 88185 FLOWCYTOMETRY/TC ADD-ON: CPT

## 2024-01-24 PROCEDURE — 88311 DECALCIFY TISSUE: CPT

## 2024-01-24 PROCEDURE — 88184 FLOWCYTOMETRY/ TC 1 MARKER: CPT

## 2024-01-24 PROCEDURE — 6360000002 HC RX W HCPCS: Performed by: NURSE PRACTITIONER

## 2024-01-24 PROCEDURE — 38221 DX BONE MARROW BIOPSIES: CPT

## 2024-01-24 PROCEDURE — 85025 COMPLETE CBC W/AUTO DIFF WBC: CPT

## 2024-01-24 PROCEDURE — 99152 MOD SED SAME PHYS/QHP 5/>YRS: CPT

## 2024-01-24 PROCEDURE — 36591 DRAW BLOOD OFF VENOUS DEVICE: CPT

## 2024-01-24 PROCEDURE — 96375 TX/PRO/DX INJ NEW DRUG ADDON: CPT

## 2024-01-24 PROCEDURE — 96374 THER/PROPH/DIAG INJ IV PUSH: CPT

## 2024-01-24 PROCEDURE — 94761 N-INVAS EAR/PLS OXIMETRY MLT: CPT

## 2024-01-24 PROCEDURE — 88342 IMHCHEM/IMCYTCHM 1ST ANTB: CPT

## 2024-01-24 PROCEDURE — 96376 TX/PRO/DX INJ SAME DRUG ADON: CPT

## 2024-01-24 PROCEDURE — 2700000000 HC OXYGEN THERAPY PER DAY

## 2024-01-24 RX ORDER — MIDAZOLAM HYDROCHLORIDE 1 MG/ML
1 INJECTION INTRAMUSCULAR; INTRAVENOUS PRN
Status: DISCONTINUED | OUTPATIENT
Start: 2024-01-24 | End: 2024-01-25 | Stop reason: HOSPADM

## 2024-01-24 RX ORDER — NALOXONE HYDROCHLORIDE 0.4 MG/ML
0.4 INJECTION, SOLUTION INTRAMUSCULAR; INTRAVENOUS; SUBCUTANEOUS PRN
Status: DISCONTINUED | OUTPATIENT
Start: 2024-01-24 | End: 2024-01-25 | Stop reason: HOSPADM

## 2024-01-24 RX ORDER — LIDOCAINE HYDROCHLORIDE 10 MG/ML
30 INJECTION, SOLUTION EPIDURAL; INFILTRATION; INTRACAUDAL; PERINEURAL ONCE
Status: CANCELLED | OUTPATIENT
Start: 2024-01-24 | End: 2024-01-24

## 2024-01-24 RX ORDER — FENTANYL CITRATE 50 UG/ML
25 INJECTION, SOLUTION INTRAMUSCULAR; INTRAVENOUS PRN
Status: DISCONTINUED | OUTPATIENT
Start: 2024-01-24 | End: 2024-01-25 | Stop reason: HOSPADM

## 2024-01-24 RX ORDER — LIDOCAINE HYDROCHLORIDE 10 MG/ML
30 INJECTION, SOLUTION INFILTRATION; PERINEURAL
Status: DISCONTINUED | OUTPATIENT
Start: 2024-01-24 | End: 2024-01-25 | Stop reason: HOSPADM

## 2024-01-24 RX ORDER — FLUMAZENIL 0.1 MG/ML
0.5 INJECTION INTRAVENOUS PRN
Status: DISCONTINUED | OUTPATIENT
Start: 2024-01-24 | End: 2024-01-25 | Stop reason: HOSPADM

## 2024-01-24 RX ADMIN — FENTANYL CITRATE 50 MCG: 50 INJECTION, SOLUTION INTRAMUSCULAR; INTRAVENOUS at 09:05

## 2024-01-24 RX ADMIN — MIDAZOLAM HYDROCHLORIDE 1 MG: 1 INJECTION, SOLUTION INTRAMUSCULAR; INTRAVENOUS at 09:09

## 2024-01-24 RX ADMIN — FENTANYL CITRATE 25 MCG: 50 INJECTION, SOLUTION INTRAMUSCULAR; INTRAVENOUS at 09:09

## 2024-01-24 RX ADMIN — MIDAZOLAM HYDROCHLORIDE 3 MG: 1 INJECTION, SOLUTION INTRAMUSCULAR; INTRAVENOUS at 09:05

## 2024-01-24 NOTE — PROGRESS NOTES
Pt scheduled for bone marrow biopsy with conscious sedation. Pt's history, allergies, and medication list reviewed by nursing and physician prior to start of procedure.  Pt assessed and deemed fit for procedure by physician.        Pre-Procedural Assessment:  Signed, Dated, and timed Informed Consent on the chart  VS's obtained and documented  Pt has a patent IV site (see flowsheets)  Pt has been NPO since 0000  Current Medications, Allergies, and recent H&P reviewed and on chart  Emergency medications and equipment available (crash cart, narcan, flumazenil, O2, suction, etc)  Time out performed prior to procedure (see sedation documentation)  Pre-Procedure Shyanne Score: 10    Procedure:  Nursing present throughout procedure to assess, assist, and monitor.  Vital Signs monitored throughout - see sedation documentation. Patient to be discharged from OPO once Shyanne Score of 8 or better is achieved or once pre-procedural Shyanne Score is obtained.    Post-Procedure:  Pt vital signs stable.  Discharge teaching provided (see AVS).  Post procedure Shyanne Score 10.  Pt and family verbalized understanding of all instructions.  Pt discharged from Outpt Oncology with friend who will drive them home.

## 2024-01-24 NOTE — PROGRESS NOTES
ETCO2  Monitoring done for conscious sedation.  Patient is on 2 lpm for procedure.    0905  Baseline information:  HR: 82 BP: 126/63  RR: 20 LOC: alert  ETCO2: 44 SpO2: 96    0910  5 minutes after sedation administered:  HR: 82 BP: 130/76  RR: 14 LOC: lethargic  ETCO2: 47 SpO2: 95    0915  10 min after sedation administered:  HR: 74 BP: 132/65  RR: 13 LOC: lethargic  ETCO2: 52 SpO2: 95    Post-Procedure:  HR: 77 BP: 132/65  RR: 18 LOC: alert  ETCO2: 49 SpO2: 97  Tolerated procedure well.    Patient/caregiver was educated on the proper method of use:  Yes      Level of patient/caregiver understanding able to:   [x] Verbalize understanding   [] Demonstrate understanding       [] Teach back        [] Needs reinforcement       []  No available caregiver               []  Other:     Response to education:  Excellent

## 2024-02-01 LAB
Lab: NORMAL
REPORT: NORMAL
THIS TEST SENT TO: NORMAL

## 2024-02-14 LAB
Lab: NORMAL
REPORT: NORMAL
THIS TEST SENT TO: NORMAL

## 2024-03-11 NOTE — CONSULTS
PO intake by consuming greater than 75% of meals and snacks s/p BMT to promote meeting incresaed nutrient needs. Nutrition Monitoring and Evaluation:   Food/Nutrient Intake Outcomes:  Food and Nutrient Intake  Physical Signs/Symptoms Outcomes:  Biochemical Data,Weight,Chewing or Swallowing     OBJECTIVE DATA: Significant to nutrition assessment  · Nutrition-Focused Physical Findings: noted poor dentition; may need future extractions but pt denies this is an issue w/PO   · Labs: Reviewed;  (6/30); A1c 6.2 (4/26/22)  · Meds: Reviewed; metformin  · Wounds: None       CURRENT NUTRITION THERAPIES  PO Diet: General; Low Microbial   ONS: none   PO Intake: %   PO Supplement Intake:None Ordered  Additional Sources of Calories/IVF:1L NS daily     ANTHROPOMETRICS  Current Height: 5' 4.75\" (164.5 cm)  Current Weight: 262 lb 3.2 oz (118.9 kg) STATED from 6/20/22  Admission weight: 262 lb 3.2 oz (118.9 kg) 262 lb   Ideal Body Weight (IBW): 124 lbs  (56 kg)    Usual Bodyweight   260 lb per EMR   Weight Changes +10 lb in 8 mo      BMI: 44.1    Wt Readings from Last 50 Encounters:   06/20/22 262 lb 3.2 oz (118.9 kg)   05/16/22 260 lb 9.3 oz (118.2 kg)   04/26/22 262 lb (118.8 kg)   11/19/21 252 lb 1.5 oz (114.4 kg)   10/26/21 259 lb (117.5 kg)   03/29/21 261 lb (118.4 kg)   03/19/21 261 lb (118.4 kg)   09/15/20 262 lb (118.8 kg)       COMPARATIVE STANDARDS  Energy (kcal):  7448-1130 (15-18)     Protein (g):  73-84 (1.3-1.5)       Fluid (ml/day):  3947-7682    Consult dietitian if nutrition interventions essential to patient care are needed.      Anastacia Oropeza RD, LD 108

## 2024-03-13 ENCOUNTER — HOSPITAL ENCOUNTER (INPATIENT)
Age: 54
LOS: 2 days | Discharge: HOME | End: 2024-03-15
Attending: INTERNAL MEDICINE | Admitting: STUDENT IN AN ORGANIZED HEALTH CARE EDUCATION/TRAINING PROGRAM
Payer: COMMERCIAL

## 2024-03-13 ENCOUNTER — APPOINTMENT (OUTPATIENT)
Dept: GENERAL RADIOLOGY | Age: 54
End: 2024-03-13
Attending: INTERNAL MEDICINE
Payer: COMMERCIAL

## 2024-03-13 PROBLEM — R09.02 HYPOXIA: Status: ACTIVE | Noted: 2024-03-13

## 2024-03-13 LAB
ALBUMIN SERPL-MCNC: 3.7 G/DL (ref 3.4–5)
ALP SERPL-CCNC: 60 U/L (ref 40–129)
ALT SERPL-CCNC: 16 U/L (ref 10–40)
ANION GAP SERPL CALCULATED.3IONS-SCNC: 11 MMOL/L (ref 3–16)
APTT BLD: 26.1 SEC (ref 22.7–35.9)
AST SERPL-CCNC: 17 U/L (ref 15–37)
BACTERIA URNS QL MICRO: ABNORMAL /HPF
BASOPHILS # BLD: 0 K/UL (ref 0–0.2)
BASOPHILS NFR BLD: 0.6 %
BILIRUB DIRECT SERPL-MCNC: <0.2 MG/DL (ref 0–0.3)
BILIRUB INDIRECT SERPL-MCNC: NORMAL MG/DL (ref 0–1)
BILIRUB SERPL-MCNC: 0.8 MG/DL (ref 0–1)
BILIRUB UR QL STRIP.AUTO: NEGATIVE
BUN SERPL-MCNC: 13 MG/DL (ref 7–20)
CALCIUM SERPL-MCNC: 8.6 MG/DL (ref 8.3–10.6)
CHLORIDE SERPL-SCNC: 94 MMOL/L (ref 99–110)
CLARITY UR: CLEAR
CO2 SERPL-SCNC: 27 MMOL/L (ref 21–32)
COLOR UR: YELLOW
CREAT SERPL-MCNC: 0.9 MG/DL (ref 0.6–1.1)
CRP SERPL-MCNC: 129.6 MG/L (ref 0–5.1)
DEPRECATED RDW RBC AUTO: 19.2 % (ref 12.4–15.4)
EKG ATRIAL RATE: 107 BPM
EKG DIAGNOSIS: NORMAL
EKG P AXIS: 73 DEGREES
EKG P-R INTERVAL: 128 MS
EKG Q-T INTERVAL: 328 MS
EKG QRS DURATION: 94 MS
EKG QTC CALCULATION (BAZETT): 437 MS
EKG R AXIS: 24 DEGREES
EKG T AXIS: 64 DEGREES
EKG VENTRICULAR RATE: 107 BPM
EOSINOPHIL # BLD: 0.1 K/UL (ref 0–0.6)
EOSINOPHIL NFR BLD: 1.3 %
EPI CELLS #/AREA URNS HPF: ABNORMAL /HPF (ref 0–5)
GFR SERPLBLD CREATININE-BSD FMLA CKD-EPI: >60 ML/MIN/{1.73_M2}
GLUCOSE SERPL-MCNC: 111 MG/DL (ref 70–99)
GLUCOSE UR STRIP.AUTO-MCNC: NEGATIVE MG/DL
HCT VFR BLD AUTO: 28 % (ref 36–48)
HGB BLD-MCNC: 9.3 G/DL (ref 12–16)
HGB UR QL STRIP.AUTO: NEGATIVE
HYALINE CASTS #/AREA URNS LPF: ABNORMAL /LPF (ref 0–2)
INR PPP: 1.16 (ref 0.84–1.16)
KETONES UR STRIP.AUTO-MCNC: ABNORMAL MG/DL
LDH SERPL L TO P-CCNC: 289 U/L (ref 100–190)
LEUKOCYTE ESTERASE UR QL STRIP.AUTO: NEGATIVE
LYMPHOCYTES # BLD: 0.8 K/UL (ref 1–5.1)
LYMPHOCYTES NFR BLD: 20.8 %
MAGNESIUM SERPL-MCNC: 2.1 MG/DL (ref 1.8–2.4)
MCH RBC QN AUTO: 28.1 PG (ref 26–34)
MCHC RBC AUTO-ENTMCNC: 33.2 G/DL (ref 31–36)
MCV RBC AUTO: 84.6 FL (ref 80–100)
MONOCYTES # BLD: 0.4 K/UL (ref 0–1.3)
MONOCYTES NFR BLD: 9.6 %
NEUTROPHILS # BLD: 2.7 K/UL (ref 1.7–7.7)
NEUTROPHILS NFR BLD: 67.7 %
NITRITE UR QL STRIP.AUTO: NEGATIVE
NT-PROBNP SERPL-MCNC: <36 PG/ML (ref 0–124)
PH UR STRIP.AUTO: 6 [PH] (ref 5–8)
PHOSPHATE SERPL-MCNC: 2.5 MG/DL (ref 2.5–4.9)
PLATELET # BLD AUTO: 206 K/UL (ref 135–450)
PMV BLD AUTO: 7.4 FL (ref 5–10.5)
POTASSIUM SERPL-SCNC: 3.5 MMOL/L (ref 3.5–5.1)
PROCALCITONIN SERPL IA-MCNC: 0.17 NG/ML (ref 0–0.15)
PROT SERPL-MCNC: 7.4 G/DL (ref 6.4–8.2)
PROT UR STRIP.AUTO-MCNC: 30 MG/DL
PROTHROMBIN TIME: 14.8 SEC (ref 11.5–14.8)
RBC # BLD AUTO: 3.31 M/UL (ref 4–5.2)
RBC #/AREA URNS HPF: ABNORMAL /HPF (ref 0–4)
SODIUM SERPL-SCNC: 132 MMOL/L (ref 136–145)
SP GR UR STRIP.AUTO: >=1.03 (ref 1–1.03)
UA DIPSTICK W REFLEX MICRO PNL UR: YES
URATE SERPL-MCNC: 5.1 MG/DL (ref 2.6–6)
URN SPEC COLLECT METH UR: ABNORMAL
UROBILINOGEN UR STRIP-ACNC: 1 E.U./DL
WBC # BLD AUTO: 4 K/UL (ref 4–11)
WBC #/AREA URNS HPF: ABNORMAL /HPF (ref 0–5)

## 2024-03-13 PROCEDURE — 0202U NFCT DS 22 TRGT SARS-COV-2: CPT

## 2024-03-13 PROCEDURE — 2580000003 HC RX 258: Performed by: NURSE PRACTITIONER

## 2024-03-13 PROCEDURE — 86140 C-REACTIVE PROTEIN: CPT

## 2024-03-13 PROCEDURE — 84100 ASSAY OF PHOSPHORUS: CPT

## 2024-03-13 PROCEDURE — 80048 BASIC METABOLIC PNL TOTAL CA: CPT

## 2024-03-13 PROCEDURE — 87641 MR-STAPH DNA AMP PROBE: CPT

## 2024-03-13 PROCEDURE — 6370000000 HC RX 637 (ALT 250 FOR IP): Performed by: INTERNAL MEDICINE

## 2024-03-13 PROCEDURE — 84550 ASSAY OF BLOOD/URIC ACID: CPT

## 2024-03-13 PROCEDURE — 71046 X-RAY EXAM CHEST 2 VIEWS: CPT

## 2024-03-13 PROCEDURE — 93010 ELECTROCARDIOGRAM REPORT: CPT | Performed by: INTERNAL MEDICINE

## 2024-03-13 PROCEDURE — 85025 COMPLETE CBC W/AUTO DIFF WBC: CPT

## 2024-03-13 PROCEDURE — 6360000002 HC RX W HCPCS: Performed by: NURSE PRACTITIONER

## 2024-03-13 PROCEDURE — 84145 PROCALCITONIN (PCT): CPT

## 2024-03-13 PROCEDURE — 81001 URINALYSIS AUTO W/SCOPE: CPT

## 2024-03-13 PROCEDURE — 80076 HEPATIC FUNCTION PANEL: CPT

## 2024-03-13 PROCEDURE — 85610 PROTHROMBIN TIME: CPT

## 2024-03-13 PROCEDURE — 6370000000 HC RX 637 (ALT 250 FOR IP): Performed by: NURSE PRACTITIONER

## 2024-03-13 PROCEDURE — 83735 ASSAY OF MAGNESIUM: CPT

## 2024-03-13 PROCEDURE — 93005 ELECTROCARDIOGRAM TRACING: CPT | Performed by: NURSE PRACTITIONER

## 2024-03-13 PROCEDURE — 83880 ASSAY OF NATRIURETIC PEPTIDE: CPT

## 2024-03-13 PROCEDURE — 83615 LACTATE (LD) (LDH) ENZYME: CPT

## 2024-03-13 PROCEDURE — 85730 THROMBOPLASTIN TIME PARTIAL: CPT

## 2024-03-13 PROCEDURE — 2060000000 HC ICU INTERMEDIATE R&B

## 2024-03-13 RX ORDER — GABAPENTIN 300 MG/1
300 CAPSULE ORAL 3 TIMES DAILY
Status: DISCONTINUED | OUTPATIENT
Start: 2024-03-13 | End: 2024-03-15 | Stop reason: HOSPADM

## 2024-03-13 RX ORDER — ACETAMINOPHEN 325 MG/1
650 TABLET ORAL EVERY 6 HOURS PRN
Status: DISCONTINUED | OUTPATIENT
Start: 2024-03-13 | End: 2024-03-15 | Stop reason: HOSPADM

## 2024-03-13 RX ORDER — VALACYCLOVIR HYDROCHLORIDE 500 MG/1
500 TABLET, FILM COATED ORAL 2 TIMES DAILY
Status: DISCONTINUED | OUTPATIENT
Start: 2024-03-13 | End: 2024-03-15 | Stop reason: HOSPADM

## 2024-03-13 RX ORDER — CETIRIZINE HYDROCHLORIDE 10 MG/1
10 TABLET ORAL DAILY PRN
Status: DISCONTINUED | OUTPATIENT
Start: 2024-03-13 | End: 2024-03-15 | Stop reason: HOSPADM

## 2024-03-13 RX ORDER — MAGNESIUM SULFATE IN WATER 40 MG/ML
4000 INJECTION, SOLUTION INTRAVENOUS PRN
Status: DISCONTINUED | OUTPATIENT
Start: 2024-03-13 | End: 2024-03-15 | Stop reason: HOSPADM

## 2024-03-13 RX ORDER — MECOBALAMIN 5000 MCG
5 TABLET,DISINTEGRATING ORAL NIGHTLY PRN
Status: DISCONTINUED | OUTPATIENT
Start: 2024-03-13 | End: 2024-03-15 | Stop reason: HOSPADM

## 2024-03-13 RX ORDER — LISINOPRIL 40 MG/1
40 TABLET ORAL DAILY
Status: DISCONTINUED | OUTPATIENT
Start: 2024-03-13 | End: 2024-03-15 | Stop reason: HOSPADM

## 2024-03-13 RX ORDER — POTASSIUM CHLORIDE 29.8 MG/ML
20 INJECTION INTRAVENOUS PRN
Status: DISCONTINUED | OUTPATIENT
Start: 2024-03-13 | End: 2024-03-15 | Stop reason: HOSPADM

## 2024-03-13 RX ORDER — SODIUM CHLORIDE 0.9 % (FLUSH) 0.9 %
5-40 SYRINGE (ML) INJECTION PRN
Status: DISCONTINUED | OUTPATIENT
Start: 2024-03-13 | End: 2024-03-15 | Stop reason: HOSPADM

## 2024-03-13 RX ORDER — SODIUM CHLORIDE 9 MG/ML
INJECTION, SOLUTION INTRAVENOUS PRN
Status: DISCONTINUED | OUTPATIENT
Start: 2024-03-13 | End: 2024-03-15 | Stop reason: HOSPADM

## 2024-03-13 RX ORDER — SODIUM CHLORIDE 9 MG/ML
INJECTION, SOLUTION INTRAVENOUS CONTINUOUS PRN
Status: DISCONTINUED | OUTPATIENT
Start: 2024-03-13 | End: 2024-03-15 | Stop reason: HOSPADM

## 2024-03-13 RX ORDER — AMLODIPINE BESYLATE 5 MG/1
5 TABLET ORAL DAILY
Status: DISCONTINUED | OUTPATIENT
Start: 2024-03-13 | End: 2024-03-15 | Stop reason: HOSPADM

## 2024-03-13 RX ORDER — ENOXAPARIN SODIUM 100 MG/ML
40 INJECTION SUBCUTANEOUS DAILY
Status: DISCONTINUED | OUTPATIENT
Start: 2024-03-13 | End: 2024-03-15 | Stop reason: HOSPADM

## 2024-03-13 RX ORDER — PROCHLORPERAZINE MALEATE 10 MG
10 TABLET ORAL EVERY 6 HOURS PRN
Status: DISCONTINUED | OUTPATIENT
Start: 2024-03-13 | End: 2024-03-15 | Stop reason: HOSPADM

## 2024-03-13 RX ORDER — SENNA AND DOCUSATE SODIUM 50; 8.6 MG/1; MG/1
2 TABLET, FILM COATED ORAL 2 TIMES DAILY PRN
Status: DISCONTINUED | OUTPATIENT
Start: 2024-03-13 | End: 2024-03-15 | Stop reason: HOSPADM

## 2024-03-13 RX ORDER — CYCLOBENZAPRINE HCL 10 MG
5 TABLET ORAL 3 TIMES DAILY PRN
Status: DISCONTINUED | OUTPATIENT
Start: 2024-03-13 | End: 2024-03-15 | Stop reason: HOSPADM

## 2024-03-13 RX ORDER — CALCIUM CARBONATE 500 MG/1
500 TABLET, CHEWABLE ORAL 3 TIMES DAILY PRN
Status: DISCONTINUED | OUTPATIENT
Start: 2024-03-13 | End: 2024-03-15 | Stop reason: HOSPADM

## 2024-03-13 RX ORDER — SODIUM CHLORIDE 0.9 % (FLUSH) 0.9 %
5-40 SYRINGE (ML) INJECTION EVERY 12 HOURS SCHEDULED
Status: DISCONTINUED | OUTPATIENT
Start: 2024-03-13 | End: 2024-03-15 | Stop reason: HOSPADM

## 2024-03-13 RX ORDER — CALCIUM CARBONATE 500(1250)
500 TABLET ORAL DAILY
Status: DISCONTINUED | OUTPATIENT
Start: 2024-03-13 | End: 2024-03-15 | Stop reason: HOSPADM

## 2024-03-13 RX ADMIN — CALCIUM 500 MG: 500 TABLET ORAL at 18:17

## 2024-03-13 RX ADMIN — GABAPENTIN 300 MG: 300 CAPSULE ORAL at 18:17

## 2024-03-13 RX ADMIN — SODIUM CHLORIDE, PRESERVATIVE FREE 10 ML: 5 INJECTION INTRAVENOUS at 20:39

## 2024-03-13 RX ADMIN — LISINOPRIL 40 MG: 40 TABLET ORAL at 18:18

## 2024-03-13 RX ADMIN — ENOXAPARIN SODIUM 40 MG: 100 INJECTION SUBCUTANEOUS at 18:48

## 2024-03-13 RX ADMIN — VALACYCLOVIR HYDROCHLORIDE 500 MG: 500 TABLET, FILM COATED ORAL at 20:39

## 2024-03-13 RX ADMIN — CEFEPIME 2000 MG: 2 INJECTION, POWDER, FOR SOLUTION INTRAVENOUS at 18:45

## 2024-03-13 RX ADMIN — AMLODIPINE BESYLATE 5 MG: 5 TABLET ORAL at 18:18

## 2024-03-13 RX ADMIN — GABAPENTIN 300 MG: 300 CAPSULE ORAL at 20:39

## 2024-03-13 RX ADMIN — ACETAMINOPHEN 650 MG: 325 TABLET ORAL at 21:01

## 2024-03-13 RX ADMIN — POTASSIUM BICARBONATE 20 MEQ: 782 TABLET, EFFERVESCENT ORAL at 20:39

## 2024-03-13 ASSESSMENT — PAIN SCALES - GENERAL
PAINLEVEL_OUTOF10: 4
PAINLEVEL_OUTOF10: 8
PAINLEVEL_OUTOF10: 2

## 2024-03-13 ASSESSMENT — PAIN - FUNCTIONAL ASSESSMENT
PAIN_FUNCTIONAL_ASSESSMENT: ACTIVITIES ARE NOT PREVENTED
PAIN_FUNCTIONAL_ASSESSMENT: ACTIVITIES ARE NOT PREVENTED

## 2024-03-13 ASSESSMENT — PAIN DESCRIPTION - DESCRIPTORS
DESCRIPTORS: ACHING;DISCOMFORT
DESCRIPTORS: ACHING

## 2024-03-13 ASSESSMENT — PAIN DESCRIPTION - ONSET
ONSET: GRADUAL
ONSET: ON-GOING

## 2024-03-13 ASSESSMENT — PAIN DESCRIPTION - FREQUENCY
FREQUENCY: CONTINUOUS
FREQUENCY: INTERMITTENT

## 2024-03-13 ASSESSMENT — PAIN DESCRIPTION - ORIENTATION
ORIENTATION: INNER
ORIENTATION: LEFT;RIGHT

## 2024-03-13 ASSESSMENT — PAIN DESCRIPTION - PAIN TYPE
TYPE: ACUTE PAIN
TYPE: ACUTE PAIN

## 2024-03-13 ASSESSMENT — PAIN DESCRIPTION - LOCATION
LOCATION: HEAD
LOCATION: HIP

## 2024-03-13 NOTE — H&P
[facility] History and Physical       Attending Physician: Lasha Morton MD    Primary Care: Tila Eisenberg DO       Referring MD: Jonah Cheema MD  [address]  [place] [address]    Name: Farhad Balderas :  1970  MRN:  [id number]    Admission: 3/13/2024      Date: 3/13/2024    Reason for Admission: Hypoxia    History of Present Illness:     Farhad Balderas is a 54 yo female w/ IgG Kappa Multiple Myeloma. Her PMH is also significant for HTN, intestinal malabsorption disorders, esophagitis and CRYSTAL.  She is s/p  RVD x 5 cycles (2021 - 22) and High Dose Melphalan f/b autologous SCT (22). She then began prgoressing on maintenance revlimid late , she was started on clinical trial Eden/Pom/Dex on 24.    She has been tolerating treatment fairly well without many complaints. She then called [facility] on 3/12/24 with cough that she noted was producing bloody sputum. She was instructed to go to the ED, but states it wasn't too bad. She then presented to [facility] on 3/13/24 feeling poorly. Upon presentation to the office her O2 was noted to be 80%, but she recovered to 88% with rest. She denies fever at home. She reports adequate intake. No GI complaints. She will be admitted for further work ip of hypoxia.      Past Surgical History:   Procedure Laterality Date    BREAST BIOPSY      CATHETER INSERTION N/A 2022    INSERT TRIFUSION CATHETER performed by Max Bell MD at [facility] OR    CT BONE MARROW BIOPSY  2021    CT BONE MARROW BIOPSY 2021 [facility]    CT BONE MARROW BIOPSY  2022    CT BONE MARROW BIOPSY 3/18/2022 [facility] CT SCAN    CT BONE MARROW BIOPSY  06/10/2022    CT BONE MARROW BIOPSY 6/10/2022 [facility] CT SCAN    DENTAL SURGERY      HYSTERECTOMY (CERVIX STATUS UNKNOWN)      IR PORT PLACEMENT EQUAL OR GREATER THAN 5 YEARS  2023    IR PORT PLACEMENT EQUAL OR GREATER THAN 5 YEARS 3/22/2023 [facility] SPECIAL PROCEDURES    IR PORT PLACEMENT EQUAL OR GREATER  40 mg daily 2/7/24  - BP improved 2/14/24 at 144/76     10. Rash: Resolved  - If returns with add pepcid and medrol dose awilda      - DVT Prophylaxis: Platelets >50,000 cells/dL, - daily lovenox prophylaxis ordered  Contraindications to pharmacologic prophylaxis: None  Contraindications to mechanical prophylaxis: None      - Disposition: Unknown    The patient was seen and examined by Dr. Prajapati. This admission history and physical has been discussed and agreed upon by Dr. prajapati.    Tracy Schoenhoft, APRN - CNP

## 2024-03-13 NOTE — PROGRESS NOTES
[facility] - Clinical Pharmacy Note - Extended Infusion Beta-Lactam Adjustment    Cefepime traditional dosing ordered for treatment of Upper Respiratory Infection. Per [facility] Extended Infusion Beta-Lactam Policy, dosing will be changed to cefepime 2g IV q8 hours EI (4 hour infusion).    Estimated Creatinine Clearance: Estimated Creatinine Clearance: 87 mL/min (based on SCr of 0.9 mg/dL).  Dialysis Status, ALEJANDRA, CKD: n/a  BMI: Body mass index is 40.83 kg/m².    Rationale for Adjustment: Agent demonstrates time-dependent effect on bacterial eradication. Extended-infusion dosing strategy aims to enhance microbiologic and clinical efficacy.    Pharmacy will continue to monitor cultures and sensitivities (where available) and adjust dose as necessary.      Please call with any questions.    Pierre Bingham, PharmD 3/13/2024, 5:48 PM

## 2024-03-13 NOTE — PROGRESS NOTES
This RN wrote Dr. Waterhouse, \"Pt has a multiple myeloma dx. She is admitted for hypoxia with sats earlier today in the high 80s on room air. Fevered in the office today. They did blood cultures before admission. Has been started on cefepime. Can she have 650 mg tylenol q6 PRN for fevers?\"    He replied, \"Yes ok with tylenol.\"      Tylenol order placed by RN.

## 2024-03-14 LAB
ANION GAP SERPL CALCULATED.3IONS-SCNC: 13 MMOL/L (ref 3–16)
APTT BLD: 29.5 SEC (ref 22.7–35.9)
BASOPHILS # BLD: 0 K/UL (ref 0–0.2)
BASOPHILS NFR BLD: 0.5 %
BUN SERPL-MCNC: 14 MG/DL (ref 7–20)
CALCIUM SERPL-MCNC: 8.6 MG/DL (ref 8.3–10.6)
CHLORIDE SERPL-SCNC: 94 MMOL/L (ref 99–110)
CO2 SERPL-SCNC: 26 MMOL/L (ref 21–32)
CREAT SERPL-MCNC: 0.8 MG/DL (ref 0.6–1.1)
DEPRECATED RDW RBC AUTO: 19.1 % (ref 12.4–15.4)
EOSINOPHIL # BLD: 0 K/UL (ref 0–0.6)
EOSINOPHIL NFR BLD: 1.3 %
GFR SERPLBLD CREATININE-BSD FMLA CKD-EPI: >60 ML/MIN/{1.73_M2}
GLUCOSE SERPL-MCNC: 124 MG/DL (ref 70–99)
HCT VFR BLD AUTO: 25.4 % (ref 36–48)
HGB BLD-MCNC: 8.8 G/DL (ref 12–16)
INR PPP: 1.13 (ref 0.84–1.16)
LYMPHOCYTES # BLD: 0.8 K/UL (ref 1–5.1)
LYMPHOCYTES NFR BLD: 24.7 %
MAGNESIUM SERPL-MCNC: 2.2 MG/DL (ref 1.8–2.4)
MCH RBC QN AUTO: 29.2 PG (ref 26–34)
MCHC RBC AUTO-ENTMCNC: 34.6 G/DL (ref 31–36)
MCV RBC AUTO: 84.3 FL (ref 80–100)
MONOCYTES # BLD: 0.4 K/UL (ref 0–1.3)
MONOCYTES NFR BLD: 11.4 %
MRSA DNA SPEC QL NAA+PROBE: NORMAL
NEUTROPHILS # BLD: 2.1 K/UL (ref 1.7–7.7)
NEUTROPHILS NFR BLD: 62.1 %
ORGANISM: ABNORMAL
PHOSPHATE SERPL-MCNC: 2.9 MG/DL (ref 2.5–4.9)
PLATELET # BLD AUTO: 213 K/UL (ref 135–450)
PMV BLD AUTO: 8.5 FL (ref 5–10.5)
POTASSIUM SERPL-SCNC: 3.5 MMOL/L (ref 3.5–5.1)
PROTHROMBIN TIME: 14.5 SEC (ref 11.5–14.8)
RBC # BLD AUTO: 3.01 M/UL (ref 4–5.2)
REPORT: NORMAL
RESP PATH DNA+RNA PNL NPH NAA+NON-PROBE: ABNORMAL
SODIUM SERPL-SCNC: 133 MMOL/L (ref 136–145)
WBC # BLD AUTO: 3.4 K/UL (ref 4–11)

## 2024-03-14 PROCEDURE — 85025 COMPLETE CBC W/AUTO DIFF WBC: CPT

## 2024-03-14 PROCEDURE — 6360000002 HC RX W HCPCS: Performed by: NURSE PRACTITIONER

## 2024-03-14 PROCEDURE — 97165 OT EVAL LOW COMPLEX 30 MIN: CPT

## 2024-03-14 PROCEDURE — 6370000000 HC RX 637 (ALT 250 FOR IP): Performed by: NURSE PRACTITIONER

## 2024-03-14 PROCEDURE — 80048 BASIC METABOLIC PNL TOTAL CA: CPT

## 2024-03-14 PROCEDURE — 2580000003 HC RX 258: Performed by: NURSE PRACTITIONER

## 2024-03-14 PROCEDURE — 97116 GAIT TRAINING THERAPY: CPT

## 2024-03-14 PROCEDURE — 6370000000 HC RX 637 (ALT 250 FOR IP): Performed by: INTERNAL MEDICINE

## 2024-03-14 PROCEDURE — 36592 COLLECT BLOOD FROM PICC: CPT

## 2024-03-14 PROCEDURE — 2060000000 HC ICU INTERMEDIATE R&B

## 2024-03-14 PROCEDURE — 85730 THROMBOPLASTIN TIME PARTIAL: CPT

## 2024-03-14 PROCEDURE — 83735 ASSAY OF MAGNESIUM: CPT

## 2024-03-14 PROCEDURE — 84100 ASSAY OF PHOSPHORUS: CPT

## 2024-03-14 PROCEDURE — 85610 PROTHROMBIN TIME: CPT

## 2024-03-14 PROCEDURE — 97161 PT EVAL LOW COMPLEX 20 MIN: CPT

## 2024-03-14 RX ORDER — OSELTAMIVIR PHOSPHATE 75 MG/1
75 CAPSULE ORAL 2 TIMES DAILY
Status: DISCONTINUED | OUTPATIENT
Start: 2024-03-14 | End: 2024-03-15 | Stop reason: HOSPADM

## 2024-03-14 RX ORDER — PANTOPRAZOLE SODIUM 40 MG/1
40 TABLET, DELAYED RELEASE ORAL
Status: DISCONTINUED | OUTPATIENT
Start: 2024-03-15 | End: 2024-03-15 | Stop reason: HOSPADM

## 2024-03-14 RX ADMIN — AMLODIPINE BESYLATE 5 MG: 5 TABLET ORAL at 09:16

## 2024-03-14 RX ADMIN — CALCIUM 500 MG: 500 TABLET ORAL at 09:17

## 2024-03-14 RX ADMIN — ACETAMINOPHEN 650 MG: 325 TABLET ORAL at 08:31

## 2024-03-14 RX ADMIN — PHENOL 1 SPRAY: 1.4 SPRAY ORAL at 17:53

## 2024-03-14 RX ADMIN — CEFEPIME 2000 MG: 2 INJECTION, POWDER, FOR SOLUTION INTRAVENOUS at 03:12

## 2024-03-14 RX ADMIN — OSELTAMIVIR PHOSPHATE 75 MG: 75 CAPSULE ORAL at 21:01

## 2024-03-14 RX ADMIN — VALACYCLOVIR HYDROCHLORIDE 500 MG: 500 TABLET, FILM COATED ORAL at 09:20

## 2024-03-14 RX ADMIN — GABAPENTIN 300 MG: 300 CAPSULE ORAL at 21:01

## 2024-03-14 RX ADMIN — CEFEPIME 2000 MG: 2 INJECTION, POWDER, FOR SOLUTION INTRAVENOUS at 09:22

## 2024-03-14 RX ADMIN — POTASSIUM BICARBONATE 20 MEQ: 782 TABLET, EFFERVESCENT ORAL at 21:01

## 2024-03-14 RX ADMIN — SODIUM CHLORIDE, PRESERVATIVE FREE 10 ML: 5 INJECTION INTRAVENOUS at 21:04

## 2024-03-14 RX ADMIN — OSELTAMIVIR PHOSPHATE 75 MG: 75 CAPSULE ORAL at 15:34

## 2024-03-14 RX ADMIN — ENOXAPARIN SODIUM 40 MG: 100 INJECTION SUBCUTANEOUS at 17:53

## 2024-03-14 RX ADMIN — GABAPENTIN 300 MG: 300 CAPSULE ORAL at 15:34

## 2024-03-14 RX ADMIN — SODIUM CHLORIDE, PRESERVATIVE FREE 10 ML: 5 INJECTION INTRAVENOUS at 08:32

## 2024-03-14 RX ADMIN — GABAPENTIN 300 MG: 300 CAPSULE ORAL at 09:18

## 2024-03-14 RX ADMIN — POTASSIUM BICARBONATE 20 MEQ: 782 TABLET, EFFERVESCENT ORAL at 08:32

## 2024-03-14 RX ADMIN — LISINOPRIL 40 MG: 40 TABLET ORAL at 09:19

## 2024-03-14 RX ADMIN — BENZOCAINE 6 MG-MENTHOL 10 MG LOZENGES 1 LOZENGE: at 01:00

## 2024-03-14 RX ADMIN — VALACYCLOVIR HYDROCHLORIDE 500 MG: 500 TABLET, FILM COATED ORAL at 21:01

## 2024-03-14 ASSESSMENT — PAIN DESCRIPTION - ONSET
ONSET: GRADUAL
ONSET: PROGRESSIVE
ONSET: PROGRESSIVE

## 2024-03-14 ASSESSMENT — PAIN SCALES - GENERAL
PAINLEVEL_OUTOF10: 0
PAINLEVEL_OUTOF10: 0
PAINLEVEL_OUTOF10: 2
PAINLEVEL_OUTOF10: 4
PAINLEVEL_OUTOF10: 8
PAINLEVEL_OUTOF10: 0
PAINLEVEL_OUTOF10: 2

## 2024-03-14 ASSESSMENT — PAIN DESCRIPTION - DESCRIPTORS
DESCRIPTORS: SORE

## 2024-03-14 ASSESSMENT — PAIN - FUNCTIONAL ASSESSMENT
PAIN_FUNCTIONAL_ASSESSMENT: ACTIVITIES ARE NOT PREVENTED

## 2024-03-14 ASSESSMENT — PAIN DESCRIPTION - FREQUENCY
FREQUENCY: INTERMITTENT

## 2024-03-14 ASSESSMENT — PAIN DESCRIPTION - ORIENTATION
ORIENTATION: INNER

## 2024-03-14 ASSESSMENT — PAIN DESCRIPTION - PAIN TYPE
TYPE: ACUTE PAIN
TYPE: ACUTE PAIN

## 2024-03-14 ASSESSMENT — PAIN DESCRIPTION - LOCATION
LOCATION: THROAT

## 2024-03-14 NOTE — PROGRESS NOTES
Central line dressing changed using sterile technique following hospital policy. SERGIO (Natasha), observed with procedure to ensure proper technique.

## 2024-03-14 NOTE — PROGRESS NOTES
Physical Therapy  Facility/Department: [address]  Physical Therapy Initial Assessment    Name: Farhad Balderas  : 1970  MRN: [id number]  Date of Service: 3/14/2024    Discharge Recommendations:  Home with assist PRN   PT Equipment Recommendations  Equipment Needed: No      Patient Diagnosis(es): There were no encounter diagnoses.  Past Medical History:  has a past medical history of Anemia, Anxiety, Chronic back pain, Depression, Hypertension, Multiple myeloma not having achieved remission (HCC), Neutropenic fever (HCC), Prediabetes, Type 2 diabetes mellitus with hyperglycemia, without long-term current use of insulin (HCC), and Type 2 diabetes mellitus with hyperlipidemia (HCC).  Past Surgical History:  has a past surgical history that includes Hysterectomy; shoulder surgery; CT BIOPSY BONE MARROW (2021); CT BIOPSY BONE MARROW (2022); CT BIOPSY BONE MARROW (06/10/2022); Dental surgery; Catheter Insertion (N/A, 2022); IR PORT PLACEMENT > 5 YEARS (2023); IR PORT PLACEMENT > 5 YEARS (2023); and Breast biopsy.    Assessment   Assessment: pt is a 52 yo female from home with family and IND at baseline admitted for SOB and bloody sputum found to be hypoxic in ED. On eval, pt reports she has been up ad dorcas in room, is performing bed mobility, transfers, and ambulation without AD with mod I- IND with no safety concerns or LOB. pt has no hx of falls, does report decreased endurance from baseline only able to tolerate distances in the room at this time but believes that will get better as she feels better. Pt denies needs for further therapies in hospital and at [place]. Recommend PRN A. PT to sign off. Please reorder if change in status occurs.  Therapy Prognosis: Good  Decision Making: Low Complexity  Requires PT Follow-Up: No  Activity Tolerance  Activity Tolerance: Patient tolerated evaluation without incident;Patient limited by endurance  Activity Tolerance Comments:

## 2024-03-14 NOTE — DISCHARGE SUMMARY
Uric Acid   Recent Labs     03/13/24  1652   LABURIC 5.1     Tacro:  No results for input(s): \"TACROLEV\" in the last 72 hours.  CMV Quant DNA by PCR: No results found for: \"CMVDNAQNT\"  IgG: No results for input(s): \"IGG\" in the last 72 hours.      PROBLEM LIST:          1. IgG Kappa Multiple myeloma  2. Dental disorder  3. HTN  4. Intestinal malabsorption (disorder)  5. Iron deficiency anemia due to chronic blood loss       TREATMENT:            1. RVD x 5 cycles (12/2021 - 5/16/22)  2. High Dose Melphalan f/b autologous SCT (7/1/22) - 3.66 x10^6 [id number]/kg      ASSESSMENT AND PLAN:           1. Multiple myeloma, IgG Kappa, ISS 3: partial response  - t(14;20), gain of 1q21, monosomy 13.    - At diagnoses:  M spike is 3.7, FLC ratio  132  - BMBx (12/7/21): 20% plasma cells & PET scan (1/12/22) showed diffuse bone marrow activity, no lytic lesions  - BMBx (3/18/22) after cycle 4: 40% cellularity and few percentage of clonal plasma cells  - BMbx (6/10/22) - negative for plasma cells  - BMBx post transplant negative for plasma cell  - Serum light chain with M protein of 1, normal FLC ratio   - S/p HD [id number] and ASCT (7/1/22) -- Patient did not meet inclusion criteria #3 of obtaining VGPR or better for clinical trial  - PET  1/31/24: No evidence of any definitive hypermetabolic neoplastic disease. Mild diffuse hypermetabolic activity identified within the spine, pelvis and femoral diaphyses is without corresponding CT abnormality therefore favors physiologic marrow activity. Diffuse infiltrating marrow process is a differential consideration. 3. Spleen activity representative of possible extramedullary disease    PLAN:   Viktoria progression following maintenance Revlmid will change to Eden/Pom/Dex on clinical trial (C1D1 2/7/24)  - VTE ppx: Aspirin 325 mg daily     Cycle 2 Day 11 Eden/Pom/Dex (Clinical Trial)-- HELD with URI symptoms    2. ID: Low grade fever overnight  - Cont Valtrex ppx  - Post transplant vaccines:  #1 (1/6/22) , #2 (3/14/23), #3 (6/6/23)   - Resp viral panel 1/4/25: +FLU A -- S/p tamiflu  Hypoxia/Fever 3/13/24:  - Blood cx (3/13/24): NGTD  - Urine Cx (3/13/24): NGTD  - CXR (3/13/24): No acute pulmonary disease  - Procalcitonin (3/13/24): 0.17  - CRP (3/13/24): 129.6  - MRSA Nares 3/13/24: negative   - Cefepime 3/13-3/14- stopped with positive flu test  - Respiratory viral panel (3/13/24): Positive for Influenza B   - Tamiflu started 3/14/24      3. Heme: Anemia 2/2 chemotherapy  - Transfuse for Hgb < 7 and Platelets < 10K  - No transfusion today    4. Metabolic / DM: HypoNa, hyperglycemia  - Encourage PO intake   - Check labs at next visit     5. Endocrine: - H/o Type 2 DM  Type 2 DM  - Currently off metformin  - Hgb A1c (1/6/22): 6.1     6. GI / Nutrition:    - H/o esophagitis  Nutrition:    - Cont low microbial diet  - Encourage PO intake   GERD:   - Cont PPI daily   - Cont TUMs as needed     7. Poor dentition:    - S/p partial extractions     8.Pain  - Rx tramadol 1/11/24  Leg Pain  - Continue gabapentin 300 mg BID    9. Cardiac: HTN  - Continue amlodipine 5 mg daily  - Continue lisinopril 40 mg daily     - DVT Prophylaxis: Platelets >50,000 cells/dL, - daily lovenox prophylaxis ordered  Contraindications to pharmacologic prophylaxis: None  Contraindications to mechanical prophylaxis: None     Condition on discharge: Stable     Discharge Instructions: Follow up at [facility] on *** for labs and provider visit.     The patient was advised on activity and dietary restrictions.    The patient was advised to follow up in the emergency department or contact the physician with any unresolved nausea/vomiting/diarrhea/pain or temperature greater than 100.5 F or any other unusual symptoms.       This discharge summary and plan was discussed and agreed upon with Dr. Simpson.

## 2024-03-14 NOTE — PLAN OF CARE
Problem: Pain  Goal: Verbalizes/displays adequate comfort level or baseline comfort level  3/14/2024 0211 by Jeanne Malave RN  Outcome: Progressing  Flowsheets (Taken 3/14/2024 0211)  Verbalizes/displays adequate comfort level or baseline comfort level:   Encourage patient to monitor pain and request assistance   Assess pain using appropriate pain scale   Consider cultural and social influences on pain and pain management   Implement non-pharmacological measures as appropriate and evaluate response   Administer analgesics based on type and severity of pain and evaluate response   Notify Licensed Independent Practitioner if interventions unsuccessful or patient reports new pain     Problem: Chronic Conditions and Co-morbidities  Goal: Patient's chronic conditions and co-morbidity symptoms are monitored and maintained or improved  Outcome: Progressing  Flowsheets (Taken 3/14/2024 0211)  Care Plan - Patient's Chronic Conditions and Co-Morbidity Symptoms are Monitored and Maintained or Improved:   Collaborate with multidisciplinary team to address chronic and comorbid conditions and prevent exacerbation or deterioration   Monitor and assess patient's chronic conditions and comorbid symptoms for stability, deterioration, or improvement   Update acute care plan with appropriate goals if chronic or comorbid symptoms are exacerbated and prevent overall improvement and discharge     Problem: Safety - Adult  Goal: Free from fall injury  3/14/2024 0211 by Jeanne Malave RN  Outcome: Progressing  Flowsheets (Taken 3/14/2024 0211)  Free From Fall Injury: Instruct family/caregiver on patient safety     Problem: ABCDS Injury Assessment  Goal: Absence of physical injury  3/14/2024 0211 by Jeanne Malave RN  Outcome: Progressing  Flowsheets (Taken 3/14/2024 0211)  Absence of Physical Injury: Implement safety measures based on patient assessment

## 2024-03-14 NOTE — PLAN OF CARE
Problem: Pain  Goal: Verbalizes/displays adequate comfort level or baseline comfort level  3/14/2024 1723 by Amber Mead, RN  Outcome: Progressing  Flowsheets (Taken 3/14/2024 0211 by Jeanne Malave RN)  Verbalizes/displays adequate comfort level or baseline comfort level:   Encourage patient to monitor pain and request assistance   Assess pain using appropriate pain scale   Consider cultural and social influences on pain and pain management   Implement non-pharmacological measures as appropriate and evaluate response   Administer analgesics based on type and severity of pain and evaluate response   Notify Licensed Independent Practitioner if interventions unsuccessful or patient reports new pain  Note: Patient c/o sore throat. PRN lozenges and throat spray administered per MAR. Patient reports relief with throat spray. Educated on use.      Problem: Safety - Adult  Goal: Free from fall injury  3/14/2024 1723 by Amber Mead, RN  Outcome: Progressing  Flowsheets (Taken 3/14/2024 0823)  Free From Fall Injury: Instruct family/caregiver on patient safety  Note: Orthostatic vital signs obtained at start of shift - see flowsheet for details.  Pt does not meet criteria for orthostasis.  Pt is a Med fall risk. See Camarena Fall Score and ABCDS Injury Risk assessments.  Pt bed is in low position, side rails up, call light and belongings are in reach.  Fall risk light is on outside pts room.  Pt encouraged to call for assistance as needed. Will continue with hourly rounds for PO intake, pain needs, toileting and repositioning as needed.        Problem: Respiratory - Adult  Goal: Achieves optimal ventilation and oxygenation  Outcome: Progressing  Flowsheets (Taken 3/14/2024 1723)  Achieves optimal ventilation and oxygenation:   Assess for changes in respiratory status   Position to facilitate oxygenation and minimize respiratory effort   Encourage broncho-pulmonary hygiene including cough, deep breathe, incentive  spirometry   Respiratory therapy support as indicated   Assess and instruct to report shortness of breath or any respiratory difficulty  Note: Patient with strong productive cough. Patient endorsing SOB with exertion. Educated on using incentive spirometer.

## 2024-03-14 NOTE — PLAN OF CARE
Problem: Pain  Goal: Verbalizes/displays adequate comfort level or baseline comfort level  Outcome: Progressing  Note: Pt did not report pain this shift. Pt educated on importance of calling for pain meds when in pain. Pt verbalized understanding.     Problem: Safety - Adult  Goal: Free from fall injury  Outcome: Progressing  Note: Orthostatic vital signs obtained at start of shift - see flowsheet for details.  Pt does not meet criteria for orthostasis.  Pt is a Low fall risk. See Oconnor Fall Score and ABCDS Injury Risk assessments.   - Screening for Orthostasis AND not a Endeavor Risk per OCONNOR/ABCDS: Pt bed is in low position, side rails up, call light and belongings are in reach.  Fall risk light is on outside pts room.  Pt encouraged to call for assistance as needed. Will continue with hourly rounds for PO intake, pain needs, toileting and repositioning as needed.      Problem: ABCDS Injury Assessment  Goal: Absence of physical injury  Outcome: Progressing  Note: No new observed or reported injuries this shift.

## 2024-03-14 NOTE — PROGRESS NOTES
[facility] Progress Note    3/14/2024     Farhad Balderas    MRN: [id number]    : 1970    Referring MD: Jonah Cheema MD  [address]  [address] [address]      SUBJECTIVE: ***    ECOG PS:  (2) Ambulatory and capable of self care, unable to carry out work activity, up and about > 50% or waking hours    KPS: 70% Cares for self; unable to carry on normal activity or to do active work    Isolation: None    Medications    Scheduled Meds:   sodium chloride flush  5-40 mL IntraVENous 2 times per day    enoxaparin  40 mg SubCUTAneous Daily    Saline Mouthwash  15 mL Swish & Spit 4x Daily AC & HS    amLODIPine  5 mg Oral Daily    calcium elemental  500 mg Oral Daily    gabapentin  300 mg Oral TID    lisinopril  40 mg Oral Daily    valACYclovir  500 mg Oral BID    potassium bicarb-citric acid  20 mEq Oral BID    cefepime  2,000 mg IntraVENous Q8H     Continuous Infusions:   sodium chloride      sodium chloride       PRN Meds:.sodium chloride, sodium chloride flush, sodium chloride, potassium chloride, magnesium sulfate, Saline Mouthwash, alteplase (CATHFLO) 2 mg in sterile water 2 mL injection, melatonin, calcium carbonate, sennosides-docusate sodium, cetirizine, cyclobenzaprine, prochlorperazine, acetaminophen, Benzocaine-Menthol    ROS:  As noted above, otherwise remainder of 10-point ROS negative    Physical Exam:     I&O:    Intake/Output Summary (Last 24 hours) at 3/14/2024 0911  Last data filed at 3/14/2024 0823  Gross per 24 hour   Intake 220 ml   Output 775 ml   Net -555 ml       Vital Signs:  /78   Pulse (!) 111   Temp (!) 100.7 °F (38.2 °C) (Oral)   Resp 21   Ht 1.626 m (5' 4\")   Wt 107.9 kg (237 lb 14 oz)   SpO2 92%   BMI 40.83 kg/m²     Weight:    Wt Readings from Last 3 Encounters:   24 107.9 kg (237 lb 14 oz)   10/25/23 112.8 kg (248 lb 9.6 oz)   23 113.9 kg (251 lb)         General: Awake, alert and oriented.  HEENT: normocephalic, PERRL, no scleral  erythema or icterus, Oral mucosa moist and intact, throat clear  NECK: supple  BACK: Straight   SKIN: warm dry and intact without lesions rashes or masses  CHEST: CTA bilaterally without use of accessory muscles  CV: Normal S1 S2, RRR, no MRG  ABD: NT ND normoactive BS  EXTREMITIES: without edema, denies calf tenderness  NEURO: CN II - XII grossly intact  CATHETER: PAC    Data    CBC:   Recent Labs     03/13/24  1652 03/14/24  0319   WBC 4.0 3.4*   HGB 9.3* 8.8*   HCT 28.0* 25.4*   MCV 84.6 84.3    213     BMP/Mag:  Recent Labs     03/13/24  1652 03/14/24  0319   * 133*   K 3.5 3.5   CL 94* 94*   CO2 27 26   PHOS 2.5 2.9   BUN 13 14   CREATININE 0.9 0.8   MG 2.10 2.20     LIVP:   Recent Labs     03/13/24  1652   AST 17   ALT 16   BILIDIR <0.2   BILITOT 0.8   ALKPHOS 60     Coags:   Recent Labs     03/13/24  1523 03/14/24  0319   PROTIME 14.8 14.5   INR 1.16 1.13   APTT 26.1 29.5     Uric Acid   Recent Labs     03/13/24  1652   LABURIC 5.1       PROBLEM LIST:           1. IgG Kappa Multiple myeloma  2. Dental disorder  3. HTN  4. Intestinal malabsorption (disorder)  5. Iron deficiency anemia due to chronic blood loss       TREATMENT:            1. RVD x 5 cycles (12/2021 - 5/16/22)  2. High Dose Melphalan f/b autologous SCT (7/1/22) - 3.66 x10^6 [id number]/kg      ASSESSMENT AND PLAN:           1. Multiple myeloma, IgG Kappa, ISS 3: partial response  - t(14;20), gain of 1q21, monosomy 13.    - At diagnoses:  M spike is 3.7, FLC ratio  132  - BMBx (12/7/21): 20% plasma cells & PET scan (1/12/22) showed diffuse bone marrow activity, no lytic lesions  - BMBx (3/18/22) after cycle 4: 40% cellularity and few percentage of clonal plasma cells  - BMbx (6/10/22) - negative for plasma cells  - BMBx post transplant negative for plasma cell  - Serum light chain with M protein of 1, normal FLC ratio   - S/p HD [id number] and ASCT (7/1/22) -- Patient did not meet inclusion criteria #3 of obtaining VGPR or better for

## 2024-03-14 NOTE — ACP (ADVANCE CARE PLANNING)
Advance Care Planning     General Advance Care Planning (ACP) Conversation    Date of Conversation: 3/14/2024  Conducted with: Patient with Decision Making Capacity daughter Felipe was on pt's phone with pt permission     Healthcare Decision Maker:    Primary Decision Maker: felipe benitez - Child - [phone]    Secondary Decision Maker: Albert Orosco - Child - [phone]  Click here to complete Healthcare Decision Makers including selection of the Healthcare Decision Maker Relationship (ie \"Primary\").   Today we documented Decision Maker(s) consistent with Legal Next of Kin hierarchy.    Length of Voluntary ACP Conversation in minutes:  <16 minutes (Non-Billable)    CLAUDE Apple   for [facility] ([facility])  Office Phone: [phone]  [employer] Mobile: [phone]

## 2024-03-14 NOTE — PROGRESS NOTES
Occupational Therapy  Facility/Department: [address] [facility]CER [place]  Occupational Therapy Initial Assessment and Discharge Summary.           1x OT visit only    Name: Farhad Balderas  : 1970  MRN: [id number]  Date of Service: 3/14/2024    Discharge Recommendations:  Home with assist PRN  OT Equipment Recommendations  Equipment Needed: No       Patient Diagnosis(es): There were no encounter diagnoses.  Past Medical History:  has a past medical history of Anemia, Anxiety, Chronic back pain, Depression, Hypertension, Multiple myeloma not having achieved remission (HCC), Neutropenic fever (HCC), Prediabetes, Type 2 diabetes mellitus with hyperglycemia, without long-term current use of insulin (HCC), and Type 2 diabetes mellitus with hyperlipidemia (HCC).  Past Surgical History:  has a past surgical history that includes Hysterectomy; shoulder surgery; CT BIOPSY BONE MARROW (2021); CT BIOPSY BONE MARROW (2022); CT BIOPSY BONE MARROW (06/10/2022); Dental surgery; Catheter Insertion (N/A, 2022); IR PORT PLACEMENT > 5 YEARS (2023); IR PORT PLACEMENT > 5 YEARS (2023); and Breast biopsy.           Assessment   Assessment: Patient tolerated OT evaluation well. At baseline patient is independent with I/ADLs and ambulates without AD. Patient continues to complete ADLs, functional transfers, functional ambulation Jayla to Independent. Patient appears to be at baseline level of occupational performance. No further OT services warranted at this time. OT orders to be discontinued. Anticipate patient safe to return home at discharge. No OT services warranted upon discharge.  Prognosis: Good  Decision Making: Low Complexity  No Skilled OT: Independent with functional mobility;Independent with ADL's;At baseline function;Safe to return home;No OT goals identified  REQUIRES OT FOLLOW-UP: No  Activity Tolerance  Activity Tolerance: Patient Tolerated treatment well        Plan   Occupational

## 2024-03-14 NOTE — CARE COORDINATION
Case Management Assessment  Initial Evaluation    Date/Time of Evaluation: 3/14/2024 8:50 AM  Assessment Completed by: CLAUDE Apple   for [facility] [place] ([facility])  Office Phone: [phone]  [employer] Mobile: [phone]    If patient is discharged prior to next notation, then this note serves as note for discharge by case management.    Patient Name: Farhad Balderas                   YOB: 1970  Diagnosis: Hypoxia [R09.02]                   Date / Time: 3/13/2024  2:59 PM    Patient Admission Status: Inpatient   Readmission Risk (Low < 19, Mod (19-27), High > 27): Readmission Risk Score: 11.4    Current PCP: Tila Eisenberg, DO  PCP verified by CM? Yes    Chart Reviewed: Yes      History Provided by: Patient  Patient Orientation: Alert and Oriented    Patient Cognition: Alert    Hospitalization in the last 30 days (Readmission):  No    If yes, Readmission Assessment in CM Navigator will be completed.    Advance Directives:      Code Status: Full Code   Patient's Primary Decision Maker is: Named in Scanned ACP Document    Primary Decision Maker: felipe benitez - Child - [phone]    Secondary Decision Maker: Albert Orosco - Child - [phone]    Discharge Planning:    Patient lives with: Parent Type of Home: House  Primary Care Giver: Self  Patient Support Systems include: Children, Parent   Current Financial resources: Medicaid ([employer])  Current community resources: None  Current services prior to admission: Other (Comment) ([facility])            Current DME:              Type of Home Care services:  None    ADLS  Prior functional level: Independent in ADLs/IADLs  Current functional level: Independent in ADLs/IADLs    PT AM-PAC:   /24  OT AM-PAC:   /24    Family can provide assistance at DC: Yes  Would you like Case Management to discuss the discharge plan with any other family members/significant others, and if so, who?    Plans to  Return to Present Housing: Yes  Other Identified Issues/Barriers to RETURNING to current housing: n/a  Potential Assistance needed at discharge: N/A            Potential DME:    Patient expects to discharge to: House  Plan for transportation at discharge:      Financial    Payor: [employer] / Plan: [employer] [place] MEDICAID / Product Type: *No Product type* /     Does insurance require precert for SNF: Yes    Potential assistance Purchasing Medications: No  Meds-to-Beds request:        [facility] [address] [place] [address] [address] [place] [address] -  [phone] - F [phone]  [address] [facility] [address]  Phone: [phone] Fax: [phone]      Notes:    Factors facilitating achievement of predicted outcomes: Family support, Caregiver support, Motivated, Cooperative, Pleasant, and Good insight into deficits    Barriers to discharge: Medical complications    Additional Case Management Notes: Chart review completed. Spoke with pt at bedside this AM and her daughter Pilo as she was on pt's phone with pt permission. Pt stated she is independent with her ADL's and will return home when able. She doesn't anticipate needs. She denied financial concerns when writer inquired.     SW will follow    The Plan for Transition of Care is related to the following treatment goals of Hypoxia [R09.02]    CLAUDE Apple   for [facility] ([facility])  Office Phone: [phone]  [employer] Mobile: [phone]

## 2024-03-14 NOTE — PROGRESS NOTES
03/14/24 1445   Encounter Summary   Encounter Overview/Reason  Initial Encounter   Service Provided For: Patient   Referral/Consult From: Nurse  (Marjorie Holley, APRN - CNP)   Last Encounter  03/14/24  (baldo)   Complexity of Encounter Low   Begin Time 1435   End Time  1440   Total Time Calculated 5 min   Assessment/Intervention/Outcome   Assessment Calm   Intervention Active listening;Explored/Affirmed feelings, thoughts, concerns   Outcome Comfort;Refused/Declined     PT was found alone. Spiritual Fabrizio was introduced but the PT declined.  Staff , Tomy Null MA, [facility]

## 2024-03-15 VITALS
TEMPERATURE: 99.1 F | HEIGHT: 64 IN | WEIGHT: 236 LBS | HEART RATE: 95 BPM | DIASTOLIC BLOOD PRESSURE: 65 MMHG | OXYGEN SATURATION: 94 % | BODY MASS INDEX: 40.29 KG/M2 | RESPIRATION RATE: 18 BRPM | SYSTOLIC BLOOD PRESSURE: 113 MMHG

## 2024-03-15 LAB
ALBUMIN SERPL-MCNC: 3.5 G/DL (ref 3.4–5)
ALP SERPL-CCNC: 51 U/L (ref 40–129)
ALT SERPL-CCNC: 14 U/L (ref 10–40)
ANION GAP SERPL CALCULATED.3IONS-SCNC: 10 MMOL/L (ref 3–16)
AST SERPL-CCNC: 14 U/L (ref 15–37)
BASOPHILS # BLD: 0 K/UL (ref 0–0.2)
BASOPHILS NFR BLD: 0.4 %
BILIRUB DIRECT SERPL-MCNC: <0.2 MG/DL (ref 0–0.3)
BILIRUB INDIRECT SERPL-MCNC: ABNORMAL MG/DL (ref 0–1)
BILIRUB SERPL-MCNC: 0.4 MG/DL (ref 0–1)
BUN SERPL-MCNC: 9 MG/DL (ref 7–20)
CALCIUM SERPL-MCNC: 8.7 MG/DL (ref 8.3–10.6)
CHLORIDE SERPL-SCNC: 96 MMOL/L (ref 99–110)
CO2 SERPL-SCNC: 26 MMOL/L (ref 21–32)
CREAT SERPL-MCNC: 0.8 MG/DL (ref 0.6–1.1)
DEPRECATED RDW RBC AUTO: 18.9 % (ref 12.4–15.4)
EOSINOPHIL # BLD: 0 K/UL (ref 0–0.6)
EOSINOPHIL NFR BLD: 1.1 %
GFR SERPLBLD CREATININE-BSD FMLA CKD-EPI: >60 ML/MIN/{1.73_M2}
GLUCOSE SERPL-MCNC: 122 MG/DL (ref 70–99)
HCT VFR BLD AUTO: 25.2 % (ref 36–48)
HGB BLD-MCNC: 8.4 G/DL (ref 12–16)
LDH SERPL L TO P-CCNC: 267 U/L (ref 100–190)
LYMPHOCYTES # BLD: 0.8 K/UL (ref 1–5.1)
LYMPHOCYTES NFR BLD: 29.6 %
MAGNESIUM SERPL-MCNC: 2.2 MG/DL (ref 1.8–2.4)
MCH RBC QN AUTO: 28 PG (ref 26–34)
MCHC RBC AUTO-ENTMCNC: 33.2 G/DL (ref 31–36)
MCV RBC AUTO: 84.4 FL (ref 80–100)
MONOCYTES # BLD: 0.3 K/UL (ref 0–1.3)
MONOCYTES NFR BLD: 11.3 %
NEUTROPHILS # BLD: 1.5 K/UL (ref 1.7–7.7)
NEUTROPHILS NFR BLD: 57.6 %
PHOSPHATE SERPL-MCNC: 2.9 MG/DL (ref 2.5–4.9)
PLATELET # BLD AUTO: 218 K/UL (ref 135–450)
PMV BLD AUTO: 8.2 FL (ref 5–10.5)
POTASSIUM SERPL-SCNC: 3.5 MMOL/L (ref 3.5–5.1)
PROT SERPL-MCNC: 7.1 G/DL (ref 6.4–8.2)
RBC # BLD AUTO: 2.99 M/UL (ref 4–5.2)
SODIUM SERPL-SCNC: 132 MMOL/L (ref 136–145)
URATE SERPL-MCNC: 4.5 MG/DL (ref 2.6–6)
WBC # BLD AUTO: 2.5 K/UL (ref 4–11)

## 2024-03-15 PROCEDURE — 85025 COMPLETE CBC W/AUTO DIFF WBC: CPT

## 2024-03-15 PROCEDURE — 6370000000 HC RX 637 (ALT 250 FOR IP): Performed by: NURSE PRACTITIONER

## 2024-03-15 PROCEDURE — 84100 ASSAY OF PHOSPHORUS: CPT

## 2024-03-15 PROCEDURE — 2580000003 HC RX 258: Performed by: NURSE PRACTITIONER

## 2024-03-15 PROCEDURE — 84550 ASSAY OF BLOOD/URIC ACID: CPT

## 2024-03-15 PROCEDURE — 83735 ASSAY OF MAGNESIUM: CPT

## 2024-03-15 PROCEDURE — 83615 LACTATE (LD) (LDH) ENZYME: CPT

## 2024-03-15 PROCEDURE — 80076 HEPATIC FUNCTION PANEL: CPT

## 2024-03-15 PROCEDURE — 80048 BASIC METABOLIC PNL TOTAL CA: CPT

## 2024-03-15 PROCEDURE — 6370000000 HC RX 637 (ALT 250 FOR IP): Performed by: INTERNAL MEDICINE

## 2024-03-15 PROCEDURE — 36592 COLLECT BLOOD FROM PICC: CPT

## 2024-03-15 RX ORDER — PANTOPRAZOLE SODIUM 40 MG/1
40 TABLET, DELAYED RELEASE ORAL
Qty: 30 TABLET | Refills: 3
Start: 2024-03-16

## 2024-03-15 RX ORDER — OSELTAMIVIR PHOSPHATE 75 MG/1
75 CAPSULE ORAL 2 TIMES DAILY
Qty: 7 CAPSULE | Refills: 0 | Status: SHIPPED | OUTPATIENT
Start: 2024-03-15 | End: 2024-03-19

## 2024-03-15 RX ORDER — LISINOPRIL 40 MG/1
40 TABLET ORAL DAILY
Qty: 30 TABLET | Refills: 3
Start: 2024-03-16

## 2024-03-15 RX ADMIN — CALCIUM 500 MG: 500 TABLET ORAL at 08:19

## 2024-03-15 RX ADMIN — OSELTAMIVIR PHOSPHATE 75 MG: 75 CAPSULE ORAL at 08:19

## 2024-03-15 RX ADMIN — AMLODIPINE BESYLATE 5 MG: 5 TABLET ORAL at 08:19

## 2024-03-15 RX ADMIN — LISINOPRIL 40 MG: 40 TABLET ORAL at 08:19

## 2024-03-15 RX ADMIN — POTASSIUM BICARBONATE 20 MEQ: 782 TABLET, EFFERVESCENT ORAL at 08:18

## 2024-03-15 RX ADMIN — GABAPENTIN 300 MG: 300 CAPSULE ORAL at 08:19

## 2024-03-15 RX ADMIN — VALACYCLOVIR HYDROCHLORIDE 500 MG: 500 TABLET, FILM COATED ORAL at 08:19

## 2024-03-15 RX ADMIN — PHENOL 1 SPRAY: 1.4 SPRAY ORAL at 00:04

## 2024-03-15 RX ADMIN — SODIUM CHLORIDE, PRESERVATIVE FREE 10 ML: 5 INJECTION INTRAVENOUS at 08:20

## 2024-03-15 RX ADMIN — PANTOPRAZOLE SODIUM 40 MG: 40 TABLET, DELAYED RELEASE ORAL at 06:32

## 2024-03-15 ASSESSMENT — PAIN SCALES - GENERAL
PAINLEVEL_OUTOF10: 0

## 2024-03-15 NOTE — PLAN OF CARE
Problem: Pain  Goal: Verbalizes/displays adequate comfort level or baseline comfort level  3/14/2024 2202 by Jeanne Malave RN  Outcome: Progressing  Flowsheets (Taken 3/14/2024 2202)  Verbalizes/displays adequate comfort level or baseline comfort level:   Encourage patient to monitor pain and request assistance   Implement non-pharmacological measures as appropriate and evaluate response   Assess pain using appropriate pain scale   Consider cultural and social influences on pain and pain management   Administer analgesics based on type and severity of pain and evaluate response   Notify Licensed Independent Practitioner if interventions unsuccessful or patient reports new pain     Problem: Chronic Conditions and Co-morbidities  Goal: Patient's chronic conditions and co-morbidity symptoms are monitored and maintained or improved  3/14/2024 2202 by Jeanne Malave RN  Outcome: Progressing  Flowsheets (Taken 3/14/2024 2202)  Care Plan - Patient's Chronic Conditions and Co-Morbidity Symptoms are Monitored and Maintained or Improved:   Monitor and assess patient's chronic conditions and comorbid symptoms for stability, deterioration, or improvement   Collaborate with multidisciplinary team to address chronic and comorbid conditions and prevent exacerbation or deterioration   Update acute care plan with appropriate goals if chronic or comorbid symptoms are exacerbated and prevent overall improvement and discharge     Problem: Safety - Adult  Goal: Free from fall injury  3/14/2024 2202 by Jeanne Malave RN  Outcome: Progressing  Flowsheets (Taken 3/14/2024 2202)  Free From Fall Injury: Instruct family/caregiver on patient safety     Problem: ABCDS Injury Assessment  Goal: Absence of physical injury  3/14/2024 2202 by Jeanne Malave RN  Outcome: Progressing  Flowsheets (Taken 3/14/2024 2202)  Absence of Physical Injury: Implement safety measures based on patient assessment     Problem: Respiratory - Adult  Goal: Achieves  optimal ventilation and oxygenation  3/14/2024 2202 by Jeanne Malave, RN  Outcome: Progressing  Flowsheets (Taken 3/14/2024 2202)  Achieves optimal ventilation and oxygenation:   Assess for changes in respiratory status   Assess for changes in mentation and behavior   Position to facilitate oxygenation and minimize respiratory effort   Encourage broncho-pulmonary hygiene including cough, deep breathe, incentive spirometry   Assess and instruct to report shortness of breath or any respiratory difficulty   Respiratory therapy support as indicated

## 2024-03-15 NOTE — PROGRESS NOTES
Reviewed discharge instructions with patient. Reviewed discharge medications including dosing, schedule, indication, and adverse reactions.  Reviewed which medications were already taken today and next dosage due for each medication.      Reviewed signs and symptoms that prompt a call to the physician and appropriate phone numbers.     Patient verbalized understanding of all instructions and questions were answered to her. satisfaction.  Signed discharge instructions were given to the patient Patient discharged to home per wheelchair with family members.      Amber Mead RN

## 2024-03-15 NOTE — PROGRESS NOTES
[facility] Progress Note    3/15/2024     Farhad Balderas    MRN: [id number]    : 1970    Referring MD: Jonah Cheema MD  [address]  [address] [address]      SUBJECTIVE: Has URI symptoms, fatigue and poor appetite.    ECOG PS:  (2) Ambulatory and capable of self care, unable to carry out work activity, up and about > 50% or waking hours    KPS: 70% Cares for self; unable to carry on normal activity or to do active work    Isolation: None    Medications    Scheduled Meds:   pantoprazole  40 mg Oral QAM AC    oseltamivir  75 mg Oral BID    sodium chloride flush  5-40 mL IntraVENous 2 times per day    enoxaparin  40 mg SubCUTAneous Daily    Saline Mouthwash  15 mL Swish & Spit 4x Daily AC & HS    amLODIPine  5 mg Oral Daily    calcium elemental  500 mg Oral Daily    gabapentin  300 mg Oral TID    lisinopril  40 mg Oral Daily    valACYclovir  500 mg Oral BID    potassium bicarb-citric acid  20 mEq Oral BID     Continuous Infusions:   sodium chloride      sodium chloride       PRN Meds:.phenol, sodium chloride, sodium chloride flush, sodium chloride, potassium chloride, magnesium sulfate, Saline Mouthwash, alteplase (CATHFLO) 2 mg in sterile water 2 mL injection, melatonin, calcium carbonate, sennosides-docusate sodium, cetirizine, cyclobenzaprine, prochlorperazine, acetaminophen, Benzocaine-Menthol    ROS:  As noted above, otherwise remainder of 10-point ROS negative    Physical Exam:     I&O:    Intake/Output Summary (Last 24 hours) at 3/15/2024 1441  Last data filed at 3/15/2024 1136  Gross per 24 hour   Intake 960 ml   Output 700 ml   Net 260 ml         Vital Signs:  /65   Pulse 95   Temp 99.1 °F (37.3 °C) (Oral)   Resp 18   Ht 1.626 m (5' 4\")   Wt 107 kg (236 lb)   SpO2 94%   BMI 40.51 kg/m²     Weight:    Wt Readings from Last 3 Encounters:   03/15/24 107 kg (236 lb)   10/25/23 112.8 kg (248 lb 9.6 oz)   23 113.9 kg (251 lb)         General: Awake, alert and  daily 2/7/24  - BP improved 2/14/24 at 144/76      - DVT Prophylaxis: Platelets >50,000 cells/dL, - daily lovenox prophylaxis ordered  Contraindications to pharmacologic prophylaxis: None  Contraindications to mechanical prophylaxis: None     - Disposition: Unknown     The patient was seen and examined by Dr. Calvin Simpson MD  Hematology, Bone marrow transplant and Cellular therapy  [facility]

## 2024-03-15 NOTE — DISCHARGE SUMMARY
allergy prevention     cyclobenzaprine 5 MG tablet  Commonly known as: FLEXERIL  Take 1 tablet by mouth twice daily as needed  Notes to patient: Cyclobenzaprine  (Flexeril)  USE--  Relax muscles  SIDE EFFECTS--  Sleepiness, dizziness     gabapentin 300 MG capsule  Commonly known as: NEURONTIN  Take 1 capsule by mouth 3 times daily for 90 days.  Notes to patient: Used for nerve pain     prochlorperazine 10 MG tablet  Commonly known as: COMPAZINE  Take 1 tablet by mouth every 6 hours as needed (nausea / vomiting)  Notes to patient: Anti nausea medicine     valACYclovir 500 MG tablet  Commonly known as: VALTREX  Take 1 tablet by mouth 2 times daily  Notes to patient: Anti viral            STOP taking these medications      omeprazole 20 MG delayed release capsule  Commonly known as: PRILOSEC               Where to Get Your Medications        These medications were sent to [address] [facility] - P [phone] - F [phone] [address] [facility] [address]      Phone: [phone]   oseltamivir 75 MG capsule       You can get these medications from any pharmacy    You don't need a prescription for these medications  Benzocaine-Menthol 6-10 MG Lozg lozenge  phenol 1.4 % Liqd mouth spray       Information about where to get these medications is not yet available    Ask your nurse or doctor about these medications  lisinopril 40 MG tablet  pantoprazole 40 MG tablet         Reason for Admission: Hypoxia, fever    Hospital Course: Farhad Balderas is a 54 yo female w/ IgG Kappa Multiple Myeloma currently on clinical trial Eden/Pom/Dex on 2/7/24. Her PMH is also significant for HTN, intestinal malabsorption disorders, esophagitis and CRYSTAL.     She then presented to [facility] on 3/13/24 feeling poorly. Upon presentation to the office her O2 she was hypoxic at 80% SpO2, but recovered to 88% with rest, and she was febrile. On admission she was pan cultured and started on cefepime. Her  2.20 2.20     LIVP:   Recent Labs     03/13/24  1652 03/15/24  0315   AST 17 14*   ALT 16 14   BILIDIR <0.2 <0.2   BILITOT 0.8 0.4   ALKPHOS 60 51     Coags:   Recent Labs     03/13/24  1523 03/14/24  0319   PROTIME 14.8 14.5   INR 1.16 1.13   APTT 26.1 29.5     Uric Acid   Recent Labs     03/13/24  1652 03/15/24  0315   LABURIC 5.1 4.5     Tacro:  No results for input(s): \"TACROLEV\" in the last 72 hours.  CMV Quant DNA by PCR: No results found for: \"CMVDNAQNT\"  IgG: No results for input(s): \"IGG\" in the last 72 hours.      PROBLEM LIST:          1. IgG Kappa Multiple myeloma  2. Dental disorder  3. HTN  4. Intestinal malabsorption (disorder)  5. Iron deficiency anemia due to chronic blood loss       TREATMENT:            1. RVD x 5 cycles (12/2021 - 5/16/22)  2. High Dose Melphalan f/b autologous SCT (7/1/22) - 3.66 x10^6 [id number]/kg      ASSESSMENT AND PLAN:           1. Multiple myeloma, IgG Kappa, ISS 3: partial response  - t(14;20), gain of 1q21, monosomy 13.    - At diagnoses:  M spike is 3.7, FLC ratio  132  - BMBx (12/7/21): 20% plasma cells & PET scan (1/12/22) showed diffuse bone marrow activity, no lytic lesions  - BMBx (3/18/22) after cycle 4: 40% cellularity and few percentage of clonal plasma cells  - BMbx (6/10/22) - negative for plasma cells  - BMBx post transplant negative for plasma cell  - Serum light chain with M protein of 1, normal FLC ratio   - S/p HD [id number] and ASCT (7/1/22) -- Patient did not meet inclusion criteria #3 of obtaining VGPR or better for clinical trial  - PET  1/31/24: No evidence of any definitive hypermetabolic neoplastic disease. Mild diffuse hypermetabolic activity identified within the spine, pelvis and femoral diaphyses is without corresponding CT abnormality therefore favors physiologic marrow activity. Diffuse infiltrating marrow process is a differential consideration. 3. Spleen activity representative of possible extramedullary disease    PLAN:   Viktoria progression

## 2024-03-15 NOTE — PLAN OF CARE
Problem: Pain  Goal: Verbalizes/displays adequate comfort level or baseline comfort level  Outcome: Adequate for Discharge     Problem: Chronic Conditions and Co-morbidities  Goal: Patient's chronic conditions and co-morbidity symptoms are monitored and maintained or improved  Outcome: Adequate for Discharge     Problem: Safety - Adult  Goal: Free from fall injury  Outcome: Adequate for Discharge  Flowsheets (Taken 3/15/2024 0806)  Free From Fall Injury: Instruct family/caregiver on patient safety     Problem: ABCDS Injury Assessment  Goal: Absence of physical injury  Outcome: Adequate for Discharge  Flowsheets (Taken 3/15/2024 0806)  Absence of Physical Injury: Implement safety measures based on patient assessment     Problem: Respiratory - Adult  Goal: Achieves optimal ventilation and oxygenation  Outcome: Adequate for Discharge  Flowsheets (Taken 3/15/2024 0806)  Achieves optimal ventilation and oxygenation: Assess for changes in respiratory status

## 2024-03-15 NOTE — PROGRESS NOTES
[facility] Progress Note    3/15/2024     Farhad Balderas    MRN: [id number]    : 1970    Referring MD: Jonah Cheema MD  [address]  [address] [address]      SUBJECTIVE: Having flu related URI symptoms. Will start Tamiflu today. D/C IV abx.    ECOG PS:  (2) Ambulatory and capable of self care, unable to carry out work activity, up and about > 50% or waking hours    KPS: 70% Cares for self; unable to carry on normal activity or to do active work    Isolation: None    Medications    Scheduled Meds:   pantoprazole  40 mg Oral QAM AC    oseltamivir  75 mg Oral BID    sodium chloride flush  5-40 mL IntraVENous 2 times per day    enoxaparin  40 mg SubCUTAneous Daily    Saline Mouthwash  15 mL Swish & Spit 4x Daily AC & HS    amLODIPine  5 mg Oral Daily    calcium elemental  500 mg Oral Daily    gabapentin  300 mg Oral TID    lisinopril  40 mg Oral Daily    valACYclovir  500 mg Oral BID    potassium bicarb-citric acid  20 mEq Oral BID     Continuous Infusions:   sodium chloride      sodium chloride       PRN Meds:.phenol, sodium chloride, sodium chloride flush, sodium chloride, potassium chloride, magnesium sulfate, Saline Mouthwash, alteplase (CATHFLO) 2 mg in sterile water 2 mL injection, melatonin, calcium carbonate, sennosides-docusate sodium, cetirizine, cyclobenzaprine, prochlorperazine, acetaminophen, Benzocaine-Menthol    ROS:  As noted above, otherwise remainder of 10-point ROS negative    Physical Exam:     I&O:    Intake/Output Summary (Last 24 hours) at 3/15/2024 1415  Last data filed at 3/15/2024 1136  Gross per 24 hour   Intake 960 ml   Output 700 ml   Net 260 ml         Vital Signs:  /65   Pulse 95   Temp 99.1 °F (37.3 °C) (Oral)   Resp 18   Ht 1.626 m (5' 4\")   Wt 107 kg (236 lb)   SpO2 94%   BMI 40.51 kg/m²     Weight:    Wt Readings from Last 3 Encounters:   03/15/24 107 kg (236 lb)   10/25/23 112.8 kg (248 lb 9.6 oz)   23 113.9 kg (251 lb)  Continue lisinopril 40 mg daily 2/7/24  - BP improved 2/14/24 at 144/76      - DVT Prophylaxis: Platelets >50,000 cells/dL, - daily lovenox prophylaxis ordered  Contraindications to pharmacologic prophylaxis: None  Contraindications to mechanical prophylaxis: None     - Disposition: Prob tomorrow.     The patient was seen and examined by Dr. Calvin Simpson MD  Hematology, Bone marrow transplant and Cellular therapy  [facility]

## 2024-03-15 NOTE — DISCHARGE SUMMARY
[facility] Discharge Summary             Attending Physician: Lasha Morton MD    Referring MD: Jonah Cheema MD  [address] [facility] [address]  [place] [address]    Name: Farhad Balderas :  1970  MRN:  [id number]    Admission: 3/13/2024   Discharge:   3/15/24    Date: 3/15/2024    Diagnosis on admit: Fever/Hypoxia    Medications: refresh once med rec is finished     Medication List        START taking these medications      Benzocaine-Menthol 6-10 MG Lozg lozenge  Commonly known as: CEPACOL  Take 1 lozenge by mouth every 2 hours as needed for Sore Throat     oseltamivir 75 MG capsule  Commonly known as: TAMIFLU  Take 1 capsule by mouth 2 times daily for 7 doses     pantoprazole 40 MG tablet  Commonly known as: PROTONIX  Take 1 tablet by mouth every morning (before breakfast)  Start taking on: 2024     phenol 1.4 % Liqd mouth spray  Take 1 spray by mouth every 2 hours as needed for Sore Throat            CHANGE how you take these medications      lisinopril 40 MG tablet  Commonly known as: PRINIVIL;ZESTRIL  Take 1 tablet by mouth daily  Start taking on: 2024  What changed:   medication strength  how much to take            CONTINUE taking these medications      amLODIPine 5 MG tablet  Commonly known as: NORVASC  Take 1 tablet by mouth daily     calcium carbonate 500 MG Tabs tablet  Commonly known as: OSCAL     cetirizine 10 MG tablet  Commonly known as: ZYRTEC     cyclobenzaprine 5 MG tablet  Commonly known as: FLEXERIL  Take 1 tablet by mouth twice daily as needed     gabapentin 300 MG capsule  Commonly known as: NEURONTIN  Take 1 capsule by mouth 3 times daily for 90 days.     prochlorperazine 10 MG tablet  Commonly known as: COMPAZINE  Take 1 tablet by mouth every 6 hours as needed (nausea / vomiting)     valACYclovir 500 MG tablet  Commonly known as: VALTREX  Take 1 tablet by mouth 2 times daily            STOP taking these medications      omeprazole 20 MG delayed release  today    4. Metabolic / DM: HypoNa, hyperglycemia  - Encourage PO intake   - Check labs at next visit     5. Endocrine: - H/o Type 2 DM  Type 2 DM  - Currently off metformin  - Hgb A1c (1/6/22): 6.1     6. GI / Nutrition:    - H/o esophagitis  Nutrition:    - Cont low microbial diet  - Encourage PO intake   GERD:   - Cont PPI daily   - Cont TUMs as needed     7. Poor dentition:    - S/p partial extractions     8.Pain  - Rx tramadol 1/11/24  Leg Pain  - Continue gabapentin 300 mg BID    9. Cardiac: HTN  - Continue amlodipine 5 mg daily  - Continue lisinopril 40 mg daily     - DVT Prophylaxis: Platelets >50,000 cells/dL, - daily lovenox prophylaxis ordered  Contraindications to pharmacologic prophylaxis: None  Contraindications to mechanical prophylaxis: None     Condition on discharge: Stable     Discharge Instructions: Follow up at [facility] in 1-2 weeks for labs and provider visit and IgG level with IVIG replacement q4 weeks prn for goal IgG > 400.     The patient was advised on activity and dietary restrictions.    The patient was advised to follow up in the emergency department or contact the physician with any unresolved nausea/vomiting/diarrhea/pain or temperature greater than 100.5 F or any other unusual symptoms.       This discharge summary and plan was discussed and agreed upon with Dr. Simpson.

## 2024-03-15 NOTE — CARE COORDINATION
Case Management Assessment            Discharge Note                    Date / Time of Note: 3/15/2024 2:10 PM                  Discharge Note Completed by: CLAUDE Apple   for [facility] ([facility])  Office Phone: [phone]  [employer] Mobile: [phone]    Patient Name: Farhad Balderas   YOB: 1970  Diagnosis: Hypoxia [R09.02]   Date / Time: 3/13/2024  2:59 PM    Current PCP: Tila Eisenberg DO  Clinic patient: No    Hospitalization in the last 30 days: No       Advance Directives:  Code Status: Full Code  [place] DNR form completed and on chart: Not Indicated    Financial:  Payor: [facility] / Plan: [facility] MEDICAID / Product Type: *No Product type* /      Pharmacy:    [facility] [address] [place] [address] [address] [facility] [phone] -  [phone]  [address] [place] [address]  Phone: [phone] Fax: [phone]      Assistance purchasing medications?: Potential Assistance Purchasing Medications: No  Assistance provided by Case Management: None at this time    Does patient want to participate in local refill/ meds to beds program?:      Meds To Beds General Rules:  1. Can ONLY be done Monday- Friday between 8:30am-5pm  2. Prescription(s) must be in pharmacy by 3pm to be filled same day  3.Copy of patient's insurance/ prescription drug card and patient face sheet must be sent along with the prescription(s)  4. Cost of Rx cannot be added to hospital bill. If financial assistance is needed, please contact unit  or ;  or  CANNOT provide pharmacy voucher for patients co-pays  5. Patients can then  the prescription on their way out of the hospital at discharge, or pharmacy can deliver to the bedside if staff is available. (payment due at time of pick-up or delivery - cash, check, or card accepted)     Able to afford home medications/ co-pay

## 2024-03-18 ENCOUNTER — TELEPHONE (OUTPATIENT)
Age: 54
End: 2024-03-18

## 2024-03-18 NOTE — TELEPHONE ENCOUNTER
Care Transitions Initial Follow Up Call    Outreach made within 2 business days of discharge: Yes    Patient: Farhad Balderas Patient : 1970   MRN: [id number]  Reason for Admission: There are no discharge diagnoses documented for the most recent discharge.  Discharge Date: 3/15/24       Spoke with: Patient    Discharge department/facility: [facility] Interactive Patient Contact:  Was patient able to fill all prescriptions: Yes  Was patient instructed to bring all medications to the follow-up visit: Yes  Is patient taking all medications as directed in the discharge summary? Yes  Does patient understand their discharge instructions: Yes  Does patient have questions or concerns that need addressed prior to 7-14 day follow up office visit: no    Scheduled appointment with PCP within 7-14 days    Follow Up  No future appointments.    Robert Pagan MA

## 2024-07-02 DIAGNOSIS — C90.00 MULTIPLE MYELOMA, REMISSION STATUS UNSPECIFIED (HCC): ICD-10-CM

## 2024-07-02 DIAGNOSIS — Z01.818 PRE-TRANSPLANT EVALUATION FOR STEM CELL TRANSPLANT: Primary | ICD-10-CM

## 2024-07-19 DIAGNOSIS — C90.02 MULTIPLE MYELOMA IN RELAPSE (HCC): ICD-10-CM

## 2024-07-19 DIAGNOSIS — Z01.818 PRE-TRANSPLANT EVALUATION FOR STEM CELL TRANSPLANT: Primary | ICD-10-CM

## 2024-07-30 ENCOUNTER — HOSPITAL ENCOUNTER (OUTPATIENT)
Dept: ONCOLOGY | Age: 54
Setting detail: INFUSION SERIES
Discharge: HOME | End: 2024-07-30
Payer: COMMERCIAL

## 2024-07-30 LAB
ABO + RH BLD: NORMAL
ALBUMIN SERPL-MCNC: 4.2 G/DL (ref 3.4–5)
ALBUMIN/GLOB SERPL: 1.8 {RATIO} (ref 1.1–2.2)
ALP SERPL-CCNC: 44 U/L (ref 40–129)
ALT SERPL-CCNC: 22 U/L (ref 10–40)
ANION GAP SERPL CALCULATED.3IONS-SCNC: 12 MMOL/L (ref 3–16)
APTT BLD: 20.1 SEC (ref 22.1–36.4)
AST SERPL-CCNC: 16 U/L (ref 15–37)
BASOPHILS # BLD: 0 K/UL (ref 0–0.2)
BASOPHILS NFR BLD: 1.1 %
BILIRUB DIRECT SERPL-MCNC: <0.2 MG/DL (ref 0–0.3)
BILIRUB INDIRECT SERPL-MCNC: NORMAL MG/DL (ref 0–1)
BILIRUB SERPL-MCNC: <0.2 MG/DL (ref 0–1)
BLD GP AB SCN SERPL QL: NORMAL
BUN SERPL-MCNC: 15 MG/DL (ref 7–20)
CALCIUM SERPL-MCNC: 8.9 MG/DL (ref 8.3–10.6)
CHLORIDE SERPL-SCNC: 104 MMOL/L (ref 99–110)
CO2 SERPL-SCNC: 25 MMOL/L (ref 21–32)
CREAT SERPL-MCNC: 0.7 MG/DL (ref 0.6–1.1)
CRP SERPL-MCNC: <3 MG/L (ref 0–5.1)
DEPRECATED RDW RBC AUTO: 16.9 % (ref 12.4–15.4)
EOSINOPHIL # BLD: 0 K/UL (ref 0–0.6)
EOSINOPHIL NFR BLD: 0.9 %
FERRITIN SERPL IA-MCNC: 336.5 NG/ML (ref 15–150)
GFR SERPLBLD CREATININE-BSD FMLA CKD-EPI: >90 ML/MIN/{1.73_M2}
GLUCOSE SERPL-MCNC: 122 MG/DL (ref 70–99)
HCT VFR BLD AUTO: 33.4 % (ref 36–48)
HGB BLD-MCNC: 11 G/DL (ref 12–16)
INR PPP: 1.01 (ref 0.85–1.15)
LDH SERPL L TO P-CCNC: 195 U/L (ref 100–190)
LYMPHOCYTES # BLD: 1.2 K/UL (ref 1–5.1)
LYMPHOCYTES NFR BLD: 35.9 %
MAGNESIUM SERPL-MCNC: 1.7 MG/DL (ref 1.8–2.4)
MCH RBC QN AUTO: 30.1 PG (ref 26–34)
MCHC RBC AUTO-ENTMCNC: 32.8 G/DL (ref 31–36)
MCV RBC AUTO: 91.8 FL (ref 80–100)
MONOCYTES # BLD: 0.5 K/UL (ref 0–1.3)
MONOCYTES NFR BLD: 14.8 %
NEUTROPHILS # BLD: 1.5 K/UL (ref 1.7–7.7)
NEUTROPHILS NFR BLD: 47.3 %
PHOSPHATE SERPL-MCNC: 3.9 MG/DL (ref 2.5–4.9)
PLATELET # BLD AUTO: 276 K/UL (ref 135–450)
PMV BLD AUTO: 7.7 FL (ref 5–10.5)
POTASSIUM SERPL-SCNC: 3 MMOL/L (ref 3.5–5.1)
PROT SERPL-MCNC: 6.6 G/DL (ref 6.4–8.2)
PROTHROMBIN TIME: 13.5 SEC (ref 11.9–14.9)
RBC # BLD AUTO: 3.64 M/UL (ref 4–5.2)
SODIUM SERPL-SCNC: 141 MMOL/L (ref 136–145)
URATE SERPL-MCNC: 6.1 MG/DL (ref 2.6–6)
WBC # BLD AUTO: 3.3 K/UL (ref 4–11)

## 2024-07-30 PROCEDURE — 86140 C-REACTIVE PROTEIN: CPT

## 2024-07-30 PROCEDURE — 86901 BLOOD TYPING SEROLOGIC RH(D): CPT

## 2024-07-30 PROCEDURE — 82784 ASSAY IGA/IGD/IGG/IGM EACH: CPT

## 2024-07-30 PROCEDURE — 82248 BILIRUBIN DIRECT: CPT

## 2024-07-30 PROCEDURE — 86787 VARICELLA-ZOSTER ANTIBODY: CPT

## 2024-07-30 PROCEDURE — 82955 ASSAY OF G6PD ENZYME: CPT

## 2024-07-30 PROCEDURE — 86706 HEP B SURFACE ANTIBODY: CPT

## 2024-07-30 PROCEDURE — 82728 ASSAY OF FERRITIN: CPT

## 2024-07-30 PROCEDURE — 86694 HERPES SIMPLEX NES ANTBDY: CPT

## 2024-07-30 PROCEDURE — 86900 BLOOD TYPING SEROLOGIC ABO: CPT

## 2024-07-30 PROCEDURE — 85025 COMPLETE CBC W/AUTO DIFF WBC: CPT

## 2024-07-30 PROCEDURE — 83735 ASSAY OF MAGNESIUM: CPT

## 2024-07-30 PROCEDURE — 86850 RBC ANTIBODY SCREEN: CPT

## 2024-07-30 PROCEDURE — 86645 CMV ANTIBODY IGM: CPT

## 2024-07-30 PROCEDURE — 83615 LACTATE (LD) (LDH) ENZYME: CPT

## 2024-07-30 PROCEDURE — 85730 THROMBOPLASTIN TIME PARTIAL: CPT

## 2024-07-30 PROCEDURE — 86777 TOXOPLASMA ANTIBODY: CPT

## 2024-07-30 PROCEDURE — 84550 ASSAY OF BLOOD/URIC ACID: CPT

## 2024-07-30 PROCEDURE — 80053 COMPREHEN METABOLIC PANEL: CPT

## 2024-07-30 PROCEDURE — 36591 DRAW BLOOD OFF VENOUS DEVICE: CPT

## 2024-07-30 PROCEDURE — 84100 ASSAY OF PHOSPHORUS: CPT

## 2024-07-30 PROCEDURE — 86707 HEPATITIS BE ANTIBODY: CPT

## 2024-07-30 PROCEDURE — 85610 PROTHROMBIN TIME: CPT

## 2024-07-30 PROCEDURE — 86778 TOXOPLASMA ANTIBODY IGM: CPT

## 2024-07-30 PROCEDURE — 86665 EPSTEIN-BARR CAPSID VCA: CPT

## 2024-07-30 PROCEDURE — 86709 HEPATITIS A IGM ANTIBODY: CPT

## 2024-07-31 LAB
HAV IGM SERPL QL IA: NORMAL
HBV SURFACE AB SERPL IA-ACNC: <3.5 MIU/ML
IGG SERPL-MCNC: 1129 MG/DL (ref 700–1600)

## 2024-08-01 LAB — HBV E AB SERPL QL IA: NEGATIVE

## 2024-08-02 LAB
CMV IGM SERPL IA-ACNC: <8 AU/ML
EBV VCA IGG SER IA-ACNC: >750 U/ML (ref 0–21.9)
EBV VCA IGM SER IA-ACNC: <10 U/ML (ref 0–43.9)
G6PD RBC-CCNC: 14 U/G HB (ref 9.9–16.6)
HSV1+2 IGG SER IA-ACNC: 1 IV
HSV1+2 IGM SER IA-ACNC: 0.1 IV
T GONDII IGG SER-ACNC: <3 IU/ML
T GONDII IGM SER-ACNC: <3 AU/ML
VZV IGG SER IA-ACNC: 23.6 IV
VZV IGM SER IA-ACNC: 0.03 ISR

## 2024-08-13 ENCOUNTER — HOSPITAL ENCOUNTER (OUTPATIENT)
Dept: GENERAL RADIOLOGY | Age: 54
Discharge: HOME | End: 2024-08-13
Attending: STUDENT IN AN ORGANIZED HEALTH CARE EDUCATION/TRAINING PROGRAM
Payer: COMMERCIAL

## 2024-08-13 ENCOUNTER — APPOINTMENT (OUTPATIENT)
Dept: ONCOLOGY | Age: 54
End: 2024-08-13
Payer: COMMERCIAL

## 2024-08-13 DIAGNOSIS — Z01.818 PRE-TRANSPLANT EVALUATION FOR STEM CELL TRANSPLANT: ICD-10-CM

## 2024-08-13 DIAGNOSIS — C90.02 MULTIPLE MYELOMA IN RELAPSE (HCC): ICD-10-CM

## 2024-08-13 PROCEDURE — 94760 N-INVAS EAR/PLS OXIMETRY 1: CPT

## 2024-08-13 PROCEDURE — 94726 PLETHYSMOGRAPHY LUNG VOLUMES: CPT

## 2024-08-13 PROCEDURE — 71046 X-RAY EXAM CHEST 2 VIEWS: CPT

## 2024-08-13 PROCEDURE — 94060 EVALUATION OF WHEEZING: CPT

## 2024-08-13 PROCEDURE — 94664 DEMO&/EVAL PT USE INHALER: CPT

## 2024-08-13 PROCEDURE — 94729 DIFFUSING CAPACITY: CPT

## 2024-08-14 ENCOUNTER — HOSPITAL ENCOUNTER (OUTPATIENT)
Age: 54
Discharge: HOME | End: 2024-08-16
Attending: STUDENT IN AN ORGANIZED HEALTH CARE EDUCATION/TRAINING PROGRAM
Payer: COMMERCIAL

## 2024-08-14 ENCOUNTER — HOSPITAL ENCOUNTER (OUTPATIENT)
Dept: PULMONOLOGY | Age: 54
Discharge: HOME | End: 2024-08-13
Attending: STUDENT IN AN ORGANIZED HEALTH CARE EDUCATION/TRAINING PROGRAM
Payer: COMMERCIAL

## 2024-08-14 VITALS
SYSTOLIC BLOOD PRESSURE: 113 MMHG | HEIGHT: 64 IN | DIASTOLIC BLOOD PRESSURE: 65 MMHG | WEIGHT: 236 LBS | BODY MASS INDEX: 40.29 KG/M2

## 2024-08-14 DIAGNOSIS — Z01.818 PRE-TRANSPLANT EVALUATION FOR STEM CELL TRANSPLANT: ICD-10-CM

## 2024-08-14 DIAGNOSIS — C90.00 MULTIPLE MYELOMA, REMISSION STATUS UNSPECIFIED (HCC): ICD-10-CM

## 2024-08-14 LAB
ECHO AO ROOT DIAM: 3.1 CM
ECHO AO ROOT INDEX: 1.48 CM/M2
ECHO AV AREA PEAK VELOCITY: 2.8 CM2
ECHO AV AREA VTI: 3 CM2
ECHO AV AREA/BSA PEAK VELOCITY: 1.3 CM2/M2
ECHO AV AREA/BSA VTI: 1.4 CM2/M2
ECHO AV MEAN GRADIENT: 4 MMHG
ECHO AV MEAN VELOCITY: 0.9 M/S
ECHO AV PEAK GRADIENT: 7 MMHG
ECHO AV PEAK VELOCITY: 1.3 M/S
ECHO AV VELOCITY RATIO: 1
ECHO AV VTI: 24.2 CM
ECHO BSA: 2.2 M2
ECHO LA AREA 2C: 17.3 CM2
ECHO LA AREA 4C: 21.2 CM2
ECHO LA MAJOR AXIS: 6.1 CM
ECHO LA MINOR AXIS: 5.3 CM
ECHO LA VOL BP: 55 ML (ref 22–52)
ECHO LA VOL MOD A2C: 45 ML (ref 22–52)
ECHO LA VOL MOD A4C: 60 ML (ref 22–52)
ECHO LA VOL/BSA BIPLANE: 26 ML/M2 (ref 16–34)
ECHO LA VOLUME INDEX MOD A2C: 21 ML/M2 (ref 16–34)
ECHO LA VOLUME INDEX MOD A4C: 29 ML/M2 (ref 16–34)
ECHO LV E' LATERAL VELOCITY: 12 CM/S
ECHO LV E' SEPTAL VELOCITY: 8 CM/S
ECHO LV EDV A2C: 81 ML
ECHO LV EDV A4C: 88 ML
ECHO LV EDV INDEX A4C: 42 ML/M2
ECHO LV EDV NDEX A2C: 39 ML/M2
ECHO LV EJECTION FRACTION A2C: 62 %
ECHO LV EJECTION FRACTION A4C: 63 %
ECHO LV EJECTION FRACTION BIPLANE: 63 % (ref 55–100)
ECHO LV ESV A2C: 31 ML
ECHO LV ESV A4C: 33 ML
ECHO LV ESV INDEX A2C: 15 ML/M2
ECHO LV ESV INDEX A4C: 16 ML/M2
ECHO LVOT AREA: 2.8 CM2
ECHO LVOT AV VTI INDEX: 1.05
ECHO LVOT DIAM: 1.9 CM
ECHO LVOT MEAN GRADIENT: 4 MMHG
ECHO LVOT PEAK GRADIENT: 7 MMHG
ECHO LVOT PEAK VELOCITY: 1.3 M/S
ECHO LVOT STROKE VOLUME INDEX: 34.1 ML/M2
ECHO LVOT SV: 71.7 ML
ECHO LVOT VTI: 25.3 CM
ECHO MV A VELOCITY: 0.81 M/S
ECHO MV E VELOCITY: 0.69 M/S
ECHO MV E/A RATIO: 0.85
ECHO MV E/E' LATERAL: 5.75
ECHO MV E/E' RATIO (AVERAGED): 7.19
ECHO MV E/E' SEPTAL: 8.63
ECHO RV FREE WALL PEAK S': 12 CM/S
ECHO RV TAPSE: 2.3 CM (ref 1.7–?)
EKG ATRIAL RATE: 75 BPM
EKG DIAGNOSIS: NORMAL
EKG P AXIS: 75 DEGREES
EKG P-R INTERVAL: 138 MS
EKG Q-T INTERVAL: 386 MS
EKG QRS DURATION: 86 MS
EKG QTC CALCULATION (BAZETT): 431 MS
EKG R AXIS: 34 DEGREES
EKG T AXIS: 56 DEGREES
EKG VENTRICULAR RATE: 75 BPM

## 2024-08-14 PROCEDURE — 93005 ELECTROCARDIOGRAM TRACING: CPT | Performed by: STUDENT IN AN ORGANIZED HEALTH CARE EDUCATION/TRAINING PROGRAM

## 2024-08-14 PROCEDURE — 93306 TTE W/DOPPLER COMPLETE: CPT | Performed by: INTERNAL MEDICINE

## 2024-08-14 PROCEDURE — 6360000004 HC RX CONTRAST MEDICATION: Performed by: STUDENT IN AN ORGANIZED HEALTH CARE EDUCATION/TRAINING PROGRAM

## 2024-08-14 PROCEDURE — C8929 TTE W OR WO FOL WCON,DOPPLER: HCPCS

## 2024-08-14 RX ORDER — ALBUTEROL SULFATE 2.5 MG/3ML
2.5 SOLUTION RESPIRATORY (INHALATION) ONCE
Status: COMPLETED | OUTPATIENT
Start: 2024-08-14 | End: 2024-08-14

## 2024-08-14 RX ADMIN — ALBUTEROL SULFATE 2.5 MG: 2.5 SOLUTION RESPIRATORY (INHALATION) at 15:54

## 2024-08-14 RX ADMIN — PERFLUTREN 1.5 ML: 6.52 INJECTION, SUSPENSION INTRAVENOUS at 11:17

## 2024-08-16 NOTE — PROCEDURES
PULMONARY FUNCTION TESTING      Patient name:  Farhad Balderas      Unit #:   [id number]   Date of test:  8/14/2024   Date of interpretation:   8/16/2024    Ms. Farhad Balderas is a 54 y.o. female. The spirometry data were acceptable and reproducible.     Spirometry:  The FEV-1 was decreased: 1.77 liters (75% of predicted).    The FEV-1 post bronchodilator was normal: 1.96 liters (83% of predicted).    The FVC was decreased: 2.15 liters (75% of predicted).    The FVC post bronchodilator was decreased: 2.27 liters (76% of predicted).    Response to inhaled bronchodilators (albuterol) was not significant.   The FEV-1/FVC ratio was normal 83%.  Flow volume loops were normal.     Lung volumes:  Lung volumes were tested via plethysmography. The total lung capacity was normal: 91% of predicted. The residual volume was normal: 86% of predicted. The RV/TLC ratio was normal: 34%    Diffusion Capacity:  DLCO, (not corrected for Hb), was normal: 105% of predicted.        Interpretation:  Normal Spirometry, lung volumes and diffusing capacity.     This data was interpreted based upon the 2005 ATS/ERS Task Force Position Statement: Interpretative Strategies for Lung Function Tests.    ------------------------------------------------------  Carlos Andrews MD  Pulmonary and Critical Care Medicine

## 2024-08-27 ENCOUNTER — HOSPITAL ENCOUNTER (OUTPATIENT)
Dept: ONCOLOGY | Age: 54
Setting detail: INFUSION SERIES
Discharge: HOME | End: 2024-08-27
Payer: COMMERCIAL

## 2024-08-27 VITALS
BODY MASS INDEX: 42.24 KG/M2 | OXYGEN SATURATION: 95 % | DIASTOLIC BLOOD PRESSURE: 83 MMHG | HEIGHT: 65 IN | TEMPERATURE: 98.1 F | RESPIRATION RATE: 20 BRPM | WEIGHT: 253.53 LBS | HEART RATE: 107 BPM | SYSTOLIC BLOOD PRESSURE: 152 MMHG

## 2024-08-27 LAB
AMPHETAMINES UR QL SCN>1000 NG/ML: ABNORMAL
BACTERIA URNS QL MICRO: ABNORMAL /HPF
BARBITURATES UR QL SCN>200 NG/ML: ABNORMAL
BENZODIAZ UR QL SCN>200 NG/ML: ABNORMAL
BILIRUB UR QL STRIP.AUTO: NEGATIVE
CANNABINOIDS UR QL SCN>50 NG/ML: POSITIVE
CLARITY UR: CLEAR
COCAINE UR QL SCN: ABNORMAL
COLOR UR: YELLOW
DRUG SCREEN COMMENT UR-IMP: ABNORMAL
EPI CELLS #/AREA URNS HPF: ABNORMAL /HPF (ref 0–5)
FENTANYL SCREEN, URINE: ABNORMAL
GLUCOSE UR STRIP.AUTO-MCNC: >=1000 MG/DL
HGB UR QL STRIP.AUTO: ABNORMAL
KETONES UR STRIP.AUTO-MCNC: ABNORMAL MG/DL
LEUKOCYTE ESTERASE UR QL STRIP.AUTO: NEGATIVE
METHADONE UR QL SCN>300 NG/ML: ABNORMAL
MUCOUS THREADS #/AREA URNS LPF: ABNORMAL /LPF
NITRITE UR QL STRIP.AUTO: NEGATIVE
OPIATES UR QL SCN>300 NG/ML: ABNORMAL
OXYCODONE UR QL SCN: ABNORMAL
PCP UR QL SCN>25 NG/ML: ABNORMAL
PH UR STRIP.AUTO: 6 [PH] (ref 5–8)
PH UR STRIP: 5.5 [PH]
PROT UR STRIP.AUTO-MCNC: NEGATIVE MG/DL
RBC #/AREA URNS HPF: ABNORMAL /HPF (ref 0–4)
SP GR UR STRIP.AUTO: 1.02 (ref 1–1.03)
UA COMPLETE W REFLEX CULTURE PNL UR: ABNORMAL
UA DIPSTICK W REFLEX MICRO PNL UR: YES
URN SPEC COLLECT METH UR: ABNORMAL
UROBILINOGEN UR STRIP-ACNC: 0.2 E.U./DL
WBC #/AREA URNS HPF: ABNORMAL /HPF (ref 0–5)

## 2024-08-27 PROCEDURE — 81001 URINALYSIS AUTO W/SCOPE: CPT

## 2024-08-27 PROCEDURE — 80307 DRUG TEST PRSMV CHEM ANLYZR: CPT

## 2024-08-27 PROCEDURE — G0480 DRUG TEST DEF 1-7 CLASSES: HCPCS

## 2024-08-27 NOTE — CARE COORDINATION
I met with patient and friend to provide education regarding potential CAR-T therapy. Discussed evaluation process, collection, manufacturing process, lymphodepleting chemotherapy, cell infusion, and recovery along with side effects and complications. Emphasized CRS/neurotoxicity signs and symptoms along with treatment options. Pt was informed of potential of seizures that will be managed with anti-seizure medication along with no driving for eight weeks. Risk for infection was also discussed with strategies to prevent infection. Patient was informed that continuous caregiver will be required for thirty days with daily visits in Outpatient Infusion if not admitted to the hospital. I also explained the potential for needing to be re-immunized or require IVIG infusions to treat hypogammaglobulinemia. Provided with our CAR-T handbook, CARVYKTI handbook, and business card.  Answered questions appropriately and instructed to call with further questions. Will follow up during next appointment.

## 2024-08-28 ENCOUNTER — HOSPITAL ENCOUNTER (OUTPATIENT)
Dept: ONCOLOGY | Age: 54
Setting detail: INFUSION SERIES
Discharge: HOME | End: 2024-08-28

## 2024-08-28 ASSESSMENT — PATIENT HEALTH QUESTIONNAIRE - PHQ9
7. TROUBLE CONCENTRATING ON THINGS, SUCH AS READING THE NEWSPAPER OR WATCHING TELEVISION: NOT AT ALL
4. FEELING TIRED OR HAVING LITTLE ENERGY: NOT AT ALL
SUM OF ALL RESPONSES TO PHQ QUESTIONS 1-9: 3
1. LITTLE INTEREST OR PLEASURE IN DOING THINGS: SEVERAL DAYS
SUM OF ALL RESPONSES TO PHQ QUESTIONS 1-9: 3
5. POOR APPETITE OR OVEREATING: NOT AT ALL
3. TROUBLE FALLING OR STAYING ASLEEP: NOT AT ALL
9. THOUGHTS THAT YOU WOULD BE BETTER OFF DEAD, OR OF HURTING YOURSELF: NOT AT ALL
SUM OF ALL RESPONSES TO PHQ9 QUESTIONS 1 & 2: 2
2. FEELING DOWN, DEPRESSED OR HOPELESS: SEVERAL DAYS
8. MOVING OR SPEAKING SO SLOWLY THAT OTHER PEOPLE COULD HAVE NOTICED. OR THE OPPOSITE, BEING SO FIGETY OR RESTLESS THAT YOU HAVE BEEN MOVING AROUND A LOT MORE THAN USUAL: NOT AT ALL
6. FEELING BAD ABOUT YOURSELF - OR THAT YOU ARE A FAILURE OR HAVE LET YOURSELF OR YOUR FAMILY DOWN: SEVERAL DAYS

## 2024-08-28 ASSESSMENT — ANXIETY QUESTIONNAIRES
6. BECOMING EASILY ANNOYED OR IRRITABLE: 1-SEVERAL DAYS
5. BEING SO RESTLESS THAT IT IS HARD TO SIT STILL: 0-NOT AT ALL
3. WORRYING TOO MUCH ABOUT DIFFERENT THINGS: 0-NOT AT ALL
7. FEELING AFRAID AS IF SOMETHING AWFUL MIGHT HAPPEN: 0-NOT AT ALL
2. NOT BEING ABLE TO STOP OR CONTROL WORRYING: 0-NOT AT ALL
GAD7 TOTAL SCORE: 2
4. TROUBLE RELAXING: 0-NOT AT ALL
1. FEELING NERVOUS, ANXIOUS, OR ON EDGE: SEVERAL DAYS

## 2024-08-28 NOTE — PSYCHOTHERAPY
Pre-CAR-T Psychological Evaluation    PSYCHOSOCIAL ASSESSMENT/RECOMMENDATIONS    Based on this evaluation, the recommendation is to to continue although monitoring of identified risk factors may be required.    Pre-CAR-T recommendations:  -Current daily marijuana use via smoking.  Patient was educated that she will need to be able to provide a negative drug screen.  Patient is agreeable to abstain from marijuana once directed to do so by oncologist due to potential delay of CAR-T.  -Complete advance directive  -Patient's daughter, Albert, will be a primary caregiver, but has not yet received CAR-T education. Would also educate partner, Thong, who will likely assist patient at home.    Recommendations to be added to H&P:  -Recent daily marijuana use via smoking. Monitor for relapse  -Hx of difficulties swallowing pills due to bad childhood experience, but managing well at this time  -7th-8th grade health literacy level for patient and primary caregiver  - Primary caregiver: Elly Trammell (friend; bringing to appointments) and Albert Orosco (daughter; w/ patient at home)   - Back-up caregiver: Albert Orosco (daughter) and Thong Rain (partner)  - Lodging: at home in [facility]    - DSM-5 Diagnoses: None  ____________________________________________________________________________________________    Farhad Balderas is a 54 y.o.   female with multiple myeloma referred for a psychological evaluation in preparation for CAR-T.      PAST MEDICAL HISTORY  Farhad Balderas reports that she was diagnosed with multiple myeloma in 2021. She explains her experience with treatment thus far to be \" good\".  Explains that she really appreciates her oncologist and trusts all of the oncologist's recommendations.  Patient reports some issues with side effects such as rashes, but has found treatment to be manageable.. Past medical history is significant for:  Multiple  disease or treatment induced psychiatric problem.  Assessment of Current Cognitive Functionin) Cognitive Functioning Within Normal Limits; or MoCA / MMSE . 26.  Influence of Personality Traits vs. Disorder: 0) None: No history of significant personality disorder or psychopathology/traits.  Effect of Truthfulness vs. Deceptive Behavior in Presentation: 0) No evidence of deceptive behavior in history or at present.  Overall Risk for Psychopathology: 1) Minimal: History of acceptable coping with current or previous medical challenges or psychosocial stressors. No psychiatric complications in response to illness, medical treatment or psychosocial stressors.   Psychological Stability and Psychopathology Total Score: 4    Lifestyle and Effect of Substance Use  Alcohol Use/Abuse/Dependence: 2) ALCOHOL USE - NO ABUSE: History of minimal alcohol use which has caused no social or medical problems (i.e., no abuse). If requested by the team the patient promptly discontinued all alcohol use.  Alcohol Risk for Recidivism: 1) Low Risk: (AUDIT 1 - 7).  Substance Use/Abuse/Dependence: 2) History of minimal substance abuse (illicit or prescribed substances). Quit use as soon as patient learned of disease or when first told by MD.  Substance Use Risk for Recidivism: 2) Moderate Risk: (DAST 3 - 5).   Nicotine Use/Abuse/Dependence: 0) None: Never used tobacco in any form. No history of Nicotine Use/Abuse.   Lifestyle and Effect of Substance Use Total Score: 7    -------------------------------------------------------------------------------------    Shanel Gaona Psy.D., MSCP  Clinical Psychologist    Farhad Balderas was seen for a pre-BMT/CAR-T psychological evaluation. There were no urgent psychological concerns (e.g. suicidal or homicidal ideation). Full report to follow. Prior to the evaluation, patient and/or support system was informed of the purposes of the evaluation, which include identification of any psychosocial

## 2024-09-02 LAB
ANABASINE UR-MCNC: <5 NG/ML
COTININE UR-MCNC: <15 NG/ML
NICOTINE UR-MCNC: <15 NG/ML
TRANS-3-OH-COTININE UR-MCNC: <50 NG/ML

## 2024-09-11 ENCOUNTER — HOSPITAL ENCOUNTER (OUTPATIENT)
Age: 54
Setting detail: SPECIMEN
Discharge: HOME | End: 2024-09-11

## 2024-09-24 ENCOUNTER — HOSPITAL ENCOUNTER (OUTPATIENT)
Age: 54
Setting detail: SPECIMEN
Discharge: HOME | End: 2024-09-24

## 2024-09-27 ENCOUNTER — HOSPITAL ENCOUNTER (OUTPATIENT)
Dept: ONCOLOGY | Age: 54
Setting detail: INFUSION SERIES
Discharge: HOME | End: 2024-09-27

## 2024-09-27 NOTE — PRE-PROCEDURE INSTRUCTIONS
Called patient about procedure. Told to be here at 0630 for procedure at 0800. Must be NPO after midnight but can take morning medication with sips of water. Patient is not on a blood thinner. Told to have a responsible adult with them to take them home and stay with them afterwards, if they do not get admitted to hospital. Also, to bring a current list of medications. No other questions or concerns.  
none

## 2024-09-30 ENCOUNTER — HOSPITAL ENCOUNTER (OUTPATIENT)
Dept: INTERVENTIONAL RADIOLOGY/VASCULAR | Age: 54
Discharge: HOME | End: 2024-10-02
Attending: STUDENT IN AN ORGANIZED HEALTH CARE EDUCATION/TRAINING PROGRAM
Payer: COMMERCIAL

## 2024-09-30 ENCOUNTER — HOSPITAL ENCOUNTER (OUTPATIENT)
Dept: ONCOLOGY | Age: 54
Setting detail: INFUSION SERIES
Discharge: HOME | End: 2024-09-30
Payer: COMMERCIAL

## 2024-09-30 VITALS
OXYGEN SATURATION: 97 % | RESPIRATION RATE: 13 BRPM | SYSTOLIC BLOOD PRESSURE: 148 MMHG | TEMPERATURE: 98.3 F | HEIGHT: 65 IN | WEIGHT: 253 LBS | BODY MASS INDEX: 42.15 KG/M2 | HEART RATE: 74 BPM | DIASTOLIC BLOOD PRESSURE: 93 MMHG

## 2024-09-30 VITALS
HEART RATE: 88 BPM | RESPIRATION RATE: 18 BRPM | OXYGEN SATURATION: 97 % | DIASTOLIC BLOOD PRESSURE: 84 MMHG | SYSTOLIC BLOOD PRESSURE: 152 MMHG

## 2024-09-30 DIAGNOSIS — C90.00 MULTIPLE MYELOMA WITHOUT REMISSION (HCC): ICD-10-CM

## 2024-09-30 DIAGNOSIS — C90.00 MULTIPLE MYELOMA, REMISSION STATUS UNSPECIFIED (HCC): ICD-10-CM

## 2024-09-30 DIAGNOSIS — Z52.011 AUTOLOGOUS DONOR OF STEM CELLS: Primary | ICD-10-CM

## 2024-09-30 LAB
ALBUMIN SERPL-MCNC: 4.1 G/DL (ref 3.4–5)
ALBUMIN/GLOB SERPL: 1.2 {RATIO} (ref 1.1–2.2)
ALP SERPL-CCNC: 52 U/L (ref 40–129)
ALT SERPL-CCNC: 22 U/L (ref 10–40)
ANION GAP SERPL CALCULATED.3IONS-SCNC: 9 MMOL/L (ref 3–16)
AST SERPL-CCNC: 20 U/L (ref 15–37)
BASOPHILS # BLD: 0 K/UL (ref 0–0.2)
BASOPHILS # BLD: 0 K/UL (ref 0–0.2)
BASOPHILS NFR BLD: 1 %
BASOPHILS NFR BLD: 1.1 %
BILIRUB SERPL-MCNC: <0.2 MG/DL (ref 0–1)
BUN SERPL-MCNC: 8 MG/DL (ref 7–20)
CALCIUM SERPL-MCNC: 8.7 MG/DL (ref 8.3–10.6)
CHLORIDE SERPL-SCNC: 105 MMOL/L (ref 99–110)
CO2 SERPL-SCNC: 26 MMOL/L (ref 21–32)
CREAT SERPL-MCNC: 0.8 MG/DL (ref 0.6–1.1)
DEPRECATED RDW RBC AUTO: 16.3 % (ref 12.4–15.4)
DEPRECATED RDW RBC AUTO: 16.9 % (ref 12.4–15.4)
ECHO BSA: 2.29 M2
EOSINOPHIL # BLD: 0.1 K/UL (ref 0–0.6)
EOSINOPHIL # BLD: 0.1 K/UL (ref 0–0.6)
EOSINOPHIL NFR BLD: 4.1 %
EOSINOPHIL NFR BLD: 4.7 %
GFR SERPLBLD CREATININE-BSD FMLA CKD-EPI: 87 ML/MIN/{1.73_M2}
GLUCOSE SERPL-MCNC: 141 MG/DL (ref 70–99)
HCT VFR BLD AUTO: 26 % (ref 36–48)
HCT VFR BLD AUTO: 29.9 % (ref 36–48)
HGB BLD-MCNC: 8.4 G/DL (ref 12–16)
HGB BLD-MCNC: 9.8 G/DL (ref 12–16)
INR PPP: 1.12 (ref 0.85–1.15)
LYMPHOCYTES # BLD: 0.5 K/UL (ref 1–5.1)
LYMPHOCYTES # BLD: 0.8 K/UL (ref 1–5.1)
LYMPHOCYTES NFR BLD: 23.7 %
LYMPHOCYTES NFR BLD: 29.5 %
MCH RBC QN AUTO: 29.4 PG (ref 26–34)
MCH RBC QN AUTO: 29.7 PG (ref 26–34)
MCHC RBC AUTO-ENTMCNC: 32.4 G/DL (ref 31–36)
MCHC RBC AUTO-ENTMCNC: 32.8 G/DL (ref 31–36)
MCV RBC AUTO: 90.7 FL (ref 80–100)
MCV RBC AUTO: 90.9 FL (ref 80–100)
MONOCYTES # BLD: 0.2 K/UL (ref 0–1.3)
MONOCYTES # BLD: 0.3 K/UL (ref 0–1.3)
MONOCYTES NFR BLD: 12 %
MONOCYTES NFR BLD: 9.1 %
NEUTROPHILS # BLD: 1.4 K/UL (ref 1.7–7.7)
NEUTROPHILS # BLD: 1.4 K/UL (ref 1.7–7.7)
NEUTROPHILS NFR BLD: 52.8 %
NEUTROPHILS NFR BLD: 62 %
PLATELET # BLD AUTO: 186 K/UL (ref 135–450)
PLATELET # BLD AUTO: 281 K/UL (ref 135–450)
PMV BLD AUTO: 7.9 FL (ref 5–10.5)
PMV BLD AUTO: 8.1 FL (ref 5–10.5)
POTASSIUM SERPL-SCNC: 3.4 MMOL/L (ref 3.5–5.1)
PROT SERPL-MCNC: 7.6 G/DL (ref 6.4–8.2)
PROTHROMBIN TIME: 14.6 SEC (ref 11.9–14.9)
RBC # BLD AUTO: 2.86 M/UL (ref 4–5.2)
RBC # BLD AUTO: 3.29 M/UL (ref 4–5.2)
SODIUM SERPL-SCNC: 140 MMOL/L (ref 136–145)
WBC # BLD AUTO: 2.3 K/UL (ref 4–11)
WBC # BLD AUTO: 2.6 K/UL (ref 4–11)

## 2024-09-30 PROCEDURE — 36592 COLLECT BLOOD FROM PICC: CPT

## 2024-09-30 PROCEDURE — 85025 COMPLETE CBC W/AUTO DIFF WBC: CPT

## 2024-09-30 PROCEDURE — 2709999900 HC NON-CHARGEABLE SUPPLY

## 2024-09-30 PROCEDURE — 0537T HC CAR-T THERAPY HRVG BLD DRV T LMPHCYT PR DAY: CPT | Performed by: STUDENT IN AN ORGANIZED HEALTH CARE EDUCATION/TRAINING PROGRAM

## 2024-09-30 PROCEDURE — 36556 INSERT NON-TUNNEL CV CATH: CPT

## 2024-09-30 PROCEDURE — 7100000010 HC PHASE II RECOVERY - FIRST 15 MIN

## 2024-09-30 PROCEDURE — 76937 US GUIDE VASCULAR ACCESS: CPT

## 2024-09-30 PROCEDURE — C1894 INTRO/SHEATH, NON-LASER: HCPCS

## 2024-09-30 PROCEDURE — 7100000011 HC PHASE II RECOVERY - ADDTL 15 MIN

## 2024-09-30 PROCEDURE — 6360000002 HC RX W HCPCS: Performed by: STUDENT IN AN ORGANIZED HEALTH CARE EDUCATION/TRAINING PROGRAM

## 2024-09-30 PROCEDURE — 85610 PROTHROMBIN TIME: CPT

## 2024-09-30 PROCEDURE — C1752 CATH,HEMODIALYSIS,SHORT-TERM: HCPCS

## 2024-09-30 PROCEDURE — 6360000002 HC RX W HCPCS: Performed by: RADIOLOGY

## 2024-09-30 PROCEDURE — 0538T HC CART-T THERAPY PREPJ BLD DRV T LMPHCYT F/TRANS: CPT | Performed by: STUDENT IN AN ORGANIZED HEALTH CARE EDUCATION/TRAINING PROGRAM

## 2024-09-30 PROCEDURE — 80053 COMPREHEN METABOLIC PANEL: CPT

## 2024-09-30 RX ORDER — FENTANYL CITRATE 50 UG/ML
INJECTION, SOLUTION INTRAMUSCULAR; INTRAVENOUS PRN
Status: COMPLETED | OUTPATIENT
Start: 2024-09-30 | End: 2024-09-30

## 2024-09-30 RX ORDER — CALCIUM GLUCONATE 20 MG/ML
2000 INJECTION, SOLUTION INTRAVENOUS ONCE
Status: COMPLETED | OUTPATIENT
Start: 2024-09-30 | End: 2024-09-30

## 2024-09-30 RX ORDER — MIDAZOLAM HYDROCHLORIDE 1 MG/ML
INJECTION INTRAMUSCULAR; INTRAVENOUS PRN
Status: COMPLETED | OUTPATIENT
Start: 2024-09-30 | End: 2024-09-30

## 2024-09-30 RX ORDER — CALCIUM GLUCONATE 20 MG/ML
2000 INJECTION, SOLUTION INTRAVENOUS ONCE
Status: CANCELLED | OUTPATIENT
Start: 2024-10-01 | End: 2024-10-01

## 2024-09-30 RX ADMIN — FENTANYL CITRATE 50 MCG: 50 INJECTION, SOLUTION INTRAMUSCULAR; INTRAVENOUS at 08:17

## 2024-09-30 RX ADMIN — MIDAZOLAM HYDROCHLORIDE 1 MG: 2 INJECTION, SOLUTION INTRAMUSCULAR; INTRAVENOUS at 08:17

## 2024-09-30 RX ADMIN — CALCIUM GLUCONATE 2000 MG: 20 INJECTION, SOLUTION INTRAVENOUS at 10:00

## 2024-09-30 NOTE — H&P
Patient:  Farhad Balderas   :   1970      Relevant clinical history, particularly as it involves the pending procedure, was reviewed and discussed.    The procedure including risks and benefits was discussed at length with the patient (or designated family member) and all questions were answered.  Informed consent to proceed with the procedure was given.    Vital signs were monitored and documented by the Radiology nurse.    Targeted physical examination  Heart : regular rate and rhythm  Lungs : clear, breathing easily  Condition : stable    [facility] nurses notes reviewed and agreed.    Past Medical History:        Diagnosis Date    Anemia 2023    Anxiety     Chronic back pain     Depression     Hypertension     Multiple myeloma not having achieved remission (HCC)     Neutropenic fever (HCC) 2022    Prediabetes     Type 2 diabetes mellitus with hyperglycemia, without long-term current use of insulin (HCC)     Type 2 diabetes mellitus with hyperlipidemia (HCC) 2022       Past Surgical History:           Procedure Laterality Date    BREAST BIOPSY      CATHETER INSERTION N/A 2022    INSERT TRIFUSION CATHETER performed by Max Bell MD at [facility] OR    CT BONE MARROW BIOPSY  2021    CT BONE MARROW BIOPSY 2021 [facility]    CT BONE MARROW BIOPSY  2022    CT BONE MARROW BIOPSY 3/18/2022 [facility] CT SCAN    CT BONE MARROW BIOPSY  06/10/2022    CT BONE MARROW BIOPSY 6/10/2022 [facility] CT SCAN    DENTAL SURGERY      HYSTERECTOMY (CERVIX STATUS UNKNOWN)      IR PORT PLACEMENT EQUAL OR GREATER THAN 5 YEARS  2023    IR PORT PLACEMENT EQUAL OR GREATER THAN 5 YEARS 3/22/2023 [facility] SPECIAL PROCEDURES    IR PORT PLACEMENT EQUAL OR GREATER THAN 5 YEARS  2023    IR PORT PLACEMENT EQUAL OR GREATER THAN 5 YEARS 2023 [facility] SPECIAL PROCEDURES    SHOULDER SURGERY         Allergies:  Patient has no known allergies.    Medications:   Home Meds  Current Outpatient Medications on File Prior

## 2024-09-30 NOTE — PROCEDURES
PROCEDURE NOTE  Date: 9/30/2024   Name: Farhad Balderas  YOB: 1970    Procedures      IR Brief Postoperative Note    Farhad Balderas  YOB: 1970  [id number]    Pre-operative Diagnosis: apheresis    Post-operative Diagnosis: Same    Procedure: temp dialysis cath placement    Anesthesia: mod    Surgeons/Assistants: anam    Estimated Blood Loss: Minimal    Complications: none    Specimens: were not obtained    See full procedure dictation to follow      Sunny Ramos MD MD  9/30/2024       The patient is a 89y Male complaining of shortness of breath.

## 2024-09-30 NOTE — FLOWSHEET NOTE
1530- discharge      Discharge instructions reviewed with patient and port de- accessed and patient discharged via wheelchair out the main entrance via wheelchair

## 2024-09-30 NOTE — PROGRESS NOTES
Cath Lab Pre Procedure Flowsheet    Plan of Care:     Hemodynamics and cardiac rhythm will remain stable.   Comfort level will be maintained.   Respiratory function will remain adequate.   Pt/family will verbalize understanding of the procedure.   Procedure will be tolerated without complications.   Patient will recover from procedure without complications.   ID armband on patient and identification verified.   Informed consent obtained.   Non invasive blood pressure cuff applied, monitoring initiated.   EKG pads and pulse oximeter applied, monitoring initiated.   Instructions given. Patient and / or family verbalize understanding.   H&P will be documented by physician in [facility].     Pre-procedure:    NPO Status: Pt has been NPO since midnight. .    Contrast / IV Dye Allergy:     Pregnancy Test: N/A.    Prep Sites: Wrist(s)  Chest    Brian's Test:    Pulses:     Anticoagulants: None.     Antiplatelets: None.     Chief Complaint:      Diabetic: No    Pre EKG Rhythm: sinus rythmn    Pre SBP:155/90    IV access: left port a cath    Pre-procedure blood work collected by: kyle    [facility] Scale:

## 2024-09-30 NOTE — DISCHARGE INSTRUCTIONS
doctor’s office for your results.      FOLLOW-UP APPOINTMENT    Follow up with MD as directed    Belongings returned to patient and/or family: Yes.    The Discharge Instructions have been explained to me.  I understand and can verbalize these instructions.

## 2024-10-01 NOTE — PROGRESS NOTES
Pt arrived to [facility] today for scheduled apheresis procedure.  Granix administered and labs drawn per this RN. Apheresis, line care, and education all completed by Trip Chavez RN.  Review Scott documentation for details.

## 2024-10-07 ENCOUNTER — HOSPITAL ENCOUNTER (OUTPATIENT)
Dept: GENERAL RADIOLOGY | Age: 54
Discharge: HOME | End: 2024-10-07
Payer: COMMERCIAL

## 2024-10-07 DIAGNOSIS — R52 PAIN: ICD-10-CM

## 2024-10-07 PROCEDURE — 71046 X-RAY EXAM CHEST 2 VIEWS: CPT

## 2024-10-08 ENCOUNTER — HOSPITAL ENCOUNTER (INPATIENT)
Age: 54
LOS: 2 days | Discharge: HOME | DRG: 139 | End: 2024-10-10
Attending: STUDENT IN AN ORGANIZED HEALTH CARE EDUCATION/TRAINING PROGRAM | Admitting: STUDENT IN AN ORGANIZED HEALTH CARE EDUCATION/TRAINING PROGRAM
Payer: COMMERCIAL

## 2024-10-08 DIAGNOSIS — M54.41 CHRONIC MIDLINE LOW BACK PAIN WITH RIGHT-SIDED SCIATICA: ICD-10-CM

## 2024-10-08 DIAGNOSIS — G89.29 CHRONIC MIDLINE LOW BACK PAIN WITH RIGHT-SIDED SCIATICA: ICD-10-CM

## 2024-10-08 PROBLEM — J18.9 PNEUMONIA, UNSPECIFIED ORGANISM: Status: ACTIVE | Noted: 2024-10-08

## 2024-10-08 LAB
ALBUMIN SERPL-MCNC: 3.2 G/DL (ref 3.4–5)
ALP SERPL-CCNC: 67 U/L (ref 40–129)
ALT SERPL-CCNC: 13 U/L (ref 10–40)
AMORPH SED URNS QL MICRO: ABNORMAL /HPF
ANION GAP SERPL CALCULATED.3IONS-SCNC: 8 MMOL/L (ref 3–16)
APTT BLD: 28.5 SEC (ref 22.1–36.4)
AST SERPL-CCNC: 18 U/L (ref 15–37)
BACTERIA URNS QL MICRO: ABNORMAL /HPF
BASOPHILS # BLD: 0 K/UL (ref 0–0.2)
BASOPHILS NFR BLD: 0.2 %
BILIRUB DIRECT SERPL-MCNC: 0.4 MG/DL (ref 0–0.3)
BILIRUB INDIRECT SERPL-MCNC: 0.4 MG/DL (ref 0–1)
BILIRUB SERPL-MCNC: 0.8 MG/DL (ref 0–1)
BILIRUB UR QL STRIP.AUTO: ABNORMAL
BUN SERPL-MCNC: 10 MG/DL (ref 7–20)
CALCIUM SERPL-MCNC: 8.2 MG/DL (ref 8.3–10.6)
CHLORIDE SERPL-SCNC: 94 MMOL/L (ref 99–110)
CLARITY UR: CLEAR
CO2 SERPL-SCNC: 29 MMOL/L (ref 21–32)
COLOR UR: YELLOW
CREAT SERPL-MCNC: 0.9 MG/DL (ref 0.6–1.1)
DEPRECATED RDW RBC AUTO: 16.5 % (ref 12.4–15.4)
EOSINOPHIL # BLD: 0 K/UL (ref 0–0.6)
EOSINOPHIL NFR BLD: 0.2 %
EPI CELLS #/AREA URNS HPF: ABNORMAL /HPF (ref 0–5)
GFR SERPLBLD CREATININE-BSD FMLA CKD-EPI: 76 ML/MIN/{1.73_M2}
GLUCOSE SERPL-MCNC: 145 MG/DL (ref 70–99)
GLUCOSE UR STRIP.AUTO-MCNC: 100 MG/DL
HCT VFR BLD AUTO: 25.6 % (ref 36–48)
HGB BLD-MCNC: 8.4 G/DL (ref 12–16)
HGB UR QL STRIP.AUTO: ABNORMAL
INR PPP: 1.34 (ref 0.85–1.15)
KETONES UR STRIP.AUTO-MCNC: NEGATIVE MG/DL
LACTATE BLDV-SCNC: 1.1 MMOL/L (ref 0.4–2)
LDH SERPL L TO P-CCNC: 207 U/L (ref 100–190)
LEUKOCYTE ESTERASE UR QL STRIP.AUTO: NEGATIVE
LYMPHOCYTES # BLD: 0.7 K/UL (ref 1–5.1)
LYMPHOCYTES NFR BLD: 8.8 %
MAGNESIUM SERPL-MCNC: 2 MG/DL (ref 1.8–2.4)
MCH RBC QN AUTO: 29.3 PG (ref 26–34)
MCHC RBC AUTO-ENTMCNC: 32.7 G/DL (ref 31–36)
MCV RBC AUTO: 89.5 FL (ref 80–100)
MONOCYTES # BLD: 0.7 K/UL (ref 0–1.3)
MONOCYTES NFR BLD: 8.6 %
MUCOUS THREADS #/AREA URNS LPF: ABNORMAL /LPF
NEUTROPHILS # BLD: 6.3 K/UL (ref 1.7–7.7)
NEUTROPHILS NFR BLD: 82.2 %
NITRITE UR QL STRIP.AUTO: NEGATIVE
PH UR STRIP.AUTO: 6 [PH] (ref 5–8)
PHOSPHATE SERPL-MCNC: 2 MG/DL (ref 2.5–4.9)
PLATELET # BLD AUTO: 177 K/UL (ref 135–450)
PMV BLD AUTO: 8.9 FL (ref 5–10.5)
POTASSIUM SERPL-SCNC: 3.1 MMOL/L (ref 3.5–5.1)
PROCALCITONIN SERPL IA-MCNC: 1.16 NG/ML (ref 0–0.15)
PROT SERPL-MCNC: 7.6 G/DL (ref 6.4–8.2)
PROT UR STRIP.AUTO-MCNC: 100 MG/DL
PROTHROMBIN TIME: 16.7 SEC (ref 11.9–14.9)
RBC # BLD AUTO: 2.86 M/UL (ref 4–5.2)
RBC #/AREA URNS HPF: ABNORMAL /HPF (ref 0–4)
RENAL EPI CELLS #/AREA UR COMP ASSIST: ABNORMAL /HPF (ref 0–1)
SODIUM SERPL-SCNC: 131 MMOL/L (ref 136–145)
SP GR UR STRIP.AUTO: 1.02 (ref 1–1.03)
UA DIPSTICK W REFLEX MICRO PNL UR: YES
URATE SERPL-MCNC: 5.5 MG/DL (ref 2.6–6)
URN SPEC COLLECT METH UR: ABNORMAL
UROBILINOGEN UR STRIP-ACNC: >=8 E.U./DL
WBC # BLD AUTO: 7.7 K/UL (ref 4–11)
WBC #/AREA URNS HPF: ABNORMAL /HPF (ref 0–5)

## 2024-10-08 PROCEDURE — 81001 URINALYSIS AUTO W/SCOPE: CPT

## 2024-10-08 PROCEDURE — 2060000000 HC ICU INTERMEDIATE R&B

## 2024-10-08 PROCEDURE — 85730 THROMBOPLASTIN TIME PARTIAL: CPT

## 2024-10-08 PROCEDURE — 85610 PROTHROMBIN TIME: CPT

## 2024-10-08 PROCEDURE — 80076 HEPATIC FUNCTION PANEL: CPT

## 2024-10-08 PROCEDURE — 2500000003 HC RX 250 WO HCPCS: Performed by: STUDENT IN AN ORGANIZED HEALTH CARE EDUCATION/TRAINING PROGRAM

## 2024-10-08 PROCEDURE — 83605 ASSAY OF LACTIC ACID: CPT

## 2024-10-08 PROCEDURE — 2580000003 HC RX 258: Performed by: PHYSICIAN ASSISTANT

## 2024-10-08 PROCEDURE — 83615 LACTATE (LD) (LDH) ENZYME: CPT

## 2024-10-08 PROCEDURE — 80048 BASIC METABOLIC PNL TOTAL CA: CPT

## 2024-10-08 PROCEDURE — 85025 COMPLETE CBC W/AUTO DIFF WBC: CPT

## 2024-10-08 PROCEDURE — 6370000000 HC RX 637 (ALT 250 FOR IP): Performed by: PHYSICIAN ASSISTANT

## 2024-10-08 PROCEDURE — A4217 STERILE WATER/SALINE, 500 ML: HCPCS | Performed by: PHYSICIAN ASSISTANT

## 2024-10-08 PROCEDURE — 84550 ASSAY OF BLOOD/URIC ACID: CPT

## 2024-10-08 PROCEDURE — 93005 ELECTROCARDIOGRAM TRACING: CPT | Performed by: PHYSICIAN ASSISTANT

## 2024-10-08 PROCEDURE — 84100 ASSAY OF PHOSPHORUS: CPT

## 2024-10-08 PROCEDURE — 83735 ASSAY OF MAGNESIUM: CPT

## 2024-10-08 PROCEDURE — 6360000002 HC RX W HCPCS: Performed by: PHYSICIAN ASSISTANT

## 2024-10-08 PROCEDURE — 2580000003 HC RX 258: Performed by: STUDENT IN AN ORGANIZED HEALTH CARE EDUCATION/TRAINING PROGRAM

## 2024-10-08 PROCEDURE — 84145 PROCALCITONIN (PCT): CPT

## 2024-10-08 RX ORDER — GABAPENTIN 300 MG/1
300 CAPSULE ORAL 3 TIMES DAILY
Status: DISCONTINUED | OUTPATIENT
Start: 2024-10-08 | End: 2024-10-10 | Stop reason: HOSPADM

## 2024-10-08 RX ORDER — SODIUM CHLORIDE 9 MG/ML
INJECTION, SOLUTION INTRAVENOUS PRN
Status: DISCONTINUED | OUTPATIENT
Start: 2024-10-08 | End: 2024-10-10 | Stop reason: HOSPADM

## 2024-10-08 RX ORDER — SODIUM CHLORIDE 0.9 % (FLUSH) 0.9 %
5-40 SYRINGE (ML) INJECTION EVERY 12 HOURS SCHEDULED
Status: DISCONTINUED | OUTPATIENT
Start: 2024-10-08 | End: 2024-10-10 | Stop reason: HOSPADM

## 2024-10-08 RX ORDER — BENZONATATE 100 MG/1
100 CAPSULE ORAL 3 TIMES DAILY PRN
Status: DISCONTINUED | OUTPATIENT
Start: 2024-10-08 | End: 2024-10-10 | Stop reason: HOSPADM

## 2024-10-08 RX ORDER — PROCHLORPERAZINE MALEATE 10 MG
10 TABLET ORAL EVERY 6 HOURS PRN
Status: DISCONTINUED | OUTPATIENT
Start: 2024-10-08 | End: 2024-10-10 | Stop reason: HOSPADM

## 2024-10-08 RX ORDER — SODIUM CHLORIDE 9 MG/ML
INJECTION, SOLUTION INTRAVENOUS CONTINUOUS PRN
Status: DISCONTINUED | OUTPATIENT
Start: 2024-10-08 | End: 2024-10-10 | Stop reason: HOSPADM

## 2024-10-08 RX ORDER — DIPHENHYDRAMINE HCL 25 MG
25 TABLET ORAL NIGHTLY PRN
Status: DISCONTINUED | OUTPATIENT
Start: 2024-10-08 | End: 2024-10-10 | Stop reason: HOSPADM

## 2024-10-08 RX ORDER — SODIUM CHLORIDE 0.9 % (FLUSH) 0.9 %
5-40 SYRINGE (ML) INJECTION PRN
Status: DISCONTINUED | OUTPATIENT
Start: 2024-10-08 | End: 2024-10-10 | Stop reason: HOSPADM

## 2024-10-08 RX ORDER — LISINOPRIL 40 MG/1
40 TABLET ORAL DAILY
Status: DISCONTINUED | OUTPATIENT
Start: 2024-10-08 | End: 2024-10-10 | Stop reason: HOSPADM

## 2024-10-08 RX ORDER — CETIRIZINE HYDROCHLORIDE 10 MG/1
10 TABLET ORAL DAILY
Status: DISCONTINUED | OUTPATIENT
Start: 2024-10-08 | End: 2024-10-10 | Stop reason: HOSPADM

## 2024-10-08 RX ORDER — GABAPENTIN 300 MG/1
300 CAPSULE ORAL 3 TIMES DAILY
COMMUNITY

## 2024-10-08 RX ORDER — TRAMADOL HYDROCHLORIDE 50 MG/1
50 TABLET ORAL EVERY 6 HOURS PRN
Status: DISCONTINUED | OUTPATIENT
Start: 2024-10-08 | End: 2024-10-10 | Stop reason: HOSPADM

## 2024-10-08 RX ORDER — ACETAMINOPHEN 325 MG/1
650 TABLET ORAL EVERY 4 HOURS PRN
Status: DISCONTINUED | OUTPATIENT
Start: 2024-10-08 | End: 2024-10-10 | Stop reason: HOSPADM

## 2024-10-08 RX ORDER — PANTOPRAZOLE SODIUM 40 MG/1
40 TABLET, DELAYED RELEASE ORAL
Status: DISCONTINUED | OUTPATIENT
Start: 2024-10-09 | End: 2024-10-10 | Stop reason: HOSPADM

## 2024-10-08 RX ORDER — CALCIUM CARBONATE 500(1250)
500 TABLET ORAL DAILY
Status: DISCONTINUED | OUTPATIENT
Start: 2024-10-08 | End: 2024-10-10 | Stop reason: HOSPADM

## 2024-10-08 RX ORDER — ONDANSETRON 8 MG/1
8 TABLET, FILM COATED ORAL EVERY 8 HOURS PRN
Status: DISCONTINUED | OUTPATIENT
Start: 2024-10-08 | End: 2024-10-10 | Stop reason: HOSPADM

## 2024-10-08 RX ORDER — AMLODIPINE BESYLATE 5 MG/1
5 TABLET ORAL DAILY
Status: DISCONTINUED | OUTPATIENT
Start: 2024-10-08 | End: 2024-10-10 | Stop reason: HOSPADM

## 2024-10-08 RX ORDER — SODIUM CHLORIDE 9 MG/ML
INJECTION, SOLUTION INTRAVENOUS CONTINUOUS
Status: DISCONTINUED | OUTPATIENT
Start: 2024-10-08 | End: 2024-10-10 | Stop reason: HOSPADM

## 2024-10-08 RX ORDER — ENOXAPARIN SODIUM 100 MG/ML
30 INJECTION SUBCUTANEOUS 2 TIMES DAILY
Status: DISCONTINUED | OUTPATIENT
Start: 2024-10-08 | End: 2024-10-10 | Stop reason: HOSPADM

## 2024-10-08 RX ORDER — POTASSIUM CHLORIDE 29.8 MG/ML
20 INJECTION INTRAVENOUS PRN
Status: DISCONTINUED | OUTPATIENT
Start: 2024-10-08 | End: 2024-10-10 | Stop reason: HOSPADM

## 2024-10-08 RX ORDER — MAGNESIUM SULFATE IN WATER 40 MG/ML
4000 INJECTION, SOLUTION INTRAVENOUS PRN
Status: DISCONTINUED | OUTPATIENT
Start: 2024-10-08 | End: 2024-10-10 | Stop reason: HOSPADM

## 2024-10-08 RX ORDER — CYCLOBENZAPRINE HCL 10 MG
5 TABLET ORAL 2 TIMES DAILY PRN
Status: DISCONTINUED | OUTPATIENT
Start: 2024-10-08 | End: 2024-10-10 | Stop reason: HOSPADM

## 2024-10-08 RX ORDER — VALACYCLOVIR HYDROCHLORIDE 500 MG/1
500 TABLET, FILM COATED ORAL 2 TIMES DAILY
Status: DISCONTINUED | OUTPATIENT
Start: 2024-10-08 | End: 2024-10-10 | Stop reason: HOSPADM

## 2024-10-08 RX ADMIN — CETIRIZINE HYDROCHLORIDE 10 MG: 10 TABLET, FILM COATED ORAL at 17:39

## 2024-10-08 RX ADMIN — LISINOPRIL 40 MG: 40 TABLET ORAL at 17:39

## 2024-10-08 RX ADMIN — VALACYCLOVIR HYDROCHLORIDE 500 MG: 500 TABLET, FILM COATED ORAL at 21:04

## 2024-10-08 RX ADMIN — GABAPENTIN 300 MG: 300 CAPSULE ORAL at 21:04

## 2024-10-08 RX ADMIN — SODIUM CHLORIDE: 9 INJECTION, SOLUTION INTRAVENOUS at 17:37

## 2024-10-08 RX ADMIN — SODIUM CHLORIDE, PRESERVATIVE FREE 10 ML: 5 INJECTION INTRAVENOUS at 21:14

## 2024-10-08 RX ADMIN — SODIUM PHOSPHATE, MONOBASIC, MONOHYDRATE AND SODIUM PHOSPHATE, DIBASIC, ANHYDROUS 30 MMOL: 142; 276 INJECTION, SOLUTION INTRAVENOUS at 22:43

## 2024-10-08 RX ADMIN — CALCIUM 500 MG: 500 TABLET ORAL at 17:39

## 2024-10-08 RX ADMIN — CEFEPIME 2000 MG: 2 INJECTION, POWDER, FOR SOLUTION INTRAVENOUS at 17:39

## 2024-10-08 RX ADMIN — AMLODIPINE BESYLATE 5 MG: 5 TABLET ORAL at 17:39

## 2024-10-08 RX ADMIN — SODIUM CHLORIDE 15 ML: 900 IRRIGANT IRRIGATION at 21:13

## 2024-10-08 NOTE — PROGRESS NOTES
4 Eyes Skin Assessment     NAME:  Farhad Balderas  YOB: 1970  MEDICAL RECORD NUMBER:  [id number]    The patient is being assessed for  Admission    I agree that at least one RN has performed a thorough Head to Toe Skin Assessment on the patient. ALL assessment sites listed below have been assessed.      Areas assessed by both nurses:    Head, Face, Ears, Shoulders, Back, Chest, Arms, Elbows, Hands, Sacrum. Buttock, Coccyx, Ischium, and Legs. Feet and Heels        Does the Patient have a Wound? No noted wound(s)       Dk Prevention initiated by RN: No  Wound Care Orders initiated by RN: No    Pressure Injury (Stage 3,4, Unstageable, DTI, NWPT, and Complex wounds) if present, place Wound referral order by RN under : No    New Ostomies, if present place, Ostomy referral order under : No     Nurse 1 eSignature: Electronically signed by Christi Vences RN on 10/8/24 at 4:38 PM EDT    **SHARE this note so that the co-signing nurse can place an eSignature**    Nurse 2 eSignature: Electronically signed by Carie Qureshi RN on 10/8/24 at 6:43 PM EDT

## 2024-10-08 NOTE — H&P
[facility] History and Physical       Attending Physician: Niki Morton DO    Primary Care: Tila Eisenberg DO       Referring MD: Niki Morton DO  [address]  [address]  [place] [address]    Name: Farhad Balderas :  1970  MRN:  [id number]    Admission: (Not on file)      Date: 10/8/2024    Reason for Admission: Hypoxia, PNA     History of Present Illness:       Farhad Balderas is a 55 yo female w/ IgG Kappa Multiple Myeloma. Her PMH is also significant for HTN, intestinal malabsorption disorders, esophagitis and CRYSTAL.  She is s/p  RVD x 5 cycles (2021 - 22) and High Dose Melphalan f/b autologous SCT (22). She then began progressing on maintenance revlimid late , she was started on clinical trial Eden/Pom/Dex on 24. She then began to progress on trial and the plan was to bridge her with Xopivo/Kyprolis/Dex while undergoing work up for Carvykti.     Patient presented to [facility] on 10/7/24 for an acute visit due to body aches, nausea, vomiting and diarrhea. Her work up was consistent with parainfluenza and her CXR showed LLL basilar airspace disease. Patient presented for next day follow up and had a temperature of 1026. Her O2 saturations with walking dropped to 87%. Given her symptoms, she will now be admitted for further work up and treatment of her pneumonia. Upon admission, she will be started on Cefepime.            Past Surgical History:   Procedure Laterality Date    BREAST BIOPSY      CATHETER INSERTION N/A 2022    INSERT TRIFUSION CATHETER performed by Max Bell MD at [facility] OR    CT BONE MARROW BIOPSY  2021    CT BONE MARROW BIOPSY 2021 [facility]    CT BONE MARROW BIOPSY  2022    CT BONE MARROW BIOPSY 3/18/2022 [facility] CT SCAN    CT BONE MARROW BIOPSY  06/10/2022    CT BONE MARROW BIOPSY 6/10/2022 [facility] CT SCAN    DENTAL SURGERY      HYSTERECTOMY (CERVIX STATUS UNKNOWN)      IR NONTUNNELED VASCULAR CATHETER  2024    IR NONTUNNELED VASCULAR  fluids: NS @ 100 ml/hr  - Replace Mag and K per PRN orders      5. Endocrine:  - H/o Type 2 DM  - Currently off metformin  - Hgb A1c (1/6/22): 6.1  - Monitor on AM labs      6. GI / Nutrition:    - H/o esophagitis  Nutrition:    - Cont low microbial diet  GERD:   - Cont PPI daily   - Cont TUMs as needed  Nausea / Vomiting: improving  - Cont Zofran & Compazine PRN  Diarrhea:     - Send stool studies if occurs while inpatient      7. Poor dentition:    - S/p partial extractions     8. Pain/peripheral neuropathy   - Tramadol PRN  - Continue gabapentin TID     9. Cardiac: HTN  - Continue amlodipine 5 mg daily & lisinopril 40mg daily   - discontinue aspirin 9/24/24         - DVT Prophylaxis: Platelets >50,000 cells/dL, - daily lovenox prophylaxis ordered  Contraindications to pharmacologic prophylaxis: None  Contraindications to mechanical prophylaxis: None      - Disposition: Uncertain    The patient was seen and examined by Dr. Morton. This admission history and physical has been discussed and agreed upon by Dr. Morton.    JEY Shafer

## 2024-10-09 LAB
ALBUMIN SERPL-MCNC: 2.9 G/DL (ref 3.4–5)
ALP SERPL-CCNC: 68 U/L (ref 40–129)
ALT SERPL-CCNC: 17 U/L (ref 10–40)
ANION GAP SERPL CALCULATED.3IONS-SCNC: 10 MMOL/L (ref 3–16)
AST SERPL-CCNC: 24 U/L (ref 15–37)
BASOPHILS # BLD: 0 K/UL (ref 0–0.2)
BASOPHILS NFR BLD: 0.2 %
BILIRUB DIRECT SERPL-MCNC: 0.3 MG/DL (ref 0–0.3)
BILIRUB INDIRECT SERPL-MCNC: 0.3 MG/DL (ref 0–1)
BILIRUB SERPL-MCNC: 0.6 MG/DL (ref 0–1)
BUN SERPL-MCNC: 8 MG/DL (ref 7–20)
CALCIUM SERPL-MCNC: 7.6 MG/DL (ref 8.3–10.6)
CHLORIDE SERPL-SCNC: 99 MMOL/L (ref 99–110)
CO2 SERPL-SCNC: 27 MMOL/L (ref 21–32)
CREAT SERPL-MCNC: 0.8 MG/DL (ref 0.6–1.1)
DEPRECATED RDW RBC AUTO: 16.6 % (ref 12.4–15.4)
EKG ATRIAL RATE: 100 BPM
EKG DIAGNOSIS: NORMAL
EKG P AXIS: 67 DEGREES
EKG P-R INTERVAL: 152 MS
EKG Q-T INTERVAL: 342 MS
EKG QRS DURATION: 90 MS
EKG QTC CALCULATION (BAZETT): 441 MS
EKG R AXIS: 19 DEGREES
EKG T AXIS: 31 DEGREES
EKG VENTRICULAR RATE: 100 BPM
EOSINOPHIL # BLD: 0 K/UL (ref 0–0.6)
EOSINOPHIL NFR BLD: 0.2 %
GFR SERPLBLD CREATININE-BSD FMLA CKD-EPI: 87 ML/MIN/{1.73_M2}
GLUCOSE SERPL-MCNC: 133 MG/DL (ref 70–99)
HCT VFR BLD AUTO: 23.2 % (ref 36–48)
HGB BLD-MCNC: 7.7 G/DL (ref 12–16)
LDH SERPL L TO P-CCNC: 211 U/L (ref 100–190)
LYMPHOCYTES # BLD: 0.8 K/UL (ref 1–5.1)
LYMPHOCYTES NFR BLD: 11.7 %
MAGNESIUM SERPL-MCNC: 2 MG/DL (ref 1.8–2.4)
MCH RBC QN AUTO: 29.6 PG (ref 26–34)
MCHC RBC AUTO-ENTMCNC: 33.4 G/DL (ref 31–36)
MCV RBC AUTO: 88.6 FL (ref 80–100)
MONOCYTES # BLD: 0.6 K/UL (ref 0–1.3)
MONOCYTES NFR BLD: 9.4 %
NEUTROPHILS # BLD: 5.3 K/UL (ref 1.7–7.7)
NEUTROPHILS NFR BLD: 78.5 %
PHOSPHATE SERPL-MCNC: 2.9 MG/DL (ref 2.5–4.9)
PLATELET # BLD AUTO: 164 K/UL (ref 135–450)
PMV BLD AUTO: 9.1 FL (ref 5–10.5)
POTASSIUM SERPL-SCNC: 2.8 MMOL/L (ref 3.5–5.1)
PROT SERPL-MCNC: 7 G/DL (ref 6.4–8.2)
RBC # BLD AUTO: 2.62 M/UL (ref 4–5.2)
SODIUM SERPL-SCNC: 136 MMOL/L (ref 136–145)
URATE SERPL-MCNC: 4.6 MG/DL (ref 2.6–6)
WBC # BLD AUTO: 6.8 K/UL (ref 4–11)

## 2024-10-09 PROCEDURE — 36592 COLLECT BLOOD FROM PICC: CPT

## 2024-10-09 PROCEDURE — 2060000000 HC ICU INTERMEDIATE R&B

## 2024-10-09 PROCEDURE — 6370000000 HC RX 637 (ALT 250 FOR IP): Performed by: PHYSICIAN ASSISTANT

## 2024-10-09 PROCEDURE — 85025 COMPLETE CBC W/AUTO DIFF WBC: CPT

## 2024-10-09 PROCEDURE — 80048 BASIC METABOLIC PNL TOTAL CA: CPT

## 2024-10-09 PROCEDURE — 84550 ASSAY OF BLOOD/URIC ACID: CPT

## 2024-10-09 PROCEDURE — 93010 ELECTROCARDIOGRAM REPORT: CPT | Performed by: INTERNAL MEDICINE

## 2024-10-09 PROCEDURE — 83615 LACTATE (LD) (LDH) ENZYME: CPT

## 2024-10-09 PROCEDURE — 94150 VITAL CAPACITY TEST: CPT

## 2024-10-09 PROCEDURE — 83735 ASSAY OF MAGNESIUM: CPT

## 2024-10-09 PROCEDURE — 97162 PT EVAL MOD COMPLEX 30 MIN: CPT

## 2024-10-09 PROCEDURE — 2580000003 HC RX 258: Performed by: PHYSICIAN ASSISTANT

## 2024-10-09 PROCEDURE — 80076 HEPATIC FUNCTION PANEL: CPT

## 2024-10-09 PROCEDURE — 97116 GAIT TRAINING THERAPY: CPT

## 2024-10-09 PROCEDURE — 97530 THERAPEUTIC ACTIVITIES: CPT

## 2024-10-09 PROCEDURE — 6360000002 HC RX W HCPCS: Performed by: PHYSICIAN ASSISTANT

## 2024-10-09 PROCEDURE — 84100 ASSAY OF PHOSPHORUS: CPT

## 2024-10-09 RX ADMIN — CETIRIZINE HYDROCHLORIDE 10 MG: 10 TABLET, FILM COATED ORAL at 08:52

## 2024-10-09 RX ADMIN — CEFEPIME 2000 MG: 2 INJECTION, POWDER, FOR SOLUTION INTRAVENOUS at 20:47

## 2024-10-09 RX ADMIN — SODIUM CHLORIDE 15 ML: 900 IRRIGANT IRRIGATION at 20:49

## 2024-10-09 RX ADMIN — CEFEPIME 2000 MG: 2 INJECTION, POWDER, FOR SOLUTION INTRAVENOUS at 13:46

## 2024-10-09 RX ADMIN — SODIUM CHLORIDE 15 ML: 900 IRRIGANT IRRIGATION at 18:00

## 2024-10-09 RX ADMIN — GABAPENTIN 300 MG: 300 CAPSULE ORAL at 13:46

## 2024-10-09 RX ADMIN — SODIUM CHLORIDE, PRESERVATIVE FREE 10 ML: 5 INJECTION INTRAVENOUS at 08:54

## 2024-10-09 RX ADMIN — PANTOPRAZOLE SODIUM 40 MG: 40 TABLET, DELAYED RELEASE ORAL at 06:46

## 2024-10-09 RX ADMIN — POTASSIUM CHLORIDE 20 MEQ: 29.8 INJECTION INTRAVENOUS at 11:35

## 2024-10-09 RX ADMIN — SODIUM CHLORIDE 15 ML: 900 IRRIGANT IRRIGATION at 11:36

## 2024-10-09 RX ADMIN — GABAPENTIN 300 MG: 300 CAPSULE ORAL at 20:48

## 2024-10-09 RX ADMIN — AMLODIPINE BESYLATE 5 MG: 5 TABLET ORAL at 08:52

## 2024-10-09 RX ADMIN — POTASSIUM CHLORIDE 20 MEQ: 29.8 INJECTION INTRAVENOUS at 10:00

## 2024-10-09 RX ADMIN — CEFEPIME 2000 MG: 2 INJECTION, POWDER, FOR SOLUTION INTRAVENOUS at 05:00

## 2024-10-09 RX ADMIN — POTASSIUM CHLORIDE 20 MEQ: 29.8 INJECTION INTRAVENOUS at 06:50

## 2024-10-09 RX ADMIN — VALACYCLOVIR HYDROCHLORIDE 500 MG: 500 TABLET, FILM COATED ORAL at 08:53

## 2024-10-09 RX ADMIN — SODIUM CHLORIDE: 9 INJECTION, SOLUTION INTRAVENOUS at 15:32

## 2024-10-09 RX ADMIN — SODIUM CHLORIDE: 9 INJECTION, SOLUTION INTRAVENOUS at 04:27

## 2024-10-09 RX ADMIN — CALCIUM 500 MG: 500 TABLET ORAL at 08:52

## 2024-10-09 RX ADMIN — LISINOPRIL 40 MG: 40 TABLET ORAL at 08:52

## 2024-10-09 RX ADMIN — GABAPENTIN 300 MG: 300 CAPSULE ORAL at 08:53

## 2024-10-09 RX ADMIN — BENZONATATE 100 MG: 100 CAPSULE ORAL at 23:59

## 2024-10-09 RX ADMIN — VALACYCLOVIR HYDROCHLORIDE 500 MG: 500 TABLET, FILM COATED ORAL at 20:48

## 2024-10-09 RX ADMIN — POTASSIUM CHLORIDE 20 MEQ: 29.8 INJECTION INTRAVENOUS at 08:25

## 2024-10-09 NOTE — DISCHARGE SUMMARY
[facility] Discharge Summary             Attending Physician: Niki Morton DO    Referring MD: Niki Morton DO  [address]  [place] [address]    Name: Farhad Balderas :  1970  MRN:  [id number]    Admission: 10/8/2024   Discharge: 10/10/24    Date: 10/10/2024    Diagnosis on admit: Multiple Myeloma     Procedures: Routine chest x-ray, laboratories, EKG, IV fluid hydration, Panculture for fevers, IV antimicrobial therapy, Respiratory therapy, Oxygen therapy, Blood Product Infusions    Medications:      Medication List        START taking these medications      benzonatate 100 MG capsule  Commonly known as: TESSALON  Take 1 capsule by mouth 3 times daily as needed for Cough            CONTINUE taking these medications      amLODIPine 5 MG tablet  Commonly known as: NORVASC  Take 1 tablet by mouth daily     calcium carbonate 500 MG Tabs tablet  Commonly known as: OSCAL     cetirizine 10 MG tablet  Commonly known as: ZYRTEC     cyclobenzaprine 5 MG tablet  Commonly known as: FLEXERIL  Take 1 tablet by mouth twice daily as needed     gabapentin 300 MG capsule  Commonly known as: NEURONTIN     lisinopril 40 MG tablet  Commonly known as: PRINIVIL;ZESTRIL  Take 1 tablet by mouth daily     pantoprazole 40 MG tablet  Commonly known as: PROTONIX  Take 1 tablet by mouth every morning (before breakfast)     prochlorperazine 10 MG tablet  Commonly known as: COMPAZINE  Take 1 tablet by mouth every 6 hours as needed (nausea / vomiting)     valACYclovir 500 MG tablet  Commonly known as: VALTREX  Take 1 tablet by mouth 2 times daily               Where to Get Your Medications        These medications were sent to [address] [facility] P [phone] - F [phone] [address] [facility] [address]      Phone: [phone]   benzonatate 100 MG capsule  cyclobenzaprine 5 MG tablet         Reason for Admission: Hypoxia, PNA     Hospital Course: Farhda  plasma cell  - Serum light chain with M protein of 1, normal FLC ratio   - s/p HD [id number] and ASCT (7/1/22) followed by Rev maintenance -- Patient did not meet inclusion criteria #3 of obtaining VGPR or better for clinical trial  - PET (1/31/24): No evidence of any definitive hypermetabolic neoplastic disease. Mild diffuse hypermetabolic activity identified within the spine, pelvis and femoral diaphysis is without corresponding CT abnormality therefore favors physiologic marrow activity. Diffuse infiltrating marrow process is a differential consideration. 3. Spleen activity representative of possible extramedullary disease  - S/p Eden/Pom/Dex on clinical trial (C1D1 2/7/24) x 8 cycles with progression     PLAN:   Selinexor/Kyprolis/Dex as bridge (not started as of yet)  to Carvykti CAR-T       2. ID: admitted with PNA, parainfluenza +  - Cont Valtrex ppx  - Post transplant vaccines: #1 (1/6/22) , #2 (3/14/23), #3 (6/6/23)   - Respiratory panel (10/7/24): parainfluenza    - CXR (10/7/24): LLL basilar airspace disease. Possible small pleural effusions   - S/p Cefepime x 2 days (started 10/8/24)     3. Heme: anemia 2/2 chemotherapy  - H/o CRYSTAL: S/p IV iron (3/21/22 & 3/28/22)  - Transfuse for Hgb < 7 and Platelets < 10K  - No transfusion today    4. Metabolic:    - Replace Mag and K per PRN orders      5. Endocrine:  - H/o Type 2 DM  - Currently off metformin  - Hgb A1c (1/6/22): 6.1  - Monitor on AM labs      6. GI / Nutrition:    - H/o esophagitis  Nutrition:    - Cont low microbial diet  GERD:   - Cont PPI daily   - Cont TUMs as needed  Nausea / Vomiting: improving  - Cont Zofran & Compazine PRN  Diarrhea:     - Send stool studies if occurs while inpatient      7. Poor dentition:    - S/p partial extractions     8. Pain/peripheral neuropathy   - Tramadol PRN  - Continue gabapentin TID     9. Cardiac: HTN  - Continue amlodipine 5 mg daily & lisinopril 40mg daily   - discontinue aspirin 9/24/24    Condition on discharge:

## 2024-10-09 NOTE — PROGRESS NOTES
ASCT (7/1/22) followed by Rev maintenance -- Patient did not meet inclusion criteria #3 of obtaining VGPR or better for clinical trial  - PET (1/31/24): No evidence of any definitive hypermetabolic neoplastic disease. Mild diffuse hypermetabolic activity identified within the spine, pelvis and femoral diaphysis is without corresponding CT abnormality therefore favors physiologic marrow activity. Diffuse infiltrating marrow process is a differential consideration. 3. Spleen activity representative of possible extramedullary disease  - S/p Eden/Pom/Dex on clinical trial (C1D1 2/7/24) x 8 cycles with progression     PLAN:   Selinexor/Kyprolis/Dex as bridge (not started as of yet)  to Carvykti CAR-T       2. ID: admitted with PNA, parainfluenza +  - Cont Valtrex ppx  - Post transplant vaccines: #1 (1/6/22) , #2 (3/14/23), #3 (6/6/23)   - Respiratory panel (10/7/24): parainfluenza    - CXR (10/7/24): LLL basilar airspace disease. Possible small pleural effusions   - Cefepime - Day +2 (started 10/8/24)     3. Heme: anemia 2/2 chemotherapy  - H/o CRYSTAL: S/p IV iron (3/21/22 & 3/28/22)  - Transfuse for Hgb < 7 and Platelets < 10K  - No transfusion today    4. Metabolic:    - IV fluids: NS @ 100 ml/hr  - Replace Mag and K per PRN orders      5. Endocrine:  - H/o Type 2 DM  - Currently off metformin  - Hgb A1c (1/6/22): 6.1  - Monitor on AM labs      6. GI / Nutrition:    - H/o esophagitis  Nutrition:    - Cont low microbial diet  GERD:   - Cont PPI daily   - Cont TUMs as needed  Nausea / Vomiting: improving  - Cont Zofran & Compazine PRN  Diarrhea:     - Send stool studies if occurs while inpatient      7. Poor dentition:    - S/p partial extractions     8. Pain/peripheral neuropathy   - Tramadol PRN  - Continue gabapentin TID     9. Cardiac: HTN  - Continue amlodipine 5 mg daily & lisinopril 40mg daily   - discontinue aspirin 9/24/24        - DVT Prophylaxis: Platelets >50,000 cells/dL, - daily lovenox prophylaxis

## 2024-10-09 NOTE — PROGRESS NOTES
Low Risk Nutrition Note       Nutrition Assessment:  Assessing patient for poor intake/appetite and weight loss. Admitted with body aches, nausea, vomiting and diarrhea. Chart review given droplet isolation. DIfficult to assess more recent weights due to stated vs standing scale, appears CBW ~ 253#. No signifiant weight loss. Adequate intake, >75% of 1 meal so far. No nutrition interventions at this time, will follow to ensure meeting energy/protein needs.     Current Nutrition Therapies:    ADULT DIET; Regular; Low Microbial    Anthropometrics:   Current Height: 165.1 cm (5' 5\")  Current Weight - Scale: 106.6 kg (235 lb)      Monitoring and Evaluation:  No nutrition diagnosis. Patient will be monitored per nutrition standards of care.     Consult Dietitian if nutrition intervention essential to patient care is needed.     Discharge Planning:  No needs    Cady Jimenez RD  [place]:  [phone]

## 2024-10-09 NOTE — PROGRESS NOTES
Spiritual Health History and Assessment/Progress Note  [facility]    (P) Initial Encounter,  , (P) Life Adjustments, Adjustment to illness,      Name: Farhad Balderas MRN: [id number]    Age: 54 y.o.     Sex: female   Language: English   Taoist: Unknown   Hypoxia     Date: 10/9/2024            Total Time Calculated: (P) 38 min              Spiritual Assessment began in [address]        Referral/Consult From: (P) Physician   Encounter Overview/Reason: (P) Initial Encounter  Service Provided For: (P) Patient    Socorro, Belief, Meaning:   Patient identifies as spiritual, is connected with a socorro tradition or spiritual practice, and has beliefs or practices that help with coping during difficult times  Family/Friends No family/friends present      Importance and Influence:  Patient has spiritual/personal beliefs that influence decisions regarding their health  Family/Friends No family/friends present    Community:  Patient is connected with a spiritual community and feels well-supported. Support system includes: Spouse/Partner, Parent/s, Children, and [place] Community  Family/Friends No family/friends present    Assessment and Plan of Care:     Patient Interventions include: Facilitated expression of thoughts and feelings and Affirmed coping skills/support systems  Family/Friends Interventions include: No family/friends present    Patient Plan of Care: No spiritual needs identified for follow-up  Family/Friends Plan of Care: Spiritual Care available upon further referral    Electronically signed by Chaplain Kahlil on 10/9/2024 at 11:27 AM

## 2024-10-09 NOTE — CARE COORDINATION
Case Management Assessment  Initial Evaluation    Date/Time of Evaluation: 10/9/2024 11:12 AM  Assessment Completed by: CLAUDE Apple   for [facility] [place] ([facility])  [place] [employer]: [phone]    If patient is discharged prior to next notation, then this note serves as note for discharge by case management.    Patient Name: Farhad Balderas                   YOB: 1970  Diagnosis: Hypoxia [R09.02]  Pneumonia, unspecified organism [J18.9]                   Date / Time: 10/8/2024  3:56 PM    Patient Admission Status: Inpatient   Readmission Risk (Low < 19, Mod (19-27), High > 27): Readmission Risk Score: 13.2    Current PCP: Tila Eisenberg, DO  PCP verified by CM? Yes    Chart Reviewed: Yes      History Provided by: Patient  Patient Orientation: Alert and Oriented    Patient Cognition: Alert    Hospitalization in the last 30 days (Readmission):  No    If yes, Readmission Assessment in CM Navigator will be completed.    Advance Directives:      Code Status: Full Code   Patient's Primary Decision Maker is: Legal Next of Kin    Primary Decision Maker: felipe benitez - Child - [phone]    Secondary Decision Maker: Albert Orosco - Child - [phone]    Discharge Planning:    Patient lives with: Parent, Spouse/Significant Other Type of Home: House  Primary Care Giver: Self  Patient Support Systems include: Spouse/Significant Other, Parent, Family Members   Current Financial resources: Medicaid ([employer])  Current community resources: None  Current services prior to admission: Other (Comment) ([facility])            Current DME:              Type of Home Care services:  None    ADLS  Prior functional level: Independent in ADLs/IADLs  Current functional level: Independent in ADLs/IADLs    PT AM-PAC:   /24  OT AM-PAC:   /24    Family can provide assistance at DC: Yes  Would you like Case Management to discuss the discharge plan with any other family  members/significant others, and if so, who? Yes  Plans to Return to Present Housing: Yes  Other Identified Issues/Barriers to RETURNING to current housing: n/a  Potential Assistance needed at discharge: N/A            Potential DME:    Patient expects to discharge to: House  Plan for transportation at discharge:      Financial    Payor: [employer] / Plan: [employer] [place] MEDICAID / Product Type: *No Product type* /     Does insurance require precert for SNF: Yes    Potential assistance Purchasing Medications: No  Meds-to-Beds request:        [facility] [address] [place] [address] - [address] [facility] -  [phone] - F [phone] [address] [facility] [address]  Phone: [phone] Fax: [phone]      Notes:    Factors facilitating achievement of predicted outcomes: Family support, Motivated, Cooperative, Pleasant, Sense of humor, and Good insight into deficits    Barriers to discharge: Medical complications    Additional Case Management Notes: Chart review completed. Spoke with pt at bedside with spiritual care present per pt permission. Pt stated she is independent with her ADL's and will return home when able. She said someone is with her She said she won't need skilled home care when discharged and will have a ride home. She denied needs for SW when discharged and encouraged to call if needs arise.     SW will follow but anticipates no needs     The Plan for Transition of Care is related to the following treatment goals of Hypoxia [R09.02]  Pneumonia, unspecified organism [J18.9]    Thais TAVERAS, CLAUDE   for [facility] ([facility])  [place] [employer]: [phone]

## 2024-10-09 NOTE — PROGRESS NOTES
2340: Pt sitting around 85% on room air. Pt was placed on 2L nasal cannula. Notified Dr. GURPREET Morton. She advised continue to monitor, no new orders.

## 2024-10-09 NOTE — H&P
[facility] Progress Note    10/9/2024     Farhad Balderas    MRN: [id number]    : 1970    Referring MD: Niki Morton DO  [address]  [address]  [place] [address]      SUBJECTIVE:  Patient is doing welI.  Now off O2.  No complaints.    ECOG PS:  (2) Ambulatory and capable of self care, unable to carry out work activity, up and about > 50% or waking hours    KPS: 50%  Requires considerable assistance and frequent medical care    Isolation: None    Medications    Scheduled Meds:   amLODIPine  5 mg Oral Daily    calcium elemental  500 mg Oral Daily    cetirizine  10 mg Oral Daily    gabapentin  300 mg Oral TID    lisinopril  40 mg Oral Daily    pantoprazole  40 mg Oral QAM AC    valACYclovir  500 mg Oral BID    sodium chloride flush  5-40 mL IntraVENous 2 times per day    Saline Mouthwash  15 mL Swish & Spit 4x Daily AC & HS    enoxaparin  30 mg SubCUTAneous BID    cefepime  2,000 mg IntraVENous Q8H     Continuous Infusions:   sodium chloride 100 mL/hr at 10/09/24 0427    sodium chloride      sodium chloride       PRN Meds:.cyclobenzaprine, prochlorperazine, ondansetron, diphenhydrAMINE, sodium chloride, sodium chloride flush, sodium chloride, potassium chloride, magnesium sulfate, magnesium hydroxide, Saline Mouthwash, acetaminophen, traMADol, benzonatate, ALTEplase    ROS:  As noted above, otherwise remainder of 10-point ROS negative    Physical Exam:     I&O:    Intake/Output Summary (Last 24 hours) at 10/9/2024 0843  Last data filed at 10/9/2024 0822  Gross per 24 hour   Intake 1733 ml   Output 800 ml   Net 933 ml       Vital Signs:  /67   Pulse 95   Temp 98.1 °F (36.7 °C) (Oral)   Resp 16   Ht 1.651 m (5' 5\")   Wt 106.6 kg (235 lb)   SpO2 92%   BMI 39.11 kg/m²     Weight:    Wt Readings from Last 3 Encounters:   10/08/24 106.6 kg (235 lb)   24 114.8 kg (253 lb)   24 115 kg (253 lb 8.5 oz)         General: Awake, alert and oriented.  HEENT: normocephalic, PERRL, no scleral

## 2024-10-09 NOTE — PROGRESS NOTES
Physical Therapy  Facility/Department: [address] [facility] [place]  Physical Therapy Initial Assessment and DISCHARGE    Name: Farhad Balderas  : 1970  MRN: [id number]  Date of Service: 10/9/2024    Discharge Recommendations:  Home with assist PRN   PT Equipment Recommendations  Equipment Needed: No    Assessment  Assessment: Pt from home, admitted for hypoxia.  PT orders for mobility program.  Pt not appropriate for mobility program as pt is not currently admitted for transplant.  Pt doing well from PT standpoint, demonstrating independence with transfers and steady gait without loss of balance.  Pt voices no concerns returing home at d/c with assist from family.  No further acute PT needs at this time.  Will sign off from acute PT and defer ongoing ambulation/activity to RN staff until discharged home.  Decision Making: Medium Complexity  Requires PT Follow-Up: No    Plan  General Plan: Discharge with evaluation only    Safety Devices  Type of Devices: Left in bed, Call light within reach, Nurse notified (Pt reports up ad dorcas per RN staff)    Restrictions  Other position/activity restrictions: Up as tolerated     Subjective  Pain: Pt denies pain.    Chart Reviewed: Yes  Additional Pertinent Hx: Pt initially to [facility] on 10/7 due to body aches, nausea, vomiting and diarrhea.  Work up was consistent with parainfluenza.  CXR:  LLL basilar airspace disease.  Pt returned to [facility] 10/8 for follow up and had a temperature of 102.6.  O2 sats decreased to 87% with ambulation.  Pt admitted for further work up and treatment of her pneumonia.  PMH:  HTN, anxiety, DM, depression, multiple myeloma, anemia, neutropenic fever    Diagnosis: Hypoxia    Subjective  Subjective: Pt found supine in bed.  Pt agreeable for PT.  \"I am doing much better today.\"    Social/Functional History  Lives With: Daughter, Spouse (mother)  Type of Home: House  Home Layout: One level, Laundry in basement  Home Access: Ramped entrance  Bathroom  24  AM-PAC Inpatient T-Scale Score : 61.14  Mobility Inpatient CMS 0-100% Score: 0  Mobility Inpatient CMS G-Code Modifier : CH      Education  Patient Education  Education Given To: Patient  Education Provided: Role of Therapy  Education Method: Verbal  Education Outcome: Verbalized understanding      Therapy Time   Individual Concurrent Group Co-treatment   Time In 1310         Time Out 1403         Minutes 53         Timed Code Treatment Minutes:  40  Total Treatment Minutes:  53      María Saavedra PT

## 2024-10-09 NOTE — PLAN OF CARE
Problem: ABCDS Injury Assessment  Goal: Absence of physical injury  Flowsheets (Taken 10/9/2024 0131)  Absence of Physical Injury: Implement safety measures based on patient assessment  Note: Pt is a high fall risk (see [place] Fall Risk assessment).  Oriented pt to room and call light. Instructed pt to call for help when needed.  Call light and personal items within reach.  Bed in lowest position, brakes on, and 2/4 side rails up.  Non-skid footwear on. Falls risk stop sign on door; hourly visual checks implemented.  Falls risk arm band on pt.  Bed alarm is on.  Pt using call light appropriately.  Will continue to monitor.       Problem: Pain  Goal: Verbalizes/displays adequate comfort level or baseline comfort level  Flowsheets (Taken 10/9/2024 0131)  Verbalizes/displays adequate comfort level or baseline comfort level:   Encourage patient to monitor pain and request assistance   Administer analgesics based on type and severity of pain and evaluate response   Assess pain using appropriate pain scale  Note: Pt with no complaints of pain. Encouraged to monitor and request assistance if pain occurs.

## 2024-10-10 VITALS
DIASTOLIC BLOOD PRESSURE: 66 MMHG | WEIGHT: 235 LBS | OXYGEN SATURATION: 95 % | HEIGHT: 65 IN | RESPIRATION RATE: 19 BRPM | SYSTOLIC BLOOD PRESSURE: 137 MMHG | BODY MASS INDEX: 39.15 KG/M2 | TEMPERATURE: 97.6 F | HEART RATE: 101 BPM

## 2024-10-10 LAB
ABO + RH BLD: NORMAL
ANION GAP SERPL CALCULATED.3IONS-SCNC: 8 MMOL/L (ref 3–16)
ANISOCYTOSIS BLD QL SMEAR: ABNORMAL
APTT BLD: 27.7 SEC (ref 22.1–36.4)
BASOPHILS # BLD: 0 K/UL (ref 0–0.2)
BASOPHILS NFR BLD: 0 %
BLD GP AB SCN SERPL QL: NORMAL
BUN SERPL-MCNC: 6 MG/DL (ref 7–20)
CALCIUM SERPL-MCNC: 8 MG/DL (ref 8.3–10.6)
CHLORIDE SERPL-SCNC: 103 MMOL/L (ref 99–110)
CO2 SERPL-SCNC: 29 MMOL/L (ref 21–32)
CREAT SERPL-MCNC: 0.7 MG/DL (ref 0.6–1.1)
DEPRECATED RDW RBC AUTO: 16.6 % (ref 12.4–15.4)
EOSINOPHIL # BLD: 0.1 K/UL (ref 0–0.6)
EOSINOPHIL NFR BLD: 1 %
GFR SERPLBLD CREATININE-BSD FMLA CKD-EPI: >90 ML/MIN/{1.73_M2}
GLUCOSE SERPL-MCNC: 142 MG/DL (ref 70–99)
HCT VFR BLD AUTO: 24.2 % (ref 36–48)
HGB BLD-MCNC: 7.9 G/DL (ref 12–16)
INR PPP: 1.09 (ref 0.85–1.15)
LYMPHOCYTES # BLD: 0.6 K/UL (ref 1–5.1)
LYMPHOCYTES NFR BLD: 11 %
MCH RBC QN AUTO: 29 PG (ref 26–34)
MCHC RBC AUTO-ENTMCNC: 32.6 G/DL (ref 31–36)
MCV RBC AUTO: 88.8 FL (ref 80–100)
MONOCYTES # BLD: 0 K/UL (ref 0–1.3)
MONOCYTES NFR BLD: 0 %
NEUTROPHILS # BLD: 4.8 K/UL (ref 1.7–7.7)
NEUTROPHILS NFR BLD: 88 %
PHOSPHATE SERPL-MCNC: 2.2 MG/DL (ref 2.5–4.9)
PLATELET # BLD AUTO: 179 K/UL (ref 135–450)
PMV BLD AUTO: 9.1 FL (ref 5–10.5)
POTASSIUM SERPL-SCNC: 3.2 MMOL/L (ref 3.5–5.1)
PROTHROMBIN TIME: 14.3 SEC (ref 11.9–14.9)
RBC # BLD AUTO: 2.72 M/UL (ref 4–5.2)
SODIUM SERPL-SCNC: 140 MMOL/L (ref 136–145)
URATE SERPL-MCNC: 3.8 MG/DL (ref 2.6–6)
WBC # BLD AUTO: 5.4 K/UL (ref 4–11)

## 2024-10-10 PROCEDURE — 84550 ASSAY OF BLOOD/URIC ACID: CPT

## 2024-10-10 PROCEDURE — 94640 AIRWAY INHALATION TREATMENT: CPT

## 2024-10-10 PROCEDURE — 86850 RBC ANTIBODY SCREEN: CPT

## 2024-10-10 PROCEDURE — 86901 BLOOD TYPING SEROLOGIC RH(D): CPT

## 2024-10-10 PROCEDURE — 6370000000 HC RX 637 (ALT 250 FOR IP): Performed by: INTERNAL MEDICINE

## 2024-10-10 PROCEDURE — 6370000000 HC RX 637 (ALT 250 FOR IP): Performed by: PHYSICIAN ASSISTANT

## 2024-10-10 PROCEDURE — 80048 BASIC METABOLIC PNL TOTAL CA: CPT

## 2024-10-10 PROCEDURE — 85730 THROMBOPLASTIN TIME PARTIAL: CPT

## 2024-10-10 PROCEDURE — 84100 ASSAY OF PHOSPHORUS: CPT

## 2024-10-10 PROCEDURE — 85610 PROTHROMBIN TIME: CPT

## 2024-10-10 PROCEDURE — 6360000002 HC RX W HCPCS: Performed by: PHYSICIAN ASSISTANT

## 2024-10-10 PROCEDURE — 86900 BLOOD TYPING SEROLOGIC ABO: CPT

## 2024-10-10 PROCEDURE — 85025 COMPLETE CBC W/AUTO DIFF WBC: CPT

## 2024-10-10 PROCEDURE — 2580000003 HC RX 258: Performed by: PHYSICIAN ASSISTANT

## 2024-10-10 RX ORDER — CYCLOBENZAPRINE HCL 5 MG
TABLET ORAL
Qty: 60 TABLET | Refills: 0 | Status: SHIPPED | OUTPATIENT
Start: 2024-10-10

## 2024-10-10 RX ORDER — IPRATROPIUM BROMIDE AND ALBUTEROL SULFATE 2.5; .5 MG/3ML; MG/3ML
1 SOLUTION RESPIRATORY (INHALATION) EVERY 8 HOURS PRN
Status: DISCONTINUED | OUTPATIENT
Start: 2024-10-10 | End: 2024-10-10 | Stop reason: HOSPADM

## 2024-10-10 RX ORDER — BENZONATATE 100 MG/1
100 CAPSULE ORAL 3 TIMES DAILY PRN
Qty: 21 CAPSULE | Refills: 0 | Status: SHIPPED | OUTPATIENT
Start: 2024-10-10 | End: 2024-10-17

## 2024-10-10 RX ADMIN — SODIUM CHLORIDE: 9 INJECTION, SOLUTION INTRAVENOUS at 05:06

## 2024-10-10 RX ADMIN — VALACYCLOVIR HYDROCHLORIDE 500 MG: 500 TABLET, FILM COATED ORAL at 08:40

## 2024-10-10 RX ADMIN — AMLODIPINE BESYLATE 5 MG: 5 TABLET ORAL at 08:39

## 2024-10-10 RX ADMIN — POTASSIUM CHLORIDE 20 MEQ: 29.8 INJECTION INTRAVENOUS at 12:54

## 2024-10-10 RX ADMIN — SODIUM CHLORIDE: 9 INJECTION, SOLUTION INTRAVENOUS at 01:50

## 2024-10-10 RX ADMIN — CETIRIZINE HYDROCHLORIDE 10 MG: 10 TABLET, FILM COATED ORAL at 08:39

## 2024-10-10 RX ADMIN — GABAPENTIN 300 MG: 300 CAPSULE ORAL at 08:39

## 2024-10-10 RX ADMIN — SODIUM CHLORIDE 15 ML: 900 IRRIGANT IRRIGATION at 06:38

## 2024-10-10 RX ADMIN — LISINOPRIL 40 MG: 40 TABLET ORAL at 08:40

## 2024-10-10 RX ADMIN — POTASSIUM CHLORIDE 20 MEQ: 29.8 INJECTION INTRAVENOUS at 08:43

## 2024-10-10 RX ADMIN — PANTOPRAZOLE SODIUM 40 MG: 40 TABLET, DELAYED RELEASE ORAL at 05:20

## 2024-10-10 RX ADMIN — POTASSIUM CHLORIDE 20 MEQ: 29.8 INJECTION INTRAVENOUS at 11:13

## 2024-10-10 RX ADMIN — POTASSIUM CHLORIDE 20 MEQ: 29.8 INJECTION INTRAVENOUS at 06:19

## 2024-10-10 RX ADMIN — CEFEPIME 2000 MG: 2 INJECTION, POWDER, FOR SOLUTION INTRAVENOUS at 10:20

## 2024-10-10 RX ADMIN — SODIUM CHLORIDE, PRESERVATIVE FREE 10 ML: 5 INJECTION INTRAVENOUS at 08:41

## 2024-10-10 RX ADMIN — IPRATROPIUM BROMIDE AND ALBUTEROL SULFATE 1 DOSE: 2.5; .5 SOLUTION RESPIRATORY (INHALATION) at 00:18

## 2024-10-10 RX ADMIN — CALCIUM 500 MG: 500 TABLET ORAL at 08:39

## 2024-10-10 NOTE — PLAN OF CARE
Problem: Safety - Adult  Goal: Free from fall injury  10/10/2024 1215 by Kelsie Atwood RN  Outcome: Progressing  Flowsheets (Taken 10/10/2024 1215)  Free From Fall Injury: Instruct family/caregiver on patient safety  Note: Orthostatic vital signs obtained at start of shift - see flowsheet for details.  Pt does not meet criteria for orthostasis.  Pt is a Med fall risk. See Oconnor Fall Score and ABCDS Injury Risk assessments.   - Screening for Orthostasis AND not a [place] Risk per OCONNOR/ABCDS: Pt bed is in low position, side rails up, call light and belongings are in reach.  Fall risk light is on outside pts room.  Pt encouraged to call for assistance as needed. Will continue with hourly rounds for PO intake, pain needs, toileting and repositioning as needed.     Problem: Pain  Goal: Verbalizes/displays adequate comfort level or baseline comfort level  10/10/2024 1215 by Kelsie Atwood RN  Outcome: Progressing  Flowsheets (Taken 10/10/2024 1215)  Verbalizes/displays adequate comfort level or baseline comfort level:   Encourage patient to monitor pain and request assistance   Assess pain using appropriate pain scale   Administer analgesics based on type and severity of pain and evaluate response   Implement non-pharmacological measures as appropriate and evaluate response   Notify Licensed Independent Practitioner if interventions unsuccessful or patient reports new pain  Note: Patient not complaining of any pain this shift.

## 2024-10-10 NOTE — CARE COORDINATION
Case Management Assessment            Discharge Note                    Date / Time of Note: 10/10/2024 8:48 AM                  Discharge Note Completed by: CLAUDE Apple   for [facility] [place] ([facility])  [employer]: [phone]    Patient Name: Farhad Balderas   YOB: 1970  Diagnosis: Hypoxia [R09.02]  Pneumonia, unspecified organism [J18.9]   Date / Time: 10/8/2024  3:56 PM    Current PCP: Tila Eisenberg DO  Clinic patient: No    Hospitalization in the last 30 days: No       Advance Directives:  Code Status: Full Code  [place] DNR form completed and on chart: Not Indicated    Financial:  Payor: [facility] / Plan: [facility] MEDICAID / Product Type: *No Product type* /      Pharmacy:    [facility] [address] [place] [address] [address] [facility] [phone] -  [phone] [address] [facility] [address]  Phone: [phone] Fax: [phone]      Assistance purchasing medications?: Potential Assistance Purchasing Medications: No  Assistance provided by Case Management: None at this time    Does patient want to participate in local refill/ meds to beds program?:      Meds To Beds General Rules:  1. Can ONLY be done Monday- Friday between 8:30am-5pm  2. Prescription(s) must be in pharmacy by 3pm to be filled same day  3.Copy of patient's insurance/ prescription drug card and patient face sheet must be sent along with the prescription(s)  4. Cost of Rx cannot be added to hospital bill. If financial assistance is needed, please contact unit  or ;  or  CANNOT provide pharmacy voucher for patients co-pays  5. Patients can then  the prescription on their way out of the hospital at discharge, or pharmacy can deliver to the bedside if staff is available. (payment due at time of pick-up or delivery - cash, check, or card accepted)     Able to afford home medications/

## 2024-10-10 NOTE — PLAN OF CARE
Problem: Chronic Conditions and Co-morbidities  Goal: Patient's chronic conditions and co-morbidity symptoms are monitored and maintained or improved  10/9/2024 2221 by Skyler Fernandes RN  Outcome: Progressing  Flowsheets (Taken 10/9/2024 2221)  Care Plan - Patient's Chronic Conditions and Co-Morbidity Symptoms are Monitored and Maintained or Improved: Monitor and assess patient's chronic conditions and comorbid symptoms for stability, deterioration, or improvement  10/9/2024 2017 by Cheryl Dave RN  Outcome: Progressing     Problem: ABCDS Injury Assessment  Goal: Absence of physical injury  Outcome: Progressing  Flowsheets (Taken 10/9/2024 0131 by Zahira Daily, RN)  Absence of Physical Injury: Implement safety measures based on patient assessment     Problem: Safety - Adult  Goal: Free from fall injury  10/9/2024 2221 by Skyler Fernandes RN  Outcome: Progressing  Flowsheets (Taken 10/9/2024 2221)  Free From Fall Injury: Instruct family/caregiver on patient safety  10/9/2024 2017 by Cheryl Dave RN  Outcome: Progressing     Problem: Pain  Goal: Verbalizes/displays adequate comfort level or baseline comfort level  10/9/2024 2221 by Skyler Fernandes RN  Outcome: Progressing  Flowsheets (Taken 10/9/2024 0131 by Zahira Daily RN)  Verbalizes/displays adequate comfort level or baseline comfort level:   Encourage patient to monitor pain and request assistance   Administer analgesics based on type and severity of pain and evaluate response   Assess pain using appropriate pain scale  10/9/2024 2017 by Cheryl Dave RN  Outcome: Progressing

## 2024-10-11 ENCOUNTER — CARE COORDINATION (OUTPATIENT)
Dept: CASE MANAGEMENT | Age: 54
End: 2024-10-11

## 2024-10-11 NOTE — CARE COORDINATION
Attempted to reach patient for transitions of care follow up. Unable to reach patient.    Outreach Attempts:   1ST CTC attempt to reach Pt regarding recent hospital discharge.  CTC left voice recording with call back number requesting a call back. Will attempt to reach patient again.    Patient: Farhad Balderas    Patient : 1970   MRN: [id number]     Reason for Admission: Hypoxia  Discharge Date: 10/10/24    RURS: Readmission Risk Score: 14.5    Last Discharge Facility       Date Complaint Diagnosis Description Type Department Provider    10/8/24  Chronic midline low back pain with right-sided sciatica Admission (Discharged) [facility] Niki Morton, DO            Was this an external facility discharge? No    Follow Up Appointment:   Patient does not have a follow up appointment scheduled at time of call.  Unable to reach patient      Plan for follow-up on next business day.      Thank You,    Alise Johnson RN  Care Transition Coordinator  Contact Number:[phone]

## 2024-10-14 ENCOUNTER — TELEPHONE (OUTPATIENT)
Dept: PRIMARY CARE CLINIC | Age: 54
End: 2024-10-14

## 2024-10-14 ENCOUNTER — CARE COORDINATION (OUTPATIENT)
Dept: CARE COORDINATION | Age: 54
End: 2024-10-14

## 2024-10-14 NOTE — CARE COORDINATION
Care Transitions Note    Initial Call - Call within 2 business days of discharge: Yes    Attempted to reach patient for transitions of care follow up. Unable to reach patient.    Outreach Attempts:   Unable to leave message.     Patient: Farhad Balderas    Patient : 1970   MRN: [id number]    Reason for Admission: chronic back pain  Discharge Date: 10/10/24  RURS: Readmission Risk Score: 14.5    Last Discharge Facility       Date Complaint Diagnosis Description Type Department Provider    10/8/24  Chronic midline low back pain with right-sided sciatica Admission (Discharged) [facility] Niki Stout, DO            Was this an external facility discharge? No    Follow Up Appointment:   Patient does not have a follow up appointment scheduled at time of call.         No further follow-up call indicated     Siria Garcia

## 2024-10-14 NOTE — TELEPHONE ENCOUNTER
Care Transitions Initial Follow Up Call    Outreach made within 2 business days of discharge: Yes    Patient: Farhad Balderas Patient : 1970   MRN: [id number]  Reason for Admission: Hypoxia  Discharge Date: 10/10/24       Spoke with: not available    Discharge department/facility: [facility]    Scheduled appointment with PCP within 7-14 days    Follow Up  No future appointments.    Frida Rodríguez MA

## 2024-10-23 NOTE — PROGRESS NOTES
Physician Progress Note      PATIENT:               AIDEN WILCOX  [facility] #:                  [id number]  :                       1970  ADMIT DATE:       10/8/2024 3:56 PM  DISCH DATE:        10/10/2024 3:44 PM  RESPONDING  PROVIDER #:        CHARMAINE Fisher NP          QUERY TEXT:    Pt admitted with pneumonia. Pt treated with IV Cefepime. If possible, please   document in the progress notes and discharge summary if you are evaluating   and/or treating any of the following:    Note: CAP and HCAP indicate where the pneumonia was acquired, not a specific   type.    The medical record reflects the following:  Risk Factors: + parainfluenza, IgG Kappa Multiple myeloma, DM2  Clinical Indicators: CXR on 10/7: Left lower lobe basilar airspace disease   compatible with atelectasis versus  pneumonia. Possible small pleural effusion.  Treatment: IV Cefepime, duonebs, po Valtrex  Options provided:  -- Gram negative pneumonia  -- Parainfluenza pneumonia  -- Other - I will add my own diagnosis  -- Disagree - Not applicable / Not valid  -- Disagree - Clinically unable to determine / Unknown  -- Refer to Clinical Documentation Reviewer    PROVIDER RESPONSE TEXT:    This patient has parainfluenza pneumonia    Query created by: Brenda Vázquez on 10/17/2024 2:43 PM      Electronically signed by:  CHARMAINE Fisher NP 10/23/2024 8:00 AM

## 2024-11-06 ENCOUNTER — HOSPITAL ENCOUNTER (OUTPATIENT)
Dept: ONCOLOGY | Age: 54
Setting detail: INFUSION SERIES
Discharge: HOME | End: 2024-11-06
Payer: COMMERCIAL

## 2024-11-06 ENCOUNTER — HOSPITAL ENCOUNTER (OUTPATIENT)
Age: 54
Setting detail: SPECIMEN
Discharge: HOME | End: 2024-11-06
Payer: COMMERCIAL

## 2024-11-06 VITALS — OXYGEN SATURATION: 100 % | RESPIRATION RATE: 16 BRPM

## 2024-11-06 VITALS
OXYGEN SATURATION: 99 % | RESPIRATION RATE: 22 BRPM | SYSTOLIC BLOOD PRESSURE: 149 MMHG | TEMPERATURE: 98.8 F | HEART RATE: 81 BPM | DIASTOLIC BLOOD PRESSURE: 84 MMHG

## 2024-11-06 LAB
BASOPHILS # BLD: 0 K/UL (ref 0–0.2)
BASOPHILS NFR BLD: 0.2 %
DEPRECATED RDW RBC AUTO: 17.9 % (ref 12.4–15.4)
EOSINOPHIL # BLD: 0 K/UL (ref 0–0.6)
EOSINOPHIL NFR BLD: 1 %
HCT VFR BLD AUTO: 28.6 % (ref 36–48)
HGB BLD-MCNC: 9.3 G/DL (ref 12–16)
LYMPHOCYTES # BLD: 0.9 K/UL (ref 1–5.1)
LYMPHOCYTES NFR BLD: 30.3 %
MCH RBC QN AUTO: 29.1 PG (ref 26–34)
MCHC RBC AUTO-ENTMCNC: 32.4 G/DL (ref 31–36)
MCV RBC AUTO: 89.7 FL (ref 80–100)
MONOCYTES # BLD: 0.4 K/UL (ref 0–1.3)
MONOCYTES NFR BLD: 12 %
NEUTROPHILS # BLD: 1.8 K/UL (ref 1.7–7.7)
NEUTROPHILS NFR BLD: 56.5 %
PLATELET # BLD AUTO: 203 K/UL (ref 135–450)
PMV BLD AUTO: 8.9 FL (ref 5–10.5)
RBC # BLD AUTO: 3.19 M/UL (ref 4–5.2)
WBC # BLD AUTO: 3.1 K/UL (ref 4–11)

## 2024-11-06 PROCEDURE — 94761 N-INVAS EAR/PLS OXIMETRY MLT: CPT

## 2024-11-06 PROCEDURE — 99152 MOD SED SAME PHYS/QHP 5/>YRS: CPT

## 2024-11-06 PROCEDURE — 88305 TISSUE EXAM BY PATHOLOGIST: CPT

## 2024-11-06 PROCEDURE — 88311 DECALCIFY TISSUE: CPT

## 2024-11-06 PROCEDURE — 96376 TX/PRO/DX INJ SAME DRUG ADON: CPT

## 2024-11-06 PROCEDURE — 85025 COMPLETE CBC W/AUTO DIFF WBC: CPT

## 2024-11-06 PROCEDURE — 2700000000 HC OXYGEN THERAPY PER DAY

## 2024-11-06 PROCEDURE — 88313 SPECIAL STAINS GROUP 2: CPT

## 2024-11-06 PROCEDURE — 96374 THER/PROPH/DIAG INJ IV PUSH: CPT

## 2024-11-06 PROCEDURE — 6360000002 HC RX W HCPCS: Performed by: NURSE PRACTITIONER

## 2024-11-06 PROCEDURE — 38221 DX BONE MARROW BIOPSIES: CPT

## 2024-11-06 PROCEDURE — 36591 DRAW BLOOD OFF VENOUS DEVICE: CPT

## 2024-11-06 PROCEDURE — 96375 TX/PRO/DX INJ NEW DRUG ADDON: CPT

## 2024-11-06 RX ORDER — FENTANYL CITRATE 50 UG/ML
25 INJECTION, SOLUTION INTRAMUSCULAR; INTRAVENOUS PRN
Status: DISCONTINUED | OUTPATIENT
Start: 2024-11-06 | End: 2024-11-07 | Stop reason: HOSPADM

## 2024-11-06 RX ORDER — NALOXONE HYDROCHLORIDE 0.4 MG/ML
0.4 INJECTION, SOLUTION INTRAMUSCULAR; INTRAVENOUS; SUBCUTANEOUS PRN
Status: DISCONTINUED | OUTPATIENT
Start: 2024-11-06 | End: 2024-11-07 | Stop reason: HOSPADM

## 2024-11-06 RX ORDER — FLUMAZENIL 0.1 MG/ML
0.5 INJECTION INTRAVENOUS PRN
Status: DISCONTINUED | OUTPATIENT
Start: 2024-11-06 | End: 2024-11-07 | Stop reason: HOSPADM

## 2024-11-06 RX ORDER — MIDAZOLAM HYDROCHLORIDE 1 MG/ML
1 INJECTION, SOLUTION INTRAMUSCULAR; INTRAVENOUS PRN
Status: DISCONTINUED | OUTPATIENT
Start: 2024-11-06 | End: 2024-11-07 | Stop reason: HOSPADM

## 2024-11-06 RX ORDER — LIDOCAINE HYDROCHLORIDE 10 MG/ML
30 INJECTION, SOLUTION INFILTRATION; PERINEURAL
Status: DISCONTINUED | OUTPATIENT
Start: 2024-11-06 | End: 2024-11-07 | Stop reason: HOSPADM

## 2024-11-06 RX ADMIN — FENTANYL CITRATE 75 MCG: 50 INJECTION, SOLUTION INTRAMUSCULAR; INTRAVENOUS at 09:11

## 2024-11-06 RX ADMIN — MIDAZOLAM HYDROCHLORIDE 3 MG: 1 INJECTION, SOLUTION INTRAMUSCULAR; INTRAVENOUS at 09:10

## 2024-11-06 RX ADMIN — MIDAZOLAM HYDROCHLORIDE 1 MG: 1 INJECTION, SOLUTION INTRAMUSCULAR; INTRAVENOUS at 09:18

## 2024-11-06 NOTE — PROGRESS NOTES
ETCO2  Monitoring done for conscious sedation.  Patient is on 4 liters/min via nasal cannula for procedure.    Baseline information:  HR: 85  BP: 157/91  RR: 16 LOC: alert  ETCO2: 42 SpO2: 99    5 minutes after sedation administered:  HR: 83 BP: 130/89  RR: 18 LOC: lethargic  ETCO2: 49 SpO2: 100    10 min after sedation administered:  HR: 80 BP: 140/85  RR: 16 LOC: lethargic  ETCO2: 49 SpO2: 100    15 minutes after sedation administered:  HR: 86 BP: 155/83  RR: 17 LOC:lethargic  ETCO2: 42 SpO2: 100    20 minutes after sedation administered:  HR: 76 BP: 149/65  RR: 17 LOC: lethargic  ETCO2: 47 SpO2: 100    Post-Procedure:  HR: 80 BP: 129/107  RR: 21 LOC: lethargic  ETCO2: 45 SpO2: 100  well    Patient/caregiver was educated on the proper method of use:  Yes      Level of patient/caregiver understanding able to:   [x] Verbalize understanding   [] Demonstrate understanding       [] Teach back        [] Needs reinforcement       []  No available caregiver               []  Other:     Response to education:  Very Good

## 2024-11-06 NOTE — PROGRESS NOTES
Pt scheduled for bone marrow biopsy with conscious sedation for diagnosis of multiple myeloma.  Pt's history, allergies, and medication list reviewed by nursing and physician prior to start of procedure.  Pt assessed and deemed fit for procedure by Dr. Niki Morton.    Height: 5'5''  Weight: 230 lbs (Pt reported)    Pre-Procedural Assessment:  Signed, Dated, and timed Informed Consent on the chart  VS's obtained and documented  Pt has a patent IV site (see flowsheets)  Pt has been NPO since 2200 on 11/5/2024  Current Medications, Allergies, and recent H&P reviewed and on chart  Emergency medications and equipment available (crash cart, narcan, flumazenil, O2, suction, etc)  Time out performed prior to procedure (see sedation documentation)  Pre-Procedure Shyanne Score: 10    Procedure:  Nursing present throughout procedure to assess, assist, and monitor.  Vital Signs monitored throughout - see sedation documentation. Patient to be discharged from OPO once Shyanne Score of 8 or better is achieved or once pre-procedural Shyanne Score is obtained.    Medications Administered:  Versed 4 mg IVP  Fentanyl 75 mg IVP    Post-Procedure:  Pt vital signs stable.  Discharge teaching provided (see AVS).  Post procedure Shyanne Score 10.  Pt and family verbalized understanding of all instructions.  Pt discharged from Outpt Oncology with Elly, friend,  who will drive them home.     Christi Cortes RN

## 2024-11-07 RX ORDER — ONDANSETRON 4 MG/1
16 TABLET, FILM COATED ORAL ONCE
Status: CANCELLED | OUTPATIENT
Start: 2024-11-13

## 2024-11-07 RX ORDER — PROCHLORPERAZINE MALEATE 10 MG
10 TABLET ORAL EVERY 6 HOURS PRN
Status: CANCELLED | OUTPATIENT
Start: 2024-11-13

## 2024-11-07 RX ORDER — ALLOPURINOL 300 MG/1
300 TABLET ORAL ONCE
Status: CANCELLED | OUTPATIENT
Start: 2024-11-13 | End: 2024-11-13

## 2024-11-07 RX ORDER — 0.9 % SODIUM CHLORIDE 0.9 %
1000 INTRAVENOUS SOLUTION INTRAVENOUS ONCE
Status: CANCELLED | OUTPATIENT
Start: 2024-11-13

## 2024-11-07 RX ORDER — PROCHLORPERAZINE EDISYLATE 5 MG/ML
10 INJECTION INTRAMUSCULAR; INTRAVENOUS EVERY 6 HOURS PRN
Status: CANCELLED | OUTPATIENT
Start: 2024-11-13

## 2024-11-12 ENCOUNTER — HOSPITAL ENCOUNTER (OUTPATIENT)
Dept: ONCOLOGY | Age: 54
Setting detail: INFUSION SERIES
Discharge: HOME | End: 2024-11-12

## 2024-11-12 LAB
AMPHETAMINES UR QL SCN>1000 NG/ML: ABNORMAL
BARBITURATES UR QL SCN>200 NG/ML: ABNORMAL
BENZODIAZ UR QL SCN>200 NG/ML: ABNORMAL
CANNABINOIDS UR QL SCN>50 NG/ML: POSITIVE
COCAINE UR QL SCN: ABNORMAL
DRUG SCREEN COMMENT UR-IMP: ABNORMAL
FENTANYL SCREEN, URINE: ABNORMAL
METHADONE UR QL SCN>300 NG/ML: ABNORMAL
OPIATES UR QL SCN>300 NG/ML: ABNORMAL
OXYCODONE UR QL SCN: ABNORMAL
PCP UR QL SCN>25 NG/ML: ABNORMAL
PH UR STRIP: 5 [PH]

## 2024-11-12 PROCEDURE — 2580000003 HC RX 258: Performed by: STUDENT IN AN ORGANIZED HEALTH CARE EDUCATION/TRAINING PROGRAM

## 2024-11-12 PROCEDURE — 6360000002 HC RX W HCPCS: Performed by: STUDENT IN AN ORGANIZED HEALTH CARE EDUCATION/TRAINING PROGRAM

## 2024-11-12 PROCEDURE — 6370000000 HC RX 637 (ALT 250 FOR IP): Performed by: STUDENT IN AN ORGANIZED HEALTH CARE EDUCATION/TRAINING PROGRAM

## 2024-11-12 PROCEDURE — 6370000000 HC RX 637 (ALT 250 FOR IP): Performed by: NURSE PRACTITIONER

## 2024-11-12 PROCEDURE — 80307 DRUG TEST PRSMV CHEM ANLYZR: CPT

## 2024-11-12 NOTE — H&P
[facility] History and Physical        Attending Physician: No att. providers found    Primary Care: Tila Eisenberg DO       Referring MD: Niki Morton DO  [address]  [address]  [place] [address]    Name: Farhad Balderas :  1970  MRN:  [id number]    Admission: 2024      Date: 2024    Reason for Admission: Lymphodepleting chemotherapy followed by Carvykti CAR-T Infusion on 24      History of Present Illness:    Farhad Balderas is a 55 yo female w/ IgG Kappa Multiple Myeloma. Her PMH is also significant for HTN, intestinal malabsorption disorders, esophagitis and CRYSTAL.  She is s/p  RVD x 5 cycles (2021 - 22) and High Dose Melphalan f/b autologous SCT (22). She then began progressing on maintenance revlimid late , she was started on clinical trial Eden/Pom/Dex on 24. She then began to progress on trial and the plan was to bridge her with Xopivo/Kyprolis/Dex while undergoing work up for Carvykti.      The patient's most recent admission was when she presented to [facility] on 10/7/24 for an acute visit due to body aches, nausea, vomiting and diarrhea. Her work up was consistent with parainfluenza and her CXR showed LLL basilar airspace disease. Patient presented for next day follow up and had a temperature of 102.6. Her O2 saturations with walking dropped to 87%. Given her symptoms, she was admitted for further work up and treatment of her pneumonia. She was treated with two days of Cefepime and has remained afebrile and not hypoxic since admission. She was discharged home on 10/10/24 and continued to follow up at [facility]. She is now S/p 1 cycles of kyprolis, selinexor for bridging chemotherapy with the disease status of:   Relapse with unconfirmed progression.     She now presents to OP Infusion to initiate LDC for 3 days (24 - 11/15/24) followed by admission for Carvyki CAR-T Infusion on 24. She feels well today and denies fever, chills, sweats, change in

## 2024-11-13 ENCOUNTER — HOSPITAL ENCOUNTER (OUTPATIENT)
Dept: ONCOLOGY | Age: 54
Setting detail: INFUSION SERIES
Discharge: HOME | End: 2024-11-13
Payer: COMMERCIAL

## 2024-11-13 VITALS
HEART RATE: 100 BPM | BODY MASS INDEX: 40.39 KG/M2 | TEMPERATURE: 98.8 F | DIASTOLIC BLOOD PRESSURE: 89 MMHG | OXYGEN SATURATION: 96 % | RESPIRATION RATE: 20 BRPM | SYSTOLIC BLOOD PRESSURE: 149 MMHG | WEIGHT: 242.73 LBS

## 2024-11-13 DIAGNOSIS — C90.00 MULTIPLE MYELOMA, REMISSION STATUS UNSPECIFIED (HCC): Primary | ICD-10-CM

## 2024-11-13 LAB
ALBUMIN SERPL-MCNC: 4 G/DL (ref 3.4–5)
ALP SERPL-CCNC: 54 U/L (ref 40–129)
ALT SERPL-CCNC: 8 U/L (ref 10–40)
ANION GAP SERPL CALCULATED.3IONS-SCNC: 10 MMOL/L (ref 3–16)
AST SERPL-CCNC: 14 U/L (ref 15–37)
BASOPHILS # BLD: 0 K/UL (ref 0–0.2)
BASOPHILS NFR BLD: 0.4 %
BILIRUB DIRECT SERPL-MCNC: 0.2 MG/DL (ref 0–0.3)
BILIRUB INDIRECT SERPL-MCNC: 0.2 MG/DL (ref 0–1)
BILIRUB SERPL-MCNC: 0.4 MG/DL (ref 0–1)
BUN SERPL-MCNC: 7 MG/DL (ref 7–20)
CALCIUM SERPL-MCNC: 9.1 MG/DL (ref 8.3–10.6)
CHLORIDE SERPL-SCNC: 106 MMOL/L (ref 99–110)
CO2 SERPL-SCNC: 25 MMOL/L (ref 21–32)
CREAT SERPL-MCNC: 0.8 MG/DL (ref 0.6–1.1)
DEPRECATED RDW RBC AUTO: 17.5 % (ref 12.4–15.4)
EOSINOPHIL # BLD: 0 K/UL (ref 0–0.6)
EOSINOPHIL NFR BLD: 1.3 %
GFR SERPLBLD CREATININE-BSD FMLA CKD-EPI: 87 ML/MIN/{1.73_M2}
GLUCOSE SERPL-MCNC: 186 MG/DL (ref 70–99)
HCT VFR BLD AUTO: 27.8 % (ref 36–48)
HGB BLD-MCNC: 8.9 G/DL (ref 12–16)
LDH SERPL L TO P-CCNC: 172 U/L (ref 100–190)
LYMPHOCYTES # BLD: 1 K/UL (ref 1–5.1)
LYMPHOCYTES NFR BLD: 30.2 %
MAGNESIUM SERPL-MCNC: 1.86 MG/DL (ref 1.8–2.4)
MCH RBC QN AUTO: 28.3 PG (ref 26–34)
MCHC RBC AUTO-ENTMCNC: 31.9 G/DL (ref 31–36)
MCV RBC AUTO: 88.8 FL (ref 80–100)
MONOCYTES # BLD: 0.3 K/UL (ref 0–1.3)
MONOCYTES NFR BLD: 8 %
NEUTROPHILS # BLD: 2 K/UL (ref 1.7–7.7)
NEUTROPHILS NFR BLD: 60.1 %
PHOSPHATE SERPL-MCNC: 3.4 MG/DL (ref 2.5–4.9)
PLATELET # BLD AUTO: 234 K/UL (ref 135–450)
PMV BLD AUTO: 7.7 FL (ref 5–10.5)
POTASSIUM SERPL-SCNC: 3.5 MMOL/L (ref 3.5–5.1)
PROT SERPL-MCNC: 7.8 G/DL (ref 6.4–8.2)
RBC # BLD AUTO: 3.13 M/UL (ref 4–5.2)
SODIUM SERPL-SCNC: 141 MMOL/L (ref 136–145)
URATE SERPL-MCNC: 5.3 MG/DL (ref 2.6–6)
WBC # BLD AUTO: 3.4 K/UL (ref 4–11)

## 2024-11-13 PROCEDURE — 36591 DRAW BLOOD OFF VENOUS DEVICE: CPT

## 2024-11-13 PROCEDURE — 96361 HYDRATE IV INFUSION ADD-ON: CPT

## 2024-11-13 PROCEDURE — 6370000000 HC RX 637 (ALT 250 FOR IP): Performed by: STUDENT IN AN ORGANIZED HEALTH CARE EDUCATION/TRAINING PROGRAM

## 2024-11-13 PROCEDURE — 2580000003 HC RX 258: Performed by: STUDENT IN AN ORGANIZED HEALTH CARE EDUCATION/TRAINING PROGRAM

## 2024-11-13 PROCEDURE — 83615 LACTATE (LD) (LDH) ENZYME: CPT

## 2024-11-13 PROCEDURE — 83735 ASSAY OF MAGNESIUM: CPT

## 2024-11-13 PROCEDURE — 80076 HEPATIC FUNCTION PANEL: CPT

## 2024-11-13 PROCEDURE — 84550 ASSAY OF BLOOD/URIC ACID: CPT

## 2024-11-13 PROCEDURE — 6360000002 HC RX W HCPCS: Performed by: STUDENT IN AN ORGANIZED HEALTH CARE EDUCATION/TRAINING PROGRAM

## 2024-11-13 PROCEDURE — 80069 RENAL FUNCTION PANEL: CPT

## 2024-11-13 PROCEDURE — 96413 CHEMO IV INFUSION 1 HR: CPT

## 2024-11-13 PROCEDURE — 96417 CHEMO IV INFUS EACH ADDL SEQ: CPT

## 2024-11-13 PROCEDURE — 85025 COMPLETE CBC W/AUTO DIFF WBC: CPT

## 2024-11-13 PROCEDURE — 96360 HYDRATION IV INFUSION INIT: CPT

## 2024-11-13 RX ORDER — LEVETIRACETAM 500 MG/1
500 TABLET ORAL 2 TIMES DAILY
Qty: 60 TABLET | Refills: 3
Start: 2024-11-13

## 2024-11-13 RX ORDER — PROCHLORPERAZINE MALEATE 10 MG
10 TABLET ORAL EVERY 6 HOURS PRN
Status: CANCELLED | OUTPATIENT
Start: 2024-11-14

## 2024-11-13 RX ORDER — ONDANSETRON 4 MG/1
16 TABLET, FILM COATED ORAL ONCE
Status: COMPLETED | OUTPATIENT
Start: 2024-11-13 | End: 2024-11-13

## 2024-11-13 RX ORDER — 0.9 % SODIUM CHLORIDE 0.9 %
1000 INTRAVENOUS SOLUTION INTRAVENOUS ONCE
Status: COMPLETED | OUTPATIENT
Start: 2024-11-13 | End: 2024-11-13

## 2024-11-13 RX ORDER — ALLOPURINOL 300 MG/1
300 TABLET ORAL ONCE
Status: COMPLETED | OUTPATIENT
Start: 2024-11-13 | End: 2024-11-13

## 2024-11-13 RX ORDER — ONDANSETRON 4 MG/1
16 TABLET, FILM COATED ORAL ONCE
Status: CANCELLED | OUTPATIENT
Start: 2024-11-14

## 2024-11-13 RX ORDER — ALLOPURINOL 300 MG/1
300 TABLET ORAL ONCE
Status: CANCELLED | OUTPATIENT
Start: 2024-11-14 | End: 2024-11-14

## 2024-11-13 RX ORDER — FLUCONAZOLE 200 MG/1
200 TABLET ORAL DAILY
Qty: 30 TABLET | Refills: 3 | Status: SHIPPED | OUTPATIENT
Start: 2024-11-13

## 2024-11-13 RX ORDER — PROCHLORPERAZINE MALEATE 10 MG
10 TABLET ORAL EVERY 6 HOURS PRN
Status: DISCONTINUED | OUTPATIENT
Start: 2024-11-13 | End: 2024-11-14 | Stop reason: HOSPADM

## 2024-11-13 RX ORDER — PROCHLORPERAZINE EDISYLATE 5 MG/ML
10 INJECTION INTRAMUSCULAR; INTRAVENOUS EVERY 6 HOURS PRN
Status: CANCELLED | OUTPATIENT
Start: 2024-11-14

## 2024-11-13 RX ORDER — PROCHLORPERAZINE EDISYLATE 5 MG/ML
10 INJECTION INTRAMUSCULAR; INTRAVENOUS EVERY 6 HOURS PRN
Status: DISCONTINUED | OUTPATIENT
Start: 2024-11-13 | End: 2024-11-14 | Stop reason: HOSPADM

## 2024-11-13 RX ORDER — LEVOFLOXACIN 500 MG/1
500 TABLET, FILM COATED ORAL DAILY
Qty: 30 TABLET | Refills: 3 | Status: SHIPPED | OUTPATIENT
Start: 2024-11-13

## 2024-11-13 RX ORDER — 0.9 % SODIUM CHLORIDE 0.9 %
1000 INTRAVENOUS SOLUTION INTRAVENOUS ONCE
Status: CANCELLED | OUTPATIENT
Start: 2024-11-14

## 2024-11-13 RX ADMIN — CYCLOPHOSPHAMIDE 680 MG: 1 INJECTION, POWDER, FOR SOLUTION INTRAVENOUS; ORAL at 12:30

## 2024-11-13 RX ADMIN — ALLOPURINOL 300 MG: 300 TABLET ORAL at 10:39

## 2024-11-13 RX ADMIN — FLUDARABINE PHOSPHATE 67.5 MG: 25 INJECTION, SOLUTION INTRAVENOUS at 11:43

## 2024-11-13 RX ADMIN — SODIUM CHLORIDE 1000 ML: 9 INJECTION, SOLUTION INTRAVENOUS at 09:11

## 2024-11-13 RX ADMIN — ONDANSETRON HYDROCHLORIDE 16 MG: 4 TABLET, FILM COATED ORAL at 10:39

## 2024-11-13 NOTE — PROGRESS NOTES
Verification:  Original chemotherapy orders reviewed and acknowledged. Appropriateness of chemotherapy treatment regimen Fludarabine and Cytoxan for diagnosis of Multiple Myeloma was verified.  Patient educated on chemotherapy regimen.  Acknowledgement of informed consent for chemotherapy obtained.      Estimated body surface area is 2.25 meters squared as calculated from the following:    Height as of 10/8/24: 1.651 m (5' 5\").    Weight as of this encounter: 110.1 kg (242 lb 11.6 oz). verified.  Appropriate dosing calculations of chemotherapy based on above height, weight, and BSA verified.        Administration: Chemotherapy drug Cytoxan and Fludarabine  independently verified with Anny Calvin RN prior to administration.  Acknowledgement of informed consent for chemotherapy administration verified.  Original order, appropriateness of regimen, drug supplied, height, weight, BSA, dose calculations, expiration dates/times, drug appearance, and two patient identifiers were verified by both RNs.  Drug checked for vesicant/irritant status and for risk of hypersensitivity.  Most recent laboratory values and allergies, were reviewed.  Positive, brisk blood return via CVC was confirmed prior to administration. Chest x-ray for correct line placement reviewed. Zahira Hanson RN and Anny Calvin RN verified correct rate of chemotherapy and maintenance IV fluids.  Patient was educated on chemotherapy regimen prior to administration including indication for treatment related to disease & side effects of chemotherapy drug.  Patient verbalizes understanding of all instructions.    Completion of Chemotherapy: Monitoring during infusion done per policy, see Flowsheets.  Blood return verified before, during, and after infusion per policy; no signs of extravasation.  Pt tolerated chemotherapy well and without incident.  Chemotherapy infusion end time on the MAR.

## 2024-11-14 ENCOUNTER — HOSPITAL ENCOUNTER (OUTPATIENT)
Dept: ONCOLOGY | Age: 54
Setting detail: INFUSION SERIES
Discharge: HOME | End: 2024-11-14
Payer: COMMERCIAL

## 2024-11-14 VITALS
SYSTOLIC BLOOD PRESSURE: 172 MMHG | TEMPERATURE: 98.4 F | OXYGEN SATURATION: 96 % | HEART RATE: 94 BPM | RESPIRATION RATE: 16 BRPM | DIASTOLIC BLOOD PRESSURE: 93 MMHG

## 2024-11-14 DIAGNOSIS — C90.00 MULTIPLE MYELOMA, REMISSION STATUS UNSPECIFIED (HCC): Primary | ICD-10-CM

## 2024-11-14 LAB
ALBUMIN SERPL-MCNC: 4 G/DL (ref 3.4–5)
ALP SERPL-CCNC: 51 U/L (ref 40–129)
ALT SERPL-CCNC: 8 U/L (ref 10–40)
ANION GAP SERPL CALCULATED.3IONS-SCNC: 8 MMOL/L (ref 3–16)
AST SERPL-CCNC: 15 U/L (ref 15–37)
BASOPHILS # BLD: 0 K/UL (ref 0–0.2)
BASOPHILS NFR BLD: 0.5 %
BILIRUB DIRECT SERPL-MCNC: <0.1 MG/DL (ref 0–0.3)
BILIRUB INDIRECT SERPL-MCNC: 0.2 MG/DL (ref 0–1)
BILIRUB SERPL-MCNC: 0.3 MG/DL (ref 0–1)
BUN SERPL-MCNC: 7 MG/DL (ref 7–20)
CALCIUM SERPL-MCNC: 8.9 MG/DL (ref 8.3–10.6)
CHLORIDE SERPL-SCNC: 106 MMOL/L (ref 99–110)
CO2 SERPL-SCNC: 26 MMOL/L (ref 21–32)
CREAT SERPL-MCNC: 0.8 MG/DL (ref 0.6–1.1)
DEPRECATED RDW RBC AUTO: 17.7 % (ref 12.4–15.4)
EOSINOPHIL # BLD: 0 K/UL (ref 0–0.6)
EOSINOPHIL NFR BLD: 1.6 %
GFR SERPLBLD CREATININE-BSD FMLA CKD-EPI: 87 ML/MIN/{1.73_M2}
GLUCOSE SERPL-MCNC: 152 MG/DL (ref 70–99)
HCT VFR BLD AUTO: 27.3 % (ref 36–48)
HGB BLD-MCNC: 8.7 G/DL (ref 12–16)
LDH SERPL L TO P-CCNC: 183 U/L (ref 100–190)
LYMPHOCYTES # BLD: 0.5 K/UL (ref 1–5.1)
LYMPHOCYTES NFR BLD: 20.3 %
MAGNESIUM SERPL-MCNC: 1.76 MG/DL (ref 1.8–2.4)
MCH RBC QN AUTO: 28.3 PG (ref 26–34)
MCHC RBC AUTO-ENTMCNC: 32 G/DL (ref 31–36)
MCV RBC AUTO: 88.2 FL (ref 80–100)
MONOCYTES # BLD: 0.1 K/UL (ref 0–1.3)
MONOCYTES NFR BLD: 5.8 %
NEUTROPHILS # BLD: 1.8 K/UL (ref 1.7–7.7)
NEUTROPHILS NFR BLD: 71.8 %
PHOSPHATE SERPL-MCNC: 3.3 MG/DL (ref 2.5–4.9)
PLATELET # BLD AUTO: 229 K/UL (ref 135–450)
PMV BLD AUTO: 7.5 FL (ref 5–10.5)
POTASSIUM SERPL-SCNC: 3.5 MMOL/L (ref 3.5–5.1)
PROT SERPL-MCNC: 7.9 G/DL (ref 6.4–8.2)
RBC # BLD AUTO: 3.1 M/UL (ref 4–5.2)
SODIUM SERPL-SCNC: 140 MMOL/L (ref 136–145)
URATE SERPL-MCNC: 4.4 MG/DL (ref 2.6–6)
WBC # BLD AUTO: 2.6 K/UL (ref 4–11)

## 2024-11-14 PROCEDURE — 83735 ASSAY OF MAGNESIUM: CPT

## 2024-11-14 PROCEDURE — 6360000002 HC RX W HCPCS: Performed by: STUDENT IN AN ORGANIZED HEALTH CARE EDUCATION/TRAINING PROGRAM

## 2024-11-14 PROCEDURE — 84550 ASSAY OF BLOOD/URIC ACID: CPT

## 2024-11-14 PROCEDURE — 2580000003 HC RX 258: Performed by: STUDENT IN AN ORGANIZED HEALTH CARE EDUCATION/TRAINING PROGRAM

## 2024-11-14 PROCEDURE — 85025 COMPLETE CBC W/AUTO DIFF WBC: CPT

## 2024-11-14 PROCEDURE — 96417 CHEMO IV INFUS EACH ADDL SEQ: CPT

## 2024-11-14 PROCEDURE — 83615 LACTATE (LD) (LDH) ENZYME: CPT

## 2024-11-14 PROCEDURE — 80076 HEPATIC FUNCTION PANEL: CPT

## 2024-11-14 PROCEDURE — 80069 RENAL FUNCTION PANEL: CPT

## 2024-11-14 PROCEDURE — 96413 CHEMO IV INFUSION 1 HR: CPT

## 2024-11-14 PROCEDURE — 36591 DRAW BLOOD OFF VENOUS DEVICE: CPT

## 2024-11-14 PROCEDURE — 6370000000 HC RX 637 (ALT 250 FOR IP): Performed by: STUDENT IN AN ORGANIZED HEALTH CARE EDUCATION/TRAINING PROGRAM

## 2024-11-14 PROCEDURE — 96361 HYDRATE IV INFUSION ADD-ON: CPT

## 2024-11-14 PROCEDURE — 96360 HYDRATION IV INFUSION INIT: CPT

## 2024-11-14 RX ORDER — PROCHLORPERAZINE MALEATE 10 MG
10 TABLET ORAL EVERY 6 HOURS PRN
Status: DISCONTINUED | OUTPATIENT
Start: 2024-11-14 | End: 2024-11-15 | Stop reason: HOSPADM

## 2024-11-14 RX ORDER — ONDANSETRON 4 MG/1
16 TABLET, FILM COATED ORAL ONCE
Status: COMPLETED | OUTPATIENT
Start: 2024-11-14 | End: 2024-11-14

## 2024-11-14 RX ORDER — 0.9 % SODIUM CHLORIDE 0.9 %
1000 INTRAVENOUS SOLUTION INTRAVENOUS ONCE
Status: CANCELLED | OUTPATIENT
Start: 2024-11-15

## 2024-11-14 RX ORDER — ONDANSETRON 4 MG/1
16 TABLET, FILM COATED ORAL ONCE
Status: CANCELLED | OUTPATIENT
Start: 2024-11-15

## 2024-11-14 RX ORDER — ALLOPURINOL 300 MG/1
300 TABLET ORAL ONCE
Status: COMPLETED | OUTPATIENT
Start: 2024-11-14 | End: 2024-11-14

## 2024-11-14 RX ORDER — PROCHLORPERAZINE EDISYLATE 5 MG/ML
10 INJECTION INTRAMUSCULAR; INTRAVENOUS EVERY 6 HOURS PRN
Status: DISCONTINUED | OUTPATIENT
Start: 2024-11-14 | End: 2024-11-15 | Stop reason: HOSPADM

## 2024-11-14 RX ORDER — PROCHLORPERAZINE MALEATE 10 MG
10 TABLET ORAL EVERY 6 HOURS PRN
Status: CANCELLED | OUTPATIENT
Start: 2024-11-15

## 2024-11-14 RX ORDER — PROCHLORPERAZINE EDISYLATE 5 MG/ML
10 INJECTION INTRAMUSCULAR; INTRAVENOUS EVERY 6 HOURS PRN
Status: CANCELLED | OUTPATIENT
Start: 2024-11-15

## 2024-11-14 RX ORDER — 0.9 % SODIUM CHLORIDE 0.9 %
1000 INTRAVENOUS SOLUTION INTRAVENOUS ONCE
Status: COMPLETED | OUTPATIENT
Start: 2024-11-14 | End: 2024-11-14

## 2024-11-14 RX ORDER — ALLOPURINOL 300 MG/1
300 TABLET ORAL ONCE
Status: CANCELLED | OUTPATIENT
Start: 2024-11-15 | End: 2024-11-15

## 2024-11-14 RX ADMIN — SODIUM CHLORIDE 1000 ML: 9 INJECTION, SOLUTION INTRAVENOUS at 09:03

## 2024-11-14 RX ADMIN — ONDANSETRON HYDROCHLORIDE 16 MG: 4 TABLET, FILM COATED ORAL at 10:21

## 2024-11-14 RX ADMIN — FLUDARABINE PHOSPHATE 67.5 MG: 25 INJECTION, SOLUTION INTRAVENOUS at 11:16

## 2024-11-14 RX ADMIN — ALLOPURINOL 300 MG: 300 TABLET ORAL at 10:21

## 2024-11-14 RX ADMIN — CYCLOPHOSPHAMIDE 680 MG: 1 INJECTION, POWDER, FOR SOLUTION INTRAVENOUS; ORAL at 12:02

## 2024-11-14 NOTE — PROGRESS NOTES
Verification:  Original chemotherapy orders reviewed and acknowledged. Appropriateness of chemotherapy treatment regimen Fludarabine and Cytoxan for diagnosis of Multiple Myeloma was verified.  Patient educated on chemotherapy regimen.  Acknowledgement of informed consent for chemotherapy obtained.      Estimated body surface area is 2.25 meters squared as calculated from the following:    Height as of 10/8/24: 1.651 m (5' 5\").    Weight as of 11/13/24: 110.1 kg (242 lb 11.6 oz). verified.  Appropriate dosing calculations of chemotherapy based on above height, weight, and BSA verified.        Administration: Chemotherapy drug Cytoxan and Fludarabine  independently verified with Elizabeth Pedraza RN prior to administration.  Acknowledgement of informed consent for chemotherapy administration verified.  Original order, appropriateness of regimen, drug supplied, height, weight, BSA, dose calculations, expiration dates/times, drug appearance, and two patient identifiers were verified by both RNs.  Drug checked for vesicant/irritant status and for risk of hypersensitivity.  Most recent laboratory values and allergies, were reviewed.  Positive, brisk blood return via CVC was confirmed prior to administration. Chest x-ray for correct line placement reviewed. Alexsandra Fields RN and Elizabeth Pedraza RN verified correct rate of chemotherapy and maintenance IV fluids.  Patient was educated on chemotherapy regimen prior to administration including indication for treatment related to disease & side effects of chemotherapy drug.  Patient verbalizes understanding of all instructions.    Completion of Chemotherapy: Monitoring during infusion done per policy, see Flowsheets.  Blood return verified before, during, and after infusion per policy; no signs of extravasation.  Pt tolerated chemotherapy well and without incident.  Chemotherapy infusion end time on the MAR.     Alexsandra Fields RN     
population  - Plans for [facility] 11/13/24 - 11/15/24 with Carvykti CAR-T admission on 11/18/24        Dr. Niki Morton has discussed the risks associated with CAR-T which include the risk of CRS toxicity, neurological toxicity, infection, bleeding,  inability to obtain a long term remission and death. He/She understands these risks and is willing to proceed.  The consent is signed and on the chart.  Two doses of Tocilizumab will remain available in the inpatient pharmacy until day + 28 post - infusion.     Lymphodepleting Chemotherapy:  Fludarabine and cyclophosphamide   Disease Status at time of Infusion:  Relapse with unconfirmed progression   CAR-T Infusion Date: 11/18/24  CAR-T Product: Carvykti   ENR#: [id number]       Day - 4     2. CRS / Neuro: At risk post-infusion  CRS Grade: N/A  - Monitor CRP and Ferrtin closely         Lab Results   Component Value Date     CRP <3.0 07/30/2024     .6 (H) 03/13/2024            Lab Results   Component Value Date     FERRITIN 336.5 (H) 07/30/2024     FERRITIN 137.3 03/19/2021         ICANS Grade: N/A  CAR-T Score: N/A  - Neuro checks w/ CARTOX 10-point assessment Q4hrs  - Start Keppra on 11/18/24        3. ID: Afebrile, no evidence of infection  - S/p admission for PNA, parainfluenza + (10/7/24)   - Post transplant vaccines: #1 (1/6/22) , #2 (3/14/23), #3 (6/6/23)   - Respiratory panel (10/7/24): parainfluenza    - CXR (10/7/24): LLL basilar airspace disease. S/p antibiotics    Current Tx/PPx:  - Cont Valtrex ppx  - Begin flonase for ear fullness 10/11/24  - Start Diflucan and Levaquin ppx when ANC < 1.5     Post CAR- T monitoring / maintenance:  - IgG & CD4 level monthly starting on day + 30  - Check disease titers on day + 30 or when WBC recovered. Re-vaccinate vs booster based on titer (assure titers are drawn prior to administering immunoglobulins)  - Start PCP ppx on day + 30 - stop when CD4 > 250  - Cont Valtrex for 1 year post CAR-T       4. Heme: anemia 2/2

## 2024-11-15 ENCOUNTER — HOSPITAL ENCOUNTER (OUTPATIENT)
Dept: ONCOLOGY | Age: 54
Setting detail: INFUSION SERIES
Discharge: HOME | End: 2024-11-15
Payer: COMMERCIAL

## 2024-11-15 VITALS
OXYGEN SATURATION: 99 % | DIASTOLIC BLOOD PRESSURE: 90 MMHG | WEIGHT: 248.02 LBS | RESPIRATION RATE: 16 BRPM | SYSTOLIC BLOOD PRESSURE: 157 MMHG | BODY MASS INDEX: 41.27 KG/M2 | TEMPERATURE: 99 F | HEART RATE: 93 BPM

## 2024-11-15 DIAGNOSIS — C90.00 MULTIPLE MYELOMA, REMISSION STATUS UNSPECIFIED (HCC): Primary | ICD-10-CM

## 2024-11-15 LAB
ALBUMIN SERPL-MCNC: 3.8 G/DL (ref 3.4–5)
ALP SERPL-CCNC: 53 U/L (ref 40–129)
ALT SERPL-CCNC: 8 U/L (ref 10–40)
ANION GAP SERPL CALCULATED.3IONS-SCNC: 8 MMOL/L (ref 3–16)
AST SERPL-CCNC: 13 U/L (ref 15–37)
BASOPHILS # BLD: 0 K/UL (ref 0–0.2)
BASOPHILS NFR BLD: 0.6 %
BILIRUB DIRECT SERPL-MCNC: <0.1 MG/DL (ref 0–0.3)
BILIRUB INDIRECT SERPL-MCNC: 0.2 MG/DL (ref 0–1)
BILIRUB SERPL-MCNC: 0.3 MG/DL (ref 0–1)
BUN SERPL-MCNC: 6 MG/DL (ref 7–20)
CALCIUM SERPL-MCNC: 9.1 MG/DL (ref 8.3–10.6)
CHLORIDE SERPL-SCNC: 105 MMOL/L (ref 99–110)
CO2 SERPL-SCNC: 27 MMOL/L (ref 21–32)
CREAT SERPL-MCNC: 0.8 MG/DL (ref 0.6–1.1)
DEPRECATED RDW RBC AUTO: 18 % (ref 12.4–15.4)
EOSINOPHIL # BLD: 0 K/UL (ref 0–0.6)
EOSINOPHIL NFR BLD: 2.3 %
GFR SERPLBLD CREATININE-BSD FMLA CKD-EPI: 87 ML/MIN/{1.73_M2}
GLUCOSE SERPL-MCNC: 146 MG/DL (ref 70–99)
HCT VFR BLD AUTO: 27.4 % (ref 36–48)
HGB BLD-MCNC: 8.7 G/DL (ref 12–16)
LDH SERPL L TO P-CCNC: 175 U/L (ref 100–190)
LYMPHOCYTES # BLD: 0.1 K/UL (ref 1–5.1)
LYMPHOCYTES NFR BLD: 4.5 %
MAGNESIUM SERPL-MCNC: 1.75 MG/DL (ref 1.8–2.4)
MCH RBC QN AUTO: 27.8 PG (ref 26–34)
MCHC RBC AUTO-ENTMCNC: 31.8 G/DL (ref 31–36)
MCV RBC AUTO: 87.4 FL (ref 80–100)
MONOCYTES # BLD: 0 K/UL (ref 0–1.3)
MONOCYTES NFR BLD: 2.2 %
NEUTROPHILS # BLD: 1.7 K/UL (ref 1.7–7.7)
NEUTROPHILS NFR BLD: 90.4 %
PHOSPHATE SERPL-MCNC: 3.9 MG/DL (ref 2.5–4.9)
PLATELET # BLD AUTO: 235 K/UL (ref 135–450)
PMV BLD AUTO: 7.9 FL (ref 5–10.5)
POTASSIUM SERPL-SCNC: 3.4 MMOL/L (ref 3.5–5.1)
PROT SERPL-MCNC: 7.9 G/DL (ref 6.4–8.2)
RBC # BLD AUTO: 3.13 M/UL (ref 4–5.2)
SODIUM SERPL-SCNC: 140 MMOL/L (ref 136–145)
URATE SERPL-MCNC: 4 MG/DL (ref 2.6–6)
WBC # BLD AUTO: 1.9 K/UL (ref 4–11)

## 2024-11-15 RX ORDER — PROCHLORPERAZINE MALEATE 10 MG
10 TABLET ORAL EVERY 6 HOURS PRN
Status: CANCELLED | OUTPATIENT
Start: 2024-11-15

## 2024-11-15 RX ORDER — POTASSIUM CHLORIDE 1500 MG/1
40 TABLET, EXTENDED RELEASE ORAL PRN
Status: DISCONTINUED | OUTPATIENT
Start: 2024-11-15 | End: 2024-11-16 | Stop reason: HOSPADM

## 2024-11-15 RX ORDER — ALLOPURINOL 300 MG/1
300 TABLET ORAL ONCE
Status: COMPLETED | OUTPATIENT
Start: 2024-11-15 | End: 2024-11-15

## 2024-11-15 RX ORDER — 0.9 % SODIUM CHLORIDE 0.9 %
1000 INTRAVENOUS SOLUTION INTRAVENOUS ONCE
Status: COMPLETED | OUTPATIENT
Start: 2024-11-15 | End: 2024-11-15

## 2024-11-15 RX ORDER — 0.9 % SODIUM CHLORIDE 0.9 %
1000 INTRAVENOUS SOLUTION INTRAVENOUS ONCE
Status: CANCELLED | OUTPATIENT
Start: 2024-11-15

## 2024-11-15 RX ORDER — ALLOPURINOL 300 MG/1
300 TABLET ORAL ONCE
Status: CANCELLED | OUTPATIENT
Start: 2024-11-15 | End: 2024-11-15

## 2024-11-15 RX ORDER — PROCHLORPERAZINE MALEATE 10 MG
10 TABLET ORAL EVERY 6 HOURS PRN
Status: DISCONTINUED | OUTPATIENT
Start: 2024-11-15 | End: 2024-11-16 | Stop reason: HOSPADM

## 2024-11-15 RX ORDER — PROCHLORPERAZINE EDISYLATE 5 MG/ML
10 INJECTION INTRAMUSCULAR; INTRAVENOUS EVERY 6 HOURS PRN
Status: CANCELLED | OUTPATIENT
Start: 2024-11-15

## 2024-11-15 RX ORDER — ONDANSETRON 4 MG/1
16 TABLET, FILM COATED ORAL ONCE
Status: COMPLETED | OUTPATIENT
Start: 2024-11-15 | End: 2024-11-15

## 2024-11-15 RX ORDER — PROCHLORPERAZINE EDISYLATE 5 MG/ML
10 INJECTION INTRAMUSCULAR; INTRAVENOUS EVERY 6 HOURS PRN
Status: DISCONTINUED | OUTPATIENT
Start: 2024-11-15 | End: 2024-11-16 | Stop reason: HOSPADM

## 2024-11-15 RX ORDER — ONDANSETRON 4 MG/1
16 TABLET, FILM COATED ORAL ONCE
Status: CANCELLED | OUTPATIENT
Start: 2024-11-15

## 2024-11-15 RX ADMIN — ONDANSETRON HYDROCHLORIDE 16 MG: 4 TABLET, FILM COATED ORAL at 10:27

## 2024-11-15 RX ADMIN — CYCLOPHOSPHAMIDE 680 MG: 1 INJECTION, POWDER, FOR SOLUTION INTRAVENOUS; ORAL at 12:06

## 2024-11-15 RX ADMIN — FLUDARABINE PHOSPHATE 67.5 MG: 25 INJECTION, SOLUTION INTRAVENOUS at 11:21

## 2024-11-15 RX ADMIN — SODIUM CHLORIDE 1000 ML: 9 INJECTION, SOLUTION INTRAVENOUS at 08:59

## 2024-11-15 RX ADMIN — POTASSIUM BICARBONATE 40 MEQ: 782 TABLET, EFFERVESCENT ORAL at 10:52

## 2024-11-15 RX ADMIN — ALLOPURINOL 300 MG: 300 TABLET ORAL at 09:11

## 2024-11-15 NOTE — PROGRESS NOTES
Verification:  Original chemotherapy orders reviewed and acknowledged. Appropriateness of chemotherapy treatment regimen Fludarabine and Cytoxan for diagnosis of Multiple Myeloma was verified.  Patient educated on chemotherapy regimen.  Acknowledgement of informed consent for chemotherapy obtained.      Estimated body surface area is 2.27 meters squared as calculated from the following:    Height as of 10/8/24: 1.651 m (5' 5\").    Weight as of this encounter: 112.5 kg (248 lb 0.3 oz). verified.  Appropriate dosing calculations of chemotherapy based on above height, weight, and BSA verified.        Administration: Chemotherapy drug Cytoxan and Fludarabine  independently verified with Elizabeth Pedraza RN prior to administration.  Acknowledgement of informed consent for chemotherapy administration verified.  Original order, appropriateness of regimen, drug supplied, height, weight, BSA, dose calculations, expiration dates/times, drug appearance, and two patient identifiers were verified by both RNs.  Drug checked for vesicant/irritant status and for risk of hypersensitivity.  Most recent laboratory values and allergies, were reviewed.  Positive, brisk blood return via CVC was confirmed prior to administration. Chest x-ray for correct line placement reviewed. Anny Calvin RN and Elizabeth Pedraza RN verified correct rate of chemotherapy and maintenance IV fluids.  Patient was educated on chemotherapy regimen prior to administration including indication for treatment related to disease & side effects of chemotherapy drug.  Patient verbalizes understanding of all instructions.    Completion of Chemotherapy: Monitoring during infusion done per policy, see Flowsheets.  Blood return verified before, during, and after infusion per policy; no signs of extravasation.  Pt tolerated chemotherapy well and without incident.  Chemotherapy infusion end time on the MAR.     Anny Calvin RN

## 2024-11-15 NOTE — PROGRESS NOTES
Pt given 40 meq potassium replacement per order. Pt also instructed to  allopurinol from pharmacy - to take daily starting saturday

## 2024-11-15 NOTE — PROGRESS NOTES
BCC Progress Note      11/15/2024    Farhad Balderas    :  1970    MRN:  [id number]    Referring MD: Niki Morton DO  [address]  [facility] [address]  [place] [address]      Subjective: Patient feels well today, tolerating chemotherapy well, no N&V, no fevers, still eating and drinking well.      ECOG PS:  (1) Restricted in physically strenuous activity, ambulatory and able to do work of light nature    KPS: 80% Normal activity with effort; some signs or symptoms of disease    Isolation:  None     Medications    Scheduled Meds:   sodium chloride  1,000 mL IntraVENous Once    ondansetron  16 mg Oral Once    fludarabine (FLUDARA) 67.5 mg in sodium chloride 0.9 % 100 mL chemo IVPB  67.5 mg IntraVENous Once    cycloPHOSphamide (CYTOXAN) 680 mg in sodium chloride 0.9 % 100 mL chemo infusion  680 mg IntraVENous Once     Continuous Infusions:  PRN Meds:.prochlorperazine, prochlorperazine      ROS:  As noted above, otherwise remainder of 10-point ROS negative      Physical Exam:     Vital Signs:  BP (!) 174/99   Pulse (!) 102   Temp 98 °F (36.7 °C) (Oral)   Resp 16   Wt 112.5 kg (248 lb 0.3 oz)   SpO2 96%   BMI 41.27 kg/m²     Weight:    Wt Readings from Last 3 Encounters:   11/15/24 112.5 kg (248 lb 0.3 oz)   24 110.1 kg (242 lb 11.6 oz)   10/08/24 106.6 kg (235 lb)       General: Awake, alert and oriented.  HEENT: normocephalic, alopecia, PERRL, no scleral erythema or icterus, Oral mucosa moist and intact, throat clear  NECK: supple   BACK: Straight   SKIN: warm dry and intact without lesions rashes or masses  CHEST: CTA bilaterally without use of accessory muscles  CV: Normal S1 S2, RRR, no MRG  ABD: NT ND normoactive BS  EXTREMITIES: without edema, denies calf tenderness  NEURO: CN II - XII grossly intact  CATHETER: PAC: CDI      Laboratory Data:  CBC:   Recent Labs     24  0915 24  0900   WBC 3.4* 2.6*   HGB 8.9* 8.7*   HCT 27.8* 27.3*   MCV 88.8 88.2    229

## 2024-11-18 ENCOUNTER — APPOINTMENT (OUTPATIENT)
Dept: GENERAL RADIOLOGY | Age: 54
DRG: 011 | End: 2024-11-18
Attending: STUDENT IN AN ORGANIZED HEALTH CARE EDUCATION/TRAINING PROGRAM
Payer: COMMERCIAL

## 2024-11-18 ENCOUNTER — HOSPITAL ENCOUNTER (INPATIENT)
Age: 54
LOS: 2 days | Discharge: HOME | DRG: 011 | End: 2024-11-20
Attending: STUDENT IN AN ORGANIZED HEALTH CARE EDUCATION/TRAINING PROGRAM | Admitting: INTERNAL MEDICINE
Payer: COMMERCIAL

## 2024-11-18 PROBLEM — C90.02 MULTIPLE MYELOMA IN RELAPSE (HCC): Status: ACTIVE | Noted: 2024-11-18

## 2024-11-18 PROBLEM — C90.00 MULTIPLE MYELOMA, REMISSION STATUS UNSPECIFIED (HCC): Status: ACTIVE | Noted: 2024-11-18

## 2024-11-18 LAB
ALBUMIN SERPL-MCNC: 4.3 G/DL (ref 3.4–5)
ALP SERPL-CCNC: 49 U/L (ref 40–129)
ALT SERPL-CCNC: 9 U/L (ref 10–40)
ANION GAP SERPL CALCULATED.3IONS-SCNC: 11 MMOL/L (ref 3–16)
ANISOCYTOSIS BLD QL SMEAR: ABNORMAL
APTT BLD: 21.5 SEC (ref 22.1–36.4)
AST SERPL-CCNC: 15 U/L (ref 15–37)
BACTERIA URNS QL MICRO: ABNORMAL /HPF
BASOPHILS # BLD: 0 K/UL (ref 0–0.2)
BASOPHILS NFR BLD: 0.9 %
BILIRUB DIRECT SERPL-MCNC: 0.2 MG/DL (ref 0–0.3)
BILIRUB INDIRECT SERPL-MCNC: 0.2 MG/DL (ref 0–1)
BILIRUB SERPL-MCNC: 0.4 MG/DL (ref 0–1)
BILIRUB UR QL STRIP.AUTO: NEGATIVE
BUN SERPL-MCNC: 11 MG/DL (ref 7–20)
CALCIUM SERPL-MCNC: 8.9 MG/DL (ref 8.3–10.6)
CHLORIDE SERPL-SCNC: 102 MMOL/L (ref 99–110)
CLARITY UR: CLEAR
CO2 SERPL-SCNC: 27 MMOL/L (ref 21–32)
COLOR UR: YELLOW
CREAT SERPL-MCNC: 0.7 MG/DL (ref 0.6–1.1)
DEPRECATED RDW RBC AUTO: 17.8 % (ref 12.4–15.4)
EKG ATRIAL RATE: 100 BPM
EKG DIAGNOSIS: NORMAL
EKG P AXIS: 74 DEGREES
EKG P-R INTERVAL: 136 MS
EKG Q-T INTERVAL: 352 MS
EKG QRS DURATION: 84 MS
EKG QTC CALCULATION (BAZETT): 451 MS
EKG R AXIS: 26 DEGREES
EKG T AXIS: 69 DEGREES
EKG VENTRICULAR RATE: 99 BPM
EOSINOPHIL # BLD: 0 K/UL (ref 0–0.6)
EOSINOPHIL NFR BLD: 1.8 %
EPI CELLS #/AREA URNS HPF: ABNORMAL /HPF (ref 0–5)
GFR SERPLBLD CREATININE-BSD FMLA CKD-EPI: >90 ML/MIN/{1.73_M2}
GGT SERPL-CCNC: 32 U/L (ref 5–36)
GLUCOSE SERPL-MCNC: 124 MG/DL (ref 70–99)
GLUCOSE UR STRIP.AUTO-MCNC: NEGATIVE MG/DL
HCT VFR BLD AUTO: 27.8 % (ref 36–48)
HGB BLD-MCNC: 9.2 G/DL (ref 12–16)
HGB UR QL STRIP.AUTO: NEGATIVE
INR PPP: 1.01 (ref 0.85–1.15)
KETONES UR STRIP.AUTO-MCNC: NEGATIVE MG/DL
LDH SERPL L TO P-CCNC: 150 U/L (ref 100–190)
LEUKOCYTE ESTERASE UR QL STRIP.AUTO: NEGATIVE
LYMPHOCYTES # BLD: 0 K/UL (ref 1–5.1)
LYMPHOCYTES NFR BLD: 3.9 %
MAGNESIUM SERPL-MCNC: 1.84 MG/DL (ref 1.8–2.4)
MCH RBC QN AUTO: 28.1 PG (ref 26–34)
MCHC RBC AUTO-ENTMCNC: 32.9 G/DL (ref 31–36)
MCV RBC AUTO: 85.3 FL (ref 80–100)
MICROCYTES BLD QL SMEAR: ABNORMAL
MONOCYTES # BLD: 0 K/UL (ref 0–1.3)
MONOCYTES NFR BLD: 3.3 %
NEUTROPHILS # BLD: 0.7 K/UL (ref 1.7–7.7)
NEUTROPHILS NFR BLD: 90.1 %
NITRITE UR QL STRIP.AUTO: NEGATIVE
PH UR STRIP.AUTO: 6 [PH] (ref 5–8)
PHOSPHATE SERPL-MCNC: 3.4 MG/DL (ref 2.5–4.9)
PLATELET # BLD AUTO: 213 K/UL (ref 135–450)
PMV BLD AUTO: 7.3 FL (ref 5–10.5)
POTASSIUM SERPL-SCNC: 3.6 MMOL/L (ref 3.5–5.1)
PROT SERPL-MCNC: 7.7 G/DL (ref 6.4–8.2)
PROT UR STRIP.AUTO-MCNC: 30 MG/DL
PROTHROMBIN TIME: 13.5 SEC (ref 11.9–14.9)
RBC # BLD AUTO: 3.26 M/UL (ref 4–5.2)
RBC #/AREA URNS HPF: ABNORMAL /HPF (ref 0–4)
RENAL EPI CELLS #/AREA UR COMP ASSIST: ABNORMAL /HPF (ref 0–1)
SCHISTOCYTES BLD QL SMEAR: ABNORMAL
SLIDE REVIEW: ABNORMAL
SODIUM SERPL-SCNC: 140 MMOL/L (ref 136–145)
SP GR UR STRIP.AUTO: >=1.03 (ref 1–1.03)
UA DIPSTICK W REFLEX MICRO PNL UR: YES
URATE SERPL-MCNC: 3.4 MG/DL (ref 2.6–6)
URN SPEC COLLECT METH UR: ABNORMAL
UROBILINOGEN UR STRIP-ACNC: 0.2 E.U./DL
WBC # BLD AUTO: 0.7 K/UL (ref 4–11)
WBC #/AREA URNS HPF: ABNORMAL /HPF (ref 0–5)

## 2024-11-18 PROCEDURE — 83735 ASSAY OF MAGNESIUM: CPT

## 2024-11-18 PROCEDURE — 93005 ELECTROCARDIOGRAM TRACING: CPT | Performed by: NURSE PRACTITIONER

## 2024-11-18 PROCEDURE — 83615 LACTATE (LD) (LDH) ENZYME: CPT

## 2024-11-18 PROCEDURE — 85730 THROMBOPLASTIN TIME PARTIAL: CPT

## 2024-11-18 PROCEDURE — 81001 URINALYSIS AUTO W/SCOPE: CPT

## 2024-11-18 PROCEDURE — 84550 ASSAY OF BLOOD/URIC ACID: CPT

## 2024-11-18 PROCEDURE — 82977 ASSAY OF GGT: CPT

## 2024-11-18 PROCEDURE — 84100 ASSAY OF PHOSPHORUS: CPT

## 2024-11-18 PROCEDURE — 80048 BASIC METABOLIC PNL TOTAL CA: CPT

## 2024-11-18 PROCEDURE — 85025 COMPLETE CBC W/AUTO DIFF WBC: CPT

## 2024-11-18 PROCEDURE — 80076 HEPATIC FUNCTION PANEL: CPT

## 2024-11-18 PROCEDURE — 71046 X-RAY EXAM CHEST 2 VIEWS: CPT

## 2024-11-18 PROCEDURE — 85610 PROTHROMBIN TIME: CPT

## 2024-11-18 PROCEDURE — 2580000003 HC RX 258: Performed by: NURSE PRACTITIONER

## 2024-11-18 PROCEDURE — 6370000000 HC RX 637 (ALT 250 FOR IP): Performed by: NURSE PRACTITIONER

## 2024-11-18 PROCEDURE — 0539T HC CART-T THERAPY RECEIPT & PREP CAR-T CELLS F/ADMIN: CPT | Performed by: STUDENT IN AN ORGANIZED HEALTH CARE EDUCATION/TRAINING PROGRAM

## 2024-11-18 PROCEDURE — A4217 STERILE WATER/SALINE, 500 ML: HCPCS | Performed by: NURSE PRACTITIONER

## 2024-11-18 PROCEDURE — 93010 ELECTROCARDIOGRAM REPORT: CPT | Performed by: INTERNAL MEDICINE

## 2024-11-18 PROCEDURE — XW043A7 INTRODUCTION OF CILTACABTAGENE AUTOLEUCEL INTO CENTRAL VEIN, PERCUTANEOUS APPROACH, NEW TECHNOLOGY GROUP 7: ICD-10-PCS | Performed by: INTERNAL MEDICINE

## 2024-11-18 PROCEDURE — 2060000000 HC ICU INTERMEDIATE R&B

## 2024-11-18 PROCEDURE — 8910000000 HC RX FDA APPROVED CELL THERAPY: Performed by: STUDENT IN AN ORGANIZED HEALTH CARE EDUCATION/TRAINING PROGRAM

## 2024-11-18 PROCEDURE — 0540T HC CAR-T THERAPY AUTOLOGOUS CELL ADMIN: CPT | Performed by: STUDENT IN AN ORGANIZED HEALTH CARE EDUCATION/TRAINING PROGRAM

## 2024-11-18 RX ORDER — DIPHENHYDRAMINE HCL 25 MG
25 TABLET ORAL ONCE
Status: COMPLETED | OUTPATIENT
Start: 2024-11-18 | End: 2024-11-18

## 2024-11-18 RX ORDER — ACETAMINOPHEN 325 MG/1
650 TABLET ORAL ONCE
Status: COMPLETED | OUTPATIENT
Start: 2024-11-18 | End: 2024-11-18

## 2024-11-18 RX ORDER — FLUCONAZOLE 200 MG/1
200 TABLET ORAL DAILY
Status: DISCONTINUED | OUTPATIENT
Start: 2024-11-18 | End: 2024-11-20 | Stop reason: HOSPADM

## 2024-11-18 RX ORDER — PANTOPRAZOLE SODIUM 40 MG/1
40 TABLET, DELAYED RELEASE ORAL
Status: DISCONTINUED | OUTPATIENT
Start: 2024-11-19 | End: 2024-11-20 | Stop reason: HOSPADM

## 2024-11-18 RX ORDER — SODIUM CHLORIDE 9 MG/ML
INJECTION, SOLUTION INTRAVENOUS CONTINUOUS PRN
Status: DISCONTINUED | OUTPATIENT
Start: 2024-11-18 | End: 2024-11-20 | Stop reason: HOSPADM

## 2024-11-18 RX ORDER — ENOXAPARIN SODIUM 100 MG/ML
30 INJECTION SUBCUTANEOUS 2 TIMES DAILY
Status: DISCONTINUED | OUTPATIENT
Start: 2024-11-18 | End: 2024-11-20 | Stop reason: HOSPADM

## 2024-11-18 RX ORDER — LEVOFLOXACIN 500 MG/1
500 TABLET, FILM COATED ORAL DAILY
Status: DISCONTINUED | OUTPATIENT
Start: 2024-11-18 | End: 2024-11-20 | Stop reason: HOSPADM

## 2024-11-18 RX ORDER — POTASSIUM CHLORIDE 29.8 MG/ML
20 INJECTION INTRAVENOUS PRN
Status: DISCONTINUED | OUTPATIENT
Start: 2024-11-18 | End: 2024-11-20 | Stop reason: HOSPADM

## 2024-11-18 RX ORDER — LEVETIRACETAM 500 MG/1
500 TABLET ORAL 2 TIMES DAILY
Status: DISCONTINUED | OUTPATIENT
Start: 2024-11-18 | End: 2024-11-20 | Stop reason: HOSPADM

## 2024-11-18 RX ORDER — MAGNESIUM SULFATE IN WATER 40 MG/ML
4000 INJECTION, SOLUTION INTRAVENOUS PRN
Status: DISCONTINUED | OUTPATIENT
Start: 2024-11-18 | End: 2024-11-20 | Stop reason: HOSPADM

## 2024-11-18 RX ORDER — SODIUM CHLORIDE 0.9 % (FLUSH) 0.9 %
5-40 SYRINGE (ML) INJECTION PRN
Status: DISCONTINUED | OUTPATIENT
Start: 2024-11-18 | End: 2024-11-20 | Stop reason: HOSPADM

## 2024-11-18 RX ORDER — SODIUM CHLORIDE 0.9 % (FLUSH) 0.9 %
5-40 SYRINGE (ML) INJECTION EVERY 12 HOURS SCHEDULED
Status: DISCONTINUED | OUTPATIENT
Start: 2024-11-18 | End: 2024-11-20 | Stop reason: HOSPADM

## 2024-11-18 RX ORDER — AMLODIPINE BESYLATE 5 MG/1
5 TABLET ORAL DAILY
Status: DISCONTINUED | OUTPATIENT
Start: 2024-11-18 | End: 2024-11-20 | Stop reason: HOSPADM

## 2024-11-18 RX ORDER — CYCLOBENZAPRINE HCL 10 MG
5 TABLET ORAL 3 TIMES DAILY PRN
Status: DISCONTINUED | OUTPATIENT
Start: 2024-11-18 | End: 2024-11-20 | Stop reason: HOSPADM

## 2024-11-18 RX ORDER — GABAPENTIN 300 MG/1
300 CAPSULE ORAL 3 TIMES DAILY
Status: DISCONTINUED | OUTPATIENT
Start: 2024-11-18 | End: 2024-11-20 | Stop reason: HOSPADM

## 2024-11-18 RX ORDER — VALACYCLOVIR HYDROCHLORIDE 500 MG/1
500 TABLET, FILM COATED ORAL 2 TIMES DAILY
Status: DISCONTINUED | OUTPATIENT
Start: 2024-11-18 | End: 2024-11-20 | Stop reason: HOSPADM

## 2024-11-18 RX ORDER — LISINOPRIL 40 MG/1
40 TABLET ORAL DAILY
Status: DISCONTINUED | OUTPATIENT
Start: 2024-11-18 | End: 2024-11-20 | Stop reason: HOSPADM

## 2024-11-18 RX ORDER — DIPHENHYDRAMINE HYDROCHLORIDE 50 MG/ML
25 INJECTION INTRAMUSCULAR; INTRAVENOUS PRN
Status: DISPENSED | OUTPATIENT
Start: 2024-11-18 | End: 2024-11-19

## 2024-11-18 RX ORDER — EPINEPHRINE 1 MG/ML
0.3 INJECTION, SOLUTION INTRAMUSCULAR; SUBCUTANEOUS
Status: DISPENSED | OUTPATIENT
Start: 2024-11-18 | End: 2024-11-19

## 2024-11-18 RX ORDER — SODIUM CHLORIDE 9 MG/ML
INJECTION, SOLUTION INTRAVENOUS PRN
Status: DISCONTINUED | OUTPATIENT
Start: 2024-11-18 | End: 2024-11-20 | Stop reason: HOSPADM

## 2024-11-18 RX ADMIN — LEVETIRACETAM 500 MG: 500 TABLET, FILM COATED ORAL at 20:29

## 2024-11-18 RX ADMIN — DIPHENHYDRAMINE HYDROCHLORIDE 25 MG: 25 TABLET ORAL at 10:53

## 2024-11-18 RX ADMIN — LEVOFLOXACIN 500 MG: 500 TABLET, FILM COATED ORAL at 11:30

## 2024-11-18 RX ADMIN — ACETAMINOPHEN 650 MG: 325 TABLET ORAL at 10:54

## 2024-11-18 RX ADMIN — VALACYCLOVIR HYDROCHLORIDE 500 MG: 500 TABLET, FILM COATED ORAL at 11:31

## 2024-11-18 RX ADMIN — GABAPENTIN 300 MG: 300 CAPSULE ORAL at 20:30

## 2024-11-18 RX ADMIN — FLUCONAZOLE 200 MG: 200 TABLET ORAL at 11:31

## 2024-11-18 RX ADMIN — GABAPENTIN 300 MG: 300 CAPSULE ORAL at 14:44

## 2024-11-18 RX ADMIN — AMLODIPINE BESYLATE 5 MG: 5 TABLET ORAL at 11:30

## 2024-11-18 RX ADMIN — SODIUM CHLORIDE, PRESERVATIVE FREE 10 ML: 5 INJECTION INTRAVENOUS at 10:17

## 2024-11-18 RX ADMIN — LISINOPRIL 40 MG: 40 TABLET ORAL at 11:31

## 2024-11-18 RX ADMIN — SODIUM CHLORIDE 15 ML: 900 IRRIGANT IRRIGATION at 16:48

## 2024-11-18 RX ADMIN — VALACYCLOVIR HYDROCHLORIDE 500 MG: 500 TABLET, FILM COATED ORAL at 20:30

## 2024-11-18 RX ADMIN — SODIUM CHLORIDE 15 ML: 900 IRRIGANT IRRIGATION at 10:17

## 2024-11-18 RX ADMIN — LEVETIRACETAM 500 MG: 500 TABLET, FILM COATED ORAL at 10:53

## 2024-11-18 RX ADMIN — CILTACABTAGENE AUTOLEUCEL 1 EACH: 100000000 INJECTION, SUSPENSION INTRAVENOUS at 11:36

## 2024-11-18 ASSESSMENT — LIFESTYLE VARIABLES
HOW OFTEN DO YOU HAVE A DRINK CONTAINING ALCOHOL: NEVER
HOW MANY STANDARD DRINKS CONTAINING ALCOHOL DO YOU HAVE ON A TYPICAL DAY: PATIENT DOES NOT DRINK

## 2024-11-18 NOTE — CARE COORDINATION
Patient provided CARVYKTI wallet card and given instructions for it's use. Reviewed signs and symptoms of CRS and/or neurotoxicity. Patient without further questions at this time.

## 2024-11-18 NOTE — PROCEDURES
CAR-T Infusion Summary       Farhad Balderas                              [id number]                             Start time:1140   Completion time:1305      Product Type: Carvykti    DIN: [id number]    Batch Number: [id number]   Volume: 70 mL      Catheter lumen used for infusion: single lumen port               Positive Blood Return: Yes    Premeds Given: Benadryl, Tylenol, Keppra    Adverse Reaction(s) and treatment: Baseline vital signs obtained prior to infusion and monitoring completed throughout per [facility] protocol - see flowsheets. All IVFs turned down to KVO during CAR-T Infusion. CAR-T product verified with 2nd RN Maura Thurman RN prior to infusion using 2 patient identifiers. Infused via gravity with rate controlled by Benjamin Lake RN per [facility] protocols.    Emergency medications epinephrine and benadryl available as needed. Emergency medical equipment available as needed including telemetry monitoring throughout infusion, supplemental O2, suction equipment, and crash cart. RN at bedside throughout infusion.        Benjamin Lake RNPROCEDURE NOTE  Date: 11/18/2024   Name: Farhad Balderas  YOB: 1970    Procedures

## 2024-11-18 NOTE — PLAN OF CARE
Problem: Safety - Adult  Goal: Free from fall injury  Note: Orthostatic vital signs obtained at start of shift - see flowsheet for details.  Pt does not meet criteria for orthostasis.  Pt is a Low fall risk. See O[place]nor Fall Score and ABCDS Injury Risk assessments.   - Screening for Orthostasis AND not a [place] Risk per OOCNNOR/ABCDS: Pt bed is in low position, side rails up, call light and belongings are in reach.  Fall risk light is on outside pts room.  Pt encouraged to call for assistance as needed. Will continue with hourly rounds for PO intake, pain needs, toileting and repositioning as needed.      Problem: Skin/Tissue Integrity - Adult  Goal: Skin integrity remains intact  Note: Pt up independently this shift and using hat in toilet.  Pt continent of stool and urine, turns self frequently while in bed.  ADLs and hygiene performed independent throughout shift. Instructed pt on importance of maintaining good hygiene and activity levels.  No signs or symptoms of new skin breakdown noted.

## 2024-11-18 NOTE — H&P
[facility] History and Physical        Attending Physician: Fernando Leal, *    Primary Care: Tila Eisenberg DO       Referring MD: Niki Morton DO  [address]  [address]  [place] [address]    Name: Farhad Balderas :  1970  MRN:  [id number]    Admission: 2024      Date: 2024    Reason for Admission: Carvykti CAR-T Infusion        History of Present Illness:    Farhad Balderas is a 55 yo female w/ IgG Kappa Multiple Myeloma. Her PMH is also significant for HTN, intestinal malabsorption disorders, esophagitis and CRYSTAL.  She is s/p  RVD x 5 cycles (2021 - 22) and High Dose Melphalan f/b autologous SCT (22). She then began progressing on maintenance revlimid late , she was started on clinical trial Eden/Pom/Dex on 24. She then began to progress on trial and the plan was to bridge her with Xopivo/Kyprolis/Dex while undergoing work up for Carvykti.      The patient's most recent admission was when she presented to [facility] on 10/7/24 for an acute visit due to body aches, nausea, vomiting and diarrhea. Her work up was consistent with parainfluenza and her CXR showed LLL basilar airspace disease. Patient presented for next day follow up and had a temperature of 102.6. Her O2 saturations with walking dropped to 87%. Given her symptoms, she was admitted for further work up and treatment of her pneumonia. She was treated with two days of Cefepime. She was discharged home on 10/10/24 and continued to follow up at [facility]. She s/p 1 cycle of kyprolis, selinexor for bridging chemotherapy. Her disease status is Relapse with unconfirmed progression.     She received LDC for 3 days (24 - 11/15/24). She is admitted today for Carvyki CAR-T Infusion. She feels well today and denies fever, chills, sweats, change in appetite, visual changes, headache, sinus congestion, sore throat, mouth pain, cough, increased shortness of breath, chest pain, abdominal pain, nausea, vomiting,  AND PLAN:           1. Multiple myeloma, IgG Kappa, ISS 3: partial response  - t(14;20), gain of 1q21, monosomy 13.    - At diagnoses: M spike is 3.7, FLC ratio  132  - BMBx (12/7/21): 20% plasma cells & PET scan (1/12/22) showed diffuse bone marrow activity, no lytic lesions  - BMBx (3/18/22) after cycle 4: 40% cellularity and few percentage of clonal plasma cells  - BMBx (6/10/22) - negative for plasma cells  - BMBx post transplant negative for plasma cell  - Serum light chain with M protein of 1, normal FLC ratio   - s/p HD [id number] and ASCT (7/1/22) followed by Rev maintenance -- Patient did not meet inclusion criteria #3 of obtaining VGPR or better for clinical trial  - PET (1/31/24): No evidence of any definitive hypermetabolic neoplastic disease. Mild diffuse hypermetabolic activity identified within the spine, pelvis and femoral diaphysis is without corresponding CT abnormality therefore favors physiologic marrow activity. Diffuse infiltrating marrow process is a differential consideration. 3. Spleen activity representative of possible extramedullary disease  - S/p Eden/Pom/Dex on clinical trial (C1D1 2/7/24) x 8 cycles with progression   - Plan Selinexor/Kyprolis/Dex as bridge to Carvykti CAR-T   - S/p 1 cycles of kyprolis, selinexor for bridging  CAR-T work up:   - Echo normal  - PFT normal   - Bone Marrow bx 1.3% monoclonal plasma cell population     Lymphodepleting Chemotherapy:  Fludarabine and cyclophosphamide 11/13/24 - 11/15/24  Disease Status at time of Infusion:  Relapse with unconfirmed progression   CAR-T Infusion Date: 11/18/24  CAR-T Product: Carvykti   ENR#: [id number]      Day 0    2. CRS / Neuro: At risk post-infusion  CRS Grade: N/A  - Monitor CRP and Ferrtin closely   Lab Results   Component Value Date    CRP <3.0 07/30/2024    .6 (H) 03/13/2024     Lab Results   Component Value Date    FERRITIN 336.5 (H) 07/30/2024    FERRITIN 137.3 03/19/2021       ICANS Grade: N/A  CAR-T Score:

## 2024-11-18 NOTE — CARE COORDINATION
Patient admitted today for Carvykti infusion.  Patient admitted for 48 hours, if no s/s of neurotoxicity or cytokine release syndrome patient will be discharged home with follow up in outpatient infusion. Patient is aware of the discharge plan.

## 2024-11-18 NOTE — PROGRESS NOTES
Availability of two 800 mg doses of Tocilizumab was verified prior to patient CAR-T infusion by this pharmacist.    -Elio De Luna 11/18/2024 9:29 AM

## 2024-11-18 NOTE — CARE COORDINATION
Case Management Assessment  Initial Evaluation    Date/Time of Evaluation: 11/18/2024 11:32 AM  Assessment Completed by: CLAUDE Apple   for [facility] [place] ([facility])  [place] Mobile: [phone]    If patient is discharged prior to next notation, then this note serves as note for discharge by case management.    Patient Name: Farhad Balderas                   YOB: 1970  Diagnosis: Multiple myeloma (HCC) [C90.00]  Multiple myeloma, remission status unspecified (HCC) [C90.00]  Multiple myeloma in relapse (HCC) [C90.02]                   Date / Time: 11/18/2024  8:20 AM    Patient Admission Status: Inpatient   Readmission Risk (Low < 19, Mod (19-27), High > 27): Readmission Risk Score: 14.5    Current PCP: Tila Eisenberg, DO  PCP verified by CM? Yes (has been going to [facility])    Chart Reviewed: Yes      History Provided by: Patient  Patient Orientation: Alert and Oriented    Patient Cognition: Alert    Hospitalization in the last 30 days (Readmission):  No    If yes, Readmission Assessment in CM Navigator will be completed.    Advance Directives:      Code Status: Full Code   Patient's Primary Decision Maker is: Legal Next of Kin    Primary Decision Maker: felipe benitez - Child - [phone]    Secondary Decision Maker: Albert Orosco - Child - [phone]    Discharge Planning:    Patient lives with: Family Members Type of Home: House  Primary Care Giver: Self  Patient Support Systems include: Family Members, Children   Current Financial resources: Medicaid ([employer])  Current community resources: None  Current services prior to admission: Other (Comment), Durable Medical Equipment ([facility])            Current DME: Walker (doesn't use the walker)            Type of Home Care services:  None    ADLS  Prior functional level: Independent in ADLs/IADLs  Current functional level: Independent in ADLs/IADLs    PT AM-PAC:   /24  OT AM-PAC:    /24    Family can provide assistance at DC: Yes  Would you like Case Management to discuss the discharge plan with any other family members/significant others, and if so, who? Yes  Plans to Return to Present Housing: Yes  Other Identified Issues/Barriers to RETURNING to current housing: n/a  Potential Assistance needed at discharge: N/A            Potential DME:    Patient expects to discharge to: House  Plan for transportation at discharge:      Financial    Payor: [employer] / Plan: [employer] [place] MEDICAID / Product Type: *No Product type* /     Does insurance require precert for SNF: Yes    Potential assistance Purchasing Medications: No  Meds-to-Beds request:        [facility] [address] [place] [address] [address] [facility] -  [phone] - F [phone]  [address] [facility] [address]  Phone: [phone] Fax: [phone]      Notes:    Factors facilitating achievement of predicted outcomes: Family support, Caregiver support, Motivated, Cooperative, Pleasant, Sense of humor, Good insight into deficits, and Has needed Durable Medical Equipment at home    Barriers to discharge: Medical complications    Additional Case Management Notes: Chart review completed. Spoke with pt at bedside. Pt stated she lives with her family and someone is with her 24/7, along with them driving. She plans on returning home when discharged with no anticipated needs from .     SW will follow but anticipates no needs.    The Plan for Transition of Care is related to the following treatment goals of Multiple myeloma (HCC) [C90.00]  Multiple myeloma, remission status unspecified (HCC) [C90.00]  Multiple myeloma in relapse (HCC) [C90.02]    CLAUDE Apple   for [facility] ([facility])  [place] Mobile: [phone]

## 2024-11-18 NOTE — PROGRESS NOTES
CAR-T Infusion Summary       Farhad Balderas                              [id number]                         Start time:1140   Completion [id number]      Product Type: Carvykti  DIN: [id number]    Batch Number: [id number]   Volume: 70 mL      Catheter lumen used for infusion: single lumen port  Positive Blood Return: Yes Verified before and after infusion    Premeds Given: Keppra, Tylenol, Benadryl    Adverse Reaction(s) and treatment: Baseline vital signs obtained prior to infusion and monitoring completed throughout per [facility] protocol - see flowsheets. All IVFs turned down to KVO during CAR-T Infusion. CAR-T product verified with 2nd RN Maura Thurman prior to infusion using 2 patient identifiers. Infused via gravity with rate controlled by Benjamin Lake RN per [facility] protocols.    Emergency medications epinephrine and benadryl available as needed. Emergency medical equipment available as needed including telemetry monitoring throughout infusion, supplemental O2, suction equipment, and crash cart. RN at bedside throughout infusion.        Benjamin Lake RN

## 2024-11-18 NOTE — PROGRESS NOTES
4 Eyes Skin Assessment     NAME:  Farhad Balderas  YOB: 1970  MEDICAL RECORD NUMBER:  [id number]    The patient is being assessed for  Admission    I agree that at least one RN has performed a thorough Head to Toe Skin Assessment on the patient. ALL assessment sites listed below have been assessed.      Areas assessed by both nurses: SERGIO Gill and SERGIO Alexander      Head, Face, Ears, Shoulders, Back, Chest, Arms, Elbows, Hands, Sacrum. Buttock, Coccyx, Ischium, Legs. Feet and Heels, and Under Medical Devices         Does the Patient have a Wound? No noted wound(s)       Dk Prevention initiated by RN: No  Wound Care Orders initiated by RN: No    Pressure Injury (Stage 3,4, Unstageable, DTI, NWPT, and Complex wounds) if present, place Wound referral order by RN under : No    New Ostomies, if present place, Ostomy referral order under : No     Nurse 1 eSignature: Electronically signed by Benjamin Lake RN on 11/18/24 at 5:13 PM EST    **SHARE this note so that the co-signing nurse can place an eSignature**    Nurse 2 eSignature: {Esignature:[id number]}

## 2024-11-19 LAB
ANION GAP SERPL CALCULATED.3IONS-SCNC: 7 MMOL/L (ref 3–16)
BASOPHILS # BLD: 0 K/UL (ref 0–0.2)
BASOPHILS NFR BLD: 0.8 %
BUN SERPL-MCNC: 11 MG/DL (ref 7–20)
CALCIUM SERPL-MCNC: 9 MG/DL (ref 8.3–10.6)
CHLORIDE SERPL-SCNC: 103 MMOL/L (ref 99–110)
CO2 SERPL-SCNC: 28 MMOL/L (ref 21–32)
CREAT SERPL-MCNC: 0.7 MG/DL (ref 0.6–1.1)
CRP SERPL-MCNC: <3 MG/L (ref 0–5.1)
DEPRECATED RDW RBC AUTO: 17.3 % (ref 12.4–15.4)
EOSINOPHIL # BLD: 0 K/UL (ref 0–0.6)
EOSINOPHIL NFR BLD: 2.6 %
FERRITIN SERPL IA-MCNC: 459 NG/ML (ref 15–150)
GFR SERPLBLD CREATININE-BSD FMLA CKD-EPI: >90 ML/MIN/{1.73_M2}
GLUCOSE SERPL-MCNC: 129 MG/DL (ref 70–99)
HCT VFR BLD AUTO: 25.1 % (ref 36–48)
HGB BLD-MCNC: 8.2 G/DL (ref 12–16)
LYMPHOCYTES # BLD: 0 K/UL (ref 1–5.1)
LYMPHOCYTES NFR BLD: 4.1 %
MAGNESIUM SERPL-MCNC: 1.89 MG/DL (ref 1.8–2.4)
MCH RBC QN AUTO: 27.9 PG (ref 26–34)
MCHC RBC AUTO-ENTMCNC: 32.6 G/DL (ref 31–36)
MCV RBC AUTO: 85.5 FL (ref 80–100)
MONOCYTES # BLD: 0 K/UL (ref 0–1.3)
MONOCYTES NFR BLD: 2.9 %
NEUTROPHILS # BLD: 0.6 K/UL (ref 1.7–7.7)
NEUTROPHILS NFR BLD: 89.6 %
PHOSPHATE SERPL-MCNC: 4.2 MG/DL (ref 2.5–4.9)
PLATELET # BLD AUTO: 181 K/UL (ref 135–450)
PMV BLD AUTO: 7.3 FL (ref 5–10.5)
POTASSIUM SERPL-SCNC: 3.8 MMOL/L (ref 3.5–5.1)
RBC # BLD AUTO: 2.94 M/UL (ref 4–5.2)
SODIUM SERPL-SCNC: 138 MMOL/L (ref 136–145)
WBC # BLD AUTO: 0.7 K/UL (ref 4–11)

## 2024-11-19 PROCEDURE — 86140 C-REACTIVE PROTEIN: CPT

## 2024-11-19 PROCEDURE — 80048 BASIC METABOLIC PNL TOTAL CA: CPT

## 2024-11-19 PROCEDURE — 2580000003 HC RX 258: Performed by: NURSE PRACTITIONER

## 2024-11-19 PROCEDURE — 2060000000 HC ICU INTERMEDIATE R&B

## 2024-11-19 PROCEDURE — 82728 ASSAY OF FERRITIN: CPT

## 2024-11-19 PROCEDURE — 94761 N-INVAS EAR/PLS OXIMETRY MLT: CPT

## 2024-11-19 PROCEDURE — 94150 VITAL CAPACITY TEST: CPT

## 2024-11-19 PROCEDURE — 83735 ASSAY OF MAGNESIUM: CPT

## 2024-11-19 PROCEDURE — 85025 COMPLETE CBC W/AUTO DIFF WBC: CPT

## 2024-11-19 PROCEDURE — 84100 ASSAY OF PHOSPHORUS: CPT

## 2024-11-19 PROCEDURE — 6370000000 HC RX 637 (ALT 250 FOR IP): Performed by: NURSE PRACTITIONER

## 2024-11-19 RX ADMIN — VALACYCLOVIR HYDROCHLORIDE 500 MG: 500 TABLET, FILM COATED ORAL at 20:22

## 2024-11-19 RX ADMIN — SODIUM CHLORIDE, PRESERVATIVE FREE 10 ML: 5 INJECTION INTRAVENOUS at 20:26

## 2024-11-19 RX ADMIN — LISINOPRIL 40 MG: 40 TABLET ORAL at 08:41

## 2024-11-19 RX ADMIN — PANTOPRAZOLE SODIUM 40 MG: 40 TABLET, DELAYED RELEASE ORAL at 06:30

## 2024-11-19 RX ADMIN — GABAPENTIN 300 MG: 300 CAPSULE ORAL at 08:41

## 2024-11-19 RX ADMIN — VALACYCLOVIR HYDROCHLORIDE 500 MG: 500 TABLET, FILM COATED ORAL at 08:41

## 2024-11-19 RX ADMIN — LEVETIRACETAM 500 MG: 500 TABLET, FILM COATED ORAL at 20:22

## 2024-11-19 RX ADMIN — GABAPENTIN 300 MG: 300 CAPSULE ORAL at 20:22

## 2024-11-19 RX ADMIN — SODIUM CHLORIDE, PRESERVATIVE FREE 10 ML: 5 INJECTION INTRAVENOUS at 08:43

## 2024-11-19 RX ADMIN — AMLODIPINE BESYLATE 5 MG: 5 TABLET ORAL at 08:41

## 2024-11-19 RX ADMIN — SODIUM CHLORIDE 15 ML: 900 IRRIGANT IRRIGATION at 21:38

## 2024-11-19 RX ADMIN — LEVOFLOXACIN 500 MG: 500 TABLET, FILM COATED ORAL at 08:41

## 2024-11-19 RX ADMIN — FLUCONAZOLE 200 MG: 200 TABLET ORAL at 08:41

## 2024-11-19 RX ADMIN — GABAPENTIN 300 MG: 300 CAPSULE ORAL at 13:41

## 2024-11-19 RX ADMIN — LEVETIRACETAM 500 MG: 500 TABLET, FILM COATED ORAL at 08:41

## 2024-11-19 ASSESSMENT — PAIN SCALES - GENERAL
PAINLEVEL_OUTOF10: 0

## 2024-11-19 NOTE — PLAN OF CARE
Problem: Chronic Conditions and Co-morbidities  Goal: Patient's chronic conditions and co-morbidity symptoms are monitored and maintained or improved  Outcome: Progressing     Problem: Safety - Adult  Goal: Free from fall injury  Outcome: Progressing     Problem: Skin/Tissue Integrity - Adult  Goal: Skin integrity remains intact  Outcome: Progressing     Problem: Infection - Adult  Goal: Absence of infection at discharge  Outcome: Progressing     Problem: Hematologic - Adult  Goal: Maintains hematologic stability  Outcome: Progressing

## 2024-11-19 NOTE — PLAN OF CARE
Problem: Chronic Conditions and Co-morbidities  Goal: Patient's chronic conditions and co-morbidity symptoms are monitored and maintained or improved  11/19/2024 0923 by Mireya Maloney RN  Outcome: Progressing     Problem: Safety - Adult  Goal: Free from fall injury  11/19/2024 0923 by Mireya Maloney, RN  Outcome: Progressing     Problem: ABCDS Injury Assessment  Goal: Absence of physical injury  Outcome: Progressing

## 2024-11-19 NOTE — CARE COORDINATION
Provided patient with discharge information today.  Patient plan is to discharge on 11/20 and return to outpatient setting.

## 2024-11-19 NOTE — PROGRESS NOTES
[facility] Progress Note    2024     Farhad Balderas    MRN: [id number]    : 1970    Referring MD: Niki Morton DO  [address]  [facility] [address]  [place] [address]      SUBJECTIVE:  Patient is doing welI.  Uneventful CART infusion yesterday.  No fevers or hypoxia.  No complaints this morning.    ECOG PS:  (2) Ambulatory and capable of self care, unable to carry out work activity, up and about > 50% or waking hours    KPS: 80% Normal activity with effort; some signs or symptoms of disease    Isolation: None    Medications    Scheduled Meds:   sodium chloride flush  5-40 mL IntraVENous 2 times per day    Saline Mouthwash  15 mL Swish & Spit 4x Daily AC & HS    enoxaparin  30 mg SubCUTAneous BID    levETIRAcetam  500 mg Oral BID    amLODIPine  5 mg Oral Daily    gabapentin  300 mg Oral TID    fluconazole  200 mg Oral Daily    levoFLOXacin  500 mg Oral Daily    lisinopril  40 mg Oral Daily    pantoprazole  40 mg Oral QAM AC    valACYclovir  500 mg Oral BID     Continuous Infusions:   sodium chloride      sodium chloride      sodium chloride       PRN Meds:.sodium chloride, sodium chloride flush, sodium chloride, potassium chloride, magnesium sulfate, Saline Mouthwash, ALTEplase (CATHFLO) 2 mg in sterile water 2 mL injection, diphenhydrAMINE, EPINEPHrine, sodium chloride, sodium chloride, cyclobenzaprine    ROS:  As noted above, otherwise remainder of 10-point ROS negative    Physical Exam:     I&O:    Intake/Output Summary (Last 24 hours) at 2024 0811  Last data filed at 2024 0354  Gross per 24 hour   Intake 1147 ml   Output 400 ml   Net 747 ml       Vital Signs:  /72   Pulse 90   Temp 97.8 °F (36.6 °C) (Oral)   Resp 17   Ht 1.689 m (5' 6.5\") Comment: Measured, standing  Wt 109 kg (240 lb 6.4 oz)   SpO2 95%   BMI 38.22 kg/m²     Weight:    Wt Readings from Last 3 Encounters:   24 109 kg (240 lb 6.4 oz)   11/15/24 112.5 kg (248 lb 0.3 oz)   24 110.1 kg (242 lb 11.6 oz)  for plasma cell  - Serum light chain with M protein of 1, normal FLC ratio   - 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 Mild diffuse hypermetabolic activity identified within the spine, pelvis and femoral diaphysis is without corresponding CT abnormality therefore favors physiologic marrow activity. Diffuse infiltrating marrow process is a differential consideration. 3. Spleen activity representative of possible extramedullary disease  - S/p Eden/Pom/Dex on clinical trial (C1D1 2/7/24) x 8 cycles with progression   - Plan Selinexor/Kyprolis/Dex as bridge to Carvykti CAR-T   - S/p 1 cycles of kyprolis, selinexor for bridging  CAR-T work up:   - Echo normal  - PFT normal   - Bone Marrow bx 1.3% monoclonal plasma cell population     Lymphodepleting Chemotherapy:  Fludarabine and cyclophosphamide 11/13/24 - 11/15/24  Disease Status at time of Infusion:  Relapse with unconfirmed progression   CAR-T Infusion Date: 11/18/24  CAR-T Product: Carvykti   ENR#: [id number]       Day +1     2. CRS / Neuro: At risk post-infusion  CRS Grade: N/A  - Monitor CRP and Ferrtin closely         Lab Results   Component Value Date     CRP <3.0 07/30/2024     .6 (H) 03/13/2024            Lab Results   Component Value Date     FERRITIN 336.5 (H) 07/30/2024     FERRITIN 137.3 03/19/2021         ICANS Grade: N/A  CAR-T Score: N/A  - Neuro checks w/ CARTOX 10-point assessment Q4hrs  - Started Keppra on 11/18/24        3.  ID: Afebrile, no evidence of infection  - Post transplant vaccines: #1 (1/6/22) , #2 (3/14/23), #3 (6/6/23)   History:   - PNA, parainfluenza + (10/7/24)   - Respiratory panel (10/7/24): parainfluenza    - CXR (10/7/24): LLL basilar airspace disease. S/p antibiotics    Current Tx/PPx:  - Cont Valtrex, Levaquin and fluconazole

## 2024-11-19 NOTE — DISCHARGE INSTRUCTIONS
[facility] CARVYKTI CAR-T Therapy Discharge Instructions    Call for Questions/Concerns:  [phone] [facility] office  The phone number listed above is available 24 hrs/7 days per week    If you experience issues reaching the On-Call physician in a timely manner? Please call the BCC unit and speak to the Charge RN- [phone]    [facility] is open M-F 8am-4:30pm; Sat-Sun/Holidays 8am-1pm    Symptoms to Report Immediately:    Fever of 100.4 or greater or chills and shaking present or when TempTraq alerts you that you have a fever.r  Signs or symptoms of CRS: racing heart, hypoxia (Shortness of breath) low blood pressure, achy, joint pain, muscle stiffness  Any signs of Neurotoxicity: Confusion, Subtle changes in mental status, dizziness, lightheadedness, Headache  Vomiting without relief after use of anti-nausea medication  Severe abdominal cramping or muscle/joint pain  Diarrhea: More than 3 loose, watery bowel movements in a 24 hour period  Unusual or excessive bleeding from your mouth, nose, rectum, bladder or vagina  Sudden onset of shortness of breath or chest pain  Signs/symptoms of infection: redness, warmth, swelling-particularly to central line site    Report to Physician's office within 24 hours:    Pain not relieved by pain medication  Change in urination-odor, cloudiness, frequency, or pain with urination  Flu-like symptoms  Skin changes-rash, hives, redness or peeling of skin    Additional Instructions:    Avoid people with colds, flu-like symptoms, or any sign of infection  Drink plenty of fluids-attempt to consume 2-3 liters ( ounces) of fluids/24 hour period  Continue low microbial diet until instructed by physician to resume normal diet  Bring all of your medications with you to your doctor's appointments  Bring your current medicine list to each hospital and office visit    You are being discharged with IV access due to need for ongoing therapy.  Below is pertinent information

## 2024-11-20 VITALS
OXYGEN SATURATION: 99 % | RESPIRATION RATE: 19 BRPM | WEIGHT: 242.9 LBS | TEMPERATURE: 97.9 F | HEART RATE: 90 BPM | HEIGHT: 67 IN | BODY MASS INDEX: 38.12 KG/M2 | DIASTOLIC BLOOD PRESSURE: 73 MMHG | SYSTOLIC BLOOD PRESSURE: 111 MMHG

## 2024-11-20 LAB
ALBUMIN SERPL-MCNC: 4 G/DL (ref 3.4–5)
ALP SERPL-CCNC: 43 U/L (ref 40–129)
ALT SERPL-CCNC: 9 U/L (ref 10–40)
ANION GAP SERPL CALCULATED.3IONS-SCNC: 7 MMOL/L (ref 3–16)
AST SERPL-CCNC: 13 U/L (ref 15–37)
BASOPHILS # BLD: 0 K/UL (ref 0–0.2)
BASOPHILS NFR BLD: 0.1 %
BILIRUB DIRECT SERPL-MCNC: <0.1 MG/DL (ref 0–0.3)
BILIRUB INDIRECT SERPL-MCNC: 0.2 MG/DL (ref 0–1)
BILIRUB SERPL-MCNC: 0.3 MG/DL (ref 0–1)
BUN SERPL-MCNC: 9 MG/DL (ref 7–20)
CALCIUM SERPL-MCNC: 9.3 MG/DL (ref 8.3–10.6)
CHLORIDE SERPL-SCNC: 104 MMOL/L (ref 99–110)
CO2 SERPL-SCNC: 29 MMOL/L (ref 21–32)
CREAT SERPL-MCNC: 0.7 MG/DL (ref 0.6–1.1)
DEPRECATED RDW RBC AUTO: 17.6 % (ref 12.4–15.4)
EOSINOPHIL # BLD: 0 K/UL (ref 0–0.6)
EOSINOPHIL NFR BLD: 7.6 %
GFR SERPLBLD CREATININE-BSD FMLA CKD-EPI: >90 ML/MIN/{1.73_M2}
GGT SERPL-CCNC: 28 U/L (ref 5–36)
GLUCOSE SERPL-MCNC: 134 MG/DL (ref 70–99)
HCT VFR BLD AUTO: 24.7 % (ref 36–48)
HGB BLD-MCNC: 8.1 G/DL (ref 12–16)
LDH SERPL L TO P-CCNC: 139 U/L (ref 100–190)
LYMPHOCYTES # BLD: 0.1 K/UL (ref 1–5.1)
LYMPHOCYTES NFR BLD: 11.8 %
MAGNESIUM SERPL-MCNC: 1.91 MG/DL (ref 1.8–2.4)
MCH RBC QN AUTO: 28.1 PG (ref 26–34)
MCHC RBC AUTO-ENTMCNC: 32.9 G/DL (ref 31–36)
MCV RBC AUTO: 85.5 FL (ref 80–100)
MONOCYTES # BLD: 0 K/UL (ref 0–1.3)
MONOCYTES NFR BLD: 5.4 %
NEUTROPHILS # BLD: 0.4 K/UL (ref 1.7–7.7)
NEUTROPHILS NFR BLD: 75.1 %
PHOSPHATE SERPL-MCNC: 4.3 MG/DL (ref 2.5–4.9)
PLATELET # BLD AUTO: 178 K/UL (ref 135–450)
PMV BLD AUTO: 7.6 FL (ref 5–10.5)
POTASSIUM SERPL-SCNC: 3.6 MMOL/L (ref 3.5–5.1)
PROT SERPL-MCNC: 7.5 G/DL (ref 6.4–8.2)
RBC # BLD AUTO: 2.89 M/UL (ref 4–5.2)
SODIUM SERPL-SCNC: 140 MMOL/L (ref 136–145)
URATE SERPL-MCNC: 3 MG/DL (ref 2.6–6)
WBC # BLD AUTO: 0.6 K/UL (ref 4–11)

## 2024-11-20 PROCEDURE — 2580000003 HC RX 258: Performed by: NURSE PRACTITIONER

## 2024-11-20 PROCEDURE — 82977 ASSAY OF GGT: CPT

## 2024-11-20 PROCEDURE — 36592 COLLECT BLOOD FROM PICC: CPT

## 2024-11-20 PROCEDURE — 85025 COMPLETE CBC W/AUTO DIFF WBC: CPT

## 2024-11-20 PROCEDURE — 84550 ASSAY OF BLOOD/URIC ACID: CPT

## 2024-11-20 PROCEDURE — 80048 BASIC METABOLIC PNL TOTAL CA: CPT

## 2024-11-20 PROCEDURE — 83735 ASSAY OF MAGNESIUM: CPT

## 2024-11-20 PROCEDURE — 83615 LACTATE (LD) (LDH) ENZYME: CPT

## 2024-11-20 PROCEDURE — 84100 ASSAY OF PHOSPHORUS: CPT

## 2024-11-20 PROCEDURE — 6370000000 HC RX 637 (ALT 250 FOR IP): Performed by: NURSE PRACTITIONER

## 2024-11-20 PROCEDURE — 80076 HEPATIC FUNCTION PANEL: CPT

## 2024-11-20 RX ORDER — ONDANSETRON 8 MG/1
8 TABLET, FILM COATED ORAL EVERY 8 HOURS PRN
Status: CANCELLED | OUTPATIENT
Start: 2024-11-21

## 2024-11-20 RX ORDER — HEPARIN 100 UNIT/ML
500 SYRINGE INTRAVENOUS PRN
Status: CANCELLED | OUTPATIENT
Start: 2024-11-21

## 2024-11-20 RX ORDER — SODIUM CHLORIDE 9 MG/ML
INJECTION, SOLUTION INTRAVENOUS CONTINUOUS PRN
Status: CANCELLED | OUTPATIENT
Start: 2024-11-21

## 2024-11-20 RX ORDER — OXYCODONE HYDROCHLORIDE 5 MG/1
10 TABLET ORAL EVERY 4 HOURS PRN
Status: CANCELLED | OUTPATIENT
Start: 2024-11-21

## 2024-11-20 RX ORDER — MAGNESIUM SULFATE IN WATER 40 MG/ML
4000 INJECTION, SOLUTION INTRAVENOUS PRN
Status: CANCELLED | OUTPATIENT
Start: 2024-11-21

## 2024-11-20 RX ORDER — POTASSIUM CHLORIDE 1500 MG/1
40 TABLET, EXTENDED RELEASE ORAL PRN
Status: CANCELLED | OUTPATIENT
Start: 2024-11-21

## 2024-11-20 RX ORDER — PROCHLORPERAZINE EDISYLATE 5 MG/ML
10 INJECTION INTRAMUSCULAR; INTRAVENOUS EVERY 6 HOURS PRN
Status: CANCELLED | OUTPATIENT
Start: 2024-11-21

## 2024-11-20 RX ORDER — POTASSIUM CHLORIDE 29.8 MG/ML
80 INJECTION INTRAVENOUS PRN
Status: CANCELLED | OUTPATIENT
Start: 2024-11-21 | End: 2024-12-21

## 2024-11-20 RX ORDER — PROCHLORPERAZINE MALEATE 10 MG
10 TABLET ORAL EVERY 6 HOURS PRN
Status: CANCELLED | OUTPATIENT
Start: 2024-11-21

## 2024-11-20 RX ORDER — OXYCODONE HYDROCHLORIDE 5 MG/1
5 TABLET ORAL EVERY 4 HOURS PRN
Status: CANCELLED | OUTPATIENT
Start: 2024-11-21

## 2024-11-20 RX ORDER — ONDANSETRON 2 MG/ML
8 INJECTION INTRAMUSCULAR; INTRAVENOUS EVERY 8 HOURS PRN
Status: CANCELLED | OUTPATIENT
Start: 2024-11-21

## 2024-11-20 RX ADMIN — LEVETIRACETAM 500 MG: 500 TABLET, FILM COATED ORAL at 08:29

## 2024-11-20 RX ADMIN — PANTOPRAZOLE SODIUM 40 MG: 40 TABLET, DELAYED RELEASE ORAL at 07:37

## 2024-11-20 RX ADMIN — GABAPENTIN 300 MG: 300 CAPSULE ORAL at 08:29

## 2024-11-20 RX ADMIN — GABAPENTIN 300 MG: 300 CAPSULE ORAL at 14:28

## 2024-11-20 RX ADMIN — FLUCONAZOLE 200 MG: 200 TABLET ORAL at 08:29

## 2024-11-20 RX ADMIN — AMLODIPINE BESYLATE 5 MG: 5 TABLET ORAL at 08:29

## 2024-11-20 RX ADMIN — LISINOPRIL 40 MG: 40 TABLET ORAL at 08:29

## 2024-11-20 RX ADMIN — LEVOFLOXACIN 500 MG: 500 TABLET, FILM COATED ORAL at 08:29

## 2024-11-20 RX ADMIN — VALACYCLOVIR HYDROCHLORIDE 500 MG: 500 TABLET, FILM COATED ORAL at 08:29

## 2024-11-20 RX ADMIN — SODIUM CHLORIDE, PRESERVATIVE FREE 10 ML: 5 INJECTION INTRAVENOUS at 08:46

## 2024-11-20 ASSESSMENT — PAIN SCALES - GENERAL
PAINLEVEL_OUTOF10: 0

## 2024-11-20 NOTE — DISCHARGE SUMMARY
Reviewed discharge instructions with patient. Reviewed discharge medications including dosing, schedule, indication, and adverse reactions.  Reviewed which medications were already taken today and next dosage due for each medication.      Reviewed signs and symptoms that prompt a call to the physician and appropriate phone numbers. Reviewed follow up appointments that have been made in [facility] and Outpatient Oncology.     Patient verbalized understanding of all instructions and questions were answered to her. satisfaction.  Discharge instructions were given to the patient.  Patient discharged to home per wheelchair with family members.      Su Alcantara RN  
work up:   - Echo normal  - PFT normal   - Bone Marrow bx 1.3% monoclonal plasma cell population     Lymphodepleting Chemotherapy:  Fludarabine and cyclophosphamide 11/13/24 - 11/15/24  Disease Status at time of Infusion:  Relapse with unconfirmed progression   CAR-T Infusion Date: 11/18/24  CAR-T Product: Carvykti   ENR#: [id number]       Day +2     2. CRS / Neuro: At risk post-infusion  CRS Grade: N/A  - Monitor CRP and Ferrtin closely             Lab Results   Component Value Date     CRP <3.0 07/30/2024     .6 (H) 03/13/2024                Lab Results   Component Value Date     FERRITIN 336.5 (H) 07/30/2024     FERRITIN 137.3 03/19/2021         ICANS Grade: N/A  CAR-T Score: N/A  - Neuro checks w/ CARTOX 10-point assessment Q4hrs  - Started Keppra on 11/18/24        3.  ID: Afebrile, no evidence of infection  - Post transplant vaccines: #1 (1/6/22) , #2 (3/14/23), #3 (6/6/23)   History:   - PNA, parainfluenza + (10/7/24)   - Respiratory panel (10/7/24): parainfluenza    - CXR (10/7/24): LLL basilar airspace disease. S/p antibiotics    Current Tx/PPx:  - Cont Valtrex, Levaquin and fluconazole  ppx      Post CAR- T monitoring / maintenance:  - IgG & CD4 level monthly starting on day + 30  - Check disease titers on day + 30 or when WBC recovered. Re-vaccinate vs booster based on titer (assure titers are drawn prior to administering immunoglobulins)  - Start PCP ppx on day + 30 - stop when CD4 > 250  - Cont Valtrex for 1 year post CAR-T       4. Heme: anemia 2/2 chemotherapy  - H/o CRYSTAL: S/p IV iron (3/21/22 & 3/28/22)  - Transfuse for Hgb < 7 and Platelets < 10K  - No transfusion today    5. Metabolic:     -Encourage PO intake     6. Endocrine:  - H/o Type 2 DM  - Currently off metformin  - Hgb A1c (1/6/22): 6.1     7. GI / Nutrition:    - H/o esophagitis  Nutrition:    - Cont low microbial diet  GERD:   - Cont PPI daily   Nausea / Vomiting: improving  - Cont Compazine PRN     8. Poor dentition:    - S/p

## 2024-11-20 NOTE — CARE COORDINATION
Case Management Assessment            Discharge Note                    Date / Time of Note: 11/20/2024 9:22 AM                  Discharge Note Completed by: CLAUDE Apple   for [facility] [place] ([facility])  [employer] Mobile: [phone]    Patient Name: Farhad Balderas   YOB: 1970  Diagnosis: Multiple myeloma (HCC) [C90.00]  Multiple myeloma, remission status unspecified (HCC) [C90.00]  Multiple myeloma in relapse (HCC) [C90.02]   Date / Time: 11/18/2024  8:20 AM    Current PCP: Tila Eisenberg DO  Clinic patient: No    Hospitalization in the last 30 days: No       Advance Directives:  Code Status: Full Code  [place] DNR form completed and on chart: Not Indicated    Financial:  Payor: [facility] / Plan: [facility] MEDICAID / Product Type: *No Product type* /      Pharmacy:    [facility] [address] [place] [address] [address] [place] [address] [phone] -  [phone]  [address] [place] [address]  Phone: [phone] Fax: [phone]      Assistance purchasing medications?: Potential Assistance Purchasing Medications: No  Assistance provided by Case Management: None at this time    Does patient want to participate in local refill/ meds to beds program?:      Meds To Beds General Rules:  1. Can ONLY be done Monday- Friday between 8:30am-5pm  2. Prescription(s) must be in pharmacy by 3pm to be filled same day  3.Copy of patient's insurance/ prescription drug card and patient face sheet must be sent along with the prescription(s)  4. Cost of Rx cannot be added to hospital bill. If financial assistance is needed, please contact unit  or ;  or  CANNOT provide pharmacy voucher for patients co-pays  5. Patients can then  the prescription on their way out of the hospital at discharge, or pharmacy can deliver to the bedside if staff is available. (payment due at time of pick-up

## 2024-11-20 NOTE — PLAN OF CARE
Problem: Chronic Conditions and Co-morbidities  Goal: Patient's chronic conditions and co-morbidity symptoms are monitored and maintained or improved  Outcome: Completed     Problem: Safety - Adult  Goal: Free from fall injury  11/20/2024 0924 by Su Alcantara RN  Outcome: Completed     Problem: ABCDS Injury Assessment  Goal: Absence of physical injury  Outcome: Completed     Problem: Skin/Tissue Integrity - Adult  Goal: Skin integrity remains intact  11/20/2024 0924 by Su Alcantara RN  Outcome: Completed     Problem: Skin/Tissue Integrity - Adult  Goal: Incisions, wounds, or drain sites healing without S/S of infection  Outcome: Completed     Problem: Skin/Tissue Integrity - Adult  Goal: Oral mucous membranes remain intact  Outcome: Completed     Problem: Infection - Adult  Goal: Absence of infection at discharge  Outcome: Completed     Problem: Infection - Adult  Goal: Absence of infection during hospitalization  Outcome: Completed     Problem: Infection - Adult  Goal: Absence of fever/infection during anticipated neutropenic period  Outcome: Completed     Problem: Hematologic - Adult  Goal: Maintains hematologic stability  11/20/2024 0924 by Su Alcantara, RN  Outcome: Completed

## 2024-11-20 NOTE — CARE COORDINATION
Written and verbal instructions provided to patient regarding s/s of CRS and or Neurotoxicity.  If patient cannot reach on call provider in a timely manner they will call the BCC unit and notify the desk.

## 2024-11-20 NOTE — PLAN OF CARE
Problem: Safety - Adult  Goal: Free from fall injury  Outcome: Progressing  Flowsheets (Taken 11/20/2024 0017)  Free From Fall Injury:   Instruct family/caregiver on patient safety   Based on caregiver fall risk screen, instruct family/caregiver to ask for assistance with transferring infant if caregiver noted to have fall risk factors  Note: Orthostatic vital signs obtained at start of shift - see flowsheet for details.  Pt meets criteria for orthostasis.  Pt is a Low fall risk. See Oconnor Fall Score and ABCDS Injury Risk assessments.     - Screening for Orthostasis AND not a Garvin Risk per OCONNOR/ABCDS: Pt bed is in low position, side rails up, call light and belongings are in reach.  Fall risk light is on outside pts room.  Pt encouraged to call for assistance as needed. Will continue with hourly rounds for PO intake, pain needs, toileting and repositioning as needed.      Problem: Hematologic - Adult  Goal: Maintains hematologic stability  Outcome: Progressing  Flowsheets (Taken 11/20/2024 0017)  Maintains hematologic stability:   Assess for signs and symptoms of bleeding or hemorrhage   Administer blood products/factors as ordered  Note:   Lab Results   Component Value Date    WBC 0.7 (L) 11/19/2024    HGB 8.2 (L) 11/19/2024    HCT 25.1 (L) 11/19/2024    MCV 85.5 11/19/2024     11/19/2024      Problem: Skin/Tissue Integrity - Adult  Goal: Skin integrity remains intact  Flowsheets (Taken 11/20/2024 0017)  Skin Integrity Remains Intact:   Monitor for areas of redness and/or skin breakdown   Assess vascular access sites hourly  Note: Pt UAL, walks around room and hallways. No presences of redness or skin breakdown.

## 2024-11-21 ENCOUNTER — HOSPITAL ENCOUNTER (OUTPATIENT)
Dept: ONCOLOGY | Age: 54
Setting detail: INFUSION SERIES
Discharge: HOME | End: 2024-11-21
Payer: COMMERCIAL

## 2024-11-21 VITALS
OXYGEN SATURATION: 100 % | RESPIRATION RATE: 20 BRPM | SYSTOLIC BLOOD PRESSURE: 142 MMHG | HEART RATE: 96 BPM | BODY MASS INDEX: 39.03 KG/M2 | TEMPERATURE: 98.8 F | DIASTOLIC BLOOD PRESSURE: 89 MMHG | WEIGHT: 245.5 LBS

## 2024-11-21 DIAGNOSIS — C90.00 MULTIPLE MYELOMA, REMISSION STATUS UNSPECIFIED (HCC): Primary | ICD-10-CM

## 2024-11-21 LAB
ANION GAP SERPL CALCULATED.3IONS-SCNC: 10 MMOL/L (ref 3–16)
BUN SERPL-MCNC: 8 MG/DL (ref 7–20)
CALCIUM SERPL-MCNC: 8.9 MG/DL (ref 8.3–10.6)
CHLORIDE SERPL-SCNC: 104 MMOL/L (ref 99–110)
CO2 SERPL-SCNC: 26 MMOL/L (ref 21–32)
CREAT SERPL-MCNC: 0.7 MG/DL (ref 0.6–1.1)
CRP SERPL-MCNC: <3 MG/L (ref 0–5.1)
DEPRECATED RDW RBC AUTO: 17.3 % (ref 12.4–15.4)
FERRITIN SERPL IA-MCNC: 416 NG/ML (ref 15–150)
GFR SERPLBLD CREATININE-BSD FMLA CKD-EPI: >90 ML/MIN/{1.73_M2}
GLUCOSE SERPL-MCNC: 124 MG/DL (ref 70–99)
HCT VFR BLD AUTO: 26.3 % (ref 36–48)
HGB BLD-MCNC: 8.5 G/DL (ref 12–16)
INR PPP: 1.07 (ref 0.85–1.15)
MCH RBC QN AUTO: 27.9 PG (ref 26–34)
MCHC RBC AUTO-ENTMCNC: 32.1 G/DL (ref 31–36)
MCV RBC AUTO: 86.8 FL (ref 80–100)
PHOSPHATE SERPL-MCNC: 3.2 MG/DL (ref 2.5–4.9)
PLATELET # BLD AUTO: 180 K/UL (ref 135–450)
PMV BLD AUTO: 7.9 FL (ref 5–10.5)
POTASSIUM SERPL-SCNC: 3.9 MMOL/L (ref 3.5–5.1)
PROTHROMBIN TIME: 14.2 SEC (ref 11.9–14.9)
RBC # BLD AUTO: 3.03 M/UL (ref 4–5.2)
SODIUM SERPL-SCNC: 140 MMOL/L (ref 136–145)
WBC # BLD AUTO: 0.5 K/UL (ref 4–11)

## 2024-11-21 PROCEDURE — 82728 ASSAY OF FERRITIN: CPT

## 2024-11-21 PROCEDURE — 86140 C-REACTIVE PROTEIN: CPT

## 2024-11-21 PROCEDURE — 85027 COMPLETE CBC AUTOMATED: CPT

## 2024-11-21 PROCEDURE — 84100 ASSAY OF PHOSPHORUS: CPT

## 2024-11-21 PROCEDURE — 36591 DRAW BLOOD OFF VENOUS DEVICE: CPT

## 2024-11-21 PROCEDURE — 85610 PROTHROMBIN TIME: CPT

## 2024-11-21 PROCEDURE — 80048 BASIC METABOLIC PNL TOTAL CA: CPT

## 2024-11-21 RX ORDER — ONDANSETRON 4 MG/1
8 TABLET, FILM COATED ORAL EVERY 8 HOURS PRN
Status: CANCELLED | OUTPATIENT
Start: 2024-11-22

## 2024-11-21 RX ORDER — OXYCODONE HYDROCHLORIDE 5 MG/1
10 TABLET ORAL EVERY 4 HOURS PRN
Status: CANCELLED | OUTPATIENT
Start: 2024-11-22

## 2024-11-21 RX ORDER — POTASSIUM CHLORIDE 1500 MG/1
40 TABLET, EXTENDED RELEASE ORAL PRN
Status: DISCONTINUED | OUTPATIENT
Start: 2024-11-21 | End: 2024-11-22 | Stop reason: HOSPADM

## 2024-11-21 RX ORDER — PROCHLORPERAZINE EDISYLATE 5 MG/ML
10 INJECTION INTRAMUSCULAR; INTRAVENOUS EVERY 6 HOURS PRN
Status: DISCONTINUED | OUTPATIENT
Start: 2024-11-21 | End: 2024-11-22 | Stop reason: HOSPADM

## 2024-11-21 RX ORDER — OXYCODONE HYDROCHLORIDE 5 MG/1
5 TABLET ORAL EVERY 4 HOURS PRN
Status: CANCELLED | OUTPATIENT
Start: 2024-11-22

## 2024-11-21 RX ORDER — SODIUM CHLORIDE 9 MG/ML
INJECTION, SOLUTION INTRAVENOUS CONTINUOUS PRN
Status: CANCELLED | OUTPATIENT
Start: 2024-11-22

## 2024-11-21 RX ORDER — POTASSIUM CHLORIDE 29.8 MG/ML
80 INJECTION INTRAVENOUS PRN
Status: CANCELLED | OUTPATIENT
Start: 2024-11-22 | End: 2024-12-22

## 2024-11-21 RX ORDER — OXYCODONE HYDROCHLORIDE 5 MG/1
5 TABLET ORAL EVERY 4 HOURS PRN
Status: DISCONTINUED | OUTPATIENT
Start: 2024-11-21 | End: 2024-11-22 | Stop reason: HOSPADM

## 2024-11-21 RX ORDER — PROCHLORPERAZINE MALEATE 10 MG
10 TABLET ORAL EVERY 6 HOURS PRN
Status: CANCELLED | OUTPATIENT
Start: 2024-11-22

## 2024-11-21 RX ORDER — HEPARIN 100 UNIT/ML
500 SYRINGE INTRAVENOUS PRN
Status: CANCELLED | OUTPATIENT
Start: 2024-11-22

## 2024-11-21 RX ORDER — POTASSIUM CHLORIDE 29.8 MG/ML
80 INJECTION INTRAVENOUS PRN
Status: DISCONTINUED | OUTPATIENT
Start: 2024-11-21 | End: 2024-11-22 | Stop reason: HOSPADM

## 2024-11-21 RX ORDER — ONDANSETRON 2 MG/ML
8 INJECTION INTRAMUSCULAR; INTRAVENOUS EVERY 8 HOURS PRN
Status: CANCELLED | OUTPATIENT
Start: 2024-11-22

## 2024-11-21 RX ORDER — PROCHLORPERAZINE MALEATE 10 MG
10 TABLET ORAL EVERY 6 HOURS PRN
Status: DISCONTINUED | OUTPATIENT
Start: 2024-11-21 | End: 2024-11-22 | Stop reason: HOSPADM

## 2024-11-21 RX ORDER — POTASSIUM CHLORIDE 1500 MG/1
40 TABLET, EXTENDED RELEASE ORAL PRN
Status: CANCELLED | OUTPATIENT
Start: 2024-11-22

## 2024-11-21 RX ORDER — MAGNESIUM SULFATE IN WATER 40 MG/ML
4000 INJECTION, SOLUTION INTRAVENOUS PRN
Status: CANCELLED | OUTPATIENT
Start: 2024-11-22

## 2024-11-21 RX ORDER — SODIUM CHLORIDE 9 MG/ML
INJECTION, SOLUTION INTRAVENOUS CONTINUOUS PRN
Status: DISCONTINUED | OUTPATIENT
Start: 2024-11-21 | End: 2024-11-22 | Stop reason: HOSPADM

## 2024-11-21 RX ORDER — OXYCODONE HYDROCHLORIDE 5 MG/1
10 TABLET ORAL EVERY 4 HOURS PRN
Status: DISCONTINUED | OUTPATIENT
Start: 2024-11-21 | End: 2024-11-22 | Stop reason: HOSPADM

## 2024-11-21 RX ORDER — ONDANSETRON 4 MG/1
8 TABLET, FILM COATED ORAL EVERY 8 HOURS PRN
Status: DISCONTINUED | OUTPATIENT
Start: 2024-11-21 | End: 2024-11-22 | Stop reason: HOSPADM

## 2024-11-21 RX ORDER — ONDANSETRON 2 MG/ML
8 INJECTION INTRAMUSCULAR; INTRAVENOUS EVERY 8 HOURS PRN
Status: DISCONTINUED | OUTPATIENT
Start: 2024-11-21 | End: 2024-11-22 | Stop reason: HOSPADM

## 2024-11-21 RX ORDER — MAGNESIUM SULFATE IN WATER 40 MG/ML
4000 INJECTION, SOLUTION INTRAVENOUS PRN
Status: DISCONTINUED | OUTPATIENT
Start: 2024-11-21 | End: 2024-11-22 | Stop reason: HOSPADM

## 2024-11-21 RX ORDER — PROCHLORPERAZINE EDISYLATE 5 MG/ML
10 INJECTION INTRAMUSCULAR; INTRAVENOUS EVERY 6 HOURS PRN
Status: CANCELLED | OUTPATIENT
Start: 2024-11-22

## 2024-11-21 NOTE — PROGRESS NOTES
Short Stay Communication Note  Farhad Balderas  Diagnosis: Multiple Myeloma  Treatment: Fludarabine/Cytoxan  CAR-T Administration Date:   Day +3 of  Carvykti    Pt seen in outpatient infusion today. Labs drawn and reviewed. Seen by Dr. Leal and Frida PENNINGTON  CBC:   Recent Labs     11/19/24  0355 11/20/24  0400 11/21/24  0939   WBC 0.7* 0.6* 0.5*   HGB 8.2* 8.1* 8.5*   HCT 25.1* 24.7* 26.3*   MCV 85.5 85.5 86.8    178 180     BMP/Mag:  Recent Labs     11/19/24  0355 11/20/24  0400 11/21/24  0939    140 140   K 3.8 3.6 3.9    104 104   CO2 28 29 26   PHOS 4.2 4.3 3.2   BUN 11 9 8   CREATININE 0.7 0.7 0.7   MG 1.89 1.91  --      Standing parameters for replacement for this patient:   1 unit of pack red blood cells for a hemoglobin < or equal to 7  1 pack of platelets for a platelet count less than or equal to 10  40 MeQ of Potassium administered for a potassium level less than or equal to 3.4  4g of Magnesium Sulfate for a magnesum level less than or equal to 1.4  No transfusions required for the above lab values.    Urinalysis last done: 11/18/24 Urinalysis next due: 11/25/24    Chest X-Ray last done: 11/18/24 Chest X-Ray next due: 11/25/24    Symptoms addressed and reported to care team this date:   Treatments this date: Labs drawn    Reviewed medication schedule with pt and caregiver. Both able to verbalize all medications and schedule. Pt to be seen again tomorrow. Patient and caregiver verbalized understanding of discharge instructions including when and how to call the doctor and when to report  to the ER. Discharged ambulatory to home.

## 2024-11-21 NOTE — PROGRESS NOTES
[facility] Progress Note      2024    Farhad Balderas    :  1970    MRN:  [id number]    Referring MD: No referring provider defined for this encounter.      Subjective:  No complaints, denies fevers.  Good oral intake.     ECOG PS:  (1) Restricted in physically strenuous activity, ambulatory and able to do work of light nature    KPS: 80% Normal activity with effort; some signs or symptoms of disease    ROS:  As noted above, otherwise remainder of 10-point ROS negative      Physical Exam:     Vital Signs:  There were no vitals taken for this visit.    Weight:    Wt Readings from Last 3 Encounters:   24 110.2 kg (242 lb 14.4 oz)   11/15/24 112.5 kg (248 lb 0.3 oz)   24 110.1 kg (242 lb 11.6 oz)       General: Awake, alert and oriented.  HEENT: normocephalic, alopecia, PERRL, no scleral erythema or icterus, Oral mucosa moist and intact, throat clear  NECK: supple   BACK: Straight   SKIN: warm dry and intact without lesions rashes or masses  CHEST: CTA bilaterally without use of accessory muscles  CV: Normal S1 S2, RRR, no MRG  ABD: NT ND normoactive BS  EXTREMITIES: without edema, denies calf tenderness  NEURO: CN II - XII grossly intact  CATHETER: PAC    Laboratory Data:  CBC:   Recent Labs     24  1007 24  0355 24  0400   WBC 0.7* 0.7* 0.6*   HGB 9.2* 8.2* 8.1*   HCT 27.8* 25.1* 24.7*   MCV 85.3 85.5 85.5    181 178     BMP/Mag:  Recent Labs     24  1007 24  0355 24  0400    138 140   K 3.6 3.8 3.6    103 104   CO2 27 28 29   PHOS 3.4 4.2 4.3   BUN 11 11 9   CREATININE 0.7 0.7 0.7   MG 1.84 1.89 1.91     LIVP:   Recent Labs     24  1007 24  0400   AST 15 13*   ALT 9* 9*   BILIDIR 0.2 <0.1   BILITOT 0.4 0.3   ALKPHOS 49 43     Coags:   Recent Labs     24  1007   PROTIME 13.5   INR 1.01   APTT 21.5*     Uric Acid No results for input(s): \"LABURIC\" in the last 72 hours.  Lab Results   Component Value Date    CRP <3.0

## 2024-11-22 ENCOUNTER — HOSPITAL ENCOUNTER (OUTPATIENT)
Dept: ONCOLOGY | Age: 54
Setting detail: INFUSION SERIES
Discharge: HOME | End: 2024-11-22
Payer: COMMERCIAL

## 2024-11-22 VITALS
RESPIRATION RATE: 20 BRPM | DIASTOLIC BLOOD PRESSURE: 80 MMHG | HEART RATE: 100 BPM | TEMPERATURE: 98.5 F | BODY MASS INDEX: 39.08 KG/M2 | OXYGEN SATURATION: 100 % | WEIGHT: 245.81 LBS | SYSTOLIC BLOOD PRESSURE: 133 MMHG

## 2024-11-22 DIAGNOSIS — C90.00 MULTIPLE MYELOMA, REMISSION STATUS UNSPECIFIED (HCC): Primary | ICD-10-CM

## 2024-11-22 LAB
ALBUMIN SERPL-MCNC: 4.2 G/DL (ref 3.4–5)
ALP SERPL-CCNC: 45 U/L (ref 40–129)
ALT SERPL-CCNC: 11 U/L (ref 10–40)
ANION GAP SERPL CALCULATED.3IONS-SCNC: 10 MMOL/L (ref 3–16)
AST SERPL-CCNC: 16 U/L (ref 15–37)
BILIRUB DIRECT SERPL-MCNC: 0.2 MG/DL (ref 0–0.3)
BILIRUB INDIRECT SERPL-MCNC: 0.2 MG/DL (ref 0–1)
BILIRUB SERPL-MCNC: 0.4 MG/DL (ref 0–1)
BUN SERPL-MCNC: 8 MG/DL (ref 7–20)
CALCIUM SERPL-MCNC: 8.8 MG/DL (ref 8.3–10.6)
CHLORIDE SERPL-SCNC: 103 MMOL/L (ref 99–110)
CO2 SERPL-SCNC: 25 MMOL/L (ref 21–32)
CREAT SERPL-MCNC: 0.7 MG/DL (ref 0.6–1.1)
CRP SERPL-MCNC: <3 MG/L (ref 0–5.1)
DEPRECATED RDW RBC AUTO: 17.3 % (ref 12.4–15.4)
FERRITIN SERPL IA-MCNC: 403 NG/ML (ref 15–150)
GFR SERPLBLD CREATININE-BSD FMLA CKD-EPI: >90 ML/MIN/{1.73_M2}
GLUCOSE SERPL-MCNC: 145 MG/DL (ref 70–99)
HCT VFR BLD AUTO: 27.6 % (ref 36–48)
HGB BLD-MCNC: 8.7 G/DL (ref 12–16)
LDH SERPL L TO P-CCNC: 156 U/L (ref 100–190)
MAGNESIUM SERPL-MCNC: 1.88 MG/DL (ref 1.8–2.4)
MCH RBC QN AUTO: 27.6 PG (ref 26–34)
MCHC RBC AUTO-ENTMCNC: 31.4 G/DL (ref 31–36)
MCV RBC AUTO: 87.9 FL (ref 80–100)
PHOSPHATE SERPL-MCNC: 3.5 MG/DL (ref 2.5–4.9)
PLATELET # BLD AUTO: 196 K/UL (ref 135–450)
PMV BLD AUTO: 8.2 FL (ref 5–10.5)
POTASSIUM SERPL-SCNC: 3.9 MMOL/L (ref 3.5–5.1)
PROT SERPL-MCNC: 8 G/DL (ref 6.4–8.2)
RBC # BLD AUTO: 3.14 M/UL (ref 4–5.2)
SODIUM SERPL-SCNC: 138 MMOL/L (ref 136–145)
URATE SERPL-MCNC: 3.7 MG/DL (ref 2.6–6)
WBC # BLD AUTO: 0.5 K/UL (ref 4–11)

## 2024-11-22 PROCEDURE — 83615 LACTATE (LD) (LDH) ENZYME: CPT

## 2024-11-22 PROCEDURE — 84550 ASSAY OF BLOOD/URIC ACID: CPT

## 2024-11-22 PROCEDURE — 80076 HEPATIC FUNCTION PANEL: CPT

## 2024-11-22 PROCEDURE — 82728 ASSAY OF FERRITIN: CPT

## 2024-11-22 PROCEDURE — 36593 DECLOT VASCULAR DEVICE: CPT

## 2024-11-22 PROCEDURE — 84100 ASSAY OF PHOSPHORUS: CPT

## 2024-11-22 PROCEDURE — 85027 COMPLETE CBC AUTOMATED: CPT

## 2024-11-22 PROCEDURE — 80048 BASIC METABOLIC PNL TOTAL CA: CPT

## 2024-11-22 PROCEDURE — 86140 C-REACTIVE PROTEIN: CPT

## 2024-11-22 PROCEDURE — 2580000003 HC RX 258: Performed by: NURSE PRACTITIONER

## 2024-11-22 PROCEDURE — 83735 ASSAY OF MAGNESIUM: CPT

## 2024-11-22 PROCEDURE — 6360000002 HC RX W HCPCS: Performed by: NURSE PRACTITIONER

## 2024-11-22 PROCEDURE — 36591 DRAW BLOOD OFF VENOUS DEVICE: CPT

## 2024-11-22 RX ORDER — POTASSIUM CHLORIDE 1500 MG/1
40 TABLET, EXTENDED RELEASE ORAL PRN
Status: DISCONTINUED | OUTPATIENT
Start: 2024-11-22 | End: 2024-11-23 | Stop reason: HOSPADM

## 2024-11-22 RX ORDER — POTASSIUM CHLORIDE 29.8 MG/ML
80 INJECTION INTRAVENOUS PRN
Status: DISCONTINUED | OUTPATIENT
Start: 2024-11-22 | End: 2024-11-23 | Stop reason: HOSPADM

## 2024-11-22 RX ORDER — SODIUM CHLORIDE 9 MG/ML
INJECTION, SOLUTION INTRAVENOUS CONTINUOUS PRN
Status: DISCONTINUED | OUTPATIENT
Start: 2024-11-22 | End: 2024-11-23 | Stop reason: HOSPADM

## 2024-11-22 RX ORDER — OXYCODONE HYDROCHLORIDE 5 MG/1
10 TABLET ORAL EVERY 4 HOURS PRN
Status: CANCELLED | OUTPATIENT
Start: 2024-11-23

## 2024-11-22 RX ORDER — SODIUM CHLORIDE 9 MG/ML
INJECTION, SOLUTION INTRAVENOUS CONTINUOUS PRN
Status: CANCELLED | OUTPATIENT
Start: 2024-11-23

## 2024-11-22 RX ORDER — POTASSIUM CHLORIDE 1500 MG/1
40 TABLET, EXTENDED RELEASE ORAL PRN
Status: CANCELLED | OUTPATIENT
Start: 2024-11-23

## 2024-11-22 RX ORDER — ONDANSETRON 4 MG/1
8 TABLET, FILM COATED ORAL EVERY 8 HOURS PRN
Status: DISCONTINUED | OUTPATIENT
Start: 2024-11-22 | End: 2024-11-23 | Stop reason: HOSPADM

## 2024-11-22 RX ORDER — OXYCODONE HYDROCHLORIDE 5 MG/1
10 TABLET ORAL EVERY 4 HOURS PRN
Status: DISCONTINUED | OUTPATIENT
Start: 2024-11-22 | End: 2024-11-23 | Stop reason: HOSPADM

## 2024-11-22 RX ORDER — HEPARIN 100 UNIT/ML
500 SYRINGE INTRAVENOUS PRN
Status: CANCELLED | OUTPATIENT
Start: 2024-11-23

## 2024-11-22 RX ORDER — POTASSIUM CHLORIDE 29.8 MG/ML
80 INJECTION INTRAVENOUS PRN
Status: CANCELLED | OUTPATIENT
Start: 2024-11-23 | End: 2024-12-23

## 2024-11-22 RX ORDER — PROCHLORPERAZINE MALEATE 10 MG
10 TABLET ORAL EVERY 6 HOURS PRN
Status: CANCELLED | OUTPATIENT
Start: 2024-11-23

## 2024-11-22 RX ORDER — PROCHLORPERAZINE EDISYLATE 5 MG/ML
10 INJECTION INTRAMUSCULAR; INTRAVENOUS EVERY 6 HOURS PRN
Status: DISCONTINUED | OUTPATIENT
Start: 2024-11-22 | End: 2024-11-23 | Stop reason: HOSPADM

## 2024-11-22 RX ORDER — OXYCODONE HYDROCHLORIDE 5 MG/1
5 TABLET ORAL EVERY 4 HOURS PRN
Status: DISCONTINUED | OUTPATIENT
Start: 2024-11-22 | End: 2024-11-23 | Stop reason: HOSPADM

## 2024-11-22 RX ORDER — MAGNESIUM SULFATE IN WATER 40 MG/ML
4000 INJECTION, SOLUTION INTRAVENOUS PRN
Status: CANCELLED | OUTPATIENT
Start: 2024-11-23

## 2024-11-22 RX ORDER — OXYCODONE HYDROCHLORIDE 5 MG/1
5 TABLET ORAL EVERY 4 HOURS PRN
Status: CANCELLED | OUTPATIENT
Start: 2024-11-23

## 2024-11-22 RX ORDER — HEPARIN 100 UNIT/ML
500 SYRINGE INTRAVENOUS PRN
Status: DISCONTINUED | OUTPATIENT
Start: 2024-11-22 | End: 2024-11-23 | Stop reason: HOSPADM

## 2024-11-22 RX ORDER — PROCHLORPERAZINE MALEATE 10 MG
10 TABLET ORAL EVERY 6 HOURS PRN
Status: DISCONTINUED | OUTPATIENT
Start: 2024-11-22 | End: 2024-11-23 | Stop reason: HOSPADM

## 2024-11-22 RX ORDER — PROCHLORPERAZINE EDISYLATE 5 MG/ML
10 INJECTION INTRAMUSCULAR; INTRAVENOUS EVERY 6 HOURS PRN
Status: CANCELLED | OUTPATIENT
Start: 2024-11-23

## 2024-11-22 RX ORDER — ONDANSETRON 2 MG/ML
8 INJECTION INTRAMUSCULAR; INTRAVENOUS EVERY 8 HOURS PRN
Status: CANCELLED | OUTPATIENT
Start: 2024-11-23

## 2024-11-22 RX ORDER — ONDANSETRON 4 MG/1
8 TABLET, FILM COATED ORAL EVERY 8 HOURS PRN
Status: CANCELLED | OUTPATIENT
Start: 2024-11-23

## 2024-11-22 RX ORDER — ONDANSETRON 2 MG/ML
8 INJECTION INTRAMUSCULAR; INTRAVENOUS EVERY 8 HOURS PRN
Status: DISCONTINUED | OUTPATIENT
Start: 2024-11-22 | End: 2024-11-23 | Stop reason: HOSPADM

## 2024-11-22 RX ORDER — MAGNESIUM SULFATE IN WATER 40 MG/ML
4000 INJECTION, SOLUTION INTRAVENOUS PRN
Status: DISCONTINUED | OUTPATIENT
Start: 2024-11-22 | End: 2024-11-23 | Stop reason: HOSPADM

## 2024-11-22 RX ADMIN — WATER 2 MG: 1 INJECTION INTRAMUSCULAR; INTRAVENOUS; SUBCUTANEOUS at 09:00

## 2024-11-22 NOTE — PROGRESS NOTES
Short Stay Communication Note  Farhad Balderas  Diagnosis: Multiple Myeloma  Treatment: Fludarabine/Cytoxan  CAR-T Administration Date:   Day +4 of  Carvykti    Pt seen in outpatient infusion today. Labs drawn and reviewed. Seen by Dr. Leal and Frida PENNINGTON  CBC:   Recent Labs     11/20/24  0400 11/21/24  0939 11/22/24  0938   WBC 0.6* 0.5* 0.5*   HGB 8.1* 8.5* 8.7*   HCT 24.7* 26.3* 27.6*   MCV 85.5 86.8 87.9    180 196     BMP/Mag:  Recent Labs     11/20/24  0400 11/21/24  0939 11/22/24  0938    140 138   K 3.6 3.9 3.9    104 103   CO2 29 26 25   PHOS 4.3 3.2 3.5   BUN 9 8 8   CREATININE 0.7 0.7 0.7   MG 1.91  --  1.88     Standing parameters for replacement for this patient:   1 unit of pack red blood cells for a hemoglobin < or equal to 7  1 pack of platelets for a platelet count less than or equal to 10  40 MeQ of Potassium administered for a potassium level less than or equal to 3.4  4g of Magnesium Sulfate for a magnesum level less than or equal to 1.4  No transfusions required for the above lab values.    Urinalysis last done: 11/18/24 Urinalysis next due: 11/25/24    Chest X-Ray last done: 11/18/24 Chest X-Ray next due: 11/25/24    Symptoms addressed and reported to care team this date: none  Treatments this date: Labs drawn    Reviewed medication schedule with pt and caregiver. Both able to verbalize all medications and schedule. Pt to be seen again tomorrow. Patient and caregiver verbalized understanding of discharge instructions including when and how to call the doctor and when to report  to the ER. Discharged ambulatory to home.

## 2024-11-22 NOTE — PROGRESS NOTES
No blood return from port, cathflo administered; requested lab to please draw labs. Blood return noted prior to patient leaving, patient requesting port not be deaccessed.

## 2024-11-22 NOTE — PROGRESS NOTES
[facility] Progress Note      2024    Farhad Balderas    :  1970    MRN:  [id number]    Referring MD: Jona Simpson MD  [facility] E [address] [place] [address]      Subjective:  No complaints, denies fevers.  Good oral intake.     ECOG PS:  (1) Restricted in physically strenuous activity, ambulatory and able to do work of light nature    KPS: 80% Normal activity with effort; some signs or symptoms of disease    ROS:  As noted above, otherwise remainder of 10-point ROS negative      Physical Exam:     Vital Signs:  /80   Pulse 100   Temp 98.5 °F (36.9 °C) (Oral)   Resp 20   Wt 111.5 kg (245 lb 13 oz)   SpO2 100%   BMI 39.08 kg/m²     Weight:    Wt Readings from Last 3 Encounters:   24 111.5 kg (245 lb 13 oz)   24 111.4 kg (245 lb 8 oz)   24 110.2 kg (242 lb 14.4 oz)       General: Awake, alert and oriented.  HEENT: normocephalic, alopecia, PERRL, no scleral erythema or icterus, Oral mucosa moist and intact, throat clear  NECK: supple   BACK: Straight   SKIN: warm dry and intact without lesions rashes or masses  CHEST: CTA bilaterally without use of accessory muscles  CV: Normal S1 S2, RRR, no MRG  ABD: NT ND normoactive BS  EXTREMITIES: without edema, denies calf tenderness  NEURO: CN II - XII grossly intact  CATHETER: PAC    Laboratory Data:  CBC:   Recent Labs     24  0400 24  0939 24  0938   WBC 0.6* 0.5* 0.5*   HGB 8.1* 8.5* 8.7*   HCT 24.7* 26.3* 27.6*   MCV 85.5 86.8 87.9    180 196     BMP/Mag:  Recent Labs     24  0400 24  0939 24  0938    140 138   K 3.6 3.9 3.9    104 103   CO2 29 26 25   PHOS 4.3 3.2 3.5   BUN 9 8 8   CREATININE 0.7 0.7 0.7   MG 1.91  --  1.88     LIVP:   Recent Labs     24  0400 24  0938   AST 13* 16   ALT 9* 11   BILIDIR <0.1 0.2   BILITOT 0.3 0.4   ALKPHOS 43 45     Coags:   Recent Labs     24  0939   PROTIME 14.2   INR 1.07     Uric Acid No results for

## 2024-11-23 ENCOUNTER — HOSPITAL ENCOUNTER (OUTPATIENT)
Dept: ONCOLOGY | Age: 54
Setting detail: INFUSION SERIES
Discharge: HOME | End: 2024-11-23
Payer: COMMERCIAL

## 2024-11-23 VITALS
OXYGEN SATURATION: 98 % | DIASTOLIC BLOOD PRESSURE: 102 MMHG | RESPIRATION RATE: 19 BRPM | TEMPERATURE: 98.2 F | WEIGHT: 244.05 LBS | BODY MASS INDEX: 38.8 KG/M2 | SYSTOLIC BLOOD PRESSURE: 150 MMHG | HEART RATE: 98 BPM

## 2024-11-23 DIAGNOSIS — C90.00 MULTIPLE MYELOMA, REMISSION STATUS UNSPECIFIED (HCC): Primary | ICD-10-CM

## 2024-11-23 LAB
ANION GAP SERPL CALCULATED.3IONS-SCNC: 8 MMOL/L (ref 3–16)
BUN SERPL-MCNC: 11 MG/DL (ref 7–20)
CALCIUM SERPL-MCNC: 9.1 MG/DL (ref 8.3–10.6)
CHLORIDE SERPL-SCNC: 107 MMOL/L (ref 99–110)
CO2 SERPL-SCNC: 27 MMOL/L (ref 21–32)
CREAT SERPL-MCNC: 0.8 MG/DL (ref 0.6–1.1)
CRP SERPL-MCNC: <3 MG/L (ref 0–5.1)
DEPRECATED RDW RBC AUTO: 17.3 % (ref 12.4–15.4)
FERRITIN SERPL IA-MCNC: 389 NG/ML (ref 15–150)
GFR SERPLBLD CREATININE-BSD FMLA CKD-EPI: 87 ML/MIN/{1.73_M2}
GLUCOSE SERPL-MCNC: 120 MG/DL (ref 70–99)
HCT VFR BLD AUTO: 25.9 % (ref 36–48)
HGB BLD-MCNC: 8.5 G/DL (ref 12–16)
MCH RBC QN AUTO: 28.4 PG (ref 26–34)
MCHC RBC AUTO-ENTMCNC: 32.7 G/DL (ref 31–36)
MCV RBC AUTO: 86.6 FL (ref 80–100)
PHOSPHATE SERPL-MCNC: 4 MG/DL (ref 2.5–4.9)
PLATELET # BLD AUTO: 180 K/UL (ref 135–450)
PMV BLD AUTO: 7.5 FL (ref 5–10.5)
POTASSIUM SERPL-SCNC: 4.1 MMOL/L (ref 3.5–5.1)
RBC # BLD AUTO: 2.99 M/UL (ref 4–5.2)
SODIUM SERPL-SCNC: 142 MMOL/L (ref 136–145)
WBC # BLD AUTO: 0.5 K/UL (ref 4–11)

## 2024-11-23 PROCEDURE — 80048 BASIC METABOLIC PNL TOTAL CA: CPT

## 2024-11-23 PROCEDURE — 84100 ASSAY OF PHOSPHORUS: CPT

## 2024-11-23 PROCEDURE — 86140 C-REACTIVE PROTEIN: CPT

## 2024-11-23 PROCEDURE — 85027 COMPLETE CBC AUTOMATED: CPT

## 2024-11-23 PROCEDURE — 36591 DRAW BLOOD OFF VENOUS DEVICE: CPT

## 2024-11-23 PROCEDURE — 82728 ASSAY OF FERRITIN: CPT

## 2024-11-23 RX ORDER — PROCHLORPERAZINE MALEATE 10 MG
10 TABLET ORAL EVERY 6 HOURS PRN
OUTPATIENT
Start: 2024-11-24

## 2024-11-23 RX ORDER — POTASSIUM CHLORIDE 1500 MG/1
40 TABLET, EXTENDED RELEASE ORAL PRN
Status: DISCONTINUED | OUTPATIENT
Start: 2024-11-23 | End: 2024-11-24 | Stop reason: HOSPADM

## 2024-11-23 RX ORDER — PROCHLORPERAZINE MALEATE 10 MG
10 TABLET ORAL EVERY 6 HOURS PRN
Status: DISCONTINUED | OUTPATIENT
Start: 2024-11-23 | End: 2024-11-24 | Stop reason: HOSPADM

## 2024-11-23 RX ORDER — POTASSIUM CHLORIDE 29.8 MG/ML
80 INJECTION INTRAVENOUS PRN
OUTPATIENT
Start: 2024-11-24 | End: 2024-12-24

## 2024-11-23 RX ORDER — ONDANSETRON 4 MG/1
8 TABLET, FILM COATED ORAL EVERY 8 HOURS PRN
Status: DISCONTINUED | OUTPATIENT
Start: 2024-11-23 | End: 2024-11-24 | Stop reason: HOSPADM

## 2024-11-23 RX ORDER — PROCHLORPERAZINE EDISYLATE 5 MG/ML
10 INJECTION INTRAMUSCULAR; INTRAVENOUS EVERY 6 HOURS PRN
Status: DISCONTINUED | OUTPATIENT
Start: 2024-11-23 | End: 2024-11-24 | Stop reason: HOSPADM

## 2024-11-23 RX ORDER — ONDANSETRON 2 MG/ML
8 INJECTION INTRAMUSCULAR; INTRAVENOUS EVERY 8 HOURS PRN
Status: DISCONTINUED | OUTPATIENT
Start: 2024-11-23 | End: 2024-11-24 | Stop reason: HOSPADM

## 2024-11-23 RX ORDER — ONDANSETRON 2 MG/ML
8 INJECTION INTRAMUSCULAR; INTRAVENOUS EVERY 8 HOURS PRN
OUTPATIENT
Start: 2024-11-24

## 2024-11-23 RX ORDER — OXYCODONE HYDROCHLORIDE 5 MG/1
5 TABLET ORAL EVERY 4 HOURS PRN
Status: DISCONTINUED | OUTPATIENT
Start: 2024-11-23 | End: 2024-11-24 | Stop reason: HOSPADM

## 2024-11-23 RX ORDER — OXYCODONE HYDROCHLORIDE 5 MG/1
10 TABLET ORAL EVERY 4 HOURS PRN
OUTPATIENT
Start: 2024-11-24

## 2024-11-23 RX ORDER — POTASSIUM CHLORIDE 29.8 MG/ML
80 INJECTION INTRAVENOUS PRN
Status: DISCONTINUED | OUTPATIENT
Start: 2024-11-23 | End: 2024-11-24 | Stop reason: HOSPADM

## 2024-11-23 RX ORDER — HEPARIN 100 UNIT/ML
500 SYRINGE INTRAVENOUS PRN
Status: DISCONTINUED | OUTPATIENT
Start: 2024-11-23 | End: 2024-11-24 | Stop reason: HOSPADM

## 2024-11-23 RX ORDER — SODIUM CHLORIDE 9 MG/ML
INJECTION, SOLUTION INTRAVENOUS CONTINUOUS PRN
Status: DISCONTINUED | OUTPATIENT
Start: 2024-11-23 | End: 2024-11-24 | Stop reason: HOSPADM

## 2024-11-23 RX ORDER — OXYCODONE HYDROCHLORIDE 5 MG/1
10 TABLET ORAL EVERY 4 HOURS PRN
Status: DISCONTINUED | OUTPATIENT
Start: 2024-11-23 | End: 2024-11-24 | Stop reason: HOSPADM

## 2024-11-23 RX ORDER — ONDANSETRON 4 MG/1
8 TABLET, FILM COATED ORAL EVERY 8 HOURS PRN
OUTPATIENT
Start: 2024-11-24

## 2024-11-23 RX ORDER — HEPARIN 100 UNIT/ML
500 SYRINGE INTRAVENOUS PRN
OUTPATIENT
Start: 2024-11-24

## 2024-11-23 RX ORDER — MAGNESIUM SULFATE IN WATER 40 MG/ML
4000 INJECTION, SOLUTION INTRAVENOUS PRN
OUTPATIENT
Start: 2024-11-24

## 2024-11-23 RX ORDER — MAGNESIUM SULFATE IN WATER 40 MG/ML
4000 INJECTION, SOLUTION INTRAVENOUS PRN
Status: DISCONTINUED | OUTPATIENT
Start: 2024-11-23 | End: 2024-11-24 | Stop reason: HOSPADM

## 2024-11-23 RX ORDER — OXYCODONE HYDROCHLORIDE 5 MG/1
5 TABLET ORAL EVERY 4 HOURS PRN
OUTPATIENT
Start: 2024-11-24

## 2024-11-23 RX ORDER — POTASSIUM CHLORIDE 1500 MG/1
40 TABLET, EXTENDED RELEASE ORAL PRN
OUTPATIENT
Start: 2024-11-24

## 2024-11-23 RX ORDER — SODIUM CHLORIDE 9 MG/ML
INJECTION, SOLUTION INTRAVENOUS CONTINUOUS PRN
OUTPATIENT
Start: 2024-11-24

## 2024-11-23 RX ORDER — PROCHLORPERAZINE EDISYLATE 5 MG/ML
10 INJECTION INTRAMUSCULAR; INTRAVENOUS EVERY 6 HOURS PRN
OUTPATIENT
Start: 2024-11-24

## 2024-11-23 NOTE — PROGRESS NOTES
Short Stay Communication Note  Farhad Balderas  Diagnosis: Multiple Myeloma  Primary MD: Dr Niki Morton  Treatment: Fludarabine/Cytoxan  CAR-T Administration Date: 11/18/2024  Day +4 of  Carvykti      CBC:   Recent Labs     11/21/24  0939 11/22/24  0938 11/23/24  0842   WBC 0.5* 0.5* 0.5*   HGB 8.5* 8.7* 8.5*   HCT 26.3* 27.6* 25.9*   MCV 86.8 87.9 86.6    196 180     BMP/Mag:  Recent Labs     11/21/24  0939 11/22/24  0938 11/23/24  0842    138 142   K 3.9 3.9 4.1    103 107   CO2 26 25 27   PHOS 3.2 3.5 4.0   BUN 8 8 11   CREATININE 0.7 0.7 0.8   MG  --  1.88  --      Standing parameters for replacement for this patient:   1 unit of pack red blood cells for a hemoglobin < or equal to 7  1 pack of platelets for a platelet count less than or equal to 10  40 MeQ of Potassium administered for a potassium level less than or equal to 3.4  4g of Magnesium Sulfate for a magnesum level less than or equal to 1.4  No transfusions required for the above lab values.    Urinalysis last done: 11/18/24 Urinalysis next due: 11/25/24    Chest X-Ray last done: 11/18/24 Chest X-Ray next due: 11/25/24    Symptoms addressed and reported to care team this date: none  Treatments this date: Labs drawn    Reviewed medication schedule with pt and caregiver. Both able to verbalize all medications and schedule. Pt to be seen again tomorrow. Patient and caregiver verbalized understanding of discharge instructions including when and how to call the doctor and when to report  to the ER. Discharged ambulatory to home.     Alexsandra Fields RN

## 2024-11-23 NOTE — PROGRESS NOTES
B[facility] Progress Note      2024    Farhad Balderas    :  1970    MRN:  [id number]    Referring MD: Jona Simpson MD  [facility] [address] [address]      Subjective: she is doing well, no issues. Afebrile.    ECOG PS:  (1) Restricted in physically strenuous activity, ambulatory and able to do work of light nature    KPS: 80% Normal activity with effort; some signs or symptoms of disease    ROS:  As noted above, otherwise remainder of 10-point ROS negative      Physical Exam:     Vital Signs:  BP (!) 150/102   Pulse 98   Temp 98.2 °F (36.8 °C)   Resp 19   Wt 110.7 kg (244 lb 0.8 oz)   SpO2 98%   BMI 38.80 kg/m²     Weight:    Wt Readings from Last 3 Encounters:   24 110.7 kg (244 lb 0.8 oz)   24 111.5 kg (245 lb 13 oz)   24 111.4 kg (245 lb 8 oz)       General: Awake, alert and oriented.  HEENT: normocephalic, alopecia, PERRL, no scleral erythema or icterus, Oral mucosa moist and intact, throat clear  NECK: supple   BACK: Straight   SKIN: warm dry and intact without lesions rashes or masses  CHEST: CTA bilaterally without use of accessory muscles  CV: Normal S1 S2, RRR, no MRG  ABD: NT ND normoactive BS  EXTREMITIES: without edema, denies calf tenderness  NEURO: CN II - XII grossly intact  CATHETER: PAC    Laboratory Data:  CBC:   Recent Labs     24  0939 24  0938 24  0842   WBC 0.5* 0.5* 0.5*   HGB 8.5* 8.7* 8.5*   HCT 26.3* 27.6* 25.9*   MCV 86.8 87.9 86.6    196 180     BMP/Mag:  Recent Labs     24  0939 24  0938 24  0842    138 142   K 3.9 3.9 4.1    103 107   CO2 26 25 27   PHOS 3.2 3.5 4.0   BUN 8 8 11   CREATININE 0.7 0.7 0.8   MG  --  1.88  --      LIVP:   Recent Labs     24  0938   AST 16   ALT 11   BILIDIR 0.2   BILITOT 0.4   ALKPHOS 45     Coags:   Recent Labs     24  0939   PROTIME 14.2   INR 1.07     Uric Acid No results for input(s): \"LABURIC\" in the last 72 hours.  Lab

## 2024-11-24 ENCOUNTER — HOSPITAL ENCOUNTER (OUTPATIENT)
Dept: ONCOLOGY | Age: 54
Setting detail: INFUSION SERIES
Discharge: HOME | End: 2024-11-24
Payer: COMMERCIAL

## 2024-11-24 VITALS
HEART RATE: 100 BPM | OXYGEN SATURATION: 98 % | RESPIRATION RATE: 18 BRPM | WEIGHT: 243.61 LBS | SYSTOLIC BLOOD PRESSURE: 134 MMHG | BODY MASS INDEX: 38.73 KG/M2 | DIASTOLIC BLOOD PRESSURE: 84 MMHG | TEMPERATURE: 98.6 F

## 2024-11-24 DIAGNOSIS — C90.00 MULTIPLE MYELOMA, REMISSION STATUS UNSPECIFIED (HCC): Primary | ICD-10-CM

## 2024-11-24 LAB
ANION GAP SERPL CALCULATED.3IONS-SCNC: 10 MMOL/L (ref 3–16)
BUN SERPL-MCNC: 8 MG/DL (ref 7–20)
CALCIUM SERPL-MCNC: 9 MG/DL (ref 8.3–10.6)
CHLORIDE SERPL-SCNC: 107 MMOL/L (ref 99–110)
CO2 SERPL-SCNC: 25 MMOL/L (ref 21–32)
CREAT SERPL-MCNC: 0.7 MG/DL (ref 0.6–1.1)
CRP SERPL-MCNC: 4.2 MG/L (ref 0–5.1)
DEPRECATED RDW RBC AUTO: 17.4 % (ref 12.4–15.4)
FERRITIN SERPL IA-MCNC: 357 NG/ML (ref 15–150)
GFR SERPLBLD CREATININE-BSD FMLA CKD-EPI: >90 ML/MIN/{1.73_M2}
GLUCOSE SERPL-MCNC: 126 MG/DL (ref 70–99)
HCT VFR BLD AUTO: 26.9 % (ref 36–48)
HGB BLD-MCNC: 8.8 G/DL (ref 12–16)
MCH RBC QN AUTO: 28.5 PG (ref 26–34)
MCHC RBC AUTO-ENTMCNC: 32.6 G/DL (ref 31–36)
MCV RBC AUTO: 87.5 FL (ref 80–100)
PHOSPHATE SERPL-MCNC: 4.5 MG/DL (ref 2.5–4.9)
PLATELET # BLD AUTO: 186 K/UL (ref 135–450)
PMV BLD AUTO: 7.4 FL (ref 5–10.5)
POTASSIUM SERPL-SCNC: 3.9 MMOL/L (ref 3.5–5.1)
RBC # BLD AUTO: 3.08 M/UL (ref 4–5.2)
SODIUM SERPL-SCNC: 142 MMOL/L (ref 136–145)
WBC # BLD AUTO: 0.5 K/UL (ref 4–11)

## 2024-11-24 PROCEDURE — 82728 ASSAY OF FERRITIN: CPT

## 2024-11-24 PROCEDURE — 36591 DRAW BLOOD OFF VENOUS DEVICE: CPT

## 2024-11-24 PROCEDURE — 86140 C-REACTIVE PROTEIN: CPT

## 2024-11-24 PROCEDURE — 84100 ASSAY OF PHOSPHORUS: CPT

## 2024-11-24 PROCEDURE — 80048 BASIC METABOLIC PNL TOTAL CA: CPT

## 2024-11-24 PROCEDURE — 85027 COMPLETE CBC AUTOMATED: CPT

## 2024-11-24 RX ORDER — POTASSIUM CHLORIDE 29.8 MG/ML
80 INJECTION INTRAVENOUS PRN
Status: CANCELLED | OUTPATIENT
Start: 2024-11-25 | End: 2024-12-25

## 2024-11-24 RX ORDER — HEPARIN 100 UNIT/ML
500 SYRINGE INTRAVENOUS PRN
Status: CANCELLED | OUTPATIENT
Start: 2024-11-25

## 2024-11-24 RX ORDER — OXYCODONE HYDROCHLORIDE 5 MG/1
10 TABLET ORAL EVERY 4 HOURS PRN
Status: CANCELLED | OUTPATIENT
Start: 2024-11-25

## 2024-11-24 RX ORDER — ONDANSETRON 4 MG/1
8 TABLET, FILM COATED ORAL EVERY 8 HOURS PRN
Status: CANCELLED | OUTPATIENT
Start: 2024-11-25

## 2024-11-24 RX ORDER — PROCHLORPERAZINE EDISYLATE 5 MG/ML
10 INJECTION INTRAMUSCULAR; INTRAVENOUS EVERY 6 HOURS PRN
Status: CANCELLED | OUTPATIENT
Start: 2024-11-25

## 2024-11-24 RX ORDER — ONDANSETRON 2 MG/ML
8 INJECTION INTRAMUSCULAR; INTRAVENOUS EVERY 8 HOURS PRN
Status: CANCELLED | OUTPATIENT
Start: 2024-11-25

## 2024-11-24 RX ORDER — OXYCODONE HYDROCHLORIDE 5 MG/1
5 TABLET ORAL EVERY 4 HOURS PRN
Status: CANCELLED | OUTPATIENT
Start: 2024-11-25

## 2024-11-24 RX ORDER — SODIUM CHLORIDE 9 MG/ML
INJECTION, SOLUTION INTRAVENOUS CONTINUOUS PRN
Status: CANCELLED | OUTPATIENT
Start: 2024-11-25

## 2024-11-24 RX ORDER — POTASSIUM CHLORIDE 1500 MG/1
40 TABLET, EXTENDED RELEASE ORAL PRN
Status: CANCELLED | OUTPATIENT
Start: 2024-11-25

## 2024-11-24 RX ORDER — PROCHLORPERAZINE MALEATE 10 MG
10 TABLET ORAL EVERY 6 HOURS PRN
Status: CANCELLED | OUTPATIENT
Start: 2024-11-25

## 2024-11-24 RX ORDER — MAGNESIUM SULFATE IN WATER 40 MG/ML
4000 INJECTION, SOLUTION INTRAVENOUS PRN
Status: CANCELLED | OUTPATIENT
Start: 2024-11-25

## 2024-11-24 NOTE — PROGRESS NOTES
[facility] Progress Note      2024    Farhad Balderas    :  1970    MRN:  [id number]    Referring MD: Jona Simpson MD  [facility] E [address] [place] [address]      Subjective: she is doing well, no issues. Afebrile.      ECOG PS:  (1) Restricted in physically strenuous activity, ambulatory and able to do work of light nature    KPS: 80% Normal activity with effort; some signs or symptoms of disease    ROS:  As noted above, otherwise remainder of 10-point ROS negative      Physical Exam:     Vital Signs:  /84   Pulse 100   Temp 98.6 °F (37 °C) (Oral)   Resp 18   Wt 110.5 kg (243 lb 9.7 oz)   SpO2 98%   BMI 38.73 kg/m²     Weight:    Wt Readings from Last 3 Encounters:   24 110.5 kg (243 lb 9.7 oz)   24 110.7 kg (244 lb 0.8 oz)   24 111.5 kg (245 lb 13 oz)       General: Awake, alert and oriented.  HEENT: normocephalic, alopecia, PERRL, no scleral erythema or icterus, Oral mucosa moist and intact, throat clear  NECK: supple   BACK: Straight   SKIN: warm dry and intact without lesions rashes or masses  CHEST: CTA bilaterally without use of accessory muscles  CV: Normal S1 S2, RRR, no MRG  ABD: NT ND normoactive BS  EXTREMITIES: without edema, denies calf tenderness  NEURO: CN II - XII grossly intact  CATHETER: PAC    Laboratory Data:  CBC:   Recent Labs     24  0938 24  0842 24  0834   WBC 0.5* 0.5* 0.5*   HGB 8.7* 8.5* 8.8*   HCT 27.6* 25.9* 26.9*   MCV 87.9 86.6 87.5    180 186     BMP/Mag:  Recent Labs     24  0938 24  0842 24  0834    142 142   K 3.9 4.1 3.9    107 107   CO2 25 27 25   PHOS 3.5 4.0 4.5   BUN 8 11 8   CREATININE 0.7 0.8 0.7   MG 1.88  --   --      LIVP:   Recent Labs     24  0938   AST 16   ALT 11   BILIDIR 0.2   BILITOT 0.4   ALKPHOS 45     Coags:   No results for input(s): \"PROTIME\", \"INR\", \"APTT\" in the last 72 hours.    Uric Acid No results for input(s): \"LABURIC\" in the

## 2024-11-24 NOTE — PROGRESS NOTES
Short Stay Communication Note  Farhad Balderas  Diagnosis: Multiple Myeloma  Primary MD: Dr Niki Morton  Treatment: Fludarabine/Cytoxan  CAR-T Administration Date: 11/18/2024  Day +5 of  Carvykti      CBC:   Recent Labs     11/22/24  0938 11/23/24  0842 11/24/24  0834   WBC 0.5* 0.5* 0.5*   HGB 8.7* 8.5* 8.8*   HCT 27.6* 25.9* 26.9*   MCV 87.9 86.6 87.5    180 186     BMP/Mag:  Recent Labs     11/22/24  0938 11/23/24  0842 11/24/24  0834    142 142   K 3.9 4.1 3.9    107 107   CO2 25 27 25   PHOS 3.5 4.0 4.5   BUN 8 11 8   CREATININE 0.7 0.8 0.7   MG 1.88  --   --      Standing parameters for replacement for this patient:   1 unit of pack red blood cells for a hemoglobin < or equal to 7  1 pack of platelets for a platelet count less than or equal to 10  40 MeQ of Potassium administered for a potassium level less than or equal to 3.4  4g of Magnesium Sulfate for a magnesum level less than or equal to 1.4  No transfusions required for the above lab values.    Urinalysis last done: 11/18/24 Urinalysis next due: 11/25/24    Chest X-Ray last done: 11/18/24 Chest X-Ray next due: 11/25/24    Symptoms addressed and reported to care team this date: none  Treatments this date: Labs drawn    Reviewed medication schedule with pt and caregiver. Both able to verbalize all medications and schedule. Pt to be seen again tomorrow. Patient and caregiver verbalized understanding of discharge instructions including when and how to call the doctor and when to report  to the ER. Discharged ambulatory to home.     Zahira Hanson RN

## 2024-11-25 ENCOUNTER — HOSPITAL ENCOUNTER (OUTPATIENT)
Dept: GENERAL RADIOLOGY | Age: 54
Discharge: HOME | End: 2024-11-25
Payer: COMMERCIAL

## 2024-11-25 ENCOUNTER — HOSPITAL ENCOUNTER (OUTPATIENT)
Dept: ONCOLOGY | Age: 54
Setting detail: INFUSION SERIES
Discharge: HOME | End: 2024-11-25
Payer: COMMERCIAL

## 2024-11-25 VITALS
WEIGHT: 246 LBS | OXYGEN SATURATION: 97 % | BODY MASS INDEX: 39.11 KG/M2 | HEART RATE: 116 BPM | TEMPERATURE: 98.1 F | SYSTOLIC BLOOD PRESSURE: 141 MMHG | DIASTOLIC BLOOD PRESSURE: 85 MMHG | RESPIRATION RATE: 18 BRPM

## 2024-11-25 DIAGNOSIS — C90.00 MULTIPLE MYELOMA, REMISSION STATUS UNSPECIFIED (HCC): Primary | ICD-10-CM

## 2024-11-25 LAB
ALBUMIN SERPL-MCNC: 4.2 G/DL (ref 3.4–5)
ALP SERPL-CCNC: 44 U/L (ref 40–129)
ALT SERPL-CCNC: 12 U/L (ref 10–40)
ANION GAP SERPL CALCULATED.3IONS-SCNC: 9 MMOL/L (ref 3–16)
AST SERPL-CCNC: 13 U/L (ref 15–37)
BILIRUB DIRECT SERPL-MCNC: 0.2 MG/DL (ref 0–0.3)
BILIRUB INDIRECT SERPL-MCNC: 0.3 MG/DL (ref 0–1)
BILIRUB SERPL-MCNC: 0.5 MG/DL (ref 0–1)
BILIRUB UR QL STRIP.AUTO: NEGATIVE
BUN SERPL-MCNC: 8 MG/DL (ref 7–20)
CALCIUM SERPL-MCNC: 8.9 MG/DL (ref 8.3–10.6)
CHLORIDE SERPL-SCNC: 104 MMOL/L (ref 99–110)
CLARITY UR: CLEAR
CO2 SERPL-SCNC: 26 MMOL/L (ref 21–32)
COLOR UR: YELLOW
CREAT SERPL-MCNC: 0.7 MG/DL (ref 0.6–1.1)
CRP SERPL-MCNC: 10.3 MG/L (ref 0–5.1)
DEPRECATED RDW RBC AUTO: 17.7 % (ref 12.4–15.4)
EPI CELLS #/AREA URNS HPF: ABNORMAL /HPF (ref 0–5)
FERRITIN SERPL IA-MCNC: 360 NG/ML (ref 15–150)
GFR SERPLBLD CREATININE-BSD FMLA CKD-EPI: >90 ML/MIN/{1.73_M2}
GLUCOSE SERPL-MCNC: 135 MG/DL (ref 70–99)
GLUCOSE UR STRIP.AUTO-MCNC: NEGATIVE MG/DL
HCT VFR BLD AUTO: 25.9 % (ref 36–48)
HGB BLD-MCNC: 8.4 G/DL (ref 12–16)
HGB UR QL STRIP.AUTO: NEGATIVE
INR PPP: 1.06 (ref 0.85–1.15)
KETONES UR STRIP.AUTO-MCNC: NEGATIVE MG/DL
LDH SERPL L TO P-CCNC: 147 U/L (ref 100–190)
LEUKOCYTE ESTERASE UR QL STRIP.AUTO: NEGATIVE
MAGNESIUM SERPL-MCNC: 1.64 MG/DL (ref 1.8–2.4)
MCH RBC QN AUTO: 28.3 PG (ref 26–34)
MCHC RBC AUTO-ENTMCNC: 32.4 G/DL (ref 31–36)
MCV RBC AUTO: 87.2 FL (ref 80–100)
MUCOUS THREADS #/AREA URNS LPF: ABNORMAL /LPF
NITRITE UR QL STRIP.AUTO: NEGATIVE
PH UR STRIP.AUTO: 6 [PH] (ref 5–8)
PHOSPHATE SERPL-MCNC: 3.4 MG/DL (ref 2.5–4.9)
PLATELET # BLD AUTO: 184 K/UL (ref 135–450)
PMV BLD AUTO: 7.6 FL (ref 5–10.5)
POTASSIUM SERPL-SCNC: 3.7 MMOL/L (ref 3.5–5.1)
PROT SERPL-MCNC: 7.8 G/DL (ref 6.4–8.2)
PROT UR STRIP.AUTO-MCNC: ABNORMAL MG/DL
PROTHROMBIN TIME: 14 SEC (ref 11.9–14.9)
RBC # BLD AUTO: 2.97 M/UL (ref 4–5.2)
RBC #/AREA URNS HPF: ABNORMAL /HPF (ref 0–4)
SODIUM SERPL-SCNC: 139 MMOL/L (ref 136–145)
SP GR UR STRIP.AUTO: >=1.03 (ref 1–1.03)
UA DIPSTICK W REFLEX MICRO PNL UR: YES
URATE SERPL-MCNC: 4.5 MG/DL (ref 2.6–6)
URN SPEC COLLECT METH UR: ABNORMAL
UROBILINOGEN UR STRIP-ACNC: 0.2 E.U./DL
WBC # BLD AUTO: 0.4 K/UL (ref 4–11)
WBC #/AREA URNS HPF: ABNORMAL /HPF (ref 0–5)

## 2024-11-25 PROCEDURE — 84100 ASSAY OF PHOSPHORUS: CPT

## 2024-11-25 PROCEDURE — 83735 ASSAY OF MAGNESIUM: CPT

## 2024-11-25 PROCEDURE — 71045 X-RAY EXAM CHEST 1 VIEW: CPT

## 2024-11-25 PROCEDURE — 81001 URINALYSIS AUTO W/SCOPE: CPT

## 2024-11-25 PROCEDURE — 85610 PROTHROMBIN TIME: CPT

## 2024-11-25 PROCEDURE — 36591 DRAW BLOOD OFF VENOUS DEVICE: CPT

## 2024-11-25 PROCEDURE — 85027 COMPLETE CBC AUTOMATED: CPT

## 2024-11-25 PROCEDURE — 86140 C-REACTIVE PROTEIN: CPT

## 2024-11-25 PROCEDURE — 84550 ASSAY OF BLOOD/URIC ACID: CPT

## 2024-11-25 PROCEDURE — 80048 BASIC METABOLIC PNL TOTAL CA: CPT

## 2024-11-25 PROCEDURE — 82728 ASSAY OF FERRITIN: CPT

## 2024-11-25 PROCEDURE — 80076 HEPATIC FUNCTION PANEL: CPT

## 2024-11-25 PROCEDURE — 83615 LACTATE (LD) (LDH) ENZYME: CPT

## 2024-11-25 RX ORDER — OXYCODONE HYDROCHLORIDE 5 MG/1
5 TABLET ORAL EVERY 4 HOURS PRN
OUTPATIENT
Start: 2024-11-26

## 2024-11-25 RX ORDER — OXYCODONE HYDROCHLORIDE 5 MG/1
5 TABLET ORAL EVERY 4 HOURS PRN
Status: DISCONTINUED | OUTPATIENT
Start: 2024-11-25 | End: 2024-11-26 | Stop reason: HOSPADM

## 2024-11-25 RX ORDER — OXYCODONE HYDROCHLORIDE 5 MG/1
10 TABLET ORAL EVERY 4 HOURS PRN
Status: DISCONTINUED | OUTPATIENT
Start: 2024-11-25 | End: 2024-11-26 | Stop reason: HOSPADM

## 2024-11-25 RX ORDER — SODIUM CHLORIDE 9 MG/ML
INJECTION, SOLUTION INTRAVENOUS CONTINUOUS PRN
OUTPATIENT
Start: 2024-11-26

## 2024-11-25 RX ORDER — PROCHLORPERAZINE MALEATE 10 MG
10 TABLET ORAL EVERY 6 HOURS PRN
OUTPATIENT
Start: 2024-11-26

## 2024-11-25 RX ORDER — POTASSIUM CHLORIDE 1500 MG/1
40 TABLET, EXTENDED RELEASE ORAL PRN
OUTPATIENT
Start: 2024-11-26

## 2024-11-25 RX ORDER — ONDANSETRON 2 MG/ML
8 INJECTION INTRAMUSCULAR; INTRAVENOUS EVERY 8 HOURS PRN
Status: DISCONTINUED | OUTPATIENT
Start: 2024-11-25 | End: 2024-11-26 | Stop reason: HOSPADM

## 2024-11-25 RX ORDER — HEPARIN 100 UNIT/ML
500 SYRINGE INTRAVENOUS PRN
OUTPATIENT
Start: 2024-11-26

## 2024-11-25 RX ORDER — ONDANSETRON 4 MG/1
8 TABLET, FILM COATED ORAL EVERY 8 HOURS PRN
Status: DISCONTINUED | OUTPATIENT
Start: 2024-11-25 | End: 2024-11-26 | Stop reason: HOSPADM

## 2024-11-25 RX ORDER — OXYCODONE HYDROCHLORIDE 5 MG/1
10 TABLET ORAL EVERY 4 HOURS PRN
OUTPATIENT
Start: 2024-11-26

## 2024-11-25 RX ORDER — MAGNESIUM SULFATE IN WATER 40 MG/ML
4000 INJECTION, SOLUTION INTRAVENOUS PRN
OUTPATIENT
Start: 2024-11-26

## 2024-11-25 RX ORDER — MAGNESIUM SULFATE IN WATER 40 MG/ML
4000 INJECTION, SOLUTION INTRAVENOUS PRN
Status: DISCONTINUED | OUTPATIENT
Start: 2024-11-25 | End: 2024-11-26 | Stop reason: HOSPADM

## 2024-11-25 RX ORDER — PROCHLORPERAZINE EDISYLATE 5 MG/ML
10 INJECTION INTRAMUSCULAR; INTRAVENOUS EVERY 6 HOURS PRN
Status: DISCONTINUED | OUTPATIENT
Start: 2024-11-25 | End: 2024-11-26 | Stop reason: HOSPADM

## 2024-11-25 RX ORDER — POTASSIUM CHLORIDE 29.8 MG/ML
80 INJECTION INTRAVENOUS PRN
Status: DISCONTINUED | OUTPATIENT
Start: 2024-11-25 | End: 2024-11-26 | Stop reason: HOSPADM

## 2024-11-25 RX ORDER — POTASSIUM CHLORIDE 1500 MG/1
40 TABLET, EXTENDED RELEASE ORAL PRN
Status: DISCONTINUED | OUTPATIENT
Start: 2024-11-25 | End: 2024-11-26 | Stop reason: HOSPADM

## 2024-11-25 RX ORDER — HEPARIN 100 UNIT/ML
500 SYRINGE INTRAVENOUS PRN
Status: DISCONTINUED | OUTPATIENT
Start: 2024-11-25 | End: 2024-11-26 | Stop reason: HOSPADM

## 2024-11-25 RX ORDER — PROCHLORPERAZINE EDISYLATE 5 MG/ML
10 INJECTION INTRAMUSCULAR; INTRAVENOUS EVERY 6 HOURS PRN
OUTPATIENT
Start: 2024-11-26

## 2024-11-25 RX ORDER — SODIUM CHLORIDE 9 MG/ML
INJECTION, SOLUTION INTRAVENOUS CONTINUOUS PRN
Status: DISCONTINUED | OUTPATIENT
Start: 2024-11-25 | End: 2024-11-26 | Stop reason: HOSPADM

## 2024-11-25 RX ORDER — POTASSIUM CHLORIDE 29.8 MG/ML
80 INJECTION INTRAVENOUS PRN
OUTPATIENT
Start: 2024-11-26 | End: 2024-12-26

## 2024-11-25 RX ORDER — ONDANSETRON 4 MG/1
8 TABLET, FILM COATED ORAL EVERY 8 HOURS PRN
OUTPATIENT
Start: 2024-11-26

## 2024-11-25 RX ORDER — PROCHLORPERAZINE MALEATE 10 MG
10 TABLET ORAL EVERY 6 HOURS PRN
Status: DISCONTINUED | OUTPATIENT
Start: 2024-11-25 | End: 2024-11-26 | Stop reason: HOSPADM

## 2024-11-25 RX ORDER — ONDANSETRON 2 MG/ML
8 INJECTION INTRAMUSCULAR; INTRAVENOUS EVERY 8 HOURS PRN
OUTPATIENT
Start: 2024-11-26

## 2024-11-25 NOTE — PROGRESS NOTES
Short Stay Communication Note  Farahd Balderas  Diagnosis: Multiple Myeloma  Primary MD: Dr Niki Morton  Treatment: Fludarabine/Cytoxan  CAR-T Administration Date: 11/18/2024  Day +6 of  Carvykti      CBC:   Recent Labs     11/23/24  0842 11/24/24  0834 11/25/24  0848   WBC 0.5* 0.5* 0.4*   HGB 8.5* 8.8* 8.4*   HCT 25.9* 26.9* 25.9*   MCV 86.6 87.5 87.2    186 184     BMP/Mag:  Recent Labs     11/23/24  0842 11/24/24  0834 11/25/24  0848    142 139   K 4.1 3.9 3.7    107 104   CO2 27 25 26   PHOS 4.0 4.5 3.4   BUN 11 8 8   CREATININE 0.8 0.7 0.7   MG  --   --  1.64*     Standing parameters for replacement for this patient:   1 unit of pack red blood cells for a hemoglobin < or equal to 7  1 pack of platelets for a platelet count less than or equal to 10  40 MeQ of Potassium administered for a potassium level less than or equal to 3.4  4g of Magnesium Sulfate for a magnesum level less than or equal to 1.4  No transfusions required for the above lab values.    Urinalysis last done: 11/25/24 Urinalysis next due: 12/02/24    Chest X-Ray last done: 11/25/24 Chest X-Ray next due: 12/02/24    Symptoms addressed and reported to care team this date: none  Treatments this date: Labs drawn    Reviewed medication schedule with pt and caregiver. Both able to verbalize all medications and schedule. Pt to be seen again tomorrow. Patient and caregiver verbalized understanding of discharge instructions including when and how to call the doctor and when to report  to the ER. Discharged ambulatory to home.     Alexsandra Fields RN

## 2024-11-26 ENCOUNTER — HOSPITAL ENCOUNTER (INPATIENT)
Age: 54
LOS: 6 days | Discharge: HOME | DRG: 722 | End: 2024-12-02
Attending: STUDENT IN AN ORGANIZED HEALTH CARE EDUCATION/TRAINING PROGRAM | Admitting: STUDENT IN AN ORGANIZED HEALTH CARE EDUCATION/TRAINING PROGRAM
Payer: COMMERCIAL

## 2024-11-26 ENCOUNTER — HOSPITAL ENCOUNTER (OUTPATIENT)
Dept: ONCOLOGY | Age: 54
Setting detail: INFUSION SERIES
Discharge: HOME | End: 2024-11-26
Payer: COMMERCIAL

## 2024-11-26 ENCOUNTER — APPOINTMENT (OUTPATIENT)
Dept: GENERAL RADIOLOGY | Age: 54
DRG: 722 | End: 2024-11-26
Payer: COMMERCIAL

## 2024-11-26 VITALS
BODY MASS INDEX: 39.01 KG/M2 | SYSTOLIC BLOOD PRESSURE: 165 MMHG | WEIGHT: 245.37 LBS | OXYGEN SATURATION: 98 % | RESPIRATION RATE: 18 BRPM | TEMPERATURE: 98.8 F | HEART RATE: 134 BPM | DIASTOLIC BLOOD PRESSURE: 75 MMHG

## 2024-11-26 DIAGNOSIS — Z94.84 STEM CELLS TRANSPLANT STATUS (HCC): ICD-10-CM

## 2024-11-26 DIAGNOSIS — C90.00 MULTIPLE MYELOMA, REMISSION STATUS UNSPECIFIED (HCC): Primary | ICD-10-CM

## 2024-11-26 DIAGNOSIS — R00.0 TACHYCARDIA: ICD-10-CM

## 2024-11-26 DIAGNOSIS — C90.00 MULTIPLE MYELOMA NOT HAVING ACHIEVED REMISSION (HCC): Primary | ICD-10-CM

## 2024-11-26 PROBLEM — R50.81 NEUTROPENIC FEVER (HCC): Status: ACTIVE | Noted: 2024-11-26

## 2024-11-26 PROBLEM — D70.9 NEUTROPENIC FEVER (HCC): Status: ACTIVE | Noted: 2024-11-26

## 2024-11-26 LAB
ALBUMIN SERPL-MCNC: 3.7 G/DL (ref 3.4–5)
ALBUMIN SERPL-MCNC: 3.9 G/DL (ref 3.4–5)
ALBUMIN/GLOB SERPL: 1.1 {RATIO} (ref 1.1–2.2)
ALP SERPL-CCNC: 45 U/L (ref 40–129)
ALP SERPL-CCNC: 48 U/L (ref 40–129)
ALT SERPL-CCNC: 10 U/L (ref 10–40)
ALT SERPL-CCNC: 11 U/L (ref 10–40)
ANION GAP SERPL CALCULATED.3IONS-SCNC: 10 MMOL/L (ref 3–16)
ANION GAP SERPL CALCULATED.3IONS-SCNC: 9 MMOL/L (ref 3–16)
ANION GAP SERPL CALCULATED.3IONS-SCNC: 9 MMOL/L (ref 3–16)
APTT BLD: 26.7 SEC (ref 22.1–36.4)
AST SERPL-CCNC: 13 U/L (ref 15–37)
AST SERPL-CCNC: 14 U/L (ref 15–37)
BASE EXCESS BLDV CALC-SCNC: 4.1 MMOL/L (ref -2–3)
BILIRUB DIRECT SERPL-MCNC: 0.2 MG/DL (ref 0–0.3)
BILIRUB INDIRECT SERPL-MCNC: 0.2 MG/DL (ref 0–1)
BILIRUB SERPL-MCNC: 0.4 MG/DL (ref 0–1)
BILIRUB SERPL-MCNC: 0.5 MG/DL (ref 0–1)
BUN SERPL-MCNC: 8 MG/DL (ref 7–20)
BUN SERPL-MCNC: 8 MG/DL (ref 7–20)
BUN SERPL-MCNC: 9 MG/DL (ref 7–20)
CALCIUM SERPL-MCNC: 9 MG/DL (ref 8.3–10.6)
CALCIUM SERPL-MCNC: 9.1 MG/DL (ref 8.3–10.6)
CALCIUM SERPL-MCNC: 9.2 MG/DL (ref 8.3–10.6)
CHLORIDE SERPL-SCNC: 100 MMOL/L (ref 99–110)
CHLORIDE SERPL-SCNC: 102 MMOL/L (ref 99–110)
CHLORIDE SERPL-SCNC: 103 MMOL/L (ref 99–110)
CO2 BLDV-SCNC: 31 MMOL/L
CO2 SERPL-SCNC: 25 MMOL/L (ref 21–32)
CO2 SERPL-SCNC: 26 MMOL/L (ref 21–32)
CO2 SERPL-SCNC: 27 MMOL/L (ref 21–32)
COHGB MFR BLDV: 1.3 % (ref 0–1.5)
CREAT SERPL-MCNC: 0.8 MG/DL (ref 0.6–1.1)
CRP SERPL-MCNC: 28.6 MG/L (ref 0–5.1)
DEPRECATED RDW RBC AUTO: 18.2 % (ref 12.4–15.4)
DEPRECATED RDW RBC AUTO: 18.7 % (ref 12.4–15.4)
DEPRECATED RDW RBC AUTO: 19.3 % (ref 12.4–15.4)
DO-HGB MFR BLDV: 83.2 %
FERRITIN SERPL IA-MCNC: 386 NG/ML (ref 15–150)
FLUAV RNA RESP QL NAA+PROBE: NOT DETECTED
FLUBV RNA RESP QL NAA+PROBE: NOT DETECTED
GFR SERPLBLD CREATININE-BSD FMLA CKD-EPI: 87 ML/MIN/{1.73_M2}
GGT SERPL-CCNC: 46 U/L (ref 5–36)
GLUCOSE SERPL-MCNC: 134 MG/DL (ref 70–99)
GLUCOSE SERPL-MCNC: 135 MG/DL (ref 70–99)
GLUCOSE SERPL-MCNC: 142 MG/DL (ref 70–99)
HCO3 BLDV-SCNC: 29.7 MMOL/L (ref 24–28)
HCT VFR BLD AUTO: 22.2 % (ref 36–48)
HCT VFR BLD AUTO: 24 % (ref 36–48)
HCT VFR BLD AUTO: 25.2 % (ref 36–48)
HGB BLD-MCNC: 7.2 G/DL (ref 12–16)
HGB BLD-MCNC: 7.7 G/DL (ref 12–16)
HGB BLD-MCNC: 8.2 G/DL (ref 12–16)
INR PPP: 1.16 (ref 0.85–1.15)
LACTATE BLDV-SCNC: 1.4 MMOL/L (ref 0.4–1.9)
LACTATE BLDV-SCNC: 1.6 MMOL/L (ref 0.4–1.9)
LDH SERPL L TO P-CCNC: 149 U/L (ref 100–190)
MAGNESIUM SERPL-MCNC: 1.45 MG/DL (ref 1.8–2.4)
MCH RBC QN AUTO: 28.2 PG (ref 26–34)
MCH RBC QN AUTO: 28.3 PG (ref 26–34)
MCH RBC QN AUTO: 28.5 PG (ref 26–34)
MCHC RBC AUTO-ENTMCNC: 32.1 G/DL (ref 31–36)
MCHC RBC AUTO-ENTMCNC: 32.5 G/DL (ref 31–36)
MCHC RBC AUTO-ENTMCNC: 32.6 G/DL (ref 31–36)
MCV RBC AUTO: 87.1 FL (ref 80–100)
MCV RBC AUTO: 87.4 FL (ref 80–100)
MCV RBC AUTO: 87.8 FL (ref 80–100)
METHGB MFR BLDV: 0.6 % (ref 0–1.5)
PCO2 BLDV: 49.7 MMHG (ref 41–51)
PH BLDV: 7.38 [PH] (ref 7.35–7.45)
PHOSPHATE SERPL-MCNC: 4.1 MG/DL (ref 2.5–4.9)
PHOSPHATE SERPL-MCNC: 4.1 MG/DL (ref 2.5–4.9)
PLATELET # BLD AUTO: 158 K/UL (ref 135–450)
PLATELET # BLD AUTO: 171 K/UL (ref 135–450)
PLATELET # BLD AUTO: 182 K/UL (ref 135–450)
PMV BLD AUTO: 7.4 FL (ref 5–10.5)
PMV BLD AUTO: 7.6 FL (ref 5–10.5)
PMV BLD AUTO: 7.7 FL (ref 5–10.5)
PO2 BLDV: <30 MMHG (ref 25–40)
POTASSIUM SERPL-SCNC: 3.7 MMOL/L (ref 3.5–5.1)
POTASSIUM SERPL-SCNC: 3.8 MMOL/L (ref 3.5–5.1)
POTASSIUM SERPL-SCNC: 3.9 MMOL/L (ref 3.5–5.1)
PROT SERPL-MCNC: 7.1 G/DL (ref 6.4–8.2)
PROT SERPL-MCNC: 7.5 G/DL (ref 6.4–8.2)
PROTHROMBIN TIME: 15 SEC (ref 11.9–14.9)
RBC # BLD AUTO: 2.54 M/UL (ref 4–5.2)
RBC # BLD AUTO: 2.73 M/UL (ref 4–5.2)
RBC # BLD AUTO: 2.89 M/UL (ref 4–5.2)
SAO2 % BLDV: 15 %
SARS-COV-2 RNA RESP QL NAA+PROBE: NOT DETECTED
SODIUM SERPL-SCNC: 135 MMOL/L (ref 136–145)
SODIUM SERPL-SCNC: 138 MMOL/L (ref 136–145)
SODIUM SERPL-SCNC: 138 MMOL/L (ref 136–145)
URATE SERPL-MCNC: 4.4 MG/DL (ref 2.6–6)
WBC # BLD AUTO: 0.3 K/UL (ref 4–11)
WBC # BLD AUTO: 0.3 K/UL (ref 4–11)
WBC # BLD AUTO: 0.4 K/UL (ref 4–11)

## 2024-11-26 PROCEDURE — 84100 ASSAY OF PHOSPHORUS: CPT

## 2024-11-26 PROCEDURE — 2580000003 HC RX 258: Performed by: NURSE PRACTITIONER

## 2024-11-26 PROCEDURE — A4217 STERILE WATER/SALINE, 500 ML: HCPCS | Performed by: NURSE PRACTITIONER

## 2024-11-26 PROCEDURE — 86140 C-REACTIVE PROTEIN: CPT

## 2024-11-26 PROCEDURE — 6370000000 HC RX 637 (ALT 250 FOR IP): Performed by: NURSE PRACTITIONER

## 2024-11-26 PROCEDURE — 80053 COMPREHEN METABOLIC PANEL: CPT

## 2024-11-26 PROCEDURE — 82803 BLOOD GASES ANY COMBINATION: CPT

## 2024-11-26 PROCEDURE — 83735 ASSAY OF MAGNESIUM: CPT

## 2024-11-26 PROCEDURE — 85730 THROMBOPLASTIN TIME PARTIAL: CPT

## 2024-11-26 PROCEDURE — 83605 ASSAY OF LACTIC ACID: CPT

## 2024-11-26 PROCEDURE — 80048 BASIC METABOLIC PNL TOTAL CA: CPT

## 2024-11-26 PROCEDURE — 85027 COMPLETE CBC AUTOMATED: CPT

## 2024-11-26 PROCEDURE — 84550 ASSAY OF BLOOD/URIC ACID: CPT

## 2024-11-26 PROCEDURE — 6360000002 HC RX W HCPCS: Performed by: NURSE PRACTITIONER

## 2024-11-26 PROCEDURE — 87040 BLOOD CULTURE FOR BACTERIA: CPT

## 2024-11-26 PROCEDURE — 2060000000 HC ICU INTERMEDIATE R&B

## 2024-11-26 PROCEDURE — 71046 X-RAY EXAM CHEST 2 VIEWS: CPT

## 2024-11-26 PROCEDURE — 2580000003 HC RX 258

## 2024-11-26 PROCEDURE — 93005 ELECTROCARDIOGRAM TRACING: CPT | Performed by: NURSE PRACTITIONER

## 2024-11-26 PROCEDURE — 82977 ASSAY OF GGT: CPT

## 2024-11-26 PROCEDURE — 87636 SARSCOV2 & INF A&B AMP PRB: CPT

## 2024-11-26 PROCEDURE — 85610 PROTHROMBIN TIME: CPT

## 2024-11-26 PROCEDURE — 96374 THER/PROPH/DIAG INJ IV PUSH: CPT

## 2024-11-26 PROCEDURE — 99285 EMERGENCY DEPT VISIT HI MDM: CPT

## 2024-11-26 PROCEDURE — 83615 LACTATE (LD) (LDH) ENZYME: CPT

## 2024-11-26 PROCEDURE — 36415 COLL VENOUS BLD VENIPUNCTURE: CPT

## 2024-11-26 PROCEDURE — 82728 ASSAY OF FERRITIN: CPT

## 2024-11-26 PROCEDURE — 36591 DRAW BLOOD OFF VENOUS DEVICE: CPT

## 2024-11-26 RX ORDER — OXYCODONE HYDROCHLORIDE 5 MG/1
5 TABLET ORAL EVERY 4 HOURS PRN
OUTPATIENT
Start: 2024-11-27

## 2024-11-26 RX ORDER — PROCHLORPERAZINE EDISYLATE 5 MG/ML
10 INJECTION INTRAMUSCULAR; INTRAVENOUS EVERY 6 HOURS PRN
OUTPATIENT
Start: 2024-11-27

## 2024-11-26 RX ORDER — SODIUM CHLORIDE 0.9 % (FLUSH) 0.9 %
5-40 SYRINGE (ML) INJECTION EVERY 12 HOURS SCHEDULED
Status: DISCONTINUED | OUTPATIENT
Start: 2024-11-26 | End: 2024-12-02 | Stop reason: HOSPADM

## 2024-11-26 RX ORDER — ONDANSETRON 4 MG/1
8 TABLET, FILM COATED ORAL EVERY 8 HOURS PRN
OUTPATIENT
Start: 2024-11-27

## 2024-11-26 RX ORDER — POTASSIUM CHLORIDE 29.8 MG/ML
20 INJECTION INTRAVENOUS PRN
Status: DISCONTINUED | OUTPATIENT
Start: 2024-11-26 | End: 2024-12-02 | Stop reason: HOSPADM

## 2024-11-26 RX ORDER — PROCHLORPERAZINE MALEATE 10 MG
10 TABLET ORAL EVERY 6 HOURS PRN
OUTPATIENT
Start: 2024-11-27

## 2024-11-26 RX ORDER — SODIUM CHLORIDE, SODIUM LACTATE, POTASSIUM CHLORIDE, AND CALCIUM CHLORIDE .6; .31; .03; .02 G/100ML; G/100ML; G/100ML; G/100ML
30 INJECTION, SOLUTION INTRAVENOUS ONCE
Status: DISCONTINUED | OUTPATIENT
Start: 2024-11-26 | End: 2024-11-26

## 2024-11-26 RX ORDER — ENOXAPARIN SODIUM 100 MG/ML
30 INJECTION SUBCUTANEOUS 2 TIMES DAILY
Status: DISCONTINUED | OUTPATIENT
Start: 2024-11-26 | End: 2024-12-02 | Stop reason: HOSPADM

## 2024-11-26 RX ORDER — SODIUM CHLORIDE 9 MG/ML
INJECTION, SOLUTION INTRAVENOUS PRN
Status: DISCONTINUED | OUTPATIENT
Start: 2024-11-26 | End: 2024-12-02 | Stop reason: HOSPADM

## 2024-11-26 RX ORDER — OXYCODONE HYDROCHLORIDE 5 MG/1
10 TABLET ORAL EVERY 4 HOURS PRN
OUTPATIENT
Start: 2024-11-27

## 2024-11-26 RX ORDER — MAGNESIUM SULFATE IN WATER 40 MG/ML
4000 INJECTION, SOLUTION INTRAVENOUS PRN
OUTPATIENT
Start: 2024-11-27

## 2024-11-26 RX ORDER — SODIUM CHLORIDE 9 MG/ML
INJECTION, SOLUTION INTRAVENOUS CONTINUOUS PRN
OUTPATIENT
Start: 2024-11-27

## 2024-11-26 RX ORDER — SODIUM CHLORIDE, SODIUM LACTATE, POTASSIUM CHLORIDE, AND CALCIUM CHLORIDE .6; .31; .03; .02 G/100ML; G/100ML; G/100ML; G/100ML
30 INJECTION, SOLUTION INTRAVENOUS ONCE
Status: COMPLETED | OUTPATIENT
Start: 2024-11-26 | End: 2024-11-26

## 2024-11-26 RX ORDER — SODIUM CHLORIDE 0.9 % (FLUSH) 0.9 %
5-40 SYRINGE (ML) INJECTION PRN
Status: DISCONTINUED | OUTPATIENT
Start: 2024-11-26 | End: 2024-12-02 | Stop reason: HOSPADM

## 2024-11-26 RX ORDER — MAGNESIUM SULFATE IN WATER 40 MG/ML
4000 INJECTION, SOLUTION INTRAVENOUS PRN
Status: DISCONTINUED | OUTPATIENT
Start: 2024-11-26 | End: 2024-12-02 | Stop reason: HOSPADM

## 2024-11-26 RX ORDER — POTASSIUM CHLORIDE 29.8 MG/ML
80 INJECTION INTRAVENOUS PRN
OUTPATIENT
Start: 2024-11-27 | End: 2024-12-27

## 2024-11-26 RX ORDER — ONDANSETRON 2 MG/ML
8 INJECTION INTRAMUSCULAR; INTRAVENOUS EVERY 8 HOURS PRN
OUTPATIENT
Start: 2024-11-27

## 2024-11-26 RX ORDER — HEPARIN 100 UNIT/ML
500 SYRINGE INTRAVENOUS PRN
OUTPATIENT
Start: 2024-11-27

## 2024-11-26 RX ORDER — POTASSIUM CHLORIDE 1500 MG/1
40 TABLET, EXTENDED RELEASE ORAL PRN
OUTPATIENT
Start: 2024-11-27

## 2024-11-26 RX ADMIN — CEFEPIME 2000 MG: 2 INJECTION, POWDER, FOR SOLUTION INTRAVENOUS at 18:35

## 2024-11-26 RX ADMIN — SODIUM CHLORIDE, PRESERVATIVE FREE 10 ML: 5 INJECTION INTRAVENOUS at 20:33

## 2024-11-26 RX ADMIN — SODIUM CHLORIDE, SODIUM LACTATE, POTASSIUM CHLORIDE, AND CALCIUM CHLORIDE 1710 ML: .6; .31; .03; .02 INJECTION, SOLUTION INTRAVENOUS at 19:30

## 2024-11-26 RX ADMIN — SODIUM CHLORIDE 15 ML: 900 IRRIGANT IRRIGATION at 20:32

## 2024-11-26 RX ADMIN — SODIUM CHLORIDE, PRESERVATIVE FREE 10 ML: 5 INJECTION INTRAVENOUS at 20:34

## 2024-11-26 ASSESSMENT — LIFESTYLE VARIABLES
HOW MANY STANDARD DRINKS CONTAINING ALCOHOL DO YOU HAVE ON A TYPICAL DAY: PATIENT DOES NOT DRINK
HOW OFTEN DO YOU HAVE A DRINK CONTAINING ALCOHOL: NEVER

## 2024-11-26 NOTE — ED PROVIDER NOTES
[facility]  EMERGENCY DEPARTMENT ENCOUNTER          EM RESIDENT NOTE       Date of evaluation: 11/26/2024    Chief Complaint     Fever (OHC, CAR-T pt, monitor at home reading >100 throughout the day, currently 98.6)      History of Present Illness     Farhad Balderas is a 54 y.o. female with a past medical history of multiple myeloma (on Selinexor/Kyprolis/Dex)  who presents concern for fever at home.  She wears a patch that continuously monitors her temperature at home and it was reading high for the last several hours.  She arrives here afebrile but the patch also correlates with this now.  She is currently receiving CAR-T therapy and is on day 9 of 10 of monitoring.  She is immunosuppressed from this.  She reports no infectious symptoms including cough, chills, shortness of breath, pain or difficulty urinating.  She does have a small bug bite to her right hip that she states is painful to the touch.  No other areas of rash.  Other than fever, she feels fatigued which has been the case ever since getting CAR-T therapy but otherwise is at her baseline.  Appropriate p.o. intake recently    Other than stated above, no additional associated symptoms or aggravating/alleviating factors are noted.    MEDICAL DECISION MAKING / ASSESSMENT / PLAN     INITIAL VITALS: BP: (!) 152/84, Temp: 98.6 °F (37 °C), Pulse: (!) 134, Respirations: 18, SpO2: 97 %    Nursing Notes, Past Medical Hx, Past Surgical Hx, Social Hx, Allergies, and Family Hx were reviewed.    Upon presentation, the patient was well-appearing, afebrile here but febrile at home and tachycardic.  She is severely immunosuppressed in the setting of CAR-T therapy and therefore highest concern is for sepsis.  Code sepsis activated immediately upon her arrival.  She has no significant hypotension.  I did order 30 cc/kg of ideal body weight given her BMI of 40.  No obvious source at this time but consideration is given to cellulitis in her right hip.  A dose

## 2024-11-26 NOTE — ED NOTES
Patient Name: Farhad Balderas  : 1970 54 y.o.  MRN: [id number]  ED Room #: A03/A03-03     Chief complaint:   Chief Complaint   Patient presents with    Fever     OHC, CAR-T pt, monitor at home reading >100 throughout the day, currently 98.6     Hospital Problem/Diagnosis: Principal Problem:    Neutropenic fever (HCC)  Resolved Problems:    * No resolved hospital problems. *      O2 Flow Rate:O2 Device: None (Room air)   (if applicable)  Cardiac Rhythm:   (if applicable)  Active LDA's:           How does patient ambulate? Low Fall Risk (Ambulates by themselves without support    2. How does patient take pills? Whole with Water    3. Is patient alert? Alert    4. Is patient oriented? To Person, To Place, To Time, To Situation, and Follows Commands    5.   Patient arrived from:  home  Facility Name: ___________________________________________    6. If patient is disoriented or from a Skill Nursing Facility has family been notified of admission? No    7. Patient belongings? Cell Phone, Wallet, and Clothing    Disposition of belongings? Kept with Patient     8. Any specific patient or family belongings/needs/dynamics?   a. Mom @ bedside    9. Miscellaneous comments/pending orders?  a.       If there are any additional questions please reach out to the Emergency Department.      Lydia Tay RN  [phone]    
full weight-bearing

## 2024-11-26 NOTE — PROGRESS NOTES
Short Stay Communication Note  Farhad Balderas  Diagnosis: Multiple Myeloma  Primary MD: Dr Niki Morton  Treatment: Fludarabine/Cytoxan  CAR-T Administration Date: 11/18/2024  Day +7 of  Carvykti      CBC:   Recent Labs     11/24/24  0834 11/25/24  0848 11/26/24  0915   WBC 0.5* 0.4* 0.4*   HGB 8.8* 8.4* 8.2*   HCT 26.9* 25.9* 25.2*   MCV 87.5 87.2 87.1    184 182     BMP/Mag:  Recent Labs     11/24/24  0834 11/25/24  0848 11/26/24  0915    139 138   K 3.9 3.7 3.8    104 103   CO2 25 26 25   PHOS 4.5 3.4 4.1   BUN 8 8 9   CREATININE 0.7 0.7 0.8   MG  --  1.64*  --      Standing parameters for replacement for this patient:   1 unit of pack red blood cells for a hemoglobin < or equal to 7  1 pack of platelets for a platelet count less than or equal to 10  40 MeQ of Potassium administered for a potassium level less than or equal to 3.4  4g of Magnesium Sulfate for a magnesum level less than or equal to 1.4  No transfusions required for the above lab values.    Urinalysis last done: 11/25/24 Urinalysis next due: 12/02/24    Chest X-Ray last done: 11/25/24 Chest X-Ray next due: 12/02/24    Symptoms addressed and reported to care team this date: none  Treatments this date: Labs drawn    Reviewed medication schedule with pt and caregiver. Both able to verbalize all medications and schedule. Pt to be seen again tomorrow. Patient and caregiver verbalized understanding of discharge instructions including when and how to call the doctor and when to report  to the ER. Discharged ambulatory to home.     Lety Crawford RN    
  Component Value Date    CRP 10.3 (H) 11/25/2024    CRP 4.2 11/24/2024    CRP <3.0 11/23/2024     Lab Results   Component Value Date    FERRITIN 360.0 (H) 11/25/2024    FERRITIN 357.0 (H) 11/24/2024    FERRITIN 389.0 (H) 11/23/2024         PROBLEM LIST:          1. IgG Kappa Multiple myeloma  2. Dental disorder  3. HTN  4. Intestinal malabsorption (disorder)  5. Iron deficiency anemia due to chronic blood loss      TREATMENT:            1. RVD x 5 cycles (12/2021 - 5/16/22)  2. High Dose Melphalan f/b autologous SCT (7/1/22) - 3.66 x10^6 py50lpzpq/kg  3. Revlimid maintenance  4. Eden/Pom/Dex on clinical trial (C1D1 2/7/24) x 8 cycles - progression  5. 1 cycle of kyprolis, selinexor for bridging  6.     Lymphodepleting Chemotherapy:  Fludarabine and cyclophosphamide 11/13/24 - 11/15/24  Disease Status at time of Infusion:  Relapse with unconfirmed progression   CAR-T Infusion Date: 11/18/24  CAR-T Product: Carvykti   ENR#: [id number]        ASSESSMENT AND PLAN:           1. Multiple myeloma, IgG Kappa, ISS 3: partial response  - t(14;20), gain of 1q21, monosomy 13.    - s/p HD [id number] and ASCT (7/1/22) followed by Rev maintenance   - PET (1/31/24): No evidence of any definitive hypermetabolic neoplastic disease. Mild diffuse hypermetabolic activity identified within the spine, pelvis and femoral diaphysis is without corresponding CT abnormality therefore favors physiologic marrow activity. Diffuse infiltrating marrow process is a differential consideration. 3. Spleen activity representative of possible extramedullary disease  - Plan Selinexor/Kyprolis/Dex as bridge to Carvykti CAR-T   - S/p 1 cycles of kyprolis, selinexor for bridging            Carvykti CAR-T   Day + 8          2. CRS / Neuro: At risk post-infusion  CRS Grade: none  - Monitor CRP and Ferrtin closely   Lab Results   Component Value Date    CRP 10.3 (H) 11/25/2024    CRP 4.2 11/24/2024    CRP <3.0 11/23/2024     Lab Results   Component Value Date

## 2024-11-26 NOTE — ED PROVIDER NOTES
ED Attending Attestation Note     Date of evaluation: 11/26/2024    This patient was seen by the resident.  I have seen and examined the patient, agree with the workup, evaluation, management and diagnosis. The care plan has been discussed.  My assessment reveals a 55-year-old female with history of multiple myeloma on CAR-T who presents with fevers at home.  She has a monitor that detected elevated temperatures for several hours this afternoon but has since returned to normal.  She is afebrile on arrival at 98.6 but is tachycardic to the 120s.  She is essentially asymptomatic although has noticed a small red area on her right gluteus that she thinks might be a bug bite surrounded by mild erythema.  Her exam is otherwise unremarkable.  With her tachycardia and immunosuppression, will cover her with cefepime and speak with [facility].     Chava Pederson MD  11/26/24 0679

## 2024-11-27 LAB
ALBUMIN SERPL-MCNC: 3.8 G/DL (ref 3.4–5)
ALP SERPL-CCNC: 46 U/L (ref 40–129)
ALT SERPL-CCNC: 9 U/L (ref 10–40)
ANION GAP SERPL CALCULATED.3IONS-SCNC: 10 MMOL/L (ref 3–16)
APTT BLD: 27.6 SEC (ref 22.1–36.4)
AST SERPL-CCNC: 14 U/L (ref 15–37)
BILIRUB DIRECT SERPL-MCNC: 0.2 MG/DL (ref 0–0.3)
BILIRUB INDIRECT SERPL-MCNC: 0.3 MG/DL (ref 0–1)
BILIRUB SERPL-MCNC: 0.5 MG/DL (ref 0–1)
BUN SERPL-MCNC: 8 MG/DL (ref 7–20)
CALCIUM SERPL-MCNC: 9.1 MG/DL (ref 8.3–10.6)
CHLORIDE SERPL-SCNC: 99 MMOL/L (ref 99–110)
CO2 SERPL-SCNC: 26 MMOL/L (ref 21–32)
CREAT SERPL-MCNC: 0.8 MG/DL (ref 0.6–1.1)
CRP SERPL-MCNC: 61.6 MG/L (ref 0–5.1)
DEPRECATED RDW RBC AUTO: 19.7 % (ref 12.4–15.4)
EKG ATRIAL RATE: 132 BPM
EKG DIAGNOSIS: NORMAL
EKG P AXIS: 64 DEGREES
EKG P-R INTERVAL: 136 MS
EKG Q-T INTERVAL: 302 MS
EKG QRS DURATION: 94 MS
EKG QTC CALCULATION (BAZETT): 447 MS
EKG R AXIS: 2 DEGREES
EKG T AXIS: 61 DEGREES
EKG VENTRICULAR RATE: 132 BPM
FERRITIN SERPL IA-MCNC: 494 NG/ML (ref 15–150)
GFR SERPLBLD CREATININE-BSD FMLA CKD-EPI: 87 ML/MIN/{1.73_M2}
GGT SERPL-CCNC: 48 U/L (ref 5–36)
GLUCOSE SERPL-MCNC: 121 MG/DL (ref 70–99)
HCT VFR BLD AUTO: 22.9 % (ref 36–48)
HGB BLD-MCNC: 7.5 G/DL (ref 12–16)
LDH SERPL L TO P-CCNC: 163 U/L (ref 100–190)
MAGNESIUM SERPL-MCNC: 1.49 MG/DL (ref 1.8–2.4)
MCH RBC QN AUTO: 28.2 PG (ref 26–34)
MCHC RBC AUTO-ENTMCNC: 32.5 G/DL (ref 31–36)
MCV RBC AUTO: 86.9 FL (ref 80–100)
PHOSPHATE SERPL-MCNC: 4.6 MG/DL (ref 2.5–4.9)
PLATELET # BLD AUTO: 169 K/UL (ref 135–450)
PMV BLD AUTO: 8 FL (ref 5–10.5)
POTASSIUM SERPL-SCNC: 3.7 MMOL/L (ref 3.5–5.1)
PROCALCITONIN SERPL IA-MCNC: 0.19 NG/ML (ref 0–0.15)
PROT SERPL-MCNC: 7.4 G/DL (ref 6.4–8.2)
RBC # BLD AUTO: 2.64 M/UL (ref 4–5.2)
SODIUM SERPL-SCNC: 135 MMOL/L (ref 136–145)
URATE SERPL-MCNC: 4.6 MG/DL (ref 2.6–6)
WBC # BLD AUTO: 0.3 K/UL (ref 4–11)

## 2024-11-27 PROCEDURE — 2580000003 HC RX 258: Performed by: NURSE PRACTITIONER

## 2024-11-27 PROCEDURE — 84100 ASSAY OF PHOSPHORUS: CPT

## 2024-11-27 PROCEDURE — 2580000003 HC RX 258

## 2024-11-27 PROCEDURE — 87641 MR-STAPH DNA AMP PROBE: CPT

## 2024-11-27 PROCEDURE — 83735 ASSAY OF MAGNESIUM: CPT

## 2024-11-27 PROCEDURE — 84145 PROCALCITONIN (PCT): CPT

## 2024-11-27 PROCEDURE — 84550 ASSAY OF BLOOD/URIC ACID: CPT

## 2024-11-27 PROCEDURE — 85730 THROMBOPLASTIN TIME PARTIAL: CPT

## 2024-11-27 PROCEDURE — 6370000000 HC RX 637 (ALT 250 FOR IP): Performed by: NURSE PRACTITIONER

## 2024-11-27 PROCEDURE — 82728 ASSAY OF FERRITIN: CPT

## 2024-11-27 PROCEDURE — 86140 C-REACTIVE PROTEIN: CPT

## 2024-11-27 PROCEDURE — 2060000000 HC ICU INTERMEDIATE R&B

## 2024-11-27 PROCEDURE — 6360000002 HC RX W HCPCS: Performed by: NURSE PRACTITIONER

## 2024-11-27 PROCEDURE — 80048 BASIC METABOLIC PNL TOTAL CA: CPT

## 2024-11-27 PROCEDURE — 83615 LACTATE (LD) (LDH) ENZYME: CPT

## 2024-11-27 PROCEDURE — 85027 COMPLETE CBC AUTOMATED: CPT

## 2024-11-27 PROCEDURE — 36591 DRAW BLOOD OFF VENOUS DEVICE: CPT

## 2024-11-27 PROCEDURE — 80076 HEPATIC FUNCTION PANEL: CPT

## 2024-11-27 PROCEDURE — 82977 ASSAY OF GGT: CPT

## 2024-11-27 RX ORDER — ONDANSETRON 2 MG/ML
8 INJECTION INTRAMUSCULAR; INTRAVENOUS EVERY 8 HOURS PRN
Status: DISCONTINUED | OUTPATIENT
Start: 2024-11-27 | End: 2024-12-02 | Stop reason: HOSPADM

## 2024-11-27 RX ORDER — LEVOFLOXACIN 500 MG/1
500 TABLET, FILM COATED ORAL DAILY
Status: DISCONTINUED | OUTPATIENT
Start: 2024-11-27 | End: 2024-11-27

## 2024-11-27 RX ORDER — CYCLOBENZAPRINE HCL 10 MG
5 TABLET ORAL 2 TIMES DAILY PRN
Status: DISCONTINUED | OUTPATIENT
Start: 2024-11-27 | End: 2024-12-02 | Stop reason: HOSPADM

## 2024-11-27 RX ORDER — AMLODIPINE BESYLATE 5 MG/1
5 TABLET ORAL DAILY
Status: DISCONTINUED | OUTPATIENT
Start: 2024-11-27 | End: 2024-12-02 | Stop reason: HOSPADM

## 2024-11-27 RX ORDER — ONDANSETRON 8 MG/1
8 TABLET, FILM COATED ORAL EVERY 8 HOURS PRN
Status: DISCONTINUED | OUTPATIENT
Start: 2024-11-27 | End: 2024-12-02 | Stop reason: HOSPADM

## 2024-11-27 RX ORDER — PROCHLORPERAZINE MALEATE 10 MG
10 TABLET ORAL EVERY 6 HOURS PRN
Status: DISCONTINUED | OUTPATIENT
Start: 2024-11-27 | End: 2024-11-27

## 2024-11-27 RX ORDER — ACETAMINOPHEN 325 MG/1
650 TABLET ORAL EVERY 4 HOURS PRN
Status: DISCONTINUED | OUTPATIENT
Start: 2024-11-27 | End: 2024-12-02 | Stop reason: HOSPADM

## 2024-11-27 RX ORDER — PROCHLORPERAZINE EDISYLATE 5 MG/ML
10 INJECTION INTRAMUSCULAR; INTRAVENOUS EVERY 6 HOURS PRN
Status: DISCONTINUED | OUTPATIENT
Start: 2024-11-27 | End: 2024-12-02 | Stop reason: HOSPADM

## 2024-11-27 RX ORDER — GABAPENTIN 300 MG/1
300 CAPSULE ORAL 3 TIMES DAILY
Status: DISCONTINUED | OUTPATIENT
Start: 2024-11-27 | End: 2024-12-02 | Stop reason: HOSPADM

## 2024-11-27 RX ORDER — VALACYCLOVIR HYDROCHLORIDE 500 MG/1
500 TABLET, FILM COATED ORAL 2 TIMES DAILY
Status: DISCONTINUED | OUTPATIENT
Start: 2024-11-27 | End: 2024-12-02 | Stop reason: HOSPADM

## 2024-11-27 RX ORDER — LISINOPRIL 40 MG/1
40 TABLET ORAL DAILY
Status: DISCONTINUED | OUTPATIENT
Start: 2024-11-27 | End: 2024-12-02 | Stop reason: HOSPADM

## 2024-11-27 RX ORDER — PROCHLORPERAZINE MALEATE 10 MG
10 TABLET ORAL EVERY 6 HOURS PRN
Status: DISCONTINUED | OUTPATIENT
Start: 2024-11-27 | End: 2024-12-02 | Stop reason: HOSPADM

## 2024-11-27 RX ORDER — FLUCONAZOLE 200 MG/1
200 TABLET ORAL DAILY
Status: DISCONTINUED | OUTPATIENT
Start: 2024-11-27 | End: 2024-12-02 | Stop reason: HOSPADM

## 2024-11-27 RX ORDER — PANTOPRAZOLE SODIUM 40 MG/1
40 TABLET, DELAYED RELEASE ORAL
Status: DISCONTINUED | OUTPATIENT
Start: 2024-11-27 | End: 2024-12-02 | Stop reason: HOSPADM

## 2024-11-27 RX ORDER — LEVETIRACETAM 500 MG/1
500 TABLET ORAL 2 TIMES DAILY
Status: DISCONTINUED | OUTPATIENT
Start: 2024-11-27 | End: 2024-12-02 | Stop reason: HOSPADM

## 2024-11-27 RX ADMIN — VALACYCLOVIR HYDROCHLORIDE 500 MG: 500 TABLET, FILM COATED ORAL at 19:57

## 2024-11-27 RX ADMIN — AMLODIPINE BESYLATE 5 MG: 5 TABLET ORAL at 10:51

## 2024-11-27 RX ADMIN — LEVETIRACETAM 500 MG: 500 TABLET, FILM COATED ORAL at 19:57

## 2024-11-27 RX ADMIN — ENOXAPARIN SODIUM 30 MG: 100 INJECTION SUBCUTANEOUS at 19:59

## 2024-11-27 RX ADMIN — SODIUM CHLORIDE, PRESERVATIVE FREE 10 ML: 5 INJECTION INTRAVENOUS at 20:01

## 2024-11-27 RX ADMIN — CEFEPIME 2000 MG: 2 INJECTION, POWDER, FOR SOLUTION INTRAVENOUS at 10:06

## 2024-11-27 RX ADMIN — PROCHLORPERAZINE MALEATE 10 MG: 10 TABLET ORAL at 08:58

## 2024-11-27 RX ADMIN — CEFEPIME 2000 MG: 2 INJECTION, POWDER, FOR SOLUTION INTRAVENOUS at 03:07

## 2024-11-27 RX ADMIN — SODIUM CHLORIDE, PRESERVATIVE FREE 10 ML: 5 INJECTION INTRAVENOUS at 08:59

## 2024-11-27 RX ADMIN — ACETAMINOPHEN 650 MG: 325 TABLET, FILM COATED ORAL at 15:35

## 2024-11-27 RX ADMIN — TOCILIZUMAB 800 MG: 20 INJECTION, SOLUTION, CONCENTRATE INTRAVENOUS at 13:03

## 2024-11-27 RX ADMIN — SODIUM CHLORIDE 15 ML: 900 IRRIGANT IRRIGATION at 19:59

## 2024-11-27 RX ADMIN — LEVETIRACETAM 500 MG: 500 TABLET, FILM COATED ORAL at 10:50

## 2024-11-27 RX ADMIN — LISINOPRIL 40 MG: 40 TABLET ORAL at 10:50

## 2024-11-27 RX ADMIN — ACETAMINOPHEN 650 MG: 325 TABLET, FILM COATED ORAL at 10:58

## 2024-11-27 RX ADMIN — GABAPENTIN 300 MG: 300 CAPSULE ORAL at 10:50

## 2024-11-27 RX ADMIN — FLUCONAZOLE 200 MG: 200 TABLET ORAL at 10:51

## 2024-11-27 RX ADMIN — ONDANSETRON 8 MG: 2 INJECTION INTRAMUSCULAR; INTRAVENOUS at 10:02

## 2024-11-27 RX ADMIN — VALACYCLOVIR HYDROCHLORIDE 500 MG: 500 TABLET, FILM COATED ORAL at 02:58

## 2024-11-27 RX ADMIN — LEVETIRACETAM 500 MG: 500 TABLET, FILM COATED ORAL at 02:58

## 2024-11-27 RX ADMIN — VALACYCLOVIR HYDROCHLORIDE 500 MG: 500 TABLET, FILM COATED ORAL at 10:51

## 2024-11-27 RX ADMIN — CEFEPIME 2000 MG: 2 INJECTION, POWDER, FOR SOLUTION INTRAVENOUS at 18:41

## 2024-11-27 RX ADMIN — GABAPENTIN 300 MG: 300 CAPSULE ORAL at 19:57

## 2024-11-27 RX ADMIN — PANTOPRAZOLE SODIUM 40 MG: 40 TABLET, DELAYED RELEASE ORAL at 07:30

## 2024-11-27 RX ADMIN — CYCLOBENZAPRINE HYDROCHLORIDE 5 MG: 10 TABLET, FILM COATED ORAL at 02:57

## 2024-11-27 RX ADMIN — GABAPENTIN 300 MG: 300 CAPSULE ORAL at 13:03

## 2024-11-27 NOTE — PLAN OF CARE
Problem: Chronic Conditions and Co-morbidities  Goal: Patient's chronic conditions and co-morbidity symptoms are monitored and maintained or improved  Outcome: Progressing  Flowsheets (Taken 11/27/2024 0544)  Care Plan - Patient's Chronic Conditions and Co-Morbidity Symptoms are Monitored and Maintained or Improved:   Monitor and assess patient's chronic conditions and comorbid symptoms for stability, deterioration, or improvement   Collaborate with multidisciplinary team to address chronic and comorbid conditions and prevent exacerbation or deterioration   Update acute care plan with appropriate goals if chronic or comorbid symptoms are exacerbated and prevent overall improvement and discharge     Problem: Pain  Goal: Verbalizes/displays adequate comfort level or baseline comfort level  Outcome: Progressing  Flowsheets (Taken 11/27/2024 0544)  Verbalizes/displays adequate comfort level or baseline comfort level:   Encourage patient to monitor pain and request assistance   Assess pain using appropriate pain scale   Administer analgesics based on type and severity of pain and evaluate response   Implement non-pharmacological measures as appropriate and evaluate response   Consider cultural and social influences on pain and pain management   Notify Licensed Independent Practitioner if interventions unsuccessful or patient reports new pain     Problem: Safety - Adult  Goal: Free from fall injury  Outcome: Progressing  Flowsheets (Taken 11/27/2024 0544)  Free From Fall Injury:   Instruct family/caregiver on patient safety   Based on caregiver fall risk screen, instruct family/caregiver to ask for assistance with transferring infant if caregiver noted to have fall risk factors

## 2024-11-27 NOTE — CARE COORDINATION
Case Management Assessment  Initial Evaluation    Date/Time of Evaluation: 11/27/2024 10:10 AM  Assessment Completed by: CLAUDE Apple   for [facility] [place] ([facility])  [employer] Mobile: [phone]    If patient is discharged prior to next notation, then this note serves as note for discharge by case management.    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 Yes (has been going to [facility])    Chart Reviewed: Yes      History Provided by: Patient  Patient Orientation: Alert and Oriented    Patient Cognition: Alert    Hospitalization in the last 30 days (Readmission):  Yes    If yes, Readmission Assessment in CM Navigator will be completed.    Advance Directives:      Code Status: Full Code   Patient's Primary Decision Maker is: Legal Next of Kin    Primary Decision Maker: felipe benitez - Child - [phone]    Secondary Decision Maker: Albert Orosco - Child - [phone]    Discharge Planning:    Patient lives with: Family Members Type of Home: House  Primary Care Giver: Self  Patient Support Systems include: Children, Family Members   Current Financial resources: Medicaid ([employer])  Current community resources: None  Current services prior to admission: Other (Comment), Durable Medical Equipment ([facility])            Current DME: Walker (doesn't use the walker)            Type of Home Care services:  None    ADLS  Prior functional level: Independent in ADLs/IADLs  Current functional level: Independent in ADLs/IADLs    PT AM-PAC:

## 2024-11-28 LAB
ANION GAP SERPL CALCULATED.3IONS-SCNC: 8 MMOL/L (ref 3–16)
APTT BLD: 29.2 SEC (ref 22.1–36.4)
BUN SERPL-MCNC: 11 MG/DL (ref 7–20)
CALCIUM SERPL-MCNC: 8.7 MG/DL (ref 8.3–10.6)
CHLORIDE SERPL-SCNC: 100 MMOL/L (ref 99–110)
CO2 SERPL-SCNC: 26 MMOL/L (ref 21–32)
CREAT SERPL-MCNC: 0.9 MG/DL (ref 0.6–1.1)
CRP SERPL-MCNC: 83.9 MG/L (ref 0–5.1)
DEPRECATED RDW RBC AUTO: 19.7 % (ref 12.4–15.4)
FERRITIN SERPL IA-MCNC: 979 NG/ML (ref 15–150)
GFR SERPLBLD CREATININE-BSD FMLA CKD-EPI: 76 ML/MIN/{1.73_M2}
GLUCOSE SERPL-MCNC: 118 MG/DL (ref 70–99)
HCT VFR BLD AUTO: 21.8 % (ref 36–48)
HGB BLD-MCNC: 7.2 G/DL (ref 12–16)
INR PPP: 1.15 (ref 0.85–1.15)
MAGNESIUM SERPL-MCNC: 1.71 MG/DL (ref 1.8–2.4)
MCH RBC QN AUTO: 28.5 PG (ref 26–34)
MCHC RBC AUTO-ENTMCNC: 32.8 G/DL (ref 31–36)
MCV RBC AUTO: 86.8 FL (ref 80–100)
PHOSPHATE SERPL-MCNC: 4.9 MG/DL (ref 2.5–4.9)
PLATELET # BLD AUTO: 150 K/UL (ref 135–450)
PMV BLD AUTO: 7.7 FL (ref 5–10.5)
POTASSIUM SERPL-SCNC: 3.6 MMOL/L (ref 3.5–5.1)
PROTHROMBIN TIME: 14.9 SEC (ref 11.9–14.9)
RBC # BLD AUTO: 2.51 M/UL (ref 4–5.2)
SODIUM SERPL-SCNC: 134 MMOL/L (ref 136–145)
WBC # BLD AUTO: 0.1 K/UL (ref 4–11)

## 2024-11-28 PROCEDURE — 86140 C-REACTIVE PROTEIN: CPT

## 2024-11-28 PROCEDURE — 84100 ASSAY OF PHOSPHORUS: CPT

## 2024-11-28 PROCEDURE — 6360000002 HC RX W HCPCS: Performed by: NURSE PRACTITIONER

## 2024-11-28 PROCEDURE — 80048 BASIC METABOLIC PNL TOTAL CA: CPT

## 2024-11-28 PROCEDURE — 6360000002 HC RX W HCPCS

## 2024-11-28 PROCEDURE — 85730 THROMBOPLASTIN TIME PARTIAL: CPT

## 2024-11-28 PROCEDURE — 6370000000 HC RX 637 (ALT 250 FOR IP): Performed by: NURSE PRACTITIONER

## 2024-11-28 PROCEDURE — 2060000000 HC ICU INTERMEDIATE R&B

## 2024-11-28 PROCEDURE — 2580000003 HC RX 258: Performed by: NURSE PRACTITIONER

## 2024-11-28 PROCEDURE — 82728 ASSAY OF FERRITIN: CPT

## 2024-11-28 PROCEDURE — 36592 COLLECT BLOOD FROM PICC: CPT

## 2024-11-28 PROCEDURE — 85610 PROTHROMBIN TIME: CPT

## 2024-11-28 PROCEDURE — 6370000000 HC RX 637 (ALT 250 FOR IP): Performed by: INTERNAL MEDICINE

## 2024-11-28 PROCEDURE — 2580000003 HC RX 258

## 2024-11-28 PROCEDURE — 83735 ASSAY OF MAGNESIUM: CPT

## 2024-11-28 PROCEDURE — 85027 COMPLETE CBC AUTOMATED: CPT

## 2024-11-28 RX ORDER — CELECOXIB 100 MG/1
200 CAPSULE ORAL ONCE
Status: COMPLETED | OUTPATIENT
Start: 2024-11-28 | End: 2024-11-28

## 2024-11-28 RX ADMIN — SODIUM CHLORIDE, PRESERVATIVE FREE 10 ML: 5 INJECTION INTRAVENOUS at 09:06

## 2024-11-28 RX ADMIN — LEVETIRACETAM 500 MG: 500 TABLET, FILM COATED ORAL at 20:47

## 2024-11-28 RX ADMIN — VALACYCLOVIR HYDROCHLORIDE 500 MG: 500 TABLET, FILM COATED ORAL at 09:05

## 2024-11-28 RX ADMIN — VALACYCLOVIR HYDROCHLORIDE 500 MG: 500 TABLET, FILM COATED ORAL at 20:47

## 2024-11-28 RX ADMIN — GABAPENTIN 300 MG: 300 CAPSULE ORAL at 09:05

## 2024-11-28 RX ADMIN — SODIUM CHLORIDE, PRESERVATIVE FREE 10 ML: 5 INJECTION INTRAVENOUS at 09:05

## 2024-11-28 RX ADMIN — LISINOPRIL 40 MG: 40 TABLET ORAL at 09:05

## 2024-11-28 RX ADMIN — CEFEPIME 2000 MG: 2 INJECTION, POWDER, FOR SOLUTION INTRAVENOUS at 03:18

## 2024-11-28 RX ADMIN — AMLODIPINE BESYLATE 5 MG: 5 TABLET ORAL at 09:05

## 2024-11-28 RX ADMIN — FLUCONAZOLE 200 MG: 200 TABLET ORAL at 09:05

## 2024-11-28 RX ADMIN — ONDANSETRON HYDROCHLORIDE 8 MG: 8 TABLET, FILM COATED ORAL at 19:27

## 2024-11-28 RX ADMIN — GABAPENTIN 300 MG: 300 CAPSULE ORAL at 20:47

## 2024-11-28 RX ADMIN — LEVETIRACETAM 500 MG: 500 TABLET, FILM COATED ORAL at 09:05

## 2024-11-28 RX ADMIN — PANTOPRAZOLE SODIUM 40 MG: 40 TABLET, DELAYED RELEASE ORAL at 06:31

## 2024-11-28 RX ADMIN — GABAPENTIN 300 MG: 300 CAPSULE ORAL at 13:21

## 2024-11-28 RX ADMIN — ONDANSETRON 8 MG: 2 INJECTION INTRAMUSCULAR; INTRAVENOUS at 09:05

## 2024-11-28 RX ADMIN — CELECOXIB 200 MG: 100 CAPSULE ORAL at 13:21

## 2024-11-28 RX ADMIN — CEFEPIME 2000 MG: 2 INJECTION, POWDER, FOR SOLUTION INTRAVENOUS at 18:11

## 2024-11-28 RX ADMIN — ACETAMINOPHEN 650 MG: 325 TABLET, FILM COATED ORAL at 00:07

## 2024-11-28 RX ADMIN — TOCILIZUMAB 800 MG: 20 INJECTION, SOLUTION, CONCENTRATE INTRAVENOUS at 02:02

## 2024-11-28 RX ADMIN — CEFEPIME 2000 MG: 2 INJECTION, POWDER, FOR SOLUTION INTRAVENOUS at 10:03

## 2024-11-28 RX ADMIN — ACETAMINOPHEN 650 MG: 325 TABLET, FILM COATED ORAL at 11:48

## 2024-11-28 NOTE — PLAN OF CARE
Problem: Pain  Goal: Verbalizes/displays adequate comfort level or baseline comfort level  11/28/2024 1332 by Sirena Kam, RN  Outcome: Progressing- patient not complaining of pain, but endorsing some nausea at the beginning of the shift. Prn nausea medication administered, relief of symptoms reported upon reassessment. Care continues.     Problem: Safety - Adult  Goal: Free from fall injury  11/28/2024 1332 by Sirena Kam RN  Outcome: Progressing  Orthostatic vital signs obtained at start of shift - see flowsheet for details.  Pt does not meet criteria for orthostasis.  Pt is a Med fall risk. See Oconnor Fall Score and ABCDS Injury Risk assessments.   - Screening for Orthostasis AND not a [place] Risk per OCONNOR/ABCDS: Pt bed is in low position, side rails up, call light and belongings are in reach.  Fall risk light is on outside pts room.  Pt encouraged to call for assistance as needed. Will continue with hourly rounds for PO intake, pain needs, toileting and repositioning as needed.      Problem: Infection - Adult  Goal: Absence of infection during hospitalization  11/28/2024 1332 by Sirena Kam, RN  Outcome: Progressing- patient spiked fever upon afternoon vitals, see flowsheets.

## 2024-11-28 NOTE — H&P
by mouth daily 3/16/24  Yes Jona Simpson MD   amLODIPine (NORVASC) 5 MG tablet Take 1 tablet by mouth daily 10/25/23  Yes Tila Eisenberg DO   valACYclovir (VALTREX) 500 MG tablet Take 1 tablet by mouth 2 times daily 6/30/22  Yes Marianna Gallego APRN - GORGE   prochlorperazine (COMPAZINE) 10 MG tablet Take 1 tablet by mouth every 6 hours as needed (nausea / vomiting) 6/30/22  Yes Marianna Gallego APRN - NP   levoFLOXacin (LEVAQUIN) 500 MG tablet Take 1 tablet by mouth daily Do Not Start Until Instructed to by Provider. 11/13/24   Marianna Gallego APRKODAK - NP   cyclobenzaprine (FLEXERIL) 5 MG tablet Take 1 tablet by mouth twice daily as needed 10/10/24   Fernando Leal MD       Allergies   Allergen Reactions    Corticosteroids Other (See Comments)     All corticosteroids should be avoided from day -5 to day +90 due to CAR T infusion. No steroids unless confirmed with [facility] MD          Family History   Problem Relation Age of Onset    Hypertension Mother     Diabetes Father     Hypertension Father     Cancer Father         leukemia    Heart Attack Father     Cancer Maternal Grandmother         liver        Social History     Socioeconomic History    Marital status:      Spouse name: Not on file    Number of children: 2    Years of education: Not on file    Highest education level: Not on file   Occupational History    Occupation: day care worker   Tobacco Use    Smoking status: Never    Smokeless tobacco: Never   Vaping Use    Vaping status: Never Used   Substance and Sexual Activity    Alcohol use: Not Currently    Drug use: Yes     Types: Marijuana (Weed)     Comment: medical marijuana - smokes 2 per day    Sexual activity: Not on file   Other Topics Concern    Not on file   Social History Narrative    Lives with daughter, mother, and significant other     Social Determinants of Health     Financial Resource Strain: Patient Declined (6/29/2023)    Overall Financial Resource 
maintenance   - PET (1/31/24): No evidence of any definitive hypermetabolic neoplastic disease. Mild diffuse hypermetabolic activity identified within the spine, pelvis and femoral diaphysis is without corresponding CT abnormality therefore favors physiologic marrow activity. Diffuse infiltrating marrow process is a differential consideration. 3. Spleen activity representative of possible extramedullary disease  - Plan Selinexor/Kyprolis/Dex as bridge to Carvykti CAR-T   - S/p 1 cycles of kyprolis, selinexor for bridging         Carvykti CAR-T   Day + 9      2. CRS / Neuro: At risk post-infusion  CRS Grade: 1, fever only 11/27/24 but persistently febrile  - Shows desaturation in ER but likely inaccurate, if so then CRS grade would be 2 on admission but on room air currently  - Persistently febrile of >102 for since 0300 with tachycardia and non-responsive to supportive care- will give Toxi 800 mg (capped dose) today (11/24/24 at 1230)  - Monitor CRP and Ferrtin closely - trending up 11/27/24  Lab Results   Component Value Date    CRP 83.9 (H) 11/28/2024    CRP 61.6 (H) 11/27/2024    CRP 28.6 (H) 11/26/2024     Lab Results   Component Value Date    FERRITIN 979.0 (H) 11/28/2024    FERRITIN 494.0 (H) 11/27/2024    FERRITIN 386.0 (H) 11/26/2024       ICANS Grade:  None  ICE Score:  CAR-T Score: 10  - Neuro checks w/ CARTOX 10-point assessment Q4hrs  - Cont Keppra         3.  ID: Neutropenic fever POA, Tmax 102,  tachycardia- possible CRS +/- infected wound  Neutropenic fever 11/27/24  - Pan cx- pending  - CXray- No acute changes  - Cont Cefepime Day 2 (11/26/24)  - COVID and flu negative  - Erythema on right hip- monitor  - MRSA swab and Procalcitonin pending    Current Tx/PPx:  - Cont Valtrex and fluconazole  ppx      Post CAR- T monitoring / maintenance:  - IgG & CD4 level monthly starting on day + 30  - Check disease titers on day + 30 or when WBC recovered. Re-vaccinate vs booster based on titer (assure titers are

## 2024-11-28 NOTE — PLAN OF CARE
Problem: Chronic Conditions and Co-morbidities  Goal: Patient's chronic conditions and co-morbidity symptoms are monitored and maintained or improved  Outcome: Progressing     Problem: Pain  Goal: Verbalizes/displays adequate comfort level or baseline comfort level  Outcome: Progressing  Note: Pt did not complain of any pain this shift.     Problem: Safety - Adult  Goal: Free from fall injury  Outcome: Progressing  Note:   - Screening for Orthostasis AND not a [place] Risk per OCONNOR/ABCDS: Pt bed is in low position, side rails up, call light and belongings are in reach.  Fall risk light is on outside pts room.  Pt encouraged to call for assistance as needed. Will continue with hourly rounds for PO intake, pain needs, toileting and repositioning as needed.       Problem: Infection - Adult  Goal: Absence of infection during hospitalization  Outcome: Progressing  Note: CVC site remains free of signs/symptoms of infection. No drainage, edema, erythema, pain, or warmth noted at site. Dressing changes continue per protocol and on an as needed basis - see flowsheet.          Problem: Hematologic - Adult  Goal: Maintains hematologic stability  Outcome: Progressing  Note: Patient's hemoglobin this AM:   Recent Labs     11/28/24  0329   HGB 7.2*     Patient's platelet count this AM:   Recent Labs     11/28/24  0329       Thrombocytopenia Precautions in place.  Patient showing no signs or symptoms of active bleeding.  Transfusion not indicated at this time.  Patient verbalizes understanding of all instructions. Will continue to assess and implement POC. Call light within reach and hourly rounding in place.

## 2024-11-29 LAB
ABO + RH BLD: NORMAL
ALBUMIN SERPL-MCNC: 3.6 G/DL (ref 3.4–5)
ALP SERPL-CCNC: 43 U/L (ref 40–129)
ALT SERPL-CCNC: 11 U/L (ref 10–40)
ANION GAP SERPL CALCULATED.3IONS-SCNC: 12 MMOL/L (ref 3–16)
AST SERPL-CCNC: 30 U/L (ref 15–37)
BILIRUB DIRECT SERPL-MCNC: <0.1 MG/DL (ref 0–0.3)
BILIRUB INDIRECT SERPL-MCNC: 0.2 MG/DL (ref 0–1)
BILIRUB SERPL-MCNC: 0.3 MG/DL (ref 0–1)
BLD GP AB SCN SERPL QL: NORMAL
BUN SERPL-MCNC: 13 MG/DL (ref 7–20)
CALCIUM SERPL-MCNC: 8.5 MG/DL (ref 8.3–10.6)
CHLORIDE SERPL-SCNC: 103 MMOL/L (ref 99–110)
CO2 SERPL-SCNC: 23 MMOL/L (ref 21–32)
CREAT SERPL-MCNC: 0.9 MG/DL (ref 0.6–1.1)
CRP SERPL-MCNC: 44.6 MG/L (ref 0–5.1)
DEPRECATED RDW RBC AUTO: 19.2 % (ref 12.4–15.4)
FERRITIN SERPL IA-MCNC: 4998 NG/ML (ref 15–150)
GFR SERPLBLD CREATININE-BSD FMLA CKD-EPI: 76 ML/MIN/{1.73_M2}
GGT SERPL-CCNC: 54 U/L (ref 5–36)
GLUCOSE SERPL-MCNC: 114 MG/DL (ref 70–99)
HCT VFR BLD AUTO: 25.1 % (ref 36–48)
HGB BLD-MCNC: 8 G/DL (ref 12–16)
LDH SERPL L TO P-CCNC: 308 U/L (ref 100–190)
MAGNESIUM SERPL-MCNC: 1.85 MG/DL (ref 1.8–2.4)
MCH RBC QN AUTO: 28.1 PG (ref 26–34)
MCHC RBC AUTO-ENTMCNC: 31.9 G/DL (ref 31–36)
MCV RBC AUTO: 88.1 FL (ref 80–100)
MRSA DNA SPEC QL NAA+PROBE: NORMAL
PHOSPHATE SERPL-MCNC: 4.3 MG/DL (ref 2.5–4.9)
PLATELET # BLD AUTO: 146 K/UL (ref 135–450)
PMV BLD AUTO: 7.1 FL (ref 5–10.5)
POTASSIUM SERPL-SCNC: 3.6 MMOL/L (ref 3.5–5.1)
PROT SERPL-MCNC: 6.7 G/DL (ref 6.4–8.2)
RBC # BLD AUTO: 2.85 M/UL (ref 4–5.2)
SODIUM SERPL-SCNC: 138 MMOL/L (ref 136–145)
URATE SERPL-MCNC: 5 MG/DL (ref 2.6–6)
WBC # BLD AUTO: 0.5 K/UL (ref 4–11)

## 2024-11-29 PROCEDURE — 2060000000 HC ICU INTERMEDIATE R&B

## 2024-11-29 PROCEDURE — 86140 C-REACTIVE PROTEIN: CPT

## 2024-11-29 PROCEDURE — 82977 ASSAY OF GGT: CPT

## 2024-11-29 PROCEDURE — 86850 RBC ANTIBODY SCREEN: CPT

## 2024-11-29 PROCEDURE — 86900 BLOOD TYPING SEROLOGIC ABO: CPT

## 2024-11-29 PROCEDURE — 83615 LACTATE (LD) (LDH) ENZYME: CPT

## 2024-11-29 PROCEDURE — 82728 ASSAY OF FERRITIN: CPT

## 2024-11-29 PROCEDURE — 85027 COMPLETE CBC AUTOMATED: CPT

## 2024-11-29 PROCEDURE — 80048 BASIC METABOLIC PNL TOTAL CA: CPT

## 2024-11-29 PROCEDURE — 2580000003 HC RX 258: Performed by: NURSE PRACTITIONER

## 2024-11-29 PROCEDURE — 83735 ASSAY OF MAGNESIUM: CPT

## 2024-11-29 PROCEDURE — 6370000000 HC RX 637 (ALT 250 FOR IP): Performed by: NURSE PRACTITIONER

## 2024-11-29 PROCEDURE — 80076 HEPATIC FUNCTION PANEL: CPT

## 2024-11-29 PROCEDURE — 86901 BLOOD TYPING SEROLOGIC RH(D): CPT

## 2024-11-29 PROCEDURE — 2580000003 HC RX 258

## 2024-11-29 PROCEDURE — 84550 ASSAY OF BLOOD/URIC ACID: CPT

## 2024-11-29 PROCEDURE — 6360000002 HC RX W HCPCS: Performed by: NURSE PRACTITIONER

## 2024-11-29 PROCEDURE — 84100 ASSAY OF PHOSPHORUS: CPT

## 2024-11-29 RX ADMIN — CEFEPIME 2000 MG: 2 INJECTION, POWDER, FOR SOLUTION INTRAVENOUS at 02:53

## 2024-11-29 RX ADMIN — GABAPENTIN 300 MG: 300 CAPSULE ORAL at 14:03

## 2024-11-29 RX ADMIN — SODIUM CHLORIDE, PRESERVATIVE FREE 20 ML: 5 INJECTION INTRAVENOUS at 09:08

## 2024-11-29 RX ADMIN — LEVETIRACETAM 500 MG: 500 TABLET, FILM COATED ORAL at 09:07

## 2024-11-29 RX ADMIN — VALACYCLOVIR HYDROCHLORIDE 500 MG: 500 TABLET, FILM COATED ORAL at 21:01

## 2024-11-29 RX ADMIN — SODIUM CHLORIDE, PRESERVATIVE FREE 10 ML: 5 INJECTION INTRAVENOUS at 09:09

## 2024-11-29 RX ADMIN — CEFEPIME 2000 MG: 2 INJECTION, POWDER, FOR SOLUTION INTRAVENOUS at 18:13

## 2024-11-29 RX ADMIN — CEFEPIME 2000 MG: 2 INJECTION, POWDER, FOR SOLUTION INTRAVENOUS at 10:15

## 2024-11-29 RX ADMIN — SODIUM CHLORIDE 15 ML: 900 IRRIGANT IRRIGATION at 11:58

## 2024-11-29 RX ADMIN — SODIUM CHLORIDE, PRESERVATIVE FREE 10 ML: 5 INJECTION INTRAVENOUS at 21:01

## 2024-11-29 RX ADMIN — SODIUM CHLORIDE 15 ML: 900 IRRIGANT IRRIGATION at 16:33

## 2024-11-29 RX ADMIN — PANTOPRAZOLE SODIUM 40 MG: 40 TABLET, DELAYED RELEASE ORAL at 06:38

## 2024-11-29 RX ADMIN — VALACYCLOVIR HYDROCHLORIDE 500 MG: 500 TABLET, FILM COATED ORAL at 09:07

## 2024-11-29 RX ADMIN — LEVETIRACETAM 500 MG: 500 TABLET, FILM COATED ORAL at 21:01

## 2024-11-29 RX ADMIN — GABAPENTIN 300 MG: 300 CAPSULE ORAL at 21:01

## 2024-11-29 RX ADMIN — FLUCONAZOLE 200 MG: 200 TABLET ORAL at 09:07

## 2024-11-29 RX ADMIN — GABAPENTIN 300 MG: 300 CAPSULE ORAL at 09:07

## 2024-11-29 ASSESSMENT — PAIN SCALES - GENERAL: PAINLEVEL_OUTOF10: 1

## 2024-11-29 NOTE — PLAN OF CARE
Problem: Pain  Goal: Verbalizes/displays adequate comfort level or baseline comfort level  11/29/2024 1116 by Monse Mclain RN  Outcome: Progressing  Note: Pt did not report pain this shift. Pt educated on importance of calling for pain meds when in pain. Pt verbalized understanding.     Problem: Safety - Adult  Goal: Free from fall injury  11/29/2024 1116 by Monse Mclain RN  Outcome: Progressing  Note: Orthostatic vital signs obtained at start of shift - see flowsheet for details.  Pt does not meet criteria for orthostasis.  Pt is a Low fall risk. See Oconnor Fall Score and ABCDS Injury Risk assessments.   - Screening for Orthostasis AND not a [place] Risk per OCONNOR/ABCDS: Pt bed is in low position, side rails up, call light and belongings are in reach.  Fall risk light is on outside pts room.  Pt encouraged to call for assistance as needed. Will continue with hourly rounds for PO intake, pain needs, toileting and repositioning as needed.      Problem: ABCDS Injury Assessment  Goal: Absence of physical injury  11/29/2024 1116 by Monse Mclain, RN  Outcome: Progressing  Note: No new reported or observed physical injuries this shift.      Problem: Gastrointestinal - Adult  Goal: Minimal or absence of nausea and vomiting  Outcome: Progressing  Note: No reported nausea this shift.      Problem: Hematologic - Adult  Goal: Maintains hematologic stability  Outcome: Progressing  Note: Patient's hemoglobin this AM:   Recent Labs     11/29/24  0312   HGB 8.0*     Patient's platelet count this AM:   Recent Labs     11/29/24  0312       Thrombocytopenia not present at this time.  Patient showing no signs or symptoms of active bleeding.  Transfusion not indicated at this time.  Patient verbalizes understanding of all instructions. Will continue to assess and implement POC. Call light within reach and hourly rounding in place.

## 2024-11-29 NOTE — PLAN OF CARE
Problem: Pain  Goal: Verbalizes/displays adequate comfort level or baseline comfort level  Outcome: Progressing  Note: Pt with no complaints of pain during shift.     Problem: Safety - Adult  Goal: Free from fall injury  Outcome: Progressing  Note: Orthostatic vital signs obtained at start of shift - see flowsheet for details.  Pt does not meet criteria for orthostasis.  Pt is a Low fall risk. See Oconnor Fall Score and ABCDS Injury Risk assessments.     - Screening for Orthostasis AND not a [place] Risk per OCONNOR/ABCDS: Pt bed is in low position, side rails up, call light and belongings are in reach.  Fall risk light is on outside pts room.  Pt encouraged to call for assistance as needed. Will continue with hourly rounds for PO intake, pain needs, toileting and repositioning as needed.       Problem: ABCDS Injury Assessment  Goal: Absence of physical injury  Outcome: Progressing

## 2024-11-30 LAB
ANION GAP SERPL CALCULATED.3IONS-SCNC: 11 MMOL/L (ref 3–16)
BACTERIA BLD CULT ORG #2: NORMAL
BACTERIA BLD CULT: NORMAL
BASOPHILS # BLD: 0 K/UL (ref 0–0.2)
BASOPHILS NFR BLD: 0 %
BUN SERPL-MCNC: 12 MG/DL (ref 7–20)
CALCIUM SERPL-MCNC: 8.3 MG/DL (ref 8.3–10.6)
CHLORIDE SERPL-SCNC: 103 MMOL/L (ref 99–110)
CO2 SERPL-SCNC: 24 MMOL/L (ref 21–32)
CREAT SERPL-MCNC: 0.7 MG/DL (ref 0.6–1.1)
CRP SERPL-MCNC: 20.6 MG/L (ref 0–5.1)
DACRYOCYTES BLD QL SMEAR: ABNORMAL
DEPRECATED RDW RBC AUTO: 19.4 % (ref 12.4–15.4)
EOSINOPHIL # BLD: 0 K/UL (ref 0–0.6)
EOSINOPHIL NFR BLD: 0 %
FERRITIN SERPL IA-MCNC: 2682 NG/ML (ref 15–150)
GFR SERPLBLD CREATININE-BSD FMLA CKD-EPI: >90 ML/MIN/{1.73_M2}
GLUCOSE SERPL-MCNC: 134 MG/DL (ref 70–99)
HCT VFR BLD AUTO: 23.8 % (ref 36–48)
HGB BLD-MCNC: 7.7 G/DL (ref 12–16)
HYPOCHROMIA BLD QL SMEAR: ABNORMAL
LYMPHOCYTES # BLD: 2.1 K/UL (ref 1–5.1)
LYMPHOCYTES NFR BLD: 92 %
MAGNESIUM SERPL-MCNC: 1.75 MG/DL (ref 1.8–2.4)
MCH RBC QN AUTO: 28.1 PG (ref 26–34)
MCHC RBC AUTO-ENTMCNC: 32.4 G/DL (ref 31–36)
MCV RBC AUTO: 86.7 FL (ref 80–100)
MICROCYTES BLD QL SMEAR: ABNORMAL
MONOCYTES # BLD: 0.2 K/UL (ref 0–1.3)
MONOCYTES NFR BLD: 7 %
NEUTROPHILS # BLD: 0 K/UL (ref 1.7–7.7)
NEUTROPHILS NFR BLD: 1 %
OVALOCYTES BLD QL SMEAR: ABNORMAL
PHOSPHATE SERPL-MCNC: 3.3 MG/DL (ref 2.5–4.9)
PLATELET # BLD AUTO: 146 K/UL (ref 135–450)
PLATELET BLD QL SMEAR: ADEQUATE
PMV BLD AUTO: 7.5 FL (ref 5–10.5)
POTASSIUM SERPL-SCNC: 3.7 MMOL/L (ref 3.5–5.1)
RBC # BLD AUTO: 2.75 M/UL (ref 4–5.2)
SCHISTOCYTES BLD QL SMEAR: ABNORMAL
SLIDE REVIEW: ABNORMAL
SODIUM SERPL-SCNC: 138 MMOL/L (ref 136–145)
WBC # BLD AUTO: 2.3 K/UL (ref 4–11)

## 2024-11-30 PROCEDURE — 83735 ASSAY OF MAGNESIUM: CPT

## 2024-11-30 PROCEDURE — 85025 COMPLETE CBC W/AUTO DIFF WBC: CPT

## 2024-11-30 PROCEDURE — 2580000003 HC RX 258

## 2024-11-30 PROCEDURE — 82728 ASSAY OF FERRITIN: CPT

## 2024-11-30 PROCEDURE — 2060000000 HC ICU INTERMEDIATE R&B

## 2024-11-30 PROCEDURE — 84100 ASSAY OF PHOSPHORUS: CPT

## 2024-11-30 PROCEDURE — 80048 BASIC METABOLIC PNL TOTAL CA: CPT

## 2024-11-30 PROCEDURE — 6360000002 HC RX W HCPCS: Performed by: NURSE PRACTITIONER

## 2024-11-30 PROCEDURE — 2580000003 HC RX 258: Performed by: NURSE PRACTITIONER

## 2024-11-30 PROCEDURE — 86140 C-REACTIVE PROTEIN: CPT

## 2024-11-30 PROCEDURE — 6370000000 HC RX 637 (ALT 250 FOR IP): Performed by: NURSE PRACTITIONER

## 2024-11-30 RX ADMIN — VALACYCLOVIR HYDROCHLORIDE 500 MG: 500 TABLET, FILM COATED ORAL at 21:05

## 2024-11-30 RX ADMIN — SODIUM CHLORIDE, PRESERVATIVE FREE 10 ML: 5 INJECTION INTRAVENOUS at 10:01

## 2024-11-30 RX ADMIN — CEFEPIME 2000 MG: 2 INJECTION, POWDER, FOR SOLUTION INTRAVENOUS at 03:06

## 2024-11-30 RX ADMIN — FLUCONAZOLE 200 MG: 200 TABLET ORAL at 10:01

## 2024-11-30 RX ADMIN — GABAPENTIN 300 MG: 300 CAPSULE ORAL at 21:05

## 2024-11-30 RX ADMIN — CEFEPIME 2000 MG: 2 INJECTION, POWDER, FOR SOLUTION INTRAVENOUS at 18:21

## 2024-11-30 RX ADMIN — GABAPENTIN 300 MG: 300 CAPSULE ORAL at 10:00

## 2024-11-30 RX ADMIN — PANTOPRAZOLE SODIUM 40 MG: 40 TABLET, DELAYED RELEASE ORAL at 06:23

## 2024-11-30 RX ADMIN — CEFEPIME 2000 MG: 2 INJECTION, POWDER, FOR SOLUTION INTRAVENOUS at 10:09

## 2024-11-30 RX ADMIN — LEVETIRACETAM 500 MG: 500 TABLET, FILM COATED ORAL at 21:05

## 2024-11-30 RX ADMIN — LEVETIRACETAM 500 MG: 500 TABLET, FILM COATED ORAL at 10:00

## 2024-11-30 RX ADMIN — SODIUM CHLORIDE, PRESERVATIVE FREE 10 ML: 5 INJECTION INTRAVENOUS at 10:14

## 2024-11-30 RX ADMIN — GABAPENTIN 300 MG: 300 CAPSULE ORAL at 14:34

## 2024-11-30 RX ADMIN — VALACYCLOVIR HYDROCHLORIDE 500 MG: 500 TABLET, FILM COATED ORAL at 10:00

## 2024-11-30 ASSESSMENT — PAIN SCALES - GENERAL
PAINLEVEL_OUTOF10: 0

## 2024-11-30 NOTE — PLAN OF CARE
Problem: Pain  Goal: Verbalizes/displays adequate comfort level or baseline comfort level  11/30/2024 1129 by Kecia Donahue RN  Outcome: Progressing  11/30/2024 0511 by Teri Bonilla RN  Outcome: Progressing   Patient educated on non-pharmacologic pain relief measures with use of heat and ice therapy as needed, along with rest. She understands and reports no pain at this time.     Problem: Safety - Adult  Goal: Free from fall injury  11/30/2024 1129 by Kecia Donahue RN  Outcome: Progressing  11/30/2024 0511 by Teri Bonilla RN  Outcome: Progressing   Orthostatic vital signs obtained at start of shift - see flowsheet for details.  Pt does not meet criteria for orthostasis.  Pt is a Low fall risk. See [place] Fall Score and ABCDS Injury Risk assessments.    Pt encouraged to call for assistance. Will continue with hourly rounds for PO intake, pain needs, toileting and repositioning as needed.

## 2024-12-01 LAB
ANION GAP SERPL CALCULATED.3IONS-SCNC: 8 MMOL/L (ref 3–16)
BASOPHILS # BLD: 0 K/UL (ref 0–0.2)
BASOPHILS NFR BLD: 0 %
BUN SERPL-MCNC: 11 MG/DL (ref 7–20)
CALCIUM SERPL-MCNC: 8.5 MG/DL (ref 8.3–10.6)
CHLORIDE SERPL-SCNC: 105 MMOL/L (ref 99–110)
CO2 SERPL-SCNC: 26 MMOL/L (ref 21–32)
CREAT SERPL-MCNC: 0.7 MG/DL (ref 0.6–1.1)
CRP SERPL-MCNC: 10.5 MG/L (ref 0–5.1)
DACRYOCYTES BLD QL SMEAR: ABNORMAL
DEPRECATED RDW RBC AUTO: 19.9 % (ref 12.4–15.4)
EOSINOPHIL # BLD: 0 K/UL (ref 0–0.6)
EOSINOPHIL NFR BLD: 0 %
FERRITIN SERPL IA-MCNC: 1633 NG/ML (ref 15–150)
GFR SERPLBLD CREATININE-BSD FMLA CKD-EPI: >90 ML/MIN/{1.73_M2}
GLUCOSE SERPL-MCNC: 133 MG/DL (ref 70–99)
HCT VFR BLD AUTO: 23.2 % (ref 36–48)
HGB BLD-MCNC: 7.4 G/DL (ref 12–16)
HYPOCHROMIA BLD QL SMEAR: ABNORMAL
LYMPHOCYTES # BLD: 5.1 K/UL (ref 1–5.1)
LYMPHOCYTES NFR BLD: 96 %
MAGNESIUM SERPL-MCNC: 1.72 MG/DL (ref 1.8–2.4)
MCH RBC QN AUTO: 28.2 PG (ref 26–34)
MCHC RBC AUTO-ENTMCNC: 32.1 G/DL (ref 31–36)
MCV RBC AUTO: 87.8 FL (ref 80–100)
MICROCYTES BLD QL SMEAR: ABNORMAL
MONOCYTES # BLD: 0.2 K/UL (ref 0–1.3)
MONOCYTES NFR BLD: 4 %
NEUTROPHILS # BLD: 0 K/UL (ref 1.7–7.7)
NEUTROPHILS NFR BLD: 0 %
OVALOCYTES BLD QL SMEAR: ABNORMAL
PHOSPHATE SERPL-MCNC: 3.1 MG/DL (ref 2.5–4.9)
PLATELET # BLD AUTO: 137 K/UL (ref 135–450)
PLATELET BLD QL SMEAR: ADEQUATE
PMV BLD AUTO: 7.5 FL (ref 5–10.5)
POTASSIUM SERPL-SCNC: 4 MMOL/L (ref 3.5–5.1)
RBC # BLD AUTO: 2.64 M/UL (ref 4–5.2)
SCHISTOCYTES BLD QL SMEAR: ABNORMAL
SLIDE REVIEW: ABNORMAL
SODIUM SERPL-SCNC: 139 MMOL/L (ref 136–145)
WBC # BLD AUTO: 5.3 K/UL (ref 4–11)

## 2024-12-01 PROCEDURE — 2580000003 HC RX 258

## 2024-12-01 PROCEDURE — 6370000000 HC RX 637 (ALT 250 FOR IP): Performed by: NURSE PRACTITIONER

## 2024-12-01 PROCEDURE — 80048 BASIC METABOLIC PNL TOTAL CA: CPT

## 2024-12-01 PROCEDURE — 2580000003 HC RX 258: Performed by: NURSE PRACTITIONER

## 2024-12-01 PROCEDURE — 2060000000 HC ICU INTERMEDIATE R&B

## 2024-12-01 PROCEDURE — 6360000002 HC RX W HCPCS: Performed by: NURSE PRACTITIONER

## 2024-12-01 PROCEDURE — 86140 C-REACTIVE PROTEIN: CPT

## 2024-12-01 PROCEDURE — 84100 ASSAY OF PHOSPHORUS: CPT

## 2024-12-01 PROCEDURE — 83735 ASSAY OF MAGNESIUM: CPT

## 2024-12-01 PROCEDURE — 85025 COMPLETE CBC W/AUTO DIFF WBC: CPT

## 2024-12-01 PROCEDURE — 82728 ASSAY OF FERRITIN: CPT

## 2024-12-01 PROCEDURE — 36592 COLLECT BLOOD FROM PICC: CPT

## 2024-12-01 RX ORDER — LEVOFLOXACIN 500 MG/1
500 TABLET, FILM COATED ORAL NIGHTLY
Status: DISCONTINUED | OUTPATIENT
Start: 2024-12-01 | End: 2024-12-02 | Stop reason: HOSPADM

## 2024-12-01 RX ADMIN — LEVOFLOXACIN 500 MG: 500 TABLET, FILM COATED ORAL at 21:05

## 2024-12-01 RX ADMIN — LEVETIRACETAM 500 MG: 500 TABLET, FILM COATED ORAL at 21:05

## 2024-12-01 RX ADMIN — CEFEPIME 2000 MG: 2 INJECTION, POWDER, FOR SOLUTION INTRAVENOUS at 03:04

## 2024-12-01 RX ADMIN — SODIUM CHLORIDE, PRESERVATIVE FREE 10 ML: 5 INJECTION INTRAVENOUS at 08:51

## 2024-12-01 RX ADMIN — VALACYCLOVIR HYDROCHLORIDE 500 MG: 500 TABLET, FILM COATED ORAL at 08:52

## 2024-12-01 RX ADMIN — PANTOPRAZOLE SODIUM 40 MG: 40 TABLET, DELAYED RELEASE ORAL at 07:16

## 2024-12-01 RX ADMIN — GABAPENTIN 300 MG: 300 CAPSULE ORAL at 21:05

## 2024-12-01 RX ADMIN — FLUCONAZOLE 200 MG: 200 TABLET ORAL at 08:52

## 2024-12-01 RX ADMIN — LEVETIRACETAM 500 MG: 500 TABLET, FILM COATED ORAL at 08:52

## 2024-12-01 RX ADMIN — SODIUM CHLORIDE, PRESERVATIVE FREE 10 ML: 5 INJECTION INTRAVENOUS at 08:52

## 2024-12-01 RX ADMIN — CEFEPIME 2000 MG: 2 INJECTION, POWDER, FOR SOLUTION INTRAVENOUS at 08:54

## 2024-12-01 RX ADMIN — VALACYCLOVIR HYDROCHLORIDE 500 MG: 500 TABLET, FILM COATED ORAL at 21:05

## 2024-12-01 RX ADMIN — GABAPENTIN 300 MG: 300 CAPSULE ORAL at 14:02

## 2024-12-01 RX ADMIN — GABAPENTIN 300 MG: 300 CAPSULE ORAL at 08:52

## 2024-12-01 ASSESSMENT — PAIN SCALES - GENERAL
PAINLEVEL_OUTOF10: 0

## 2024-12-01 NOTE — PLAN OF CARE
Problem: Chronic Conditions and Co-morbidities  Goal: Patient's chronic conditions and co-morbidity symptoms are monitored and maintained or improved  Outcome: Progressing     Problem: Pain  Goal: Verbalizes/displays adequate comfort level or baseline comfort level  Outcome: Progressing     Problem: Safety - Adult  Goal: Free from fall injury  Outcome: Progressing     Problem: ABCDS Injury Assessment  Goal: Absence of physical injury  Outcome: Progressing     Problem: Gastrointestinal - Adult  Goal: Minimal or absence of nausea and vomiting  Outcome: Progressing

## 2024-12-01 NOTE — PLAN OF CARE
Problem: Infection - Adult  Goal: Absence of infection during hospitalization  12/1/2024 0941 by Kecia Donahue RN  Outcome: Progressing  12/1/2024 0546 by Mavis Cole, RN  Outcome: Progressing   Pt remains in neutropenic precautions for WBC of 0.8. Pt educated on use of mask prior to leaving room. All staff and visitor following handwashing requirements this shift prior to entering room. Low microbial diet order in place and being followed. Linens changed today per protocol and pt reports using chlorhexidine wash per order.   Problem: Hematologic - Adult  Goal: Maintains hematologic stability  12/1/2024 0941 by Kecia Donahue RN  Outcome: Progressing  12/1/2024 0546 by Mavis Cole, RN  Outcome: Progressing   Patient's hemoglobin this AM:   Recent Labs     12/01/24  0310   HGB 7.4*     Patient's platelet count this AM:   Recent Labs     12/01/24  0310       Thrombocytopenia Precautions in place.  Patient showing no signs or symptoms of active bleeding.  Transfusion not indicated at this time.  Patient verbalizes understanding of all instructions. Will continue to assess and implement POC. Call light within reach and hourly rounding in place.   Problem: Safety - Adult  Goal: Free from fall injury  12/1/2024 0546 by Mavis Cole, RN  Outcome: Progressing   Orthostatic vital signs obtained at start of shift - see flowsheet for details.  Pt does not meet criteria for orthostasis.  Pt is a Low fall risk. See [place]rena Fall Score and ABCDS Injury Risk assessments.    Pt encouraged to call for assistance. Will continue with hourly rounds for PO intake, pain needs, toileting and repositioning as needed.

## 2024-12-02 ENCOUNTER — APPOINTMENT (OUTPATIENT)
Dept: GENERAL RADIOLOGY | Age: 54
DRG: 722 | End: 2024-12-02
Payer: COMMERCIAL

## 2024-12-02 VITALS
DIASTOLIC BLOOD PRESSURE: 82 MMHG | HEIGHT: 65 IN | TEMPERATURE: 97.6 F | BODY MASS INDEX: 41.09 KG/M2 | SYSTOLIC BLOOD PRESSURE: 132 MMHG | WEIGHT: 246.6 LBS | HEART RATE: 81 BPM | OXYGEN SATURATION: 99 % | RESPIRATION RATE: 16 BRPM

## 2024-12-02 LAB
ALBUMIN SERPL-MCNC: 3.8 G/DL (ref 3.4–5)
ALP SERPL-CCNC: 40 U/L (ref 40–129)
ALT SERPL-CCNC: 39 U/L (ref 10–40)
ANION GAP SERPL CALCULATED.3IONS-SCNC: 9 MMOL/L (ref 3–16)
ANISOCYTOSIS BLD QL SMEAR: ABNORMAL
APTT BLD: 24.7 SEC (ref 22.1–36.4)
AST SERPL-CCNC: 40 U/L (ref 15–37)
BASOPHILS # BLD: 0 K/UL (ref 0–0.2)
BASOPHILS NFR BLD: 0.2 %
BILIRUB DIRECT SERPL-MCNC: <0.1 MG/DL (ref 0–0.3)
BILIRUB INDIRECT SERPL-MCNC: ABNORMAL MG/DL (ref 0–1)
BILIRUB SERPL-MCNC: <0.2 MG/DL (ref 0–1)
BUN SERPL-MCNC: 11 MG/DL (ref 7–20)
CALCIUM SERPL-MCNC: 8.7 MG/DL (ref 8.3–10.6)
CHLORIDE SERPL-SCNC: 106 MMOL/L (ref 99–110)
CO2 SERPL-SCNC: 26 MMOL/L (ref 21–32)
CREAT SERPL-MCNC: 0.6 MG/DL (ref 0.6–1.1)
CRP SERPL-MCNC: 4.9 MG/L (ref 0–5.1)
DACRYOCYTES BLD QL SMEAR: ABNORMAL
DEPRECATED RDW RBC AUTO: 19.9 % (ref 12.4–15.4)
EOSINOPHIL # BLD: 0.1 K/UL (ref 0–0.6)
EOSINOPHIL NFR BLD: 1.7 %
FERRITIN SERPL IA-MCNC: 1201 NG/ML (ref 15–150)
GFR SERPLBLD CREATININE-BSD FMLA CKD-EPI: >90 ML/MIN/{1.73_M2}
GGT SERPL-CCNC: 60 U/L (ref 5–36)
GLUCOSE SERPL-MCNC: 119 MG/DL (ref 70–99)
HCT VFR BLD AUTO: 24.3 % (ref 36–48)
HGB BLD-MCNC: 7.7 G/DL (ref 12–16)
HYPOCHROMIA BLD QL SMEAR: ABNORMAL
INR PPP: 1.1 (ref 0.85–1.15)
LDH SERPL L TO P-CCNC: 252 U/L (ref 100–190)
LYMPHOCYTES # BLD: 3.5 K/UL (ref 1–5.1)
LYMPHOCYTES NFR BLD: 90.3 %
MAGNESIUM SERPL-MCNC: 1.67 MG/DL (ref 1.8–2.4)
MCH RBC QN AUTO: 28.2 PG (ref 26–34)
MCHC RBC AUTO-ENTMCNC: 31.6 G/DL (ref 31–36)
MCV RBC AUTO: 89.3 FL (ref 80–100)
MONOCYTES # BLD: 0.2 K/UL (ref 0–1.3)
MONOCYTES NFR BLD: 6.2 %
NEUTROPHILS # BLD: 0.1 K/UL (ref 1.7–7.7)
NEUTROPHILS NFR BLD: 1.6 %
OVALOCYTES BLD QL SMEAR: ABNORMAL
PHOSPHATE SERPL-MCNC: 3.5 MG/DL (ref 2.5–4.9)
PLATELET # BLD AUTO: 129 K/UL (ref 135–450)
PMV BLD AUTO: 7.5 FL (ref 5–10.5)
POTASSIUM SERPL-SCNC: 3.7 MMOL/L (ref 3.5–5.1)
PROT SERPL-MCNC: 6.3 G/DL (ref 6.4–8.2)
PROTHROMBIN TIME: 14.4 SEC (ref 11.9–14.9)
RBC # BLD AUTO: 2.72 M/UL (ref 4–5.2)
SCHISTOCYTES BLD QL SMEAR: ABNORMAL
SLIDE REVIEW: ABNORMAL
SODIUM SERPL-SCNC: 141 MMOL/L (ref 136–145)
STOMATOCYTES BLD QL SMEAR: ABNORMAL
URATE SERPL-MCNC: 3.6 MG/DL (ref 2.6–6)
WBC # BLD AUTO: 3.9 K/UL (ref 4–11)

## 2024-12-02 PROCEDURE — 85025 COMPLETE CBC W/AUTO DIFF WBC: CPT

## 2024-12-02 PROCEDURE — 82728 ASSAY OF FERRITIN: CPT

## 2024-12-02 PROCEDURE — 84100 ASSAY OF PHOSPHORUS: CPT

## 2024-12-02 PROCEDURE — 71045 X-RAY EXAM CHEST 1 VIEW: CPT

## 2024-12-02 PROCEDURE — 86140 C-REACTIVE PROTEIN: CPT

## 2024-12-02 PROCEDURE — 85730 THROMBOPLASTIN TIME PARTIAL: CPT

## 2024-12-02 PROCEDURE — 84550 ASSAY OF BLOOD/URIC ACID: CPT

## 2024-12-02 PROCEDURE — 80048 BASIC METABOLIC PNL TOTAL CA: CPT

## 2024-12-02 PROCEDURE — 80076 HEPATIC FUNCTION PANEL: CPT

## 2024-12-02 PROCEDURE — 82977 ASSAY OF GGT: CPT

## 2024-12-02 PROCEDURE — 83735 ASSAY OF MAGNESIUM: CPT

## 2024-12-02 PROCEDURE — 36591 DRAW BLOOD OFF VENOUS DEVICE: CPT

## 2024-12-02 PROCEDURE — 83615 LACTATE (LD) (LDH) ENZYME: CPT

## 2024-12-02 PROCEDURE — 2580000003 HC RX 258: Performed by: NURSE PRACTITIONER

## 2024-12-02 PROCEDURE — 85610 PROTHROMBIN TIME: CPT

## 2024-12-02 PROCEDURE — 6370000000 HC RX 637 (ALT 250 FOR IP): Performed by: NURSE PRACTITIONER

## 2024-12-02 RX ADMIN — LEVETIRACETAM 500 MG: 500 TABLET, FILM COATED ORAL at 08:41

## 2024-12-02 RX ADMIN — GABAPENTIN 300 MG: 300 CAPSULE ORAL at 08:41

## 2024-12-02 RX ADMIN — PANTOPRAZOLE SODIUM 40 MG: 40 TABLET, DELAYED RELEASE ORAL at 08:41

## 2024-12-02 RX ADMIN — SODIUM CHLORIDE, PRESERVATIVE FREE 10 ML: 5 INJECTION INTRAVENOUS at 08:41

## 2024-12-02 RX ADMIN — VALACYCLOVIR HYDROCHLORIDE 500 MG: 500 TABLET, FILM COATED ORAL at 08:41

## 2024-12-02 RX ADMIN — FLUCONAZOLE 200 MG: 200 TABLET ORAL at 08:41

## 2024-12-02 RX ADMIN — GABAPENTIN 300 MG: 300 CAPSULE ORAL at 13:57

## 2024-12-02 ASSESSMENT — PAIN SCALES - GENERAL
PAINLEVEL_OUTOF10: 0
PAINLEVEL_OUTOF10: 0

## 2024-12-02 NOTE — CARE COORDINATION
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]  Multiple myeloma not having achieved remission (HCC) [C90.00]   Date / Time: 11/26/2024  5:25 PM    Current PCP: Tila Eisenberg DO  Clinic patient: No    Hospitalization in the last 30 days: Yes  Readmission Assessment  Number of Days since last admission?: 1-7 days  Previous Disposition: Home with Family  Who is being Interviewed: Patient  What was the patient's/caregiver's perception as to why they think they needed to return back to the hospital?: Other (Comment) (fever)  Did you visit your Primary Care Physician after you left the hospital, before you returned this time?: No  Why weren't you able to visit your PCP?: Did not have an appointment  Did you see a specialist, such as Cardiac, Pulmonary, Orthopedic Physician, etc. after you left the hospital?: Yes  Who advised the patient to return to the hospital?: Self-referral, Caregiver, Physician  Does the patient report anything that got in the way of taking their medications?: No  In our efforts to provide the best possible care to you and others like you, can you think of anything that we could have done to help you after you left the hospital the first time, so that you might not have needed to return so soon?: Other (Comment) (nothing additional per pt)    Advance Directives:  Code Status: Full Code  [place] DNR form completed and on chart: Not Indicated    Financial:  Payor: [employer] / [facility] [place] [facility] / Product Type: *No Product type* /      Pharmacy:    [id number] -

## 2024-12-02 NOTE — CARE COORDINATION
Patient discharging today.  Follow up in outpatient infusion 12/3 for daily eval of Carvykti infusion. Patient is D + 13.  Patient will follow up in outpatient infusion until D+30.  Patient advised to reapply temp traq when home. Daily medications reviewed.  No scripts called into pharmacy with this admission.

## 2024-12-02 NOTE — DISCHARGE SUMMARY
[facility] Discharge Summary             Attending Physician: Niki Morton DO    Referring MD: No referring provider defined for this encounter.    Name: Farhad Balderas :  1970  MRN:  [id number]    Admission: 2024     Discharge:   2024    Date: 2024    Diagnosis on admit: IgG Kappa Multiple Myeloma  s/p Carvykti Car-T.     Procedures: Routine chest x-ray, laboratories, EKG, IV fluid hydration, Panculture for fevers, IV antimicrobial therapy, Respiratory therapy, Oxygen therapy, Blood Product Infusions    Consultations:     Medications:      Medication List        CONTINUE taking these medications      cyclobenzaprine 5 MG tablet  Commonly known as: FLEXERIL  Take 1 tablet by mouth twice daily as needed  Notes to patient: Cyclobenzaprine  (Flexeril)  USE--  Relax muscles  SIDE EFFECTS--  Sleepiness, dizziness     fluconazole 200 MG tablet  Commonly known as: Diflucan  Take 1 tablet by mouth daily Do Not Start Until Instructed to by Provider.  Notes to patient: Fluconazole (Diflucan)  Use:  to prevent or treat fungal infections    Side effects:  headache, rash, nausea, stomach pain, diarrhea       gabapentin 300 MG capsule  Commonly known as: NEURONTIN  Notes to patient: Treats neuropathy (nerve pain).    Side effects: dizziness, dry mouth, blurred vision.     levETIRAcetam 500 MG tablet  Commonly known as: Keppra  Take 1 tablet by mouth 2 times daily  Notes to patient: Anticonvulsant-Keppra Treats seizures.  Important to take this medication as directed by your physician.  No Driving while taking this medication.     Side effects;  Drowsiness, weakness      levoFLOXacin 500 MG tablet  Commonly known as: LEVAQUIN  Take 1 tablet by mouth daily Do Not Start Until Instructed to by Provider.  Notes to patient: Levofloxacin  (Levaquin)  USE--  Treat bacterial infection  SIDE EFFECTS--  Upset stomach, diarrhea     pantoprazole 40 MG tablet  Commonly known as: PROTONIX  Take 1 tablet by mouth every

## 2024-12-02 NOTE — DISCHARGE INSTRUCTIONS
[facility] CARVYKTI CAR-T Therapy Discharge Instructions    Call for Questions/Concerns:  [phone] [facility] office  The phone number listed above is available 24 hrs/7 days per week    If you experience issues reaching the On-Call physician in a timely manner? Please call the B[facility] unit and speak to the Charge RN- [phone]    [facility] is open M-F 8am-4:30pm; Sat-Sun/Holidays 8am-1pm    Symptoms to Report Immediately:    Fever of 100.4 or greater or chills and shaking present or when TempTraq alerts you that you have a fever.r  Signs or symptoms of CRS: racing heart, hypoxia (Shortness of breath) low blood pressure, achy, joint pain, muscle stiffness  Any signs of Neurotoxicity: Confusion, Subtle changes in mental status, dizziness, lightheadedness, Headache  Vomiting without relief after use of anti-nausea medication  Severe abdominal cramping or muscle/joint pain  Diarrhea: More than 3 loose, watery bowel movements in a 24 hour period  Unusual or excessive bleeding from your mouth, nose, rectum, bladder or vagina  Sudden onset of shortness of breath or chest pain  Signs/symptoms of infection: redness, warmth, swelling-particularly to central line site    Report to Physician's office within 24 hours:    Pain not relieved by pain medication  Change in urination-odor, cloudiness, frequency, or pain with urination  Flu-like symptoms  Skin changes-rash, hives, redness or peeling of skin    Additional Instructions:    Avoid people with colds, flu-like symptoms, or any sign of infection  Drink plenty of fluids-attempt to consume 2-3 liters ( ounces) of fluids/24 hour period  Continue low microbial diet until instructed by physician to resume normal diet  Bring all of your medications with you to your doctor's appointments  Bring your current medicine list to each hospital and office visit    You are being discharged with IV access due to need for ongoing therapy.  Below is pertinent information

## 2024-12-03 ENCOUNTER — CARE COORDINATION (OUTPATIENT)
Dept: CASE MANAGEMENT | Age: 54
End: 2024-12-03

## 2024-12-03 ENCOUNTER — HOSPITAL ENCOUNTER (OUTPATIENT)
Dept: ONCOLOGY | Age: 54
Setting detail: INFUSION SERIES
Discharge: HOME | End: 2024-12-03
Payer: COMMERCIAL

## 2024-12-03 VITALS
OXYGEN SATURATION: 100 % | HEART RATE: 101 BPM | DIASTOLIC BLOOD PRESSURE: 88 MMHG | SYSTOLIC BLOOD PRESSURE: 132 MMHG | WEIGHT: 248.02 LBS | BODY MASS INDEX: 41.27 KG/M2 | TEMPERATURE: 98.5 F | RESPIRATION RATE: 14 BRPM

## 2024-12-03 DIAGNOSIS — C90.00 MULTIPLE MYELOMA, REMISSION STATUS UNSPECIFIED (HCC): Primary | ICD-10-CM

## 2024-12-03 LAB
ALBUMIN SERPL-MCNC: 4.4 G/DL (ref 3.4–5)
ALP SERPL-CCNC: 46 U/L (ref 40–129)
ALT SERPL-CCNC: 44 U/L (ref 10–40)
ANION GAP SERPL CALCULATED.3IONS-SCNC: 11 MMOL/L (ref 3–16)
ANISOCYTOSIS BLD QL SMEAR: ABNORMAL
AST SERPL-CCNC: 35 U/L (ref 15–37)
BACTERIA URNS QL MICRO: ABNORMAL /HPF
BASOPHILS # BLD: 0 K/UL (ref 0–0.2)
BASOPHILS NFR BLD: 0 %
BILIRUB DIRECT SERPL-MCNC: <0.1 MG/DL (ref 0–0.3)
BILIRUB INDIRECT SERPL-MCNC: 0.2 MG/DL (ref 0–1)
BILIRUB SERPL-MCNC: 0.3 MG/DL (ref 0–1)
BILIRUB UR QL STRIP.AUTO: NEGATIVE
BUN SERPL-MCNC: 12 MG/DL (ref 7–20)
CALCIUM SERPL-MCNC: 9 MG/DL (ref 8.3–10.6)
CHLORIDE SERPL-SCNC: 105 MMOL/L (ref 99–110)
CLARITY UR: CLEAR
CO2 SERPL-SCNC: 25 MMOL/L (ref 21–32)
COLOR UR: YELLOW
CREAT SERPL-MCNC: 0.7 MG/DL (ref 0.6–1.1)
CRP SERPL-MCNC: <3 MG/L (ref 0–5.1)
DACRYOCYTES BLD QL SMEAR: ABNORMAL
DEPRECATED RDW RBC AUTO: 19.3 % (ref 12.4–15.4)
EOSINOPHIL # BLD: 0.1 K/UL (ref 0–0.6)
EOSINOPHIL NFR BLD: 6 %
EPI CELLS #/AREA URNS HPF: ABNORMAL /HPF (ref 0–5)
FERRITIN SERPL IA-MCNC: 1054 NG/ML (ref 15–150)
GFR SERPLBLD CREATININE-BSD FMLA CKD-EPI: >90 ML/MIN/{1.73_M2}
GLUCOSE SERPL-MCNC: 138 MG/DL (ref 70–99)
GLUCOSE UR STRIP.AUTO-MCNC: NEGATIVE MG/DL
HCT VFR BLD AUTO: 25.5 % (ref 36–48)
HGB BLD-MCNC: 8.2 G/DL (ref 12–16)
HGB UR QL STRIP.AUTO: NEGATIVE
HYALINE CASTS #/AREA URNS LPF: ABNORMAL /LPF (ref 0–2)
INR PPP: 1.01 (ref 0.85–1.15)
KETONES UR STRIP.AUTO-MCNC: NEGATIVE MG/DL
LDH SERPL L TO P-CCNC: 253 U/L (ref 100–190)
LEUKOCYTE ESTERASE UR QL STRIP.AUTO: NEGATIVE
LYMPHOCYTES # BLD: 1.3 K/UL (ref 1–5.1)
LYMPHOCYTES NFR BLD: 72 %
MAGNESIUM SERPL-MCNC: 1.64 MG/DL (ref 1.8–2.4)
MCH RBC QN AUTO: 28.3 PG (ref 26–34)
MCHC RBC AUTO-ENTMCNC: 32.2 G/DL (ref 31–36)
MCV RBC AUTO: 87.8 FL (ref 80–100)
MICROCYTES BLD QL SMEAR: ABNORMAL
MONOCYTES # BLD: 0.3 K/UL (ref 0–1.3)
MONOCYTES NFR BLD: 16 %
MUCOUS THREADS #/AREA URNS LPF: ABNORMAL /LPF
NEUTROPHILS # BLD: 0.1 K/UL (ref 1.7–7.7)
NEUTROPHILS NFR BLD: 6 %
NITRITE UR QL STRIP.AUTO: NEGATIVE
PH UR STRIP.AUTO: 6 [PH] (ref 5–8)
PHOSPHATE SERPL-MCNC: 3.6 MG/DL (ref 2.5–4.9)
PLATELET # BLD AUTO: 129 K/UL (ref 135–450)
PMV BLD AUTO: 8 FL (ref 5–10.5)
POTASSIUM SERPL-SCNC: 3.7 MMOL/L (ref 3.5–5.1)
PROT SERPL-MCNC: 6.9 G/DL (ref 6.4–8.2)
PROT UR STRIP.AUTO-MCNC: 30 MG/DL
PROTHROMBIN TIME: 13.5 SEC (ref 11.9–14.9)
RBC # BLD AUTO: 2.9 M/UL (ref 4–5.2)
RBC #/AREA URNS HPF: ABNORMAL /HPF (ref 0–4)
SODIUM SERPL-SCNC: 141 MMOL/L (ref 136–145)
SP GR UR STRIP.AUTO: >=1.03 (ref 1–1.03)
STOMATOCYTES BLD QL SMEAR: ABNORMAL
UA DIPSTICK W REFLEX MICRO PNL UR: YES
URATE SERPL-MCNC: 4.1 MG/DL (ref 2.6–6)
URN SPEC COLLECT METH UR: ABNORMAL
UROBILINOGEN UR STRIP-ACNC: 0.2 E.U./DL
WBC # BLD AUTO: 1.8 K/UL (ref 4–11)
WBC #/AREA URNS HPF: ABNORMAL /HPF (ref 0–5)

## 2024-12-03 PROCEDURE — 85025 COMPLETE CBC W/AUTO DIFF WBC: CPT

## 2024-12-03 PROCEDURE — 81001 URINALYSIS AUTO W/SCOPE: CPT

## 2024-12-03 PROCEDURE — 85610 PROTHROMBIN TIME: CPT

## 2024-12-03 PROCEDURE — 80076 HEPATIC FUNCTION PANEL: CPT

## 2024-12-03 PROCEDURE — 36591 DRAW BLOOD OFF VENOUS DEVICE: CPT

## 2024-12-03 PROCEDURE — 83615 LACTATE (LD) (LDH) ENZYME: CPT

## 2024-12-03 PROCEDURE — 80048 BASIC METABOLIC PNL TOTAL CA: CPT

## 2024-12-03 PROCEDURE — 82728 ASSAY OF FERRITIN: CPT

## 2024-12-03 PROCEDURE — 84100 ASSAY OF PHOSPHORUS: CPT

## 2024-12-03 PROCEDURE — 6360000002 HC RX W HCPCS: Performed by: INTERNAL MEDICINE

## 2024-12-03 PROCEDURE — 83735 ASSAY OF MAGNESIUM: CPT

## 2024-12-03 PROCEDURE — 96372 THER/PROPH/DIAG INJ SC/IM: CPT

## 2024-12-03 PROCEDURE — 86140 C-REACTIVE PROTEIN: CPT

## 2024-12-03 PROCEDURE — 84550 ASSAY OF BLOOD/URIC ACID: CPT

## 2024-12-03 RX ORDER — POTASSIUM CHLORIDE 1500 MG/1
40 TABLET, EXTENDED RELEASE ORAL PRN
Status: CANCELLED | OUTPATIENT
Start: 2024-12-04

## 2024-12-03 RX ORDER — PROCHLORPERAZINE MALEATE 10 MG
10 TABLET ORAL EVERY 6 HOURS PRN
Status: CANCELLED | OUTPATIENT
Start: 2024-12-04

## 2024-12-03 RX ORDER — OXYCODONE HYDROCHLORIDE 5 MG/1
5 TABLET ORAL EVERY 4 HOURS PRN
Status: CANCELLED | OUTPATIENT
Start: 2024-12-04

## 2024-12-03 RX ORDER — POTASSIUM CHLORIDE 29.8 MG/ML
80 INJECTION INTRAVENOUS PRN
Status: CANCELLED | OUTPATIENT
Start: 2024-12-04 | End: 2025-01-03

## 2024-12-03 RX ORDER — PROCHLORPERAZINE EDISYLATE 5 MG/ML
10 INJECTION INTRAMUSCULAR; INTRAVENOUS EVERY 6 HOURS PRN
Status: CANCELLED | OUTPATIENT
Start: 2024-12-04

## 2024-12-03 RX ORDER — SODIUM CHLORIDE 9 MG/ML
INJECTION, SOLUTION INTRAVENOUS CONTINUOUS PRN
Status: CANCELLED | OUTPATIENT
Start: 2024-12-04

## 2024-12-03 RX ORDER — OXYCODONE HYDROCHLORIDE 5 MG/1
10 TABLET ORAL EVERY 4 HOURS PRN
Status: CANCELLED | OUTPATIENT
Start: 2024-12-04

## 2024-12-03 RX ORDER — MAGNESIUM SULFATE IN WATER 40 MG/ML
4000 INJECTION, SOLUTION INTRAVENOUS PRN
Status: CANCELLED | OUTPATIENT
Start: 2024-12-04

## 2024-12-03 RX ORDER — ONDANSETRON 4 MG/1
8 TABLET, FILM COATED ORAL EVERY 8 HOURS PRN
Status: CANCELLED | OUTPATIENT
Start: 2024-12-04

## 2024-12-03 RX ORDER — ONDANSETRON 2 MG/ML
8 INJECTION INTRAMUSCULAR; INTRAVENOUS EVERY 8 HOURS PRN
Status: CANCELLED | OUTPATIENT
Start: 2024-12-04

## 2024-12-03 RX ORDER — HEPARIN 100 UNIT/ML
500 SYRINGE INTRAVENOUS PRN
Status: CANCELLED | OUTPATIENT
Start: 2024-12-04

## 2024-12-03 RX ADMIN — FILGRASTIM-AAFI 300 MCG: 300 INJECTION, SOLUTION SUBCUTANEOUS at 10:36

## 2024-12-03 NOTE — PROGRESS NOTES
[facility] Progress Note      12/3/2024    Farhad Balderas    :  1970    MRN:  [id number]    Referring MD: Jona Simpson MD  [facility] [address] [place] [address]      Subjective:  Patient feels well today, was discharged from hospital yesterday, afebrile, no confusion, eating and drinking well.      ECOG PS:  (1) Restricted in physically strenuous activity, ambulatory and able to do work of light nature    KPS: 80% Normal activity with effort; some signs or symptoms of disease    Isolation:  None     Medications    Scheduled Meds:  Continuous Infusions:  PRN Meds:.      ROS:  As noted above, otherwise remainder of 10-point ROS negative      Physical Exam:     Vital Signs:  There were no vitals taken for this visit.    Weight:    Wt Readings from Last 3 Encounters:   24 111.9 kg (246 lb 9.6 oz)   24 111.3 kg (245 lb 6 oz)   24 111.6 kg (246 lb)       General: Awake, alert and oriented.  HEENT: normocephalic, alopecia, PERRL, no scleral erythema or icterus, Oral mucosa moist and intact, throat clear  NECK: supple   BACK: Straight   SKIN: warm dry and intact without lesions rashes or masses  CHEST: CTA bilaterally without use of accessory muscles  CV: Normal S1 S2, RRR, no MRG  ABD: NT ND normoactive BS  EXTREMITIES: without edema, denies calf tenderness  NEURO: CN II - XII grossly intact  CATHETER: PAC: CDI    Laboratory Data:  CBC:   Recent Labs     24  0310 24  0500   WBC 5.3 3.9*   HGB 7.4* 7.7*   HCT 23.2* 24.3*   MCV 87.8 89.3    129*     BMP/Mag:  Recent Labs     24  0310 24  0500    141   K 4.0 3.7    106   CO2 26 26   PHOS 3.1 3.5   BUN 11 11   CREATININE 0.7 0.6   MG 1.72* 1.67*     LIVP:   Recent Labs     24  0500   AST 40*   ALT 39   BILIDIR <0.1   BILITOT <0.2   ALKPHOS 40     Coags:   Recent Labs     24  0500   PROTIME 14.4   INR 1.10   APTT 24.7     Uric Acid No results for input(s): \"LABURIC\" in the

## 2024-12-03 NOTE — PROGRESS NOTES
Short Stay Communication Note  Farhad Balderas  Diagnosis: Multiple Myeloma  Primary MD: Dr Niki Morton   Treatment: Fludarabine/Cytoxan  CAR-T Administration Date: 11/18/2024  Day +15 of  Carvykti      CBC:   Recent Labs     12/01/24  0310 12/02/24  0500 12/03/24  0940   WBC 5.3 3.9* 1.8*   HGB 7.4* 7.7* 8.2*   HCT 23.2* 24.3* 25.5*   MCV 87.8 89.3 87.8    129* 129*     BMP/Mag:  Recent Labs     12/01/24  0310 12/02/24  0500 12/03/24  0940    141 141   K 4.0 3.7 3.7    106 105   CO2 26 26 25   PHOS 3.1 3.5 3.6   BUN 11 11 12   CREATININE 0.7 0.6 0.7   MG 1.72* 1.67* 1.64*     Standing parameters for replacement for this patient:   1 unit of pack red blood cells for a hemoglobin < or equal to 7  1 pack of platelets for a platelet count less than or equal to 10  40 MeQ of Potassium administered for a potassium level less than or equal to 3.4  4g of Magnesium Sulfate for a magnesum level less than or equal to 1.4  No transfusions required for the above lab values.    Urinalysis last done: 12/3/24 Urinalysis next due: 12/9/24    Chest X-Ray last done: 12/2/24 Chest X-Ray next due: 12/9/24    Symptoms addressed and reported to care team this date: none  Treatments this date: Labs drawn, Granix    Reviewed medication schedule with pt and caregiver. Both able to verbalize all medications and schedule. Pt to be seen again tomorrow, then pt likely to have 12/5/24 off pending next visit. TempTraq not reading 12/3/24. RN to troubleshoot 12/4/24. Patient and caregiver verbalized understanding of discharge instructions including when and how to call the doctor and when to report  to the ER. Discharged ambulatory to home with friend.     Anny Calvin RN

## 2024-12-03 NOTE — CARE COORDINATION
This patient is being treated for Multiple myeloma and has daily visits to the infusion center at the hospital.  CTN will close program & remain available.

## 2024-12-04 ENCOUNTER — HOSPITAL ENCOUNTER (OUTPATIENT)
Dept: ONCOLOGY | Age: 54
Setting detail: INFUSION SERIES
Discharge: HOME | End: 2024-12-04
Payer: COMMERCIAL

## 2024-12-04 VITALS
BODY MASS INDEX: 40.91 KG/M2 | OXYGEN SATURATION: 98 % | RESPIRATION RATE: 16 BRPM | HEART RATE: 100 BPM | DIASTOLIC BLOOD PRESSURE: 98 MMHG | WEIGHT: 245.81 LBS | SYSTOLIC BLOOD PRESSURE: 145 MMHG | TEMPERATURE: 97.9 F

## 2024-12-04 DIAGNOSIS — C90.00 MULTIPLE MYELOMA, REMISSION STATUS UNSPECIFIED (HCC): Primary | ICD-10-CM

## 2024-12-04 LAB
ALBUMIN SERPL-MCNC: 4.5 G/DL (ref 3.4–5)
ALP SERPL-CCNC: 47 U/L (ref 40–129)
ALT SERPL-CCNC: 38 U/L (ref 10–40)
ANION GAP SERPL CALCULATED.3IONS-SCNC: 10 MMOL/L (ref 3–16)
AST SERPL-CCNC: 26 U/L (ref 15–37)
BASOPHILS # BLD: 0 K/UL (ref 0–0.2)
BASOPHILS NFR BLD: 0.1 %
BILIRUB DIRECT SERPL-MCNC: <0.1 MG/DL (ref 0–0.3)
BILIRUB INDIRECT SERPL-MCNC: 0.2 MG/DL (ref 0–1)
BILIRUB SERPL-MCNC: 0.3 MG/DL (ref 0–1)
BUN SERPL-MCNC: 13 MG/DL (ref 7–20)
CALCIUM SERPL-MCNC: 9.5 MG/DL (ref 8.3–10.6)
CHLORIDE SERPL-SCNC: 105 MMOL/L (ref 99–110)
CO2 SERPL-SCNC: 27 MMOL/L (ref 21–32)
CREAT SERPL-MCNC: 0.7 MG/DL (ref 0.6–1.1)
CRP SERPL-MCNC: <3 MG/L (ref 0–5.1)
DEPRECATED RDW RBC AUTO: 21 % (ref 12.4–15.4)
EOSINOPHIL # BLD: 0.1 K/UL (ref 0–0.6)
EOSINOPHIL NFR BLD: 1.8 %
FERRITIN SERPL IA-MCNC: 904 NG/ML (ref 15–150)
GFR SERPLBLD CREATININE-BSD FMLA CKD-EPI: >90 ML/MIN/{1.73_M2}
GLUCOSE SERPL-MCNC: 116 MG/DL (ref 70–99)
HCT VFR BLD AUTO: 26.7 % (ref 36–48)
HGB BLD-MCNC: 8.6 G/DL (ref 12–16)
LDH SERPL L TO P-CCNC: 250 U/L (ref 100–190)
LYMPHOCYTES # BLD: 1.2 K/UL (ref 1–5.1)
LYMPHOCYTES NFR BLD: 19.2 %
MAGNESIUM SERPL-MCNC: 1.69 MG/DL (ref 1.8–2.4)
MCH RBC QN AUTO: 28.3 PG (ref 26–34)
MCHC RBC AUTO-ENTMCNC: 32.1 G/DL (ref 31–36)
MCV RBC AUTO: 88 FL (ref 80–100)
MONOCYTES # BLD: 0.7 K/UL (ref 0–1.3)
MONOCYTES NFR BLD: 11.3 %
NEUTROPHILS # BLD: 4.2 K/UL (ref 1.7–7.7)
NEUTROPHILS NFR BLD: 67.6 %
PHOSPHATE SERPL-MCNC: 3.4 MG/DL (ref 2.5–4.9)
PLATELET # BLD AUTO: 129 K/UL (ref 135–450)
PMV BLD AUTO: 8.1 FL (ref 5–10.5)
POTASSIUM SERPL-SCNC: 3.7 MMOL/L (ref 3.5–5.1)
PROT SERPL-MCNC: 6.9 G/DL (ref 6.4–8.2)
RBC # BLD AUTO: 3.04 M/UL (ref 4–5.2)
SODIUM SERPL-SCNC: 142 MMOL/L (ref 136–145)
URATE SERPL-MCNC: 4.5 MG/DL (ref 2.6–6)
WBC # BLD AUTO: 6.2 K/UL (ref 4–11)

## 2024-12-04 PROCEDURE — 36591 DRAW BLOOD OFF VENOUS DEVICE: CPT

## 2024-12-04 PROCEDURE — 83735 ASSAY OF MAGNESIUM: CPT

## 2024-12-04 PROCEDURE — 84100 ASSAY OF PHOSPHORUS: CPT

## 2024-12-04 PROCEDURE — 80076 HEPATIC FUNCTION PANEL: CPT

## 2024-12-04 PROCEDURE — 85025 COMPLETE CBC W/AUTO DIFF WBC: CPT

## 2024-12-04 PROCEDURE — 84550 ASSAY OF BLOOD/URIC ACID: CPT

## 2024-12-04 PROCEDURE — 83615 LACTATE (LD) (LDH) ENZYME: CPT

## 2024-12-04 PROCEDURE — 36592 COLLECT BLOOD FROM PICC: CPT

## 2024-12-04 PROCEDURE — 80048 BASIC METABOLIC PNL TOTAL CA: CPT

## 2024-12-04 PROCEDURE — 86140 C-REACTIVE PROTEIN: CPT

## 2024-12-04 PROCEDURE — 82728 ASSAY OF FERRITIN: CPT

## 2024-12-04 RX ORDER — SODIUM CHLORIDE 9 MG/ML
INJECTION, SOLUTION INTRAVENOUS CONTINUOUS PRN
Status: DISCONTINUED | OUTPATIENT
Start: 2024-12-04 | End: 2024-12-05 | Stop reason: HOSPADM

## 2024-12-04 RX ORDER — POTASSIUM CHLORIDE 1500 MG/1
40 TABLET, EXTENDED RELEASE ORAL PRN
Status: DISCONTINUED | OUTPATIENT
Start: 2024-12-04 | End: 2024-12-05 | Stop reason: HOSPADM

## 2024-12-04 RX ORDER — ONDANSETRON 4 MG/1
8 TABLET, FILM COATED ORAL EVERY 8 HOURS PRN
Status: DISCONTINUED | OUTPATIENT
Start: 2024-12-04 | End: 2024-12-05 | Stop reason: HOSPADM

## 2024-12-04 RX ORDER — OXYCODONE HYDROCHLORIDE 5 MG/1
10 TABLET ORAL EVERY 4 HOURS PRN
Status: DISCONTINUED | OUTPATIENT
Start: 2024-12-04 | End: 2024-12-05 | Stop reason: HOSPADM

## 2024-12-04 RX ORDER — POTASSIUM CHLORIDE 29.8 MG/ML
80 INJECTION INTRAVENOUS PRN
Status: CANCELLED | OUTPATIENT
Start: 2024-12-05 | End: 2025-01-04

## 2024-12-04 RX ORDER — MAGNESIUM SULFATE IN WATER 40 MG/ML
4000 INJECTION, SOLUTION INTRAVENOUS PRN
Status: DISCONTINUED | OUTPATIENT
Start: 2024-12-04 | End: 2024-12-05 | Stop reason: HOSPADM

## 2024-12-04 RX ORDER — HEPARIN 100 UNIT/ML
500 SYRINGE INTRAVENOUS PRN
Status: DISCONTINUED | OUTPATIENT
Start: 2024-12-04 | End: 2024-12-05 | Stop reason: HOSPADM

## 2024-12-04 RX ORDER — ONDANSETRON 2 MG/ML
8 INJECTION INTRAMUSCULAR; INTRAVENOUS EVERY 8 HOURS PRN
Status: CANCELLED | OUTPATIENT
Start: 2024-12-05

## 2024-12-04 RX ORDER — OXYCODONE HYDROCHLORIDE 5 MG/1
10 TABLET ORAL EVERY 4 HOURS PRN
Status: CANCELLED | OUTPATIENT
Start: 2024-12-05

## 2024-12-04 RX ORDER — POTASSIUM CHLORIDE 29.8 MG/ML
80 INJECTION INTRAVENOUS PRN
Status: DISCONTINUED | OUTPATIENT
Start: 2024-12-04 | End: 2024-12-05 | Stop reason: HOSPADM

## 2024-12-04 RX ORDER — OXYCODONE HYDROCHLORIDE 5 MG/1
5 TABLET ORAL EVERY 4 HOURS PRN
Status: DISCONTINUED | OUTPATIENT
Start: 2024-12-04 | End: 2024-12-05 | Stop reason: HOSPADM

## 2024-12-04 RX ORDER — SODIUM CHLORIDE 9 MG/ML
INJECTION, SOLUTION INTRAVENOUS CONTINUOUS PRN
Status: CANCELLED | OUTPATIENT
Start: 2024-12-05

## 2024-12-04 RX ORDER — PROCHLORPERAZINE MALEATE 10 MG
10 TABLET ORAL EVERY 6 HOURS PRN
Status: DISCONTINUED | OUTPATIENT
Start: 2024-12-04 | End: 2024-12-05 | Stop reason: HOSPADM

## 2024-12-04 RX ORDER — ONDANSETRON 4 MG/1
8 TABLET, FILM COATED ORAL EVERY 8 HOURS PRN
Status: CANCELLED | OUTPATIENT
Start: 2024-12-05

## 2024-12-04 RX ORDER — PROCHLORPERAZINE EDISYLATE 5 MG/ML
10 INJECTION INTRAMUSCULAR; INTRAVENOUS EVERY 6 HOURS PRN
Status: DISCONTINUED | OUTPATIENT
Start: 2024-12-04 | End: 2024-12-05 | Stop reason: HOSPADM

## 2024-12-04 RX ORDER — OXYCODONE HYDROCHLORIDE 5 MG/1
5 TABLET ORAL EVERY 4 HOURS PRN
Status: CANCELLED | OUTPATIENT
Start: 2024-12-05

## 2024-12-04 RX ORDER — PROCHLORPERAZINE EDISYLATE 5 MG/ML
10 INJECTION INTRAMUSCULAR; INTRAVENOUS EVERY 6 HOURS PRN
Status: CANCELLED | OUTPATIENT
Start: 2024-12-05

## 2024-12-04 RX ORDER — PROCHLORPERAZINE MALEATE 10 MG
10 TABLET ORAL EVERY 6 HOURS PRN
Status: CANCELLED | OUTPATIENT
Start: 2024-12-05

## 2024-12-04 RX ORDER — HEPARIN 100 UNIT/ML
500 SYRINGE INTRAVENOUS PRN
OUTPATIENT
Start: 2024-12-05

## 2024-12-04 RX ORDER — MAGNESIUM SULFATE IN WATER 40 MG/ML
4000 INJECTION, SOLUTION INTRAVENOUS PRN
Status: CANCELLED | OUTPATIENT
Start: 2024-12-05

## 2024-12-04 RX ORDER — POTASSIUM CHLORIDE 1500 MG/1
40 TABLET, EXTENDED RELEASE ORAL PRN
Status: CANCELLED | OUTPATIENT
Start: 2024-12-05

## 2024-12-04 RX ORDER — ONDANSETRON 2 MG/ML
8 INJECTION INTRAMUSCULAR; INTRAVENOUS EVERY 8 HOURS PRN
Status: DISCONTINUED | OUTPATIENT
Start: 2024-12-04 | End: 2024-12-05 | Stop reason: HOSPADM

## 2024-12-04 NOTE — PROGRESS NOTES
[facility] Progress Note      2024    Farhad Balderas    :  1970    MRN:  [id number]    Referring MD: Jona Simpson MD  [facility] [address] [place] [address]      Subjective: Patient feels well today, afebrile, no confusion, eating and drinking well.      ECOG PS:  (1) Restricted in physically strenuous activity, ambulatory and able to do work of light nature    KPS: 80% Normal activity with effort; some signs or symptoms of disease    Isolation:  None     Medications    Scheduled Meds:  Continuous Infusions:  PRN Meds:.      ROS:  As noted above, otherwise remainder of 10-point ROS negative      Physical Exam:     Vital Signs:  There were no vitals taken for this visit.    Weight:    Wt Readings from Last 3 Encounters:   24 112.5 kg (248 lb 0.3 oz)   24 111.9 kg (246 lb 9.6 oz)   24 111.3 kg (245 lb 6 oz)       General: Awake, alert and oriented.  HEENT: normocephalic, alopecia, PERRL, no scleral erythema or icterus, Oral mucosa moist and intact, throat clear  NECK: supple   BACK: Straight   SKIN: warm dry and intact without lesions rashes or masses  CHEST: CTA bilaterally without use of accessory muscles  CV: Normal S1 S2, RRR, no MRG  ABD: NT ND normoactive BS  EXTREMITIES: without edema, denies calf tenderness  NEURO: CN II - XII grossly intact  CATHETER: PAC: CDI    Laboratory Data:  CBC:   Recent Labs     24  0500 24  0940   WBC 3.9* 1.8*   HGB 7.7* 8.2*   HCT 24.3* 25.5*   MCV 89.3 87.8   * 129*     BMP/Mag:  Recent Labs     24  0500 24  0940    141   K 3.7 3.7    105   CO2 26 25   PHOS 3.5 3.6   BUN 11 12   CREATININE 0.6 0.7   MG 1.67* 1.64*     LIVP:   Recent Labs     24  0500 24  0940   AST 40* 35   ALT 39 44*   BILIDIR <0.1 <0.1   BILITOT <0.2 0.3   ALKPHOS 40 46     Coags:   Recent Labs     24  0500 24  0940   PROTIME 14.4 13.5   INR 1.10 1.01   APTT 24.7  --      Uric Acid No results

## 2024-12-04 NOTE — PROGRESS NOTES
Short Stay Communication Note  Farhad Balderas  Diagnosis: multiple myeloma  Primary MD: Dr. GURPREET Morton  Treatment: Fludarabine/Cytoxan  CAR-T Administration Date: 11/18/24  Day +16 of carvykti  Pt seen in outpatient infusion today. Labs drawn and reviewed.   CBC:   Recent Labs     12/02/24  0500 12/03/24  0940 12/04/24  0923   WBC 3.9* 1.8* 6.2   HGB 7.7* 8.2* 8.6*   HCT 24.3* 25.5* 26.7*   MCV 89.3 87.8 88.0   * 129* 129*     BMP/Mag:  Recent Labs     12/02/24  0500 12/03/24  0940 12/04/24  0923    141 142   K 3.7 3.7 3.7    105 105   CO2 26 25 27   PHOS 3.5 3.6 3.4   BUN 11 12 13   CREATININE 0.6 0.7 0.7   MG 1.67* 1.64* 1.69*     Standing parameters for replacement for this patient:   1 unit of pack red blood cells for a hemoglobin < or equal to 7  1 pack of platelets for a platelet count less than or equal to 10  40 MeQ of Potassium administered for a potassium level less than or equal to 3.4  4g of Magnesium Sulfate for a magnesum level less than or equal to 1.4  No transfusions required for the above lab values.    Urinalysis last done: 12/3/24 Urinalysis next due: 12/9/24    Chest X-Ray last done: 12/3/24 Chest X-Ray next due: 12/9/24    Symptoms addressed and reported to care team this date:   Treatments this date: labs    Reviewed medication schedule with pt and caregiver. Both able to verbalize all medications and schedule. Pt to be seen again Friday 12/6. Patient and caregiver verbalized understanding of discharge instructions including when and how to call the doctor and when to report  to the ER. TempTraq functioning properly at this time. Discharged ambulatory to home.

## 2024-12-05 ENCOUNTER — APPOINTMENT (OUTPATIENT)
Dept: ONCOLOGY | Age: 54
End: 2024-12-05
Payer: COMMERCIAL

## 2024-12-05 ENCOUNTER — TELEPHONE (OUTPATIENT)
Dept: PRIMARY CARE CLINIC | Age: 54
End: 2024-12-05

## 2024-12-05 NOTE — TELEPHONE ENCOUNTER
Care Transitions Initial Follow Up Call    Outreach made within 2 business days of discharge: Yes    Patient: Farhad Balderas Patient : 1970   MRN: [id number]  Reason for Admission: Neutropenic Fever  Discharge Date: 24       Spoke with: Phone not in service.    Discharge department/facility: [facility]      Naveen Cheatham MA

## 2024-12-06 ENCOUNTER — HOSPITAL ENCOUNTER (OUTPATIENT)
Dept: ONCOLOGY | Age: 54
Setting detail: INFUSION SERIES
Discharge: HOME | End: 2024-12-06
Payer: COMMERCIAL

## 2024-12-06 VITALS
RESPIRATION RATE: 16 BRPM | TEMPERATURE: 98.5 F | SYSTOLIC BLOOD PRESSURE: 127 MMHG | HEART RATE: 109 BPM | DIASTOLIC BLOOD PRESSURE: 84 MMHG

## 2024-12-06 DIAGNOSIS — C90.00 MULTIPLE MYELOMA, REMISSION STATUS UNSPECIFIED (HCC): Primary | ICD-10-CM

## 2024-12-06 LAB
ALBUMIN SERPL-MCNC: 4.5 G/DL (ref 3.4–5)
ALP SERPL-CCNC: 53 U/L (ref 40–129)
ALT SERPL-CCNC: 30 U/L (ref 10–40)
ANION GAP SERPL CALCULATED.3IONS-SCNC: 11 MMOL/L (ref 3–16)
AST SERPL-CCNC: 20 U/L (ref 15–37)
BASOPHILS # BLD: 0 K/UL (ref 0–0.2)
BASOPHILS NFR BLD: 0.9 %
BILIRUB DIRECT SERPL-MCNC: <0.1 MG/DL (ref 0–0.3)
BILIRUB INDIRECT SERPL-MCNC: 0.2 MG/DL (ref 0–1)
BILIRUB SERPL-MCNC: 0.3 MG/DL (ref 0–1)
BUN SERPL-MCNC: 11 MG/DL (ref 7–20)
CALCIUM SERPL-MCNC: 9.9 MG/DL (ref 8.3–10.6)
CHLORIDE SERPL-SCNC: 104 MMOL/L (ref 99–110)
CO2 SERPL-SCNC: 27 MMOL/L (ref 21–32)
CREAT SERPL-MCNC: 0.8 MG/DL (ref 0.6–1.1)
CRP SERPL-MCNC: <3 MG/L (ref 0–5.1)
DEPRECATED RDW RBC AUTO: 22.3 % (ref 12.4–15.4)
EOSINOPHIL # BLD: 0.1 K/UL (ref 0–0.6)
EOSINOPHIL NFR BLD: 2.2 %
FERRITIN SERPL IA-MCNC: 750 NG/ML (ref 15–150)
GFR SERPLBLD CREATININE-BSD FMLA CKD-EPI: 87 ML/MIN/{1.73_M2}
GLUCOSE SERPL-MCNC: 121 MG/DL (ref 70–99)
HCT VFR BLD AUTO: 29.1 % (ref 36–48)
HGB BLD-MCNC: 9.5 G/DL (ref 12–16)
LDH SERPL L TO P-CCNC: 223 U/L (ref 100–190)
LYMPHOCYTES # BLD: 0.8 K/UL (ref 1–5.1)
LYMPHOCYTES NFR BLD: 26 %
MAGNESIUM SERPL-MCNC: 1.62 MG/DL (ref 1.8–2.4)
MCH RBC QN AUTO: 29.2 PG (ref 26–34)
MCHC RBC AUTO-ENTMCNC: 32.7 G/DL (ref 31–36)
MCV RBC AUTO: 89.1 FL (ref 80–100)
MONOCYTES # BLD: 0.5 K/UL (ref 0–1.3)
MONOCYTES NFR BLD: 16.6 %
NEUTROPHILS # BLD: 1.7 K/UL (ref 1.7–7.7)
NEUTROPHILS NFR BLD: 54.3 %
PHOSPHATE SERPL-MCNC: 4.7 MG/DL (ref 2.5–4.9)
PLATELET # BLD AUTO: 150 K/UL (ref 135–450)
PMV BLD AUTO: 7.9 FL (ref 5–10.5)
POTASSIUM SERPL-SCNC: 3.7 MMOL/L (ref 3.5–5.1)
PROT SERPL-MCNC: 6.9 G/DL (ref 6.4–8.2)
RBC # BLD AUTO: 3.27 M/UL (ref 4–5.2)
SODIUM SERPL-SCNC: 142 MMOL/L (ref 136–145)
URATE SERPL-MCNC: 5.6 MG/DL (ref 2.6–6)
WBC # BLD AUTO: 3.2 K/UL (ref 4–11)

## 2024-12-06 PROCEDURE — 36591 DRAW BLOOD OFF VENOUS DEVICE: CPT

## 2024-12-06 PROCEDURE — 80076 HEPATIC FUNCTION PANEL: CPT

## 2024-12-06 PROCEDURE — 83615 LACTATE (LD) (LDH) ENZYME: CPT

## 2024-12-06 PROCEDURE — 85025 COMPLETE CBC W/AUTO DIFF WBC: CPT

## 2024-12-06 PROCEDURE — 82728 ASSAY OF FERRITIN: CPT

## 2024-12-06 PROCEDURE — 84100 ASSAY OF PHOSPHORUS: CPT

## 2024-12-06 PROCEDURE — 83735 ASSAY OF MAGNESIUM: CPT

## 2024-12-06 PROCEDURE — 84550 ASSAY OF BLOOD/URIC ACID: CPT

## 2024-12-06 PROCEDURE — 86140 C-REACTIVE PROTEIN: CPT

## 2024-12-06 PROCEDURE — 80048 BASIC METABOLIC PNL TOTAL CA: CPT

## 2024-12-06 RX ORDER — ONDANSETRON 4 MG/1
8 TABLET, FILM COATED ORAL EVERY 8 HOURS PRN
OUTPATIENT
Start: 2024-12-07

## 2024-12-06 RX ORDER — SODIUM CHLORIDE 9 MG/ML
INJECTION, SOLUTION INTRAVENOUS CONTINUOUS PRN
OUTPATIENT
Start: 2024-12-07

## 2024-12-06 RX ORDER — OXYCODONE HYDROCHLORIDE 5 MG/1
5 TABLET ORAL EVERY 4 HOURS PRN
OUTPATIENT
Start: 2024-12-07

## 2024-12-06 RX ORDER — POTASSIUM CHLORIDE 1500 MG/1
40 TABLET, EXTENDED RELEASE ORAL PRN
Status: DISCONTINUED | OUTPATIENT
Start: 2024-12-06 | End: 2024-12-07 | Stop reason: HOSPADM

## 2024-12-06 RX ORDER — POTASSIUM CHLORIDE 1500 MG/1
40 TABLET, EXTENDED RELEASE ORAL PRN
OUTPATIENT
Start: 2024-12-07

## 2024-12-06 RX ORDER — MAGNESIUM SULFATE IN WATER 40 MG/ML
4000 INJECTION, SOLUTION INTRAVENOUS PRN
OUTPATIENT
Start: 2024-12-07

## 2024-12-06 RX ORDER — POTASSIUM CHLORIDE 29.8 MG/ML
80 INJECTION INTRAVENOUS PRN
OUTPATIENT
Start: 2024-12-07 | End: 2025-01-06

## 2024-12-06 RX ORDER — HEPARIN 100 UNIT/ML
500 SYRINGE INTRAVENOUS PRN
OUTPATIENT
Start: 2024-12-07

## 2024-12-06 RX ORDER — OXYCODONE HYDROCHLORIDE 5 MG/1
10 TABLET ORAL EVERY 4 HOURS PRN
Status: DISCONTINUED | OUTPATIENT
Start: 2024-12-06 | End: 2024-12-07 | Stop reason: HOSPADM

## 2024-12-06 RX ORDER — POTASSIUM CHLORIDE 29.8 MG/ML
80 INJECTION INTRAVENOUS PRN
Status: DISCONTINUED | OUTPATIENT
Start: 2024-12-06 | End: 2024-12-07 | Stop reason: HOSPADM

## 2024-12-06 RX ORDER — SODIUM CHLORIDE 9 MG/ML
INJECTION, SOLUTION INTRAVENOUS CONTINUOUS PRN
Status: DISCONTINUED | OUTPATIENT
Start: 2024-12-06 | End: 2024-12-07 | Stop reason: HOSPADM

## 2024-12-06 RX ORDER — OXYCODONE HYDROCHLORIDE 5 MG/1
5 TABLET ORAL EVERY 4 HOURS PRN
Status: DISCONTINUED | OUTPATIENT
Start: 2024-12-06 | End: 2024-12-07 | Stop reason: HOSPADM

## 2024-12-06 RX ORDER — PROCHLORPERAZINE EDISYLATE 5 MG/ML
10 INJECTION INTRAMUSCULAR; INTRAVENOUS EVERY 6 HOURS PRN
OUTPATIENT
Start: 2024-12-07

## 2024-12-06 RX ORDER — PROCHLORPERAZINE EDISYLATE 5 MG/ML
10 INJECTION INTRAMUSCULAR; INTRAVENOUS EVERY 6 HOURS PRN
Status: DISCONTINUED | OUTPATIENT
Start: 2024-12-06 | End: 2024-12-07 | Stop reason: HOSPADM

## 2024-12-06 RX ORDER — ONDANSETRON 2 MG/ML
8 INJECTION INTRAMUSCULAR; INTRAVENOUS EVERY 8 HOURS PRN
OUTPATIENT
Start: 2024-12-07

## 2024-12-06 RX ORDER — ONDANSETRON 2 MG/ML
8 INJECTION INTRAMUSCULAR; INTRAVENOUS EVERY 8 HOURS PRN
Status: DISCONTINUED | OUTPATIENT
Start: 2024-12-06 | End: 2024-12-07 | Stop reason: HOSPADM

## 2024-12-06 RX ORDER — OXYCODONE HYDROCHLORIDE 5 MG/1
10 TABLET ORAL EVERY 4 HOURS PRN
OUTPATIENT
Start: 2024-12-07

## 2024-12-06 RX ORDER — PROCHLORPERAZINE MALEATE 10 MG
10 TABLET ORAL EVERY 6 HOURS PRN
OUTPATIENT
Start: 2024-12-07

## 2024-12-06 RX ORDER — PROCHLORPERAZINE MALEATE 10 MG
10 TABLET ORAL EVERY 6 HOURS PRN
Status: DISCONTINUED | OUTPATIENT
Start: 2024-12-06 | End: 2024-12-07 | Stop reason: HOSPADM

## 2024-12-06 RX ORDER — MAGNESIUM SULFATE IN WATER 40 MG/ML
4000 INJECTION, SOLUTION INTRAVENOUS PRN
Status: DISCONTINUED | OUTPATIENT
Start: 2024-12-06 | End: 2024-12-07 | Stop reason: HOSPADM

## 2024-12-06 RX ORDER — ONDANSETRON 4 MG/1
8 TABLET, FILM COATED ORAL EVERY 8 HOURS PRN
Status: DISCONTINUED | OUTPATIENT
Start: 2024-12-06 | End: 2024-12-07 | Stop reason: HOSPADM

## 2024-12-06 NOTE — PROGRESS NOTES
Short Stay Communication Note  Farhad Balderas  Diagnosis: multiple myeloma  Primary MD: Dr. GURPREET Morton  Treatment: Fludarabine/Cytoxan  CAR-T Administration Date: 11/18/24  Day +18 of carvykti  Pt seen in outpatient infusion today. Labs drawn and reviewed.   CBC:   Recent Labs     12/04/24 0923 12/06/24  0940   WBC 6.2 3.2*   HGB 8.6* 9.5*   HCT 26.7* 29.1*   MCV 88.0 89.1   * 150     BMP/Mag:  Recent Labs     12/04/24  0923 12/06/24  0940    142   K 3.7 3.7    104   CO2 27 27   PHOS 3.4 4.7   BUN 13 11   CREATININE 0.7 0.8   MG 1.69* 1.62*     Standing parameters for replacement for this patient:   1 unit of pack red blood cells for a hemoglobin < or equal to 7  1 pack of platelets for a platelet count less than or equal to 10  40 MeQ of Potassium administered for a potassium level less than or equal to 3.4  4g of Magnesium Sulfate for a magnesum level less than or equal to 1.4  No transfusions required for the above lab values.    Urinalysis last done: 12/3/24 Urinalysis next due: 12/9/24    Chest X-Ray last done: 12/3/24 Chest X-Ray next due: 12/9/24    Symptoms addressed and reported to care team this date: none   Treatments this date: labs    Reviewed medication schedule with pt and caregiver. Both able to verbalize all medications and schedule. Pt to be seen again Sunday 12/8/2024. Patient and caregiver verbalized understanding of discharge instructions including when and how to call the doctor and when to report  to the ER. TempTraq functioning properly at this time. Discharged ambulatory to home.     Alexsandra Fields RN

## 2024-12-06 NOTE — PROGRESS NOTES
[facility] Progress Note    2024     Farhad Balderas    MRN: [id number]    : 1970    Referring MD: No referring provider defined for this encounter..  She has no new complaints.  Overall she feels well.  She is orthostatic, but asymptomatic      Interval History:   The patient has no complaints.  She remains afebrile.  She would like to go home soon    ECOG PS:  (1) Restricted in physically strenuous activity, ambulatory and able to do work of light nature    KPS: 90% Able to carry on normal activity; minor signs or symptoms of disease    Isolation: None    Medications    Scheduled Meds:   [Held by provider] amLODIPine  5 mg Oral Daily    fluconazole  200 mg Oral Daily    gabapentin  300 mg Oral TID    levETIRAcetam  500 mg Oral BID    [Held by provider] lisinopril  40 mg Oral Daily    pantoprazole  40 mg Oral QAM AC    valACYclovir  500 mg Oral BID    sodium chloride flush  5-40 mL IntraVENous 2 times per day    sodium chloride flush  5-40 mL IntraVENous 2 times per day    Saline Mouthwash  15 mL Swish & Spit 4x Daily AC & HS    enoxaparin  30 mg SubCUTAneous BID    cefepime  2,000 mg IntraVENous Q8H     Continuous Infusions:   sodium chloride      sodium chloride       PRN Meds:.cyclobenzaprine, acetaminophen, ondansetron **OR** ondansetron, prochlorperazine **OR** prochlorperazine, sodium chloride flush, sodium chloride, sodium chloride flush, sodium chloride, potassium chloride, magnesium sulfate, Saline Mouthwash, ALTEplase    ROS:  As noted above, otherwise remainder of 10-point ROS negative    Physical Exam:     I&O:    Intake/Output Summary (Last 24 hours) at 2024 0843  Last data filed at 2024 0308  Gross per 24 hour   Intake 890 ml   Output 1000 ml   Net -110 ml       Vital Signs:  BP (!) 105/58   Pulse 79   Temp 98.1 °F (36.7 °C) (Oral)   Resp 16   Ht 1.651 m (5' 5\")   Wt 110.3 kg (243 lb 2.7 oz)   SpO2 97%   BMI 40.47 kg/m²     Weight:    Wt Readings from Last 3 Encounters: 
    [facility] Progress Note    2024     Farhad Balderas    MRN: [id number]    : 1970    Referring MD: No referring provider defined for this encounter..  She has no new complaints.  Overall she feels well.  She is orthostatic, but asymptomatic      Interval History:  - Afebrile since yesterday afternoon    ECOG PS:  (1) Restricted in physically strenuous activity, ambulatory and able to do work of light nature    KPS: 90% Able to carry on normal activity; minor signs or symptoms of disease    Isolation: None    Medications    Scheduled Meds:   [Held by provider] amLODIPine  5 mg Oral Daily    fluconazole  200 mg Oral Daily    gabapentin  300 mg Oral TID    levETIRAcetam  500 mg Oral BID    [Held by provider] lisinopril  40 mg Oral Daily    pantoprazole  40 mg Oral QAM AC    valACYclovir  500 mg Oral BID    sodium chloride flush  5-40 mL IntraVENous 2 times per day    sodium chloride flush  5-40 mL IntraVENous 2 times per day    Saline Mouthwash  15 mL Swish & Spit 4x Daily AC & HS    enoxaparin  30 mg SubCUTAneous BID    cefepime  2,000 mg IntraVENous Q8H     Continuous Infusions:   sodium chloride      sodium chloride       PRN Meds:.cyclobenzaprine, acetaminophen, ondansetron **OR** ondansetron, prochlorperazine **OR** prochlorperazine, sodium chloride flush, sodium chloride, sodium chloride flush, sodium chloride, potassium chloride, magnesium sulfate, Saline Mouthwash, ALTEplase    ROS:  As noted above, otherwise remainder of 10-point ROS negative    Physical Exam:     I&O:    Intake/Output Summary (Last 24 hours) at 2024 0954  Last data filed at 2024 0642  Gross per 24 hour   Intake 544 ml   Output 1050 ml   Net -506 ml       Vital Signs:  BP (!) 95/54   Pulse 95   Temp 97.8 °F (36.6 °C) (Oral)   Resp 18   Ht 1.651 m (5' 5\")   Wt 109.7 kg (241 lb 12.8 oz)   SpO2 97%   BMI 40.24 kg/m²     Weight:    Wt Readings from Last 3 Encounters:   24 109.7 kg (241 lb 12.8 oz)   24 
  Physician Progress Note      PATIENT:               AIDEN WILCOX  [facility] #:                  [id number]  :                       1970  ADMIT DATE:       2024 5:25 PM  DISCH DATE:        2024 5:16 PM  RESPONDING  PROVIDER #:        CRHISSY MALONE          QUERY TEXT:    Patient admitted with neutropenic fever, noted to have RBC- 2.51, WBC - 0.1,   and platelets - 146. If possible, please document in progress notes and   discharge summary if you are evaluating and/or treating any of the following:    The medical record reflects the following:  Risk Factors: 54 y.o female with pMHx of multiple myeloma.  Clinical Indicators:  - Labs:  RBC- 2.51, WBC - 0.1, and platelets - 146.    - HO - Heme: Neutropenia and anemia 2/2 chemotherapy.  Treatment: serial CBCs, PRN blood transfusion  Options provided:  -- This patient has antineoplastic (chemotherapy) induced pancytopenia.  -- Other - I will add my own diagnosis  -- Disagree - Not applicable / Not valid  -- Disagree - Clinically unable to determine / Unknown  -- Refer to Clinical Documentation Reviewer    PROVIDER RESPONSE TEXT:    This patient has antineoplastic ([id number]) induced pancytopenia.    Query created by: Alice Craig on 2024 7:24 AM      Electronically signed by:  CHRISSY MALONE 2024 10:04 AM          
At 0858, patient's supine blood pressure was 95/54. RN held amlodipine and lisinopril and notified NP, Dasia Smith, who changed order in MAR to held by provider. Pt not reporting any symptoms associated with low blood pressure. Denies dizziness or lightheadedness. Pt remains independent.  
CARTOX 10/10.  
Dr. Chase notified of patient's fever despite tylenol administration. See MAR and flowsheets.    MD ordered 200 mg celebrex x1.   
GORGE Adams notified of deviation findings for 1529 CART assessment, though patient still scored 10/10. Temperature rechecked at 1642, decreased to 100.9 orally.   
PT temp is 102.0 F. Orders for Tylenol placed and administered per order. Will continue with plan of care.  
Patient and PT mother oriented to patient room including call light and bed controls.  Admission assessment completed - see admission flowsheet documentation.    Patient oriented to unit policies and procedures including: pain management practices, unit safety precautions, family rapid response, q4h vital signs and assessments, daily 4am lab draws, weekly chest x-rays,daily chlorhexidine bathing, standing transfusion orders, and routine central line care.  Also discussed use of call light and how to get in touch with nursing staff.  Stressed the importance of calling out immediately for any changes in condition including but not limited to: pain, chills, fever, nausea, vomiting, diarrhea, chest pain, sob/phillip, assistance with toileting, bleeding, or any other symptoms that are out of the ordinary for the patient.  Patient verbalizes understanding of all instructions and will call for assistance as needed. Pt bed side table, call light and belonging in reach. Bed in the lowest position.   
Patient endorsing nausea this am. PRN oral compazine administered with morning meds. Around 35 minutes later, patient called RN stating she vomited. RN went through emesis and ID-ed morning pills, most undigested. NP Angie notified, PRN nausea medicine orders updated. Will administer IV medicine and repull/readminister am meds when nausea decreased, per NP order. Care continues.   
Pt Refused IS at this time.   
Pt oral temp at 0001 was 100.5, gave tylenol 650 mg. This RN rechecked temp again at 0050 and the temp was 101.8. Informed NP  Dasia Smith, ordered tocilizumab 800 mg.  
Pt port was accessed in the ED and a new dressing applied per pt. RN informed PT that per [facility] policy that the port needle and dressing is to be changed upon admission to [facility]. PT refused to have needle/ dressing changed. PT educated. Charge RN notified. Will continue with care as planned.   
Reviewed discharge instructions with patient and  family members.  Reviewed discharge medications including dosing, schedule, indication, and adverse reactions.  Reviewed which medications were already taken today and next dosage due for each medication.      Reviewed signs and symptoms that prompt a call to the physician and appropriate phone numbers. Reviewed follow up appointments that have been made in [facility] and Outpatient Oncology.  Low microbial diet, activity restrictions, and increased risk of infection were reviewed.     Patient is being discharged with IV access d/t need for ongoing therapy:      Type:  Port                        Deaccessed.  CVC care and maintenance was reviewed with patient and family members.  Pt verbalizes understanding of line care and maintenance.      Patient verbalized understanding of all instructions and questions were answered to her. satisfaction.  Signed discharge instructions were given to the patient and a copy placed in the paper-lite chart.  Patient discharged to home per wheelchair with family members.      Deloris Ro RN   
oriented.  HEENT: normocephalic, PERRL, no scleral erythema or icterus, Oral mucosa moist and intact, throat clear  NECK: supple  BACK: Straight   SKIN: warm dry and intact without lesions rashes or masses  CHEST: CTA bilaterally without use of accessory muscles  CV: Normal S1 S2, RRR, no MRG  ABD: NT ND normoactive BS  EXTREMITIES: without edema, denies calf tenderness  NEURO: CN II - XII grossly intact  CATHETER: clear    Data    CBC:   Recent Labs     11/26/24 2245 11/27/24 0338 11/28/24 0329   WBC 0.3* 0.3* 0.1*   HGB 7.2* 7.5* 7.2*   HCT 22.2* 22.9* 21.8*   MCV 87.4 86.9 86.8    169 150     BMP/Mag:  Recent Labs     11/26/24 2245 11/27/24 0338 11/28/24 0329   * 135* 134*   K 3.7 3.7 3.6    99 100   CO2 26 26 26   PHOS 4.1 4.6 4.9   BUN 8 8 11   CREATININE 0.8 0.8 0.9   MG 1.45* 1.49* 1.71*     LIVP:   Recent Labs     11/26/24  1822 11/26/24 2245 11/27/24 0338   AST 13* 14* 14*   ALT 11 10 9*   BILIDIR  --  0.2 0.2   BILITOT 0.5 0.4 0.5   ALKPHOS 48 45 46     Coags:   Recent Labs     11/26/24 2245 11/27/24 0338 11/28/24 0329   PROTIME 15.0*  --  14.9   INR 1.16*  --  1.15   APTT 26.7 27.6 29.2     Uric Acid No results for input(s): \"LABURIC\" in the last 72 hours.    PROBLEM LIST:                 TREATMENT:                 ASSESSMENT AND PLAN:             1. Multiple myeloma, IgG Kappa, ISS 3:  - t(14;20), gain of 1q21, monosomy 13.    - s/p HD [id number] and ASCT (7/1/22) followed by Rev maintenance   - PET (1/31/24): No evidence of any definitive hypermetabolic neoplastic disease. Mild diffuse hypermetabolic activity identified within the spine, pelvis and femoral diaphysis is without corresponding CT abnormality therefore favors physiologic marrow activity. Diffuse infiltrating marrow process is a differential consideration. 3. Spleen activity representative of possible extramedullary disease  - Plan Selinexor/Kyprolis/Dex as bridge to Carvykti CAR-T   - S/p 1 cycles of kyprolis, 
Pending  - Procalcitonin 11/27/2024 is 0.19     Current Tx/PPx:  - Cont Valtrex and fluconazole  ppx      Post CAR- T monitoring / maintenance:  - IgG & CD4 level monthly starting on day + 30  - Check disease titers on day + 30 or when WBC recovered. Re-vaccinate vs booster based on titer (assure titers are drawn prior to administering immunoglobulins)  - Start PCP ppx on day + 30 - stop when CD4 > 250  - Cont Valtrex for 1 year post CAR-T     4. Heme: Neutropenia and anemia 2/2 chemotherapy  - Transfuse for Hgb < 7 and Platelets < 10K  - No transfusion today    5. Metabolic:   Stable  - S/P LR bolus in ER   - Encourage PO intake     6. Endocrine:  - H/o Type 2 DM  - Currently off metformin  - Hgb A1c (1/6/22): 6.1  - Will monitor daily BG     7. GI / Nutrition:    - H/o esophagitis  Nutrition:    - Cont low microbial diet  GERD:   - Cont PPI daily   Nausea / Vomiting:   - Cont Compazine and zofran PRN    8. Poor dentition:    - S/p partial extractions     9. Pain/peripheral neuropathy  - Flexeril prn   - Continue gabapentin 300 mg PO TID    10. Cardiac: HTN  - Continue amlodipine 5 mg daily   - Continue lisinopril 40mg daily  - S/p aspirin 9/24/24         - DVT Prophylaxis: Platelets >50,000 cells/dL, - daily lovenox prophylaxis ordered  Contraindications to pharmacologic prophylaxis: None  Contraindications to mechanical prophylaxis: None     - Disposition:  Once afebrile and off IV abx    SHY Cevallos - NP    Javier Chase MD  May be reached through [employer]

## 2024-12-07 NOTE — PROGRESS NOTES
[facility] Progress Note      2024    Farhad Balderas    :  1970    MRN:  [id number]    Referring MD: Jona Simpson MD  [facility] [address] [place] [address]      Subjective: Patient feels well today, afebrile, no confusion, eating and drinking well.Advised not to go to court with her friend on Wednesday, she is still immunocompromised and at high risk for infection.      ECOG PS:  (1) Restricted in physically strenuous activity, ambulatory and able to do work of light nature    KPS: 80% Normal activity with effort; some signs or symptoms of disease    Isolation:  None     Medications    Scheduled Meds:  Continuous Infusions:  PRN Meds:.      ROS:  As noted above, otherwise remainder of 10-point ROS negative      Physical Exam:     Vital Signs:  /84   Pulse (!) 109   Temp 98.5 °F (36.9 °C) (Oral)   Resp 16     Weight:    Wt Readings from Last 3 Encounters:   24 111.5 kg (245 lb 13 oz)   24 112.5 kg (248 lb 0.3 oz)   24 111.9 kg (246 lb 9.6 oz)       General: Awake, alert and oriented.  HEENT: normocephalic, alopecia, PERRL, no scleral erythema or icterus, Oral mucosa moist and intact, throat clear  NECK: supple   BACK: Straight   SKIN: warm dry and intact without lesions rashes or masses  CHEST: CTA bilaterally without use of accessory muscles  CV: Normal S1 S2, RRR, no MRG  ABD: NT ND normoactive BS  EXTREMITIES: without edema, denies calf tenderness  NEURO: CN II - XII grossly intact  CATHETER: PAC: CDI    Laboratory Data:  CBC:   Recent Labs     24  0923 24  0940   WBC 6.2 3.2*   HGB 8.6* 9.5*   HCT 26.7* 29.1*   MCV 88.0 89.1   * 150     BMP/Mag:  Recent Labs     24  0923 24  0940    142   K 3.7 3.7    104   CO2 27 27   PHOS 3.4 4.7   BUN 13 11   CREATININE 0.7 0.8   MG 1.69* 1.62*     LIVP:   Recent Labs     24  0923 24  0940   AST 26 20   ALT 38 30   BILIDIR <0.1 <0.1   BILITOT 0.3 0.3   ALKPHOS

## 2024-12-09 ENCOUNTER — HOSPITAL ENCOUNTER (OUTPATIENT)
Dept: GENERAL RADIOLOGY | Age: 54
Discharge: HOME | End: 2024-12-09
Payer: COMMERCIAL

## 2024-12-09 ENCOUNTER — HOSPITAL ENCOUNTER (OUTPATIENT)
Dept: ONCOLOGY | Age: 54
Setting detail: INFUSION SERIES
Discharge: HOME | End: 2024-12-09
Payer: COMMERCIAL

## 2024-12-09 VITALS
WEIGHT: 249.2 LBS | OXYGEN SATURATION: 100 % | RESPIRATION RATE: 18 BRPM | HEART RATE: 95 BPM | SYSTOLIC BLOOD PRESSURE: 166 MMHG | DIASTOLIC BLOOD PRESSURE: 76 MMHG | BODY MASS INDEX: 41.47 KG/M2 | TEMPERATURE: 97.7 F

## 2024-12-09 DIAGNOSIS — C90.00 MULTIPLE MYELOMA, REMISSION STATUS UNSPECIFIED (HCC): Primary | ICD-10-CM

## 2024-12-09 LAB
ALBUMIN SERPL-MCNC: 4.5 G/DL (ref 3.4–5)
ALP SERPL-CCNC: 45 U/L (ref 40–129)
ALT SERPL-CCNC: 19 U/L (ref 10–40)
ANION GAP SERPL CALCULATED.3IONS-SCNC: 10 MMOL/L (ref 3–16)
AST SERPL-CCNC: 17 U/L (ref 15–37)
BASOPHILS # BLD: 0 K/UL (ref 0–0.2)
BASOPHILS NFR BLD: 2.3 %
BILIRUB DIRECT SERPL-MCNC: <0.1 MG/DL (ref 0–0.3)
BILIRUB INDIRECT SERPL-MCNC: 0.2 MG/DL (ref 0–1)
BILIRUB SERPL-MCNC: 0.3 MG/DL (ref 0–1)
BILIRUB UR QL STRIP.AUTO: NEGATIVE
BUN SERPL-MCNC: 13 MG/DL (ref 7–20)
CALCIUM SERPL-MCNC: 9.3 MG/DL (ref 8.3–10.6)
CHLORIDE SERPL-SCNC: 107 MMOL/L (ref 99–110)
CLARITY UR: CLEAR
CO2 SERPL-SCNC: 26 MMOL/L (ref 21–32)
COLOR UR: YELLOW
CREAT SERPL-MCNC: 0.7 MG/DL (ref 0.6–1.1)
CRP SERPL-MCNC: <3 MG/L (ref 0–5.1)
DEPRECATED RDW RBC AUTO: 21.9 % (ref 12.4–15.4)
EOSINOPHIL # BLD: 0.1 K/UL (ref 0–0.6)
EOSINOPHIL NFR BLD: 3.5 %
FERRITIN SERPL IA-MCNC: 538 NG/ML (ref 15–150)
GFR SERPLBLD CREATININE-BSD FMLA CKD-EPI: >90 ML/MIN/{1.73_M2}
GLUCOSE SERPL-MCNC: 102 MG/DL (ref 70–99)
GLUCOSE UR STRIP.AUTO-MCNC: NEGATIVE MG/DL
HCT VFR BLD AUTO: 29.1 % (ref 36–48)
HGB BLD-MCNC: 9.4 G/DL (ref 12–16)
HGB UR QL STRIP.AUTO: NEGATIVE
INR PPP: 1.03 (ref 0.85–1.15)
KETONES UR STRIP.AUTO-MCNC: NEGATIVE MG/DL
LDH SERPL L TO P-CCNC: 200 U/L (ref 100–190)
LEUKOCYTE ESTERASE UR QL STRIP.AUTO: NEGATIVE
LYMPHOCYTES # BLD: 0.7 K/UL (ref 1–5.1)
LYMPHOCYTES NFR BLD: 45.7 %
MAGNESIUM SERPL-MCNC: 1.72 MG/DL (ref 1.8–2.4)
MCH RBC QN AUTO: 28.9 PG (ref 26–34)
MCHC RBC AUTO-ENTMCNC: 32.1 G/DL (ref 31–36)
MCV RBC AUTO: 89.9 FL (ref 80–100)
MONOCYTES # BLD: 0.5 K/UL (ref 0–1.3)
MONOCYTES NFR BLD: 33.6 %
NEUTROPHILS # BLD: 0.2 K/UL (ref 1.7–7.7)
NEUTROPHILS NFR BLD: 14.9 %
NITRITE UR QL STRIP.AUTO: NEGATIVE
PH UR STRIP.AUTO: 6 [PH] (ref 5–8)
PHOSPHATE SERPL-MCNC: 4 MG/DL (ref 2.5–4.9)
PLATELET # BLD AUTO: 139 K/UL (ref 135–450)
PMV BLD AUTO: 7.3 FL (ref 5–10.5)
POTASSIUM SERPL-SCNC: 3.6 MMOL/L (ref 3.5–5.1)
PROT SERPL-MCNC: 6.6 G/DL (ref 6.4–8.2)
PROT UR STRIP.AUTO-MCNC: NEGATIVE MG/DL
PROTHROMBIN TIME: 13.7 SEC (ref 11.9–14.9)
RBC # BLD AUTO: 3.24 M/UL (ref 4–5.2)
SODIUM SERPL-SCNC: 143 MMOL/L (ref 136–145)
SP GR UR STRIP.AUTO: >=1.03 (ref 1–1.03)
UA DIPSTICK W REFLEX MICRO PNL UR: NORMAL
URATE SERPL-MCNC: 5.8 MG/DL (ref 2.6–6)
URN SPEC COLLECT METH UR: NORMAL
UROBILINOGEN UR STRIP-ACNC: 0.2 E.U./DL
WBC # BLD AUTO: 1.4 K/UL (ref 4–11)

## 2024-12-09 PROCEDURE — 84550 ASSAY OF BLOOD/URIC ACID: CPT

## 2024-12-09 PROCEDURE — 80076 HEPATIC FUNCTION PANEL: CPT

## 2024-12-09 PROCEDURE — 83615 LACTATE (LD) (LDH) ENZYME: CPT

## 2024-12-09 PROCEDURE — 86140 C-REACTIVE PROTEIN: CPT

## 2024-12-09 PROCEDURE — 36591 DRAW BLOOD OFF VENOUS DEVICE: CPT

## 2024-12-09 PROCEDURE — 85610 PROTHROMBIN TIME: CPT

## 2024-12-09 PROCEDURE — 6360000002 HC RX W HCPCS: Performed by: INTERNAL MEDICINE

## 2024-12-09 PROCEDURE — 85025 COMPLETE CBC W/AUTO DIFF WBC: CPT

## 2024-12-09 PROCEDURE — 83735 ASSAY OF MAGNESIUM: CPT

## 2024-12-09 PROCEDURE — 82728 ASSAY OF FERRITIN: CPT

## 2024-12-09 PROCEDURE — 80048 BASIC METABOLIC PNL TOTAL CA: CPT

## 2024-12-09 PROCEDURE — 81003 URINALYSIS AUTO W/O SCOPE: CPT

## 2024-12-09 PROCEDURE — 96372 THER/PROPH/DIAG INJ SC/IM: CPT

## 2024-12-09 PROCEDURE — 71045 X-RAY EXAM CHEST 1 VIEW: CPT

## 2024-12-09 PROCEDURE — 84100 ASSAY OF PHOSPHORUS: CPT

## 2024-12-09 RX ORDER — OXYCODONE HYDROCHLORIDE 5 MG/1
5 TABLET ORAL EVERY 4 HOURS PRN
Status: CANCELLED | OUTPATIENT
Start: 2024-12-10

## 2024-12-09 RX ORDER — HEPARIN 100 UNIT/ML
500 SYRINGE INTRAVENOUS PRN
Status: DISCONTINUED | OUTPATIENT
Start: 2024-12-09 | End: 2024-12-10 | Stop reason: HOSPADM

## 2024-12-09 RX ORDER — HEPARIN 100 UNIT/ML
500 SYRINGE INTRAVENOUS PRN
Status: CANCELLED | OUTPATIENT
Start: 2024-12-10

## 2024-12-09 RX ORDER — PROCHLORPERAZINE MALEATE 10 MG
10 TABLET ORAL EVERY 6 HOURS PRN
Status: CANCELLED | OUTPATIENT
Start: 2024-12-10

## 2024-12-09 RX ORDER — OXYCODONE HYDROCHLORIDE 5 MG/1
10 TABLET ORAL EVERY 4 HOURS PRN
Status: CANCELLED | OUTPATIENT
Start: 2024-12-10

## 2024-12-09 RX ORDER — POTASSIUM CHLORIDE 29.8 MG/ML
80 INJECTION INTRAVENOUS PRN
Status: CANCELLED | OUTPATIENT
Start: 2024-12-10 | End: 2025-01-09

## 2024-12-09 RX ORDER — MAGNESIUM SULFATE IN WATER 40 MG/ML
4000 INJECTION, SOLUTION INTRAVENOUS PRN
Status: CANCELLED | OUTPATIENT
Start: 2024-12-10

## 2024-12-09 RX ORDER — POTASSIUM CHLORIDE 1500 MG/1
40 TABLET, EXTENDED RELEASE ORAL PRN
Status: CANCELLED | OUTPATIENT
Start: 2024-12-10

## 2024-12-09 RX ORDER — ONDANSETRON 4 MG/1
8 TABLET, FILM COATED ORAL EVERY 8 HOURS PRN
Status: CANCELLED | OUTPATIENT
Start: 2024-12-10

## 2024-12-09 RX ORDER — ONDANSETRON 2 MG/ML
8 INJECTION INTRAMUSCULAR; INTRAVENOUS EVERY 8 HOURS PRN
Status: CANCELLED | OUTPATIENT
Start: 2024-12-10

## 2024-12-09 RX ORDER — PROCHLORPERAZINE EDISYLATE 5 MG/ML
10 INJECTION INTRAMUSCULAR; INTRAVENOUS EVERY 6 HOURS PRN
Status: CANCELLED | OUTPATIENT
Start: 2024-12-10

## 2024-12-09 RX ORDER — SODIUM CHLORIDE 9 MG/ML
INJECTION, SOLUTION INTRAVENOUS CONTINUOUS PRN
Status: CANCELLED | OUTPATIENT
Start: 2024-12-10

## 2024-12-09 RX ADMIN — FILGRASTIM-AAFI 300 MCG: 300 INJECTION, SOLUTION SUBCUTANEOUS at 11:16

## 2024-12-09 NOTE — PROGRESS NOTES
Short Stay Communication Note  Farhad Balderas  Diagnosis: multiple myeloma  Primary MD: Dr. GURPREET Morton  Treatment: Fludarabine/Cytoxan  CAR-T Administration Date: 11/18/24  Day +21 of carvykti  Pt seen in outpatient infusion today. Labs drawn and reviewed.   CBC:   Recent Labs     12/09/24  1029   WBC 1.4*   HGB 9.4*   HCT 29.1*   MCV 89.9        BMP/Mag:  Recent Labs     12/09/24  1029      K 3.6      CO2 26   PHOS 4.0   BUN 13   CREATININE 0.7   MG 1.72*     Standing parameters for replacement for this patient:   1 unit of pack red blood cells for a hemoglobin < or equal to 7  1 pack of platelets for a platelet count less than or equal to 10  40 MeQ of Potassium administered for a potassium level less than or equal to 3.4  4g of Magnesium Sulfate for a magnesum level less than or equal to 1.4  No transfusions required for the above lab values.    Urinalysis last done: 12/9/24 Urinalysis next due: 12/16/24    Chest X-Ray last done: 12/9/24 Chest X-Ray next due: 12/16/24    Symptoms addressed and reported to care team this date: none   Treatments this date: labs, granix 300 mcg    Reviewed medication schedule with pt and caregiver. Both able to verbalize all medications and schedule. Pt to be seen again Wednesday 12/11/2024. Patient and caregiver verbalized understanding of discharge instructions including when and how to call the doctor and when to report  to the ER. Discharged ambulatory to home.     Anny Calvin RN

## 2024-12-11 ENCOUNTER — HOSPITAL ENCOUNTER (OUTPATIENT)
Dept: ONCOLOGY | Age: 54
Setting detail: INFUSION SERIES
Discharge: HOME | End: 2024-12-11
Payer: COMMERCIAL

## 2024-12-11 VITALS
WEIGHT: 250 LBS | RESPIRATION RATE: 16 BRPM | BODY MASS INDEX: 41.6 KG/M2 | HEART RATE: 96 BPM | SYSTOLIC BLOOD PRESSURE: 132 MMHG | TEMPERATURE: 97.7 F | DIASTOLIC BLOOD PRESSURE: 91 MMHG | OXYGEN SATURATION: 99 %

## 2024-12-11 DIAGNOSIS — C90.00 MULTIPLE MYELOMA, REMISSION STATUS UNSPECIFIED (HCC): Primary | ICD-10-CM

## 2024-12-11 LAB
ALBUMIN SERPL-MCNC: 4.4 G/DL (ref 3.4–5)
ALP SERPL-CCNC: 46 U/L (ref 40–129)
ALT SERPL-CCNC: 15 U/L (ref 10–40)
ANION GAP SERPL CALCULATED.3IONS-SCNC: 11 MMOL/L (ref 3–16)
AST SERPL-CCNC: 16 U/L (ref 15–37)
BASOPHILS # BLD: 0 K/UL (ref 0–0.2)
BASOPHILS NFR BLD: 0.9 %
BILIRUB DIRECT SERPL-MCNC: <0.1 MG/DL (ref 0–0.3)
BILIRUB INDIRECT SERPL-MCNC: 0.2 MG/DL (ref 0–1)
BILIRUB SERPL-MCNC: 0.3 MG/DL (ref 0–1)
BUN SERPL-MCNC: 9 MG/DL (ref 7–20)
CALCIUM SERPL-MCNC: 9.2 MG/DL (ref 8.3–10.6)
CHLORIDE SERPL-SCNC: 107 MMOL/L (ref 99–110)
CO2 SERPL-SCNC: 26 MMOL/L (ref 21–32)
CREAT SERPL-MCNC: 0.7 MG/DL (ref 0.6–1.1)
CRP SERPL-MCNC: <3 MG/L (ref 0–5.1)
DEPRECATED RDW RBC AUTO: 22.5 % (ref 12.4–15.4)
EOSINOPHIL # BLD: 0.1 K/UL (ref 0–0.6)
EOSINOPHIL NFR BLD: 2.2 %
FERRITIN SERPL IA-MCNC: 423 NG/ML (ref 15–150)
GFR SERPLBLD CREATININE-BSD FMLA CKD-EPI: >90 ML/MIN/{1.73_M2}
GLUCOSE SERPL-MCNC: 129 MG/DL (ref 70–99)
HCT VFR BLD AUTO: 28.5 % (ref 36–48)
HGB BLD-MCNC: 9.2 G/DL (ref 12–16)
LDH SERPL L TO P-CCNC: 189 U/L (ref 100–190)
LYMPHOCYTES # BLD: 0.5 K/UL (ref 1–5.1)
LYMPHOCYTES NFR BLD: 16.3 %
MAGNESIUM SERPL-MCNC: 1.74 MG/DL (ref 1.8–2.4)
MCH RBC QN AUTO: 29.1 PG (ref 26–34)
MCHC RBC AUTO-ENTMCNC: 32.3 G/DL (ref 31–36)
MCV RBC AUTO: 90 FL (ref 80–100)
MONOCYTES # BLD: 0.4 K/UL (ref 0–1.3)
MONOCYTES NFR BLD: 13.1 %
NEUTROPHILS # BLD: 1.9 K/UL (ref 1.7–7.7)
NEUTROPHILS NFR BLD: 67.5 %
PHOSPHATE SERPL-MCNC: 4.6 MG/DL (ref 2.5–4.9)
PLATELET # BLD AUTO: 134 K/UL (ref 135–450)
PMV BLD AUTO: 7.2 FL (ref 5–10.5)
POTASSIUM SERPL-SCNC: 3.5 MMOL/L (ref 3.5–5.1)
PROT SERPL-MCNC: 6.2 G/DL (ref 6.4–8.2)
RBC # BLD AUTO: 3.17 M/UL (ref 4–5.2)
SODIUM SERPL-SCNC: 144 MMOL/L (ref 136–145)
URATE SERPL-MCNC: 5.9 MG/DL (ref 2.6–6)
WBC # BLD AUTO: 2.8 K/UL (ref 4–11)

## 2024-12-11 PROCEDURE — 85025 COMPLETE CBC W/AUTO DIFF WBC: CPT

## 2024-12-11 PROCEDURE — 80076 HEPATIC FUNCTION PANEL: CPT

## 2024-12-11 PROCEDURE — 86140 C-REACTIVE PROTEIN: CPT

## 2024-12-11 PROCEDURE — 82728 ASSAY OF FERRITIN: CPT

## 2024-12-11 PROCEDURE — 84550 ASSAY OF BLOOD/URIC ACID: CPT

## 2024-12-11 PROCEDURE — 83735 ASSAY OF MAGNESIUM: CPT

## 2024-12-11 PROCEDURE — 84100 ASSAY OF PHOSPHORUS: CPT

## 2024-12-11 PROCEDURE — 83615 LACTATE (LD) (LDH) ENZYME: CPT

## 2024-12-11 PROCEDURE — 36591 DRAW BLOOD OFF VENOUS DEVICE: CPT

## 2024-12-11 PROCEDURE — 80048 BASIC METABOLIC PNL TOTAL CA: CPT

## 2024-12-11 RX ORDER — ONDANSETRON 4 MG/1
8 TABLET, FILM COATED ORAL EVERY 8 HOURS PRN
Status: DISCONTINUED | OUTPATIENT
Start: 2024-12-11 | End: 2024-12-12 | Stop reason: HOSPADM

## 2024-12-11 RX ORDER — HEPARIN 100 UNIT/ML
500 SYRINGE INTRAVENOUS PRN
Status: CANCELLED | OUTPATIENT
Start: 2024-12-12

## 2024-12-11 RX ORDER — PROCHLORPERAZINE EDISYLATE 5 MG/ML
10 INJECTION INTRAMUSCULAR; INTRAVENOUS EVERY 6 HOURS PRN
Status: CANCELLED | OUTPATIENT
Start: 2024-12-12

## 2024-12-11 RX ORDER — PROCHLORPERAZINE MALEATE 10 MG
10 TABLET ORAL EVERY 6 HOURS PRN
Status: DISCONTINUED | OUTPATIENT
Start: 2024-12-11 | End: 2024-12-12 | Stop reason: HOSPADM

## 2024-12-11 RX ORDER — PROCHLORPERAZINE EDISYLATE 5 MG/ML
10 INJECTION INTRAMUSCULAR; INTRAVENOUS EVERY 6 HOURS PRN
Status: DISCONTINUED | OUTPATIENT
Start: 2024-12-11 | End: 2024-12-12 | Stop reason: HOSPADM

## 2024-12-11 RX ORDER — SODIUM CHLORIDE 9 MG/ML
INJECTION, SOLUTION INTRAVENOUS CONTINUOUS PRN
Status: CANCELLED | OUTPATIENT
Start: 2024-12-12

## 2024-12-11 RX ORDER — POTASSIUM CHLORIDE 29.8 MG/ML
80 INJECTION INTRAVENOUS PRN
Status: DISCONTINUED | OUTPATIENT
Start: 2024-12-11 | End: 2024-12-12 | Stop reason: HOSPADM

## 2024-12-11 RX ORDER — PROCHLORPERAZINE MALEATE 10 MG
10 TABLET ORAL EVERY 6 HOURS PRN
Status: CANCELLED | OUTPATIENT
Start: 2024-12-12

## 2024-12-11 RX ORDER — POTASSIUM CHLORIDE 29.8 MG/ML
80 INJECTION INTRAVENOUS PRN
Status: CANCELLED | OUTPATIENT
Start: 2024-12-12 | End: 2025-01-11

## 2024-12-11 RX ORDER — ONDANSETRON 4 MG/1
8 TABLET, FILM COATED ORAL EVERY 8 HOURS PRN
Status: CANCELLED | OUTPATIENT
Start: 2024-12-12

## 2024-12-11 RX ORDER — SODIUM CHLORIDE 9 MG/ML
INJECTION, SOLUTION INTRAVENOUS CONTINUOUS PRN
Status: DISCONTINUED | OUTPATIENT
Start: 2024-12-11 | End: 2024-12-12 | Stop reason: HOSPADM

## 2024-12-11 RX ORDER — OXYCODONE HYDROCHLORIDE 5 MG/1
10 TABLET ORAL EVERY 4 HOURS PRN
Status: CANCELLED | OUTPATIENT
Start: 2024-12-12

## 2024-12-11 RX ORDER — MAGNESIUM SULFATE IN WATER 40 MG/ML
4000 INJECTION, SOLUTION INTRAVENOUS PRN
Status: CANCELLED | OUTPATIENT
Start: 2024-12-12

## 2024-12-11 RX ORDER — ONDANSETRON 2 MG/ML
8 INJECTION INTRAMUSCULAR; INTRAVENOUS EVERY 8 HOURS PRN
Status: DISCONTINUED | OUTPATIENT
Start: 2024-12-11 | End: 2024-12-12 | Stop reason: HOSPADM

## 2024-12-11 RX ORDER — OXYCODONE HYDROCHLORIDE 5 MG/1
5 TABLET ORAL EVERY 4 HOURS PRN
Status: CANCELLED | OUTPATIENT
Start: 2024-12-12

## 2024-12-11 RX ORDER — MAGNESIUM SULFATE IN WATER 40 MG/ML
4000 INJECTION, SOLUTION INTRAVENOUS PRN
Status: DISCONTINUED | OUTPATIENT
Start: 2024-12-11 | End: 2024-12-12 | Stop reason: HOSPADM

## 2024-12-11 RX ORDER — OXYCODONE HYDROCHLORIDE 5 MG/1
10 TABLET ORAL EVERY 4 HOURS PRN
Status: DISCONTINUED | OUTPATIENT
Start: 2024-12-11 | End: 2024-12-12 | Stop reason: HOSPADM

## 2024-12-11 RX ORDER — OXYCODONE HYDROCHLORIDE 5 MG/1
5 TABLET ORAL EVERY 4 HOURS PRN
Status: DISCONTINUED | OUTPATIENT
Start: 2024-12-11 | End: 2024-12-12 | Stop reason: HOSPADM

## 2024-12-11 RX ORDER — ONDANSETRON 2 MG/ML
8 INJECTION INTRAMUSCULAR; INTRAVENOUS EVERY 8 HOURS PRN
Status: CANCELLED | OUTPATIENT
Start: 2024-12-12

## 2024-12-11 RX ORDER — POTASSIUM CHLORIDE 1500 MG/1
40 TABLET, EXTENDED RELEASE ORAL PRN
Status: CANCELLED | OUTPATIENT
Start: 2024-12-12

## 2024-12-11 RX ORDER — POTASSIUM CHLORIDE 1500 MG/1
40 TABLET, EXTENDED RELEASE ORAL PRN
Status: DISCONTINUED | OUTPATIENT
Start: 2024-12-11 | End: 2024-12-12 | Stop reason: HOSPADM

## 2024-12-11 NOTE — PROGRESS NOTES
Short Stay Communication Note  Farhad Balderas  Diagnosis: multiple myeloma  Primary MD: Dr. GURPREET Morton  Treatment: Fludarabine/Cytoxan  CAR-T Administration Date: 11/18/24  Day +23 of carvykti  Pt seen in outpatient infusion today. Labs drawn and reviewed.   CBC:   Recent Labs     12/09/24  1029 12/11/24  0904   WBC 1.4* 2.8*   HGB 9.4* 9.2*   HCT 29.1* 28.5*   MCV 89.9 90.0    134*     BMP/Mag:  Recent Labs     12/09/24  1029 12/11/24  0904    144   K 3.6 3.5    107   CO2 26 26   PHOS 4.0 4.6   BUN 13 9   CREATININE 0.7 0.7   MG 1.72* 1.74*     Standing parameters for replacement for this patient:   1 unit of pack red blood cells for a hemoglobin < or equal to 7  1 pack of platelets for a platelet count less than or equal to 10  40 MeQ of Potassium administered for a potassium level less than or equal to 3.4  4g of Magnesium Sulfate for a magnesum level less than or equal to 1.4  No transfusions required for the above lab values.    Urinalysis last done: 12/9/24 Urinalysis next due: 12/16/24    Chest X-Ray last done: 12/9/24 Chest X-Ray next due: 12/16/24    Symptoms addressed and reported to care team this date: none   Treatments this date: labs    Reviewed medication schedule with pt and caregiver. Both able to verbalize all medications and schedule. Pt to be seen again Friday 12/13/2024. Patient and caregiver verbalized understanding of discharge instructions including when and how to call the doctor and when to report  to the ER. Discharged ambulatory to home.     Alexsandra Fields, RN    
daily--> HELD during admission during period of fever and 'soft' BP. Did not resume at D/C   - lisinopril 40mg daily--> HELD during admission during period of fever and 'soft' BP. Did not resume at D/C       - Disposition:  Patient will be seen in OP Infusion for 30 days post CAR-T Infusion for CAR-T assessment for s/s of toxicity including CRS & ICANS, labs (CBC w/ diff, CMP, Mag & Phos, Ferritin & CRP) and provider visit.    The patient was seen and examined by Dr. Leal.      SHY Cevallos - NP

## 2024-12-13 ENCOUNTER — HOSPITAL ENCOUNTER (OUTPATIENT)
Dept: ONCOLOGY | Age: 54
Setting detail: INFUSION SERIES
Discharge: HOME | End: 2024-12-13
Payer: COMMERCIAL

## 2024-12-13 VITALS
TEMPERATURE: 97.8 F | WEIGHT: 252.8 LBS | OXYGEN SATURATION: 97 % | BODY MASS INDEX: 42.07 KG/M2 | DIASTOLIC BLOOD PRESSURE: 90 MMHG | HEART RATE: 89 BPM | SYSTOLIC BLOOD PRESSURE: 158 MMHG | RESPIRATION RATE: 16 BRPM

## 2024-12-13 DIAGNOSIS — C90.00 MULTIPLE MYELOMA, REMISSION STATUS UNSPECIFIED (HCC): Primary | ICD-10-CM

## 2024-12-13 LAB
ALBUMIN SERPL-MCNC: 4.4 G/DL (ref 3.4–5)
ALP SERPL-CCNC: 47 U/L (ref 40–129)
ALT SERPL-CCNC: 15 U/L (ref 10–40)
ANION GAP SERPL CALCULATED.3IONS-SCNC: 10 MMOL/L (ref 3–16)
AST SERPL-CCNC: 18 U/L (ref 15–37)
BASOPHILS # BLD: 0 K/UL (ref 0–0.2)
BASOPHILS NFR BLD: 1.6 %
BILIRUB DIRECT SERPL-MCNC: <0.1 MG/DL (ref 0–0.3)
BILIRUB INDIRECT SERPL-MCNC: ABNORMAL MG/DL (ref 0–1)
BILIRUB SERPL-MCNC: <0.2 MG/DL (ref 0–1)
BUN SERPL-MCNC: 9 MG/DL (ref 7–20)
CALCIUM SERPL-MCNC: 8.8 MG/DL (ref 8.3–10.6)
CHLORIDE SERPL-SCNC: 108 MMOL/L (ref 99–110)
CO2 SERPL-SCNC: 27 MMOL/L (ref 21–32)
CREAT SERPL-MCNC: 0.8 MG/DL (ref 0.6–1.1)
CRP SERPL-MCNC: <3 MG/L (ref 0–5.1)
DEPRECATED RDW RBC AUTO: 21.9 % (ref 12.4–15.4)
EOSINOPHIL # BLD: 0 K/UL (ref 0–0.6)
EOSINOPHIL NFR BLD: 4.7 %
FERRITIN SERPL IA-MCNC: 340 NG/ML (ref 15–150)
GFR SERPLBLD CREATININE-BSD FMLA CKD-EPI: 87 ML/MIN/{1.73_M2}
GLUCOSE SERPL-MCNC: 108 MG/DL (ref 70–99)
HCT VFR BLD AUTO: 28.4 % (ref 36–48)
HGB BLD-MCNC: 9.1 G/DL (ref 12–16)
LDH SERPL L TO P-CCNC: 192 U/L (ref 100–190)
LYMPHOCYTES # BLD: 0.3 K/UL (ref 1–5.1)
LYMPHOCYTES NFR BLD: 31.6 %
MAGNESIUM SERPL-MCNC: 1.71 MG/DL (ref 1.8–2.4)
MCH RBC QN AUTO: 28.7 PG (ref 26–34)
MCHC RBC AUTO-ENTMCNC: 32 G/DL (ref 31–36)
MCV RBC AUTO: 89.8 FL (ref 80–100)
MONOCYTES # BLD: 0.2 K/UL (ref 0–1.3)
MONOCYTES NFR BLD: 22.1 %
NEUTROPHILS # BLD: 0.4 K/UL (ref 1.7–7.7)
NEUTROPHILS NFR BLD: 40 %
PHOSPHATE SERPL-MCNC: 4.3 MG/DL (ref 2.5–4.9)
PLATELET # BLD AUTO: 113 K/UL (ref 135–450)
PMV BLD AUTO: 7 FL (ref 5–10.5)
POTASSIUM SERPL-SCNC: 3.6 MMOL/L (ref 3.5–5.1)
PROT SERPL-MCNC: 6 G/DL (ref 6.4–8.2)
RBC # BLD AUTO: 3.16 M/UL (ref 4–5.2)
SODIUM SERPL-SCNC: 145 MMOL/L (ref 136–145)
URATE SERPL-MCNC: 5.4 MG/DL (ref 2.6–6)
WBC # BLD AUTO: 0.9 K/UL (ref 4–11)

## 2024-12-13 PROCEDURE — 84100 ASSAY OF PHOSPHORUS: CPT

## 2024-12-13 PROCEDURE — 83615 LACTATE (LD) (LDH) ENZYME: CPT

## 2024-12-13 PROCEDURE — 36591 DRAW BLOOD OFF VENOUS DEVICE: CPT

## 2024-12-13 PROCEDURE — 96372 THER/PROPH/DIAG INJ SC/IM: CPT

## 2024-12-13 PROCEDURE — 83735 ASSAY OF MAGNESIUM: CPT

## 2024-12-13 PROCEDURE — 80048 BASIC METABOLIC PNL TOTAL CA: CPT

## 2024-12-13 PROCEDURE — 85025 COMPLETE CBC W/AUTO DIFF WBC: CPT

## 2024-12-13 PROCEDURE — 84550 ASSAY OF BLOOD/URIC ACID: CPT

## 2024-12-13 PROCEDURE — 6360000002 HC RX W HCPCS: Performed by: INTERNAL MEDICINE

## 2024-12-13 PROCEDURE — 82728 ASSAY OF FERRITIN: CPT

## 2024-12-13 PROCEDURE — 86140 C-REACTIVE PROTEIN: CPT

## 2024-12-13 PROCEDURE — 80076 HEPATIC FUNCTION PANEL: CPT

## 2024-12-13 RX ORDER — OXYCODONE HYDROCHLORIDE 5 MG/1
5 TABLET ORAL EVERY 4 HOURS PRN
OUTPATIENT
Start: 2024-12-14

## 2024-12-13 RX ORDER — POTASSIUM CHLORIDE 29.8 MG/ML
80 INJECTION INTRAVENOUS PRN
OUTPATIENT
Start: 2024-12-14 | End: 2025-01-13

## 2024-12-13 RX ORDER — SODIUM CHLORIDE 9 MG/ML
INJECTION, SOLUTION INTRAVENOUS CONTINUOUS PRN
Status: DISCONTINUED | OUTPATIENT
Start: 2024-12-13 | End: 2024-12-14 | Stop reason: HOSPADM

## 2024-12-13 RX ORDER — OXYCODONE HYDROCHLORIDE 5 MG/1
5 TABLET ORAL EVERY 4 HOURS PRN
Status: DISCONTINUED | OUTPATIENT
Start: 2024-12-13 | End: 2024-12-14 | Stop reason: HOSPADM

## 2024-12-13 RX ORDER — PROCHLORPERAZINE MALEATE 10 MG
10 TABLET ORAL EVERY 6 HOURS PRN
OUTPATIENT
Start: 2024-12-14

## 2024-12-13 RX ORDER — MAGNESIUM SULFATE IN WATER 40 MG/ML
4000 INJECTION, SOLUTION INTRAVENOUS PRN
OUTPATIENT
Start: 2024-12-14

## 2024-12-13 RX ORDER — ONDANSETRON 2 MG/ML
8 INJECTION INTRAMUSCULAR; INTRAVENOUS EVERY 8 HOURS PRN
Status: DISCONTINUED | OUTPATIENT
Start: 2024-12-13 | End: 2024-12-14 | Stop reason: HOSPADM

## 2024-12-13 RX ORDER — ONDANSETRON 4 MG/1
8 TABLET, FILM COATED ORAL EVERY 8 HOURS PRN
OUTPATIENT
Start: 2024-12-14

## 2024-12-13 RX ORDER — PROCHLORPERAZINE EDISYLATE 5 MG/ML
10 INJECTION INTRAMUSCULAR; INTRAVENOUS EVERY 6 HOURS PRN
Status: DISCONTINUED | OUTPATIENT
Start: 2024-12-13 | End: 2024-12-14 | Stop reason: HOSPADM

## 2024-12-13 RX ORDER — PROCHLORPERAZINE MALEATE 10 MG
10 TABLET ORAL EVERY 6 HOURS PRN
Status: DISCONTINUED | OUTPATIENT
Start: 2024-12-13 | End: 2024-12-14 | Stop reason: HOSPADM

## 2024-12-13 RX ORDER — MAGNESIUM SULFATE IN WATER 40 MG/ML
4000 INJECTION, SOLUTION INTRAVENOUS PRN
Status: DISCONTINUED | OUTPATIENT
Start: 2024-12-13 | End: 2024-12-14 | Stop reason: HOSPADM

## 2024-12-13 RX ORDER — HEPARIN 100 UNIT/ML
500 SYRINGE INTRAVENOUS PRN
Status: DISCONTINUED | OUTPATIENT
Start: 2024-12-13 | End: 2024-12-14 | Stop reason: HOSPADM

## 2024-12-13 RX ORDER — ONDANSETRON 2 MG/ML
8 INJECTION INTRAMUSCULAR; INTRAVENOUS EVERY 8 HOURS PRN
OUTPATIENT
Start: 2024-12-14

## 2024-12-13 RX ORDER — POTASSIUM CHLORIDE 29.8 MG/ML
80 INJECTION INTRAVENOUS PRN
Status: DISCONTINUED | OUTPATIENT
Start: 2024-12-13 | End: 2024-12-14 | Stop reason: HOSPADM

## 2024-12-13 RX ORDER — PROCHLORPERAZINE EDISYLATE 5 MG/ML
10 INJECTION INTRAMUSCULAR; INTRAVENOUS EVERY 6 HOURS PRN
OUTPATIENT
Start: 2024-12-14

## 2024-12-13 RX ORDER — ONDANSETRON 4 MG/1
8 TABLET, FILM COATED ORAL EVERY 8 HOURS PRN
Status: DISCONTINUED | OUTPATIENT
Start: 2024-12-13 | End: 2024-12-14 | Stop reason: HOSPADM

## 2024-12-13 RX ORDER — SODIUM CHLORIDE 9 MG/ML
INJECTION, SOLUTION INTRAVENOUS CONTINUOUS PRN
OUTPATIENT
Start: 2024-12-14

## 2024-12-13 RX ORDER — OXYCODONE HYDROCHLORIDE 5 MG/1
10 TABLET ORAL EVERY 4 HOURS PRN
OUTPATIENT
Start: 2024-12-14

## 2024-12-13 RX ORDER — POTASSIUM CHLORIDE 1500 MG/1
40 TABLET, EXTENDED RELEASE ORAL PRN
Status: DISCONTINUED | OUTPATIENT
Start: 2024-12-13 | End: 2024-12-14 | Stop reason: HOSPADM

## 2024-12-13 RX ORDER — HEPARIN 100 UNIT/ML
500 SYRINGE INTRAVENOUS PRN
OUTPATIENT
Start: 2024-12-14

## 2024-12-13 RX ORDER — POTASSIUM CHLORIDE 1500 MG/1
40 TABLET, EXTENDED RELEASE ORAL PRN
OUTPATIENT
Start: 2024-12-14

## 2024-12-13 RX ORDER — OXYCODONE HYDROCHLORIDE 5 MG/1
10 TABLET ORAL EVERY 4 HOURS PRN
Status: DISCONTINUED | OUTPATIENT
Start: 2024-12-13 | End: 2024-12-14 | Stop reason: HOSPADM

## 2024-12-13 RX ADMIN — FILGRASTIM-AAFI 300 MCG: 300 INJECTION, SOLUTION SUBCUTANEOUS at 10:26

## 2024-12-13 NOTE — PROGRESS NOTES
Short Stay Communication Note  Farhad Balderas  Diagnosis: multiple myeloma  Primary MD: Dr. GURPREET Morton  Treatment: Fludarabine/Cytoxan  CAR-T Administration Date: 11/18/24  Day +25 of carvykti  Pt seen in outpatient infusion today. Labs drawn and reviewed.   CBC:   Recent Labs     12/11/24  0904 12/13/24  0850   WBC 2.8* 0.9*   HGB 9.2* 9.1*   HCT 28.5* 28.4*   MCV 90.0 89.8   * 113*     BMP/Mag:  Recent Labs     12/11/24  0904 12/13/24  0850    145   K 3.5 3.6    108   CO2 26 27   PHOS 4.6 4.3   BUN 9 9   CREATININE 0.7 0.8   MG 1.74* 1.71*     Standing parameters for replacement for this patient:   1 unit of pack red blood cells for a hemoglobin < or equal to 7  1 pack of platelets for a platelet count less than or equal to 10  40 MeQ of Potassium administered for a potassium level less than or equal to 3.4  4g of Magnesium Sulfate for a magnesum level less than or equal to 1.4  No transfusions required for the above lab values.    Urinalysis last done: 12/9/24 Urinalysis next due: 12/16/24    Chest X-Ray last done: 12/9/24 Chest X-Ray next due: 12/16/24    Symptoms addressed and reported to care team this date: none   Treatments this date: labs, Grannex    Reviewed medication schedule with pt and caregiver. Both able to verbalize all medications and schedule. Pt to be seen again Monday 12/16/2024. Patient and caregiver verbalized understanding of discharge instructions including when and how to call the doctor and when to report  to the ER. Discharged ambulatory to home.     Lety Crawford RN    
12/13/24  0850    145   K 3.5 3.6    108   CO2 26 27   PHOS 4.6 4.3   BUN 9 9   CREATININE 0.7 0.8   MG 1.74* 1.71*     LIVP:   Recent Labs     12/11/24  0904 12/13/24  0850   AST 16 18   ALT 15 15   BILIDIR <0.1 <0.1   BILITOT 0.3 <0.2   ALKPHOS 46 47     Coags:   No results for input(s): \"PROTIME\", \"INR\", \"APTT\" in the last 72 hours.      Uric Acid No results for input(s): \"LABURIC\" in the last 72 hours.  Lab Results   Component Value Date    CRP <3.0 12/11/2024    CRP <3.0 12/09/2024    CRP <3.0 12/06/2024     Lab Results   Component Value Date    FERRITIN 423.0 (H) 12/11/2024    FERRITIN 538.0 (H) 12/09/2024    FERRITIN 750.0 (H) 12/06/2024         PROBLEM LIST:          1. IgG Kappa Multiple myeloma  2. Dental disorder  3. HTN  4. Intestinal malabsorption (disorder)  5. Iron deficiency anemia due to chronic blood loss      TREATMENT:            1. RVD x 5 cycles (12/2021 - 5/16/22)  2. High Dose Melphalan f/b autologous SCT (7/1/22) - 3.66 x10^6 [id number]/kg  3. Revlimid maintenance  4. Eden/Pom/Dex on clinical trial (C1D1 2/7/24) x 8 cycles - progression  5. 1 cycle of kyprolis, selinexor for bridging  6.     Lymphodepleting Chemotherapy:  Fludarabine and cyclophosphamide 11/13/24 - 11/15/24  Disease Status at time of Infusion:  Relapse with unconfirmed progression   CAR-T Infusion Date: 11/18/24  CAR-T Product: Carvykti   ENR#: [id number]       ASSESSMENT AND PLAN:        1. Multiple myeloma, IgG Kappa, ISS 3:  - t(14;20), gain of 1q21, monosomy 13.    - s/p HD [id number] and ASCT (7/1/22) followed by Rev maintenance   - PET (1/31/24): No evidence of any definitive hypermetabolic neoplastic disease. Mild diffuse hypermetabolic activity identified within the spine, pelvis and femoral diaphysis is without corresponding CT abnormality therefore favors physiologic marrow activity. Diffuse infiltrating marrow process is a differential consideration. 3. Spleen activity representative of possible

## 2024-12-16 ENCOUNTER — HOSPITAL ENCOUNTER (OUTPATIENT)
Dept: ONCOLOGY | Age: 54
Setting detail: INFUSION SERIES
Discharge: HOME | End: 2024-12-16
Payer: COMMERCIAL

## 2024-12-16 ENCOUNTER — HOSPITAL ENCOUNTER (OUTPATIENT)
Dept: GENERAL RADIOLOGY | Age: 54
Discharge: HOME | End: 2024-12-16
Payer: COMMERCIAL

## 2024-12-16 VITALS
TEMPERATURE: 97.7 F | OXYGEN SATURATION: 97 % | RESPIRATION RATE: 16 BRPM | BODY MASS INDEX: 42.04 KG/M2 | WEIGHT: 252.65 LBS | SYSTOLIC BLOOD PRESSURE: 154 MMHG | DIASTOLIC BLOOD PRESSURE: 60 MMHG | HEART RATE: 85 BPM

## 2024-12-16 DIAGNOSIS — C90.00 MULTIPLE MYELOMA, REMISSION STATUS UNSPECIFIED (HCC): Primary | ICD-10-CM

## 2024-12-16 LAB
ALBUMIN SERPL-MCNC: 4.4 G/DL (ref 3.4–5)
ALP SERPL-CCNC: 52 U/L (ref 40–129)
ALT SERPL-CCNC: 22 U/L (ref 10–40)
AMORPH SED URNS QL MICRO: ABNORMAL /HPF
ANION GAP SERPL CALCULATED.3IONS-SCNC: 10 MMOL/L (ref 3–16)
AST SERPL-CCNC: 23 U/L (ref 15–37)
BACTERIA URNS QL MICRO: ABNORMAL /HPF
BASOPHILS # BLD: 0 K/UL (ref 0–0.2)
BASOPHILS NFR BLD: 1.1 %
BILIRUB DIRECT SERPL-MCNC: 0.2 MG/DL (ref 0–0.3)
BILIRUB INDIRECT SERPL-MCNC: 0.1 MG/DL (ref 0–1)
BILIRUB SERPL-MCNC: 0.3 MG/DL (ref 0–1)
BILIRUB UR QL STRIP.AUTO: NEGATIVE
BUN SERPL-MCNC: 10 MG/DL (ref 7–20)
CALCIUM SERPL-MCNC: 8.8 MG/DL (ref 8.3–10.6)
CHLORIDE SERPL-SCNC: 107 MMOL/L (ref 99–110)
CLARITY UR: ABNORMAL
CO2 SERPL-SCNC: 28 MMOL/L (ref 21–32)
COLOR UR: YELLOW
CREAT SERPL-MCNC: 0.8 MG/DL (ref 0.6–1.1)
CRP SERPL-MCNC: <3 MG/L (ref 0–5.1)
DEPRECATED RDW RBC AUTO: 22 % (ref 12.4–15.4)
EOSINOPHIL # BLD: 0.1 K/UL (ref 0–0.6)
EOSINOPHIL NFR BLD: 5.1 %
EPI CELLS #/AREA URNS HPF: ABNORMAL /HPF (ref 0–5)
FERRITIN SERPL IA-MCNC: 278 NG/ML (ref 15–150)
GFR SERPLBLD CREATININE-BSD FMLA CKD-EPI: 87 ML/MIN/{1.73_M2}
GLUCOSE SERPL-MCNC: 115 MG/DL (ref 70–99)
GLUCOSE UR STRIP.AUTO-MCNC: NEGATIVE MG/DL
HCT VFR BLD AUTO: 29 % (ref 36–48)
HGB BLD-MCNC: 9.6 G/DL (ref 12–16)
HGB UR QL STRIP.AUTO: NEGATIVE
INR PPP: 1.07 (ref 0.85–1.15)
KETONES UR STRIP.AUTO-MCNC: NEGATIVE MG/DL
LDH SERPL L TO P-CCNC: 214 U/L (ref 100–190)
LEUKOCYTE ESTERASE UR QL STRIP.AUTO: ABNORMAL
LYMPHOCYTES # BLD: 0.3 K/UL (ref 1–5.1)
LYMPHOCYTES NFR BLD: 24.4 %
MAGNESIUM SERPL-MCNC: 1.82 MG/DL (ref 1.8–2.4)
MCH RBC QN AUTO: 30.1 PG (ref 26–34)
MCHC RBC AUTO-ENTMCNC: 33.2 G/DL (ref 31–36)
MCV RBC AUTO: 90.8 FL (ref 80–100)
MONOCYTES # BLD: 0.4 K/UL (ref 0–1.3)
MONOCYTES NFR BLD: 28.7 %
NEUTROPHILS # BLD: 0.5 K/UL (ref 1.7–7.7)
NEUTROPHILS NFR BLD: 40.7 %
NITRITE UR QL STRIP.AUTO: NEGATIVE
PH UR STRIP.AUTO: 6 [PH] (ref 5–8)
PHOSPHATE SERPL-MCNC: 3.8 MG/DL (ref 2.5–4.9)
PLATELET # BLD AUTO: 106 K/UL (ref 135–450)
PMV BLD AUTO: 7.7 FL (ref 5–10.5)
POTASSIUM SERPL-SCNC: 3.3 MMOL/L (ref 3.5–5.1)
PROT SERPL-MCNC: 6 G/DL (ref 6.4–8.2)
PROT UR STRIP.AUTO-MCNC: ABNORMAL MG/DL
PROTHROMBIN TIME: 14.1 SEC (ref 11.9–14.9)
RBC # BLD AUTO: 3.2 M/UL (ref 4–5.2)
RBC #/AREA URNS HPF: ABNORMAL /HPF (ref 0–4)
SODIUM SERPL-SCNC: 145 MMOL/L (ref 136–145)
SP GR UR STRIP.AUTO: >=1.03 (ref 1–1.03)
UA DIPSTICK W REFLEX MICRO PNL UR: YES
URATE SERPL-MCNC: 6.1 MG/DL (ref 2.6–6)
URN SPEC COLLECT METH UR: ABNORMAL
UROBILINOGEN UR STRIP-ACNC: 0.2 E.U./DL
WBC # BLD AUTO: 1.3 K/UL (ref 4–11)
WBC #/AREA URNS HPF: ABNORMAL /HPF (ref 0–5)

## 2024-12-16 PROCEDURE — 6360000002 HC RX W HCPCS: Performed by: NURSE PRACTITIONER

## 2024-12-16 PROCEDURE — 84100 ASSAY OF PHOSPHORUS: CPT

## 2024-12-16 PROCEDURE — 71045 X-RAY EXAM CHEST 1 VIEW: CPT

## 2024-12-16 PROCEDURE — 81001 URINALYSIS AUTO W/SCOPE: CPT

## 2024-12-16 PROCEDURE — 85610 PROTHROMBIN TIME: CPT

## 2024-12-16 PROCEDURE — 84550 ASSAY OF BLOOD/URIC ACID: CPT

## 2024-12-16 PROCEDURE — 36591 DRAW BLOOD OFF VENOUS DEVICE: CPT

## 2024-12-16 PROCEDURE — 86140 C-REACTIVE PROTEIN: CPT

## 2024-12-16 PROCEDURE — 80076 HEPATIC FUNCTION PANEL: CPT

## 2024-12-16 PROCEDURE — 83615 LACTATE (LD) (LDH) ENZYME: CPT

## 2024-12-16 PROCEDURE — 82728 ASSAY OF FERRITIN: CPT

## 2024-12-16 PROCEDURE — 85025 COMPLETE CBC W/AUTO DIFF WBC: CPT

## 2024-12-16 PROCEDURE — 80048 BASIC METABOLIC PNL TOTAL CA: CPT

## 2024-12-16 PROCEDURE — 83735 ASSAY OF MAGNESIUM: CPT

## 2024-12-16 PROCEDURE — 6370000000 HC RX 637 (ALT 250 FOR IP): Performed by: NURSE PRACTITIONER

## 2024-12-16 PROCEDURE — 96372 THER/PROPH/DIAG INJ SC/IM: CPT

## 2024-12-16 RX ORDER — PROCHLORPERAZINE EDISYLATE 5 MG/ML
10 INJECTION INTRAMUSCULAR; INTRAVENOUS EVERY 6 HOURS PRN
Status: DISCONTINUED | OUTPATIENT
Start: 2024-12-16 | End: 2024-12-17 | Stop reason: HOSPADM

## 2024-12-16 RX ORDER — ONDANSETRON 2 MG/ML
8 INJECTION INTRAMUSCULAR; INTRAVENOUS EVERY 8 HOURS PRN
OUTPATIENT
Start: 2024-12-17

## 2024-12-16 RX ORDER — PROCHLORPERAZINE MALEATE 10 MG
10 TABLET ORAL EVERY 6 HOURS PRN
Status: DISCONTINUED | OUTPATIENT
Start: 2024-12-16 | End: 2024-12-17 | Stop reason: HOSPADM

## 2024-12-16 RX ORDER — MAGNESIUM SULFATE IN WATER 40 MG/ML
4000 INJECTION, SOLUTION INTRAVENOUS PRN
Status: DISCONTINUED | OUTPATIENT
Start: 2024-12-16 | End: 2024-12-17 | Stop reason: HOSPADM

## 2024-12-16 RX ORDER — OXYCODONE HYDROCHLORIDE 5 MG/1
10 TABLET ORAL EVERY 4 HOURS PRN
OUTPATIENT
Start: 2024-12-17

## 2024-12-16 RX ORDER — ONDANSETRON 4 MG/1
8 TABLET, FILM COATED ORAL EVERY 8 HOURS PRN
Status: DISCONTINUED | OUTPATIENT
Start: 2024-12-16 | End: 2024-12-17 | Stop reason: HOSPADM

## 2024-12-16 RX ORDER — OXYCODONE HYDROCHLORIDE 5 MG/1
5 TABLET ORAL EVERY 4 HOURS PRN
Status: DISCONTINUED | OUTPATIENT
Start: 2024-12-16 | End: 2024-12-17 | Stop reason: HOSPADM

## 2024-12-16 RX ORDER — SODIUM CHLORIDE 9 MG/ML
INJECTION, SOLUTION INTRAVENOUS CONTINUOUS PRN
OUTPATIENT
Start: 2024-12-17

## 2024-12-16 RX ORDER — SODIUM CHLORIDE 9 MG/ML
INJECTION, SOLUTION INTRAVENOUS CONTINUOUS PRN
Status: DISCONTINUED | OUTPATIENT
Start: 2024-12-16 | End: 2024-12-17 | Stop reason: HOSPADM

## 2024-12-16 RX ORDER — POTASSIUM CHLORIDE 1500 MG/1
40 TABLET, EXTENDED RELEASE ORAL PRN
OUTPATIENT
Start: 2024-12-17

## 2024-12-16 RX ORDER — MAGNESIUM SULFATE IN WATER 40 MG/ML
4000 INJECTION, SOLUTION INTRAVENOUS PRN
OUTPATIENT
Start: 2024-12-17

## 2024-12-16 RX ORDER — HEPARIN 100 UNIT/ML
500 SYRINGE INTRAVENOUS PRN
OUTPATIENT
Start: 2024-12-17

## 2024-12-16 RX ORDER — ONDANSETRON 4 MG/1
8 TABLET, FILM COATED ORAL EVERY 8 HOURS PRN
OUTPATIENT
Start: 2024-12-17

## 2024-12-16 RX ORDER — POTASSIUM CHLORIDE 29.8 MG/ML
80 INJECTION INTRAVENOUS PRN
OUTPATIENT
Start: 2024-12-17 | End: 2025-01-16

## 2024-12-16 RX ORDER — PROCHLORPERAZINE MALEATE 10 MG
10 TABLET ORAL EVERY 6 HOURS PRN
OUTPATIENT
Start: 2024-12-17

## 2024-12-16 RX ORDER — ONDANSETRON 2 MG/ML
8 INJECTION INTRAMUSCULAR; INTRAVENOUS EVERY 8 HOURS PRN
Status: DISCONTINUED | OUTPATIENT
Start: 2024-12-16 | End: 2024-12-17 | Stop reason: HOSPADM

## 2024-12-16 RX ORDER — POTASSIUM CHLORIDE 29.8 MG/ML
80 INJECTION INTRAVENOUS PRN
Status: DISCONTINUED | OUTPATIENT
Start: 2024-12-16 | End: 2024-12-17 | Stop reason: HOSPADM

## 2024-12-16 RX ORDER — OXYCODONE HYDROCHLORIDE 5 MG/1
5 TABLET ORAL EVERY 4 HOURS PRN
OUTPATIENT
Start: 2024-12-17

## 2024-12-16 RX ORDER — OXYCODONE HYDROCHLORIDE 5 MG/1
10 TABLET ORAL EVERY 4 HOURS PRN
Status: DISCONTINUED | OUTPATIENT
Start: 2024-12-16 | End: 2024-12-17 | Stop reason: HOSPADM

## 2024-12-16 RX ORDER — PROCHLORPERAZINE EDISYLATE 5 MG/ML
10 INJECTION INTRAMUSCULAR; INTRAVENOUS EVERY 6 HOURS PRN
OUTPATIENT
Start: 2024-12-17

## 2024-12-16 RX ORDER — POTASSIUM CHLORIDE 1500 MG/1
40 TABLET, EXTENDED RELEASE ORAL PRN
Status: DISCONTINUED | OUTPATIENT
Start: 2024-12-16 | End: 2024-12-17 | Stop reason: HOSPADM

## 2024-12-16 RX ADMIN — POTASSIUM CHLORIDE 40 MEQ: 1500 TABLET, EXTENDED RELEASE ORAL at 10:10

## 2024-12-16 RX ADMIN — FILGRASTIM-AAFI 300 MCG: 300 INJECTION, SOLUTION SUBCUTANEOUS at 10:10

## 2024-12-16 NOTE — PROGRESS NOTES
Short Stay Communication Note  Farhad Balderas  Diagnosis: multiple myeloma  Primary MD: Dr. GURPREET Morton  Treatment: Fludarabine/Cytoxan  CAR-T Administration Date: 11/18/24  Day +28 of carvykti  Pt seen in outpatient infusion today. Labs drawn and reviewed.   CBC:   Recent Labs     12/16/24  0839   WBC 1.3*   HGB 9.6*   HCT 29.0*   MCV 90.8   *     BMP/Mag:  Recent Labs     12/16/24  0839      K 3.3*      CO2 28   PHOS 3.8   BUN 10   CREATININE 0.8   MG 1.82     Standing parameters for replacement for this patient:   1 unit of pack red blood cells for a hemoglobin < or equal to 7  1 pack of platelets for a platelet count less than or equal to 10  40 MeQ of Potassium administered for a potassium level less than or equal to 3.4  4g of Magnesium Sulfate for a magnesum level less than or equal to 1.4  No transfusions required for the above lab values.    Urinalysis last done: 12/16/24   Chest X-Ray last done: 12/16/24     Symptoms addressed and reported to care team this date: none   Treatments this date: labs, Granix    Reviewed medication schedule with pt and caregiver. Both able to verbalize all medications and schedule. Pt to be seen at [facility] for next visit. Patient and caregiver verbalized understanding of discharge instructions including when and how to call the doctor and when to report  to the ER. Discharged ambulatory to home.     Alexsandra Fields RN    
bridging  - [facility] Fludarabine and cyclophosphamide (11/13/24 - 11/15/24) followed by Carvykti CAR-T Infusion on 11/18/24          Carvykti CAR-T   Day + 28     2. CRS / Neuro: At risk post-infusion  History:  Toci: 11/27 and 11/28     CRS Grade: none     Lab Results   Component Value Date    CRP <3.0 12/16/2024    CRP <3.0 12/13/2024    CRP <3.0 12/11/2024      Lab Results   Component Value Date    FERRITIN 278.0 (H) 12/16/2024    FERRITIN 340.0 (H) 12/13/2024    FERRITIN 423.0 (H) 12/11/2024          ICANS Grade:  None  CAR-T Score: 10  - Neuro checks w/ CARTOX 10-point assessment Q4hrs  - Cont Keppra      3.  ID:   History   Neutropenic fever   -BCX: NG   - CXray- No acute changes  - COVID and flu negative  - MRSA swab 11/27/2024 is negative       Current Tx/PPx:  - Cont Valtrex ppx    Post CAR- T monitoring / maintenance:  - IgG & CD4 level monthly starting on day + 30  - Check disease titers on day + 30 or when WBC recovered. Re-vaccinate vs booster based on titer (assure titers are drawn prior to administering immunoglobulins)  - Start PCP ppx on day + 30 - stop when CD4 > 250  - Cont Valtrex for 1 year post CAR-T     4. Heme: Neutropenia and anemia 2/2 chemotherapy  - Transfuse for Hgb < 7 and Platelets < 10K  - No transfusion today  - GCSF 12/16/24    5. Metabolic:     - Encourage PO intake     6. Endocrine:  - H/o Type 2 DM  - Currently off metformin  - Hgb A1c (1/6/22): 6.1  - Will monitor daily BG     7. GI / Nutrition:    - H/o esophagitis  Nutrition:    - Cont low microbial diet  GERD:   - Cont PPI daily   Nausea / Vomiting:   - Cont Compazine PRN    8. Poor dentition:    - S/p partial extractions     9. Pain/peripheral neuropathy  - Flexeril prn   - Continue gabapentin 300 mg PO TID    10. Cardiac: HTN  - amlodipine 5 mg daily--> HELD during admission during period of fever and 'soft' BP. Did not resume at D/C   - lisinopril 40mg daily--> HELD during admission during period of fever and 'soft' BP. Did not

## 2024-12-23 ENCOUNTER — HOSPITAL ENCOUNTER (OUTPATIENT)
Dept: GENERAL RADIOLOGY | Age: 54
Discharge: HOME | End: 2024-12-23
Payer: COMMERCIAL

## 2024-12-23 DIAGNOSIS — R05.9 COUGH, UNSPECIFIED TYPE: ICD-10-CM

## 2024-12-23 DIAGNOSIS — Z85.79 H/O MULTIPLE MYELOMA: ICD-10-CM

## 2024-12-23 PROCEDURE — 71046 X-RAY EXAM CHEST 2 VIEWS: CPT

## 2025-03-05 ENCOUNTER — HOSPITAL ENCOUNTER (OUTPATIENT)
Age: 55
Setting detail: SPECIMEN
Discharge: HOME | End: 2025-03-05
Payer: COMMERCIAL

## 2025-03-05 ENCOUNTER — HOSPITAL ENCOUNTER (OUTPATIENT)
Dept: ONCOLOGY | Age: 55
Setting detail: INFUSION SERIES
Discharge: HOME | End: 2025-03-05
Payer: COMMERCIAL

## 2025-03-05 VITALS
TEMPERATURE: 97.8 F | OXYGEN SATURATION: 96 % | HEART RATE: 77 BPM | SYSTOLIC BLOOD PRESSURE: 126 MMHG | RESPIRATION RATE: 15 BRPM | DIASTOLIC BLOOD PRESSURE: 76 MMHG

## 2025-03-05 LAB
BASOPHILS # BLD: 0 K/UL (ref 0–0.2)
BASOPHILS NFR BLD: 0.5 %
DEPRECATED RDW RBC AUTO: 16 % (ref 12.4–15.4)
EOSINOPHIL # BLD: 0.2 K/UL (ref 0–0.6)
EOSINOPHIL NFR BLD: 4.9 %
HCT VFR BLD AUTO: 34.4 % (ref 36–48)
HGB BLD-MCNC: 11 G/DL (ref 12–16)
LYMPHOCYTES # BLD: 0.7 K/UL (ref 1–5.1)
LYMPHOCYTES NFR BLD: 17 %
MCH RBC QN AUTO: 26.5 PG (ref 26–34)
MCHC RBC AUTO-ENTMCNC: 31.9 G/DL (ref 31–36)
MCV RBC AUTO: 83 FL (ref 80–100)
MONOCYTES # BLD: 0.6 K/UL (ref 0–1.3)
MONOCYTES NFR BLD: 14.4 %
NEUTROPHILS # BLD: 2.5 K/UL (ref 1.7–7.7)
NEUTROPHILS NFR BLD: 63.2 %
ORGANISM: ABNORMAL
PLATELET # BLD AUTO: 201 K/UL (ref 135–450)
PMV BLD AUTO: 7.2 FL (ref 5–10.5)
RBC # BLD AUTO: 4.15 M/UL (ref 4–5.2)
REPORT: NORMAL
RESP PATH DNA+RNA PNL NPH NAA+NON-PROBE: ABNORMAL
WBC # BLD AUTO: 3.9 K/UL (ref 4–11)

## 2025-03-05 PROCEDURE — 88184 FLOWCYTOMETRY/ TC 1 MARKER: CPT

## 2025-03-05 PROCEDURE — 88305 TISSUE EXAM BY PATHOLOGIST: CPT

## 2025-03-05 PROCEDURE — 0202U NFCT DS 22 TRGT SARS-COV-2: CPT

## 2025-03-05 PROCEDURE — 99152 MOD SED SAME PHYS/QHP 5/>YRS: CPT

## 2025-03-05 PROCEDURE — 96374 THER/PROPH/DIAG INJ IV PUSH: CPT

## 2025-03-05 PROCEDURE — 38221 DX BONE MARROW BIOPSIES: CPT

## 2025-03-05 PROCEDURE — 85025 COMPLETE CBC W/AUTO DIFF WBC: CPT

## 2025-03-05 PROCEDURE — 96376 TX/PRO/DX INJ SAME DRUG ADON: CPT

## 2025-03-05 PROCEDURE — 88342 IMHCHEM/IMCYTCHM 1ST ANTB: CPT

## 2025-03-05 PROCEDURE — 6360000002 HC RX W HCPCS: Performed by: NURSE PRACTITIONER

## 2025-03-05 PROCEDURE — 88311 DECALCIFY TISSUE: CPT

## 2025-03-05 PROCEDURE — 96375 TX/PRO/DX INJ NEW DRUG ADDON: CPT

## 2025-03-05 PROCEDURE — 88185 FLOWCYTOMETRY/TC ADD-ON: CPT

## 2025-03-05 PROCEDURE — 88313 SPECIAL STAINS GROUP 2: CPT

## 2025-03-05 PROCEDURE — 36591 DRAW BLOOD OFF VENOUS DEVICE: CPT

## 2025-03-05 RX ORDER — FENTANYL CITRATE 50 UG/ML
25 INJECTION, SOLUTION INTRAMUSCULAR; INTRAVENOUS PRN
Status: DISCONTINUED | OUTPATIENT
Start: 2025-03-05 | End: 2025-03-06 | Stop reason: HOSPADM

## 2025-03-05 RX ORDER — FLUMAZENIL 0.1 MG/ML
0.5 INJECTION INTRAVENOUS PRN
Status: DISCONTINUED | OUTPATIENT
Start: 2025-03-05 | End: 2025-03-06 | Stop reason: HOSPADM

## 2025-03-05 RX ORDER — NALOXONE HYDROCHLORIDE 0.4 MG/ML
0.4 INJECTION, SOLUTION INTRAMUSCULAR; INTRAVENOUS; SUBCUTANEOUS PRN
Status: DISCONTINUED | OUTPATIENT
Start: 2025-03-05 | End: 2025-03-06 | Stop reason: HOSPADM

## 2025-03-05 RX ORDER — MIDAZOLAM HYDROCHLORIDE 1 MG/ML
1 INJECTION, SOLUTION INTRAMUSCULAR; INTRAVENOUS PRN
Status: DISCONTINUED | OUTPATIENT
Start: 2025-03-05 | End: 2025-03-06 | Stop reason: HOSPADM

## 2025-03-05 RX ORDER — LIDOCAINE HYDROCHLORIDE 10 MG/ML
30 INJECTION, SOLUTION INFILTRATION; PERINEURAL
Status: DISCONTINUED | OUTPATIENT
Start: 2025-03-05 | End: 2025-03-06 | Stop reason: HOSPADM

## 2025-03-05 RX ADMIN — MIDAZOLAM HYDROCHLORIDE 1 MG: 1 INJECTION, SOLUTION INTRAMUSCULAR; INTRAVENOUS at 09:07

## 2025-03-05 RX ADMIN — FENTANYL CITRATE 25 MCG: 50 INJECTION, SOLUTION INTRAMUSCULAR; INTRAVENOUS at 09:04

## 2025-03-05 RX ADMIN — FENTANYL CITRATE 50 MCG: 50 INJECTION, SOLUTION INTRAMUSCULAR; INTRAVENOUS at 09:00

## 2025-03-05 RX ADMIN — MIDAZOLAM HYDROCHLORIDE 3 MG: 1 INJECTION, SOLUTION INTRAMUSCULAR; INTRAVENOUS at 09:00

## 2025-03-05 NOTE — PROGRESS NOTES
Pt scheduled for bone marrow biopsy with conscious sedation for diagnosis of multiple myeloma.  Pt's history, allergies, and medication list reviewed by nursing and physician prior to start of procedure.  Pt assessed and deemed fit for procedure by Dr. Niki Morton.       Pre-Procedural Assessment:  Signed, Dated, and timed Informed Consent on the chart  VS's obtained and documented  Pt has a patent IV site (see flowsheets)  Pt has been NPO since 03/05/2025  Current Medications, Allergies, and recent H&P reviewed and on chart  Emergency medications and equipment available (crash cart, narcan, flumazenil, O2, suction, etc)  Time out performed prior to procedure (see sedation documentation)  Pre-Procedure Shyanne Score: 10     Procedure:  Nursing present throughout procedure to assess, assist, and monitor.  Vital Signs monitored throughout - see sedation documentation. Patient to be discharged from OPO once Shyanne Score of 8 or better is achieved or once pre-procedural Shyanne Score is obtained.     Medications Administered:  Versed 4 mg IVP  Fentanyl 75 mg IVP     Post-Procedure:  Pt vital signs stable.  Discharge teaching provided (see AVS).  Post procedure Shyanne Score 10.  Pt and family verbalized understanding of all instructions.  Pt discharged via wheelchair, wheeled by infusion staff at the main entrance. Uncle who will drive them home.

## 2025-03-06 NOTE — PROCEDURES
PROCEDURE NOTE  Date: 3/6/2025   Name: Farhad Balderas  YOB: 1970  Bone Marrow Core & Aspirate with Moderate Sedation    Diagnosis: multiple myeloma s/p CAR-T  Indication: s/p day 100 post CAR-T  Planned Sedation:  Fentanyl: 75 mcg  Versed: 4 mg   Ativan: zero  Pre-Procedure ASA Score: Class II - mild to moderate systemic disturbance, well-controlled  Airway Assessment Score: II (soft palate, uvula, fauces visible)    Consent:   Consent was obtained from the patient by:   1. Explaining the risks associated with biopsy [bleeding and bruising] and moderate sedation [respiratory depression and sedation], as well as the benefits [an understanding of the current diagnosis to make treatment decisions], and alternatives to biopsy with sedation.  2. The patient voiced an understanding of the risks/benefits and the answers to their questions, then proceeded to sign and date the consent form.     Assessment:  Patient was assessed by myself prior to initiation of sedation and procedure and is deemed medically fit to undergo the procedure.  Recent H&P, current medications, and allergies are on the chart and reviewed.  Time out performed.    Procedure:  Patient placed in the left lateral decubitus position.  The area was prepped with chloraprep. Sterile drapes were applied. A puncture was made with the provided scalpel, then using a Jamshidi needle a bone marrow aspirate was taken from the right posterior iliac crest.  Then using an 8 G 6\" needle a core biopsy was obtained. A sterile bandage was applied.  The patient had no significant bleeding.  The sample was sent for histology, flow cytometry, and cytogenetics.     Patient tolerated well, no complications. Patient may be discharged from outpt oncology following procedure once Shyanne Score is at least 8 or meets pre-procedure Shyanne Score.  Nursing to notify physician if pt doesn't meet criteria for discharge.    Niki Morton DO  [facility]  [phone]

## 2025-03-26 LAB
Lab: NORMAL
REPORT: NORMAL
THIS TEST SENT TO: NORMAL

## 2025-07-23 ENCOUNTER — HOSPITAL ENCOUNTER (OUTPATIENT)
Dept: GENERAL RADIOLOGY | Age: 55
Discharge: HOME | End: 2025-07-23
Payer: COMMERCIAL

## 2025-07-23 DIAGNOSIS — C90.00 MYELOMA ASSOCIATED AMYLOIDOSIS (HCC): ICD-10-CM

## 2025-07-23 DIAGNOSIS — E85.9 MYELOMA ASSOCIATED AMYLOIDOSIS (HCC): ICD-10-CM

## 2025-07-23 PROCEDURE — 73502 X-RAY EXAM HIP UNI 2-3 VIEWS: CPT

## (undated) DEVICE — PORT INSERTION: Brand: MEDLINE INDUSTRIES, INC.

## (undated) DEVICE — DRESSING FOAM DISK DIA1IN H 7MM HYDRPHLC CHG IMPREG IN SL

## (undated) DEVICE — SUTURE MCRYL SZ 4-0 L27IN ABSRB UD L19MM PS-2 1/2 CIR PRIM Y426H

## (undated) DEVICE — TOWEL,STOP FLAG GOLD N-W: Brand: MEDLINE

## (undated) DEVICE — CAP IV VLV LUER ACCS DISINFECT SWABPACK XT

## (undated) DEVICE — GLOVE SURG SZ 7 CRM LTX FREE POLYISOPRENE POLYMER BEAD ANTI

## (undated) DEVICE — DRESSING GERM DIA1IN CNTR H DIA7MM BLU CHG ANTIMIC PROTCT

## (undated) DEVICE — RADIFOCUS GLIDEWIRE: Brand: GLIDEWIRE

## (undated) DEVICE — HICKMAN TRIFUSION TRIPLE-LUMEN LONG-TERM CENTRAL VENOUS CATHETER 12F INTERMEDIATE TRAY (23CM): Brand: HICKMAN TRIFUSION

## (undated) DEVICE — CONNECTOR IV TB L28MM CLR VLV ACCS NDLLSS DISP MAXPLUS

## (undated) DEVICE — SUTURE PROL SZ 2-0 L36IN NONABSORBABLE BLU SH L26MM 1/2 CIR 8523H

## (undated) DEVICE — SYRINGE MED 10ML SLIP TIP BLNT FILL AND LUERLOCK DISP